# Patient Record
Sex: FEMALE | Race: WHITE | NOT HISPANIC OR LATINO | Employment: OTHER | URBAN - METROPOLITAN AREA
[De-identification: names, ages, dates, MRNs, and addresses within clinical notes are randomized per-mention and may not be internally consistent; named-entity substitution may affect disease eponyms.]

---

## 2017-03-12 ENCOUNTER — OFFICE VISIT (OUTPATIENT)
Dept: URGENT CARE | Facility: CLINIC | Age: 67
End: 2017-03-12
Payer: MEDICARE

## 2017-03-12 VITALS
WEIGHT: 163 LBS | BODY MASS INDEX: 27.16 KG/M2 | SYSTOLIC BLOOD PRESSURE: 120 MMHG | DIASTOLIC BLOOD PRESSURE: 80 MMHG | HEIGHT: 65 IN | OXYGEN SATURATION: 96 % | TEMPERATURE: 99.1 F | RESPIRATION RATE: 16 BRPM | HEART RATE: 78 BPM

## 2017-03-12 DIAGNOSIS — R05.8 NON-PRODUCTIVE COUGH: ICD-10-CM

## 2017-03-12 DIAGNOSIS — J03.90 TONSILLITIS: ICD-10-CM

## 2017-03-12 LAB
INT CON NEG: NEGATIVE
INT CON POS: POSITIVE
S PYO AG THROAT QL: NEGATIVE

## 2017-03-12 PROCEDURE — G8432 DEP SCR NOT DOC, RNG: HCPCS | Performed by: PHYSICIAN ASSISTANT

## 2017-03-12 PROCEDURE — G8484 FLU IMMUNIZE NO ADMIN: HCPCS | Performed by: PHYSICIAN ASSISTANT

## 2017-03-12 PROCEDURE — 99214 OFFICE O/P EST MOD 30 MIN: CPT | Performed by: PHYSICIAN ASSISTANT

## 2017-03-12 PROCEDURE — G8420 CALC BMI NORM PARAMETERS: HCPCS | Performed by: PHYSICIAN ASSISTANT

## 2017-03-12 PROCEDURE — 4040F PNEUMOC VAC/ADMIN/RCVD: CPT | Mod: 8P | Performed by: PHYSICIAN ASSISTANT

## 2017-03-12 PROCEDURE — 3014F SCREEN MAMMO DOC REV: CPT | Performed by: PHYSICIAN ASSISTANT

## 2017-03-12 PROCEDURE — 1101F PT FALLS ASSESS-DOCD LE1/YR: CPT | Mod: 8P | Performed by: PHYSICIAN ASSISTANT

## 2017-03-12 PROCEDURE — 87880 STREP A ASSAY W/OPTIC: CPT | Performed by: PHYSICIAN ASSISTANT

## 2017-03-12 PROCEDURE — 3017F COLORECTAL CA SCREEN DOC REV: CPT | Mod: 8P | Performed by: PHYSICIAN ASSISTANT

## 2017-03-12 PROCEDURE — 1036F TOBACCO NON-USER: CPT | Performed by: PHYSICIAN ASSISTANT

## 2017-03-12 RX ORDER — AZITHROMYCIN 250 MG/1
TABLET, FILM COATED ORAL
Qty: 6 TAB | Refills: 1 | Status: SHIPPED | OUTPATIENT
Start: 2017-03-12 | End: 2018-07-02

## 2017-03-12 ASSESSMENT — ENCOUNTER SYMPTOMS
TROUBLE SWALLOWING: 1
SORE THROAT: 1
SPUTUM PRODUCTION: 0
COUGH: 1
CONSTITUTIONAL NEGATIVE: 1
EYES NEGATIVE: 1
SWOLLEN GLANDS: 1

## 2017-03-12 NOTE — PROGRESS NOTES
Subjective:      Allison Cespedes is a 66 y.o. female who presents with Pharyngitis            Pharyngitis   This is a new problem. The current episode started in the past 7 days. The problem has been unchanged. Neither side of throat is experiencing more pain than the other. There has been no fever. The pain is moderate. Associated symptoms include coughing, swollen glands and trouble swallowing. Pertinent negatives include no congestion. She has had no exposure to strep or mono. She has tried nothing for the symptoms. The treatment provided no relief.       Review of Systems   Constitutional: Negative.    HENT: Positive for sore throat and trouble swallowing. Negative for congestion.    Eyes: Negative.    Respiratory: Positive for cough. Negative for sputum production.    Skin: Negative.           Objective:     There were no vitals taken for this visit.     Physical Exam   Constitutional: She is oriented to person, place, and time. She appears well-developed and well-nourished. No distress.   HENT:   Head: Normocephalic and atraumatic.   Mouth/Throat: No oropharyngeal exudate.   +phar./tons redn     Eyes: EOM are normal. Pupils are equal, round, and reactive to light.   Neck: Normal range of motion. Neck supple.   Cardiovascular: Normal rate.    Pulmonary/Chest: Effort normal and breath sounds normal. No respiratory distress. She has no wheezes. She has no rales.   Lymphadenopathy:     She has cervical adenopathy.   Neurological: She is alert and oriented to person, place, and time.   Skin: Skin is warm and dry.   Psychiatric: She has a normal mood and affect. Her behavior is normal. Judgment and thought content normal.   Nursing note and vitals reviewed.  There were no vitals filed for this visit.  Active Ambulatory Problems     Diagnosis Date Noted   • No Active Ambulatory Problems     Resolved Ambulatory Problems     Diagnosis Date Noted   • No Resolved Ambulatory Problems     Past Medical History    Diagnosis Date   • Allergy    • Arthritis      Current Outpatient Prescriptions on File Prior to Visit   Medication Sig Dispense Refill   • hydrocodone-acetaminophen (NORCO) 5-325 MG Tab per tablet Take 1-2 Tabs by mouth every 6 hours as needed. 20 Tab 0   • docusate sodium (COLACE) 100 MG Cap Take 1 Cap by mouth 2 times a day. 60 Cap 3   • Cetirizine-Pseudoephedrine (ZYRTEC-D PO) Take  by mouth.     • promethazine-codeine (PHENERGAN-CODEINE) 6.25-10 MG/5ML Syrup Take 5-10 mL by mouth 3 times a day as needed for Cough (or use qhs). 150 mL 0   • azithromycin (ZITHROMAX) 250 MG Tab z-cuca; U.D. 6 Tab 1   • estradiol (VIVELLE-DOT) 0.0375 MG/24HR patch Apply 1 Patch to skin as directed.     • progesterone (PROMETRIUM) 100 MG CAPS Take 100 mg by mouth every day.     • hydrOXYzine (ATARAX) 25 MG TABS Take 25 mg by mouth 3 times a day as needed for Itching.     • MELOXICAM PO Take 15 mg by mouth.     • omeprazole (PRILOSEC) 20 MG delayed-release capsule Take 20 mg by mouth every day.     • lovastatin (MEVACOR) 20 MG TABS Take 20 mg by mouth every evening.     • dicyclomine (BENTYL) 10 MG CAPS Take 10 mg by mouth 4 Times a Day,Before Meals and at Bedtime.     • Aspirin-Acetaminophen-Caffeine (EXCEDRIN PO) Take  by mouth.     • vitamin D, Ergocalciferol, (DRISDOL) 19714 UNITS CAPS capsule Take  by mouth every 7 days.     • Probiotic Product (PROBIOTIC DAILY PO) Take  by mouth.     • Multiple Vitamin (MULTI VITAMIN DAILY PO) Take  by mouth.     • Fexofenadine HCl (ALLEGRA PO) Take  by mouth.     • Fluticasone-Salmeterol (ADVAIR DISKUS INH) Inhale  by mouth.     • ALBUTEROL INH Inhale  by mouth.     • predniSONE (DELTASONE) 20 MG TABS Take 4 tabs PO today, then take 2 tabs PO days #2-#5 12 Tab 0   • fluticasone-salmeterol (ADVAIR) 250-50 MCG/DOSE AEPB Inhale 1 Puff by mouth every 12 hours.       • levothyroxine (SYNTHROID) 100 MCG TABS Take 100 mcg by mouth every day.     • liothyronine (CYTOMEL) 5 MCG TABS Take 5 mcg by  mouth every day.     • BuPROPion HCl (WELLBUTRIN PO) Take 150 mg by mouth.     • propranolol (INDERAL) 20 MG TABS Take 20 mg by mouth 2 times a day. Patient taking 1/2 tab BID     • Montelukast Sodium (SINGULAIR PO) Take  by mouth.     • Non Formulary Request Pt. Is also using two inhalers for allergies        No current facility-administered medications on file prior to visit.     Gargles, Cepacol lozenges, Aleve/Advil as needed for throat pain  Family History   Problem Relation Age of Onset   • Non-contributory Mother    • Non-contributory Father      Pcn         rst ng     Assessment/Plan:     ·  tonsillitis, cough      · otc prn; rx zpak prn sx persist/worsen

## 2017-03-12 NOTE — MR AVS SNAPSHOT
"        Allison Cespedes   3/12/2017 1:30 PM   Office Visit   MRN: [id number]    Department:  [facility]   Dept Phone:  [phone]    Description:  Female : 1950   Provider:  Agustin Trivedi PA-C           Reason for Visit     Pharyngitis cough,fever,sinus      Allergies as of 3/12/2017     Allergen Noted Reactions    Pcn [Penicillins] 2012         You were diagnosed with     Tonsillitis   [104382]       Non-productive cough   [278328]         Vital Signs     Blood Pressure Pulse Temperature Respirations Height Weight    120/80 mmHg 78 37.3 °C (99.1 °F) 16 1.651 m (5' 5\") 73.936 kg (163 lb)    Body Mass Index Oxygen Saturation Smoking Status             27.12 kg/m2 96% Former Smoker         Basic Information     Date Of Birth Sex Race Ethnicity Preferred Language    1950 Female White Non- English      Health Maintenance        Date Due Completion Dates    IMM DTaP/Tdap/Td Vaccine (1 - Tdap) 1969 ---    PAP SMEAR 1971 ---    COLONOSCOPY 2000 ---    IMM ZOSTER VACCINE 2010 ---    BONE DENSITY 2015 ---    IMM PNEUMOCOCCAL 65+ (ADULT) LOW/MEDIUM RISK SERIES (1 of 2 - PCV13) 2015 ---    IMM INFLUENZA (1) 2016 ---    MAMMOGRAM 2017, 2005, 2004            Current Immunizations     No immunizations on file.      Below and/or attached are the medications your provider expects you to take. Review all of your home medications and newly ordered medications with your provider and/or pharmacist. Follow medication instructions as directed by your provider and/or pharmacist. Please keep your medication list with you and share with your provider. Update the information when medications are discontinued, doses are changed, or new medications (including over-the-counter products) are added; and carry medication information at all times in the event of emergency situations     Allergies:  PCN - (reactions not documented)               "   Medications  Valid as of: March 12, 2017 -  1:56 PM    Generic Name Brand Name Tablet Size Instructions for use    Albuterol   Inhale  by mouth.        Aspirin-Acetaminophen-Caffeine   Take  by mouth.        Azithromycin (Tab) ZITHROMAX 250 MG z-cuca; U.D.        Azithromycin (Tab) ZITHROMAX 250 MG z-cuca; U.D.        BuPROPion HCl   Take 150 mg by mouth.        Cetirizine-Pseudoephedrine   Take  by mouth.        Dicyclomine HCl (Cap) BENTYL 10 MG Take 10 mg by mouth 4 Times a Day,Before Meals and at Bedtime.        Docusate Sodium (Cap) COLACE 100 MG Take 1 Cap by mouth 2 times a day.        Ergocalciferol (Cap) DRISDOL 94762 UNITS Take  by mouth every 7 days.        Estradiol (PATCH BIWEEKLY) VIVELLE 0.0375 MG/24HR Apply 1 Patch to skin as directed.        Fexofenadine HCl   Take  by mouth.        Fluticasone-Salmeterol (AEROSOL POWDER, BREATH ACTIVATED) ADVAIR 250-50 MCG/DOSE Inhale 1 Puff by mouth every 12 hours.          Fluticasone-Salmeterol   Inhale  by mouth.        Hydrocodone-Acetaminophen (Tab) NORCO 5-325 MG Take 1-2 Tabs by mouth every 6 hours as needed.        HydrOXYzine HCl (Tab) ATARAX 25 MG Take 25 mg by mouth 3 times a day as needed for Itching.        Levothyroxine Sodium (Tab) SYNTHROID 100 MCG Take 100 mcg by mouth every day.        Liothyronine Sodium (Tab) CYTOMEL 5 MCG Take 5 mcg by mouth every day.        Lovastatin (Tab) MEVACOR 20 MG Take 20 mg by mouth every evening.        Meloxicam   Take 15 mg by mouth.        Montelukast Sodium   Take  by mouth.        Multiple Vitamin   Take  by mouth.        Non Formulary Request Non Formulary Request  Pt. Is also using two inhalers for allergies         Omeprazole (CAPSULE DELAYED RELEASE) PRILOSEC 20 MG Take 20 mg by mouth every day.        PredniSONE (Tab) DELTASONE 20 MG Take 4 tabs PO today, then take 2 tabs PO days #2-#5        Probiotic Product   Take  by mouth.        Progesterone Micronized (Cap) PROMETRIUM 100 MG Take 100 mg by mouth  every day.        Promethazine-Codeine (Syrup) PHENERGAN-CODEINE 6.25-10 MG/5ML Take 5-10 mL by mouth 3 times a day as needed for Cough (or use qhs).        Propranolol HCl (Tab) INDERAL 20 MG Take 20 mg by mouth 2 times a day. Patient taking 1/2 tab BID        .                 Medicines prescribed today were sent to:     [facility] [address] [facility], [place] - [address] AT [address] [id number] [facility] [address]    Phone: [phone] Fax: [phone]    Open 24 Hours?: No      Medication refill instructions:       If your prescription bottle indicates you have medication refills left, it is not necessary to call your provider’s office. Please contact your pharmacy and they will refill your medication.    If your prescription bottle indicates you do not have any refills left, you may request refills at any time through one of the following ways: The online [employer] system (except Urgent Care), by calling your provider’s office, or by asking your pharmacy to contact your provider’s office with a refill request. Medication refills are processed only during regular business hours and may not be available until the next business day. Your provider may request additional information or to have a follow-up visit with you prior to refilling your medication.   *Please Note: Medication refills are assigned a new Rx number when refilled electronically. Your pharmacy may indicate that no refills were authorized even though a new prescription for the same medication is available at the pharmacy. Please request the medicine by name with the pharmacy before contacting your provider for a refill.           [employer] Access Code: Activation code not generated  Current [employer] Status: Active

## 2017-10-05 ENCOUNTER — HOSPITAL ENCOUNTER (OUTPATIENT)
Dept: RADIOLOGY | Facility: MEDICAL CENTER | Age: 67
End: 2017-10-05
Attending: NURSE PRACTITIONER
Payer: MEDICARE

## 2017-10-05 DIAGNOSIS — Z12.31 VISIT FOR SCREENING MAMMOGRAM: ICD-10-CM

## 2017-10-05 PROCEDURE — G0202 SCR MAMMO BI INCL CAD: HCPCS

## 2018-07-02 ENCOUNTER — APPOINTMENT (OUTPATIENT)
Dept: ADMISSIONS | Facility: MEDICAL CENTER | Age: 68
End: 2018-07-02
Attending: SURGERY
Payer: MEDICARE

## 2018-07-06 ENCOUNTER — HOSPITAL ENCOUNTER (OUTPATIENT)
Facility: MEDICAL CENTER | Age: 68
End: 2018-07-06
Attending: SURGERY | Admitting: SURGERY
Payer: MEDICARE

## 2018-07-06 VITALS
HEART RATE: 84 BPM | DIASTOLIC BLOOD PRESSURE: 77 MMHG | HEIGHT: 65 IN | TEMPERATURE: 97 F | SYSTOLIC BLOOD PRESSURE: 123 MMHG | RESPIRATION RATE: 17 BRPM | BODY MASS INDEX: 28.28 KG/M2 | OXYGEN SATURATION: 91 % | WEIGHT: 169.75 LBS

## 2018-07-06 PROCEDURE — 160035 HCHG PACU - 1ST 60 MINS PHASE I: Performed by: SURGERY

## 2018-07-06 PROCEDURE — A6402 STERILE GAUZE <= 16 SQ IN: HCPCS | Performed by: SURGERY

## 2018-07-06 PROCEDURE — 88304 TISSUE EXAM BY PATHOLOGIST: CPT

## 2018-07-06 PROCEDURE — 501838 HCHG SUTURE GENERAL: Performed by: SURGERY

## 2018-07-06 PROCEDURE — 160028 HCHG SURGERY MINUTES - 1ST 30 MINS LEVEL 3: Performed by: SURGERY

## 2018-07-06 PROCEDURE — 700111 HCHG RX REV CODE 636 W/ 250 OVERRIDE (IP)

## 2018-07-06 PROCEDURE — 700101 HCHG RX REV CODE 250

## 2018-07-06 PROCEDURE — 160009 HCHG ANES TIME/MIN: Performed by: SURGERY

## 2018-07-06 PROCEDURE — A9270 NON-COVERED ITEM OR SERVICE: HCPCS

## 2018-07-06 PROCEDURE — 160048 HCHG OR STATISTICAL LEVEL 1-5: Performed by: SURGERY

## 2018-07-06 PROCEDURE — 500122 HCHG BOVIE, BLADE: Performed by: SURGERY

## 2018-07-06 PROCEDURE — 160002 HCHG RECOVERY MINUTES (STAT): Performed by: SURGERY

## 2018-07-06 PROCEDURE — 700102 HCHG RX REV CODE 250 W/ 637 OVERRIDE(OP)

## 2018-07-06 PROCEDURE — 160036 HCHG PACU - EA ADDL 30 MINS PHASE I: Performed by: SURGERY

## 2018-07-06 RX ORDER — BUPIVACAINE HYDROCHLORIDE AND EPINEPHRINE 5; 5 MG/ML; UG/ML
INJECTION, SOLUTION EPIDURAL; INTRACAUDAL; PERINEURAL
Status: DISCONTINUED
Start: 2018-07-06 | End: 2018-07-06 | Stop reason: HOSPADM

## 2018-07-06 RX ORDER — BUPIVACAINE HYDROCHLORIDE AND EPINEPHRINE 2.5; 5 MG/ML; UG/ML
INJECTION, SOLUTION EPIDURAL; INFILTRATION; INTRACAUDAL; PERINEURAL
Status: DISCONTINUED | OUTPATIENT
Start: 2018-07-06 | End: 2018-07-06 | Stop reason: HOSPADM

## 2018-07-06 RX ORDER — SODIUM CHLORIDE, SODIUM LACTATE, POTASSIUM CHLORIDE, CALCIUM CHLORIDE 600; 310; 30; 20 MG/100ML; MG/100ML; MG/100ML; MG/100ML
INJECTION, SOLUTION INTRAVENOUS CONTINUOUS
Status: DISCONTINUED | OUTPATIENT
Start: 2018-07-06 | End: 2018-07-06 | Stop reason: HOSPADM

## 2018-07-06 RX ORDER — OXYCODONE HCL 5 MG/5 ML
SOLUTION, ORAL ORAL
Status: COMPLETED
Start: 2018-07-06 | End: 2018-07-06

## 2018-07-06 RX ADMIN — OXYCODONE HYDROCHLORIDE 5 MG: 5 SOLUTION ORAL at 13:58

## 2018-07-06 RX ADMIN — ALBUTEROL SULFATE 2.5 MG: 2.5 SOLUTION RESPIRATORY (INHALATION) at 15:45

## 2018-07-06 RX ADMIN — SODIUM CHLORIDE, SODIUM LACTATE, POTASSIUM CHLORIDE, CALCIUM CHLORIDE: 600; 310; 30; 20 INJECTION, SOLUTION INTRAVENOUS at 11:40

## 2018-07-06 ASSESSMENT — PAIN SCALES - GENERAL
PAINLEVEL_OUTOF10: 3
PAINLEVEL_OUTOF10: 3
PAINLEVEL_OUTOF10: 0
PAINLEVEL_OUTOF10: 3
PAINLEVEL_OUTOF10: 3
PAINLEVEL_OUTOF10: 2
PAINLEVEL_OUTOF10: 3
PAINLEVEL_OUTOF10: 4
PAINLEVEL_OUTOF10: 3
PAINLEVEL_OUTOF10: 2

## 2018-07-06 NOTE — DISCHARGE INSTRUCTIONS
ACTIVITY: Rest and take it easy for the first 24 hours.  A responsible adult is recommended to remain with you during that time.  It is normal to feel sleepy.  We encourage you to not do anything that requires balance, judgment or coordination.    MILD FLU-LIKE SYMPTOMS ARE NORMAL. YOU MAY EXPERIENCE GENERALIZED MUSCLE ACHES, THROAT IRRITATION, HEADACHE AND/OR SOME NAUSEA.    FOR 24 HOURS DO NOT:  Drive, operate machinery or run household appliances.  Drink beer or alcoholic beverages.   Make important decisions or sign legal documents.    SPECIAL INSTRUCTIONS:      Care of Your Incisions:  • Leave the bandages on for 48 hours. You can shower with the dressing on as it is waterproof.  Avoid getting lotions, powders or deodorant on the incision while it is healing.  • You may have thin paper strips across the incision. These are called Steri-Strips. When they start to curl at the edges, you can peel them off. It is okay to shower with these on.  • Don’t worry if the area under either incision feels firm or hard. This is normal and usually softens within a few months.     How to Manage Discomfort After Surgery:  • It is normal to have tenderness, discomfort or mild swelling at the site of the incisions.      You may also feel:  - Numbness at the incisions.     • You will be given a prescription for pain medication prior to being discharged from the hospital. If you are having pain, take the medication as directed by your physician and by the label directions. You should be comfortable and moving around. Most people use some prescription pain medication, though some need only over the counter pain medication, such as ibuprofen (Motrin) or acetaminophen (Tylenol). Some women have little pain and take nothing at all. Whatever amount of pain you have, it is important to listen to your body and use the medication if needed during your recovery.     • Prescription pain medication may cause constipation. If you are having  problems, use what you normally would or call your nurse for suggestions. It also helps to stay regular by including fiber in your diet (for example: bran or fruits and vegetables) and drink plenty of liquids (water, juice, etc.).     Activity:  • You may resume your normal routine, but avoid heavy lifting (over 10 pounds), pushing or pulling until your first post-operative visit, unless otherwise specified by your surgeon.  • Do not drive for at least 24-48 hours after surgery (unless otherwise directed by your surgeon), or while you are taking prescription pain medicine. Prescription pain medicine causes drowsiness (makes you sleepy) so you should avoid doing any tasks where you should be awake and alert (driving, operating machinery, etc).     When to Call Your Doctor/Nurse:  • If you have a fever greater than 100.5, increased pain, redness or warmth at the area around the incision, drainage from the incision or around the drain. Call Dr. Rivas's office at [phone] with any other questions or concerns.     You should have an appointment to see Dr. Rivas about a week after surgery, call [phone] if you do not already have an appointment.     Office address:  [address] [place] [address] [address]  [place] [address]        Swapna Rivas M.D.  [facility]  [phone]          DIET: To avoid nausea, slowly advance diet as tolerated, avoiding spicy or greasy foods for the first day.  Add more substantial food to your diet according to your physician's instructions.  Babies can be fed formula or breast milk as soon as they are hungry.  INCREASE FLUIDS AND FIBER TO AVOID CONSTIPATION.    SURGICAL DRESSING/BATHING: may shower tomorrow, remove dressing in 48 hours    FOLLOW-UP APPOINTMENT:  A follow-up appointment should be arranged with your doctor; call to schedule.    You should CALL YOUR PHYSICIAN if you develop:  Fever greater than 101 degrees F.  Pain not relieved by medication, or persistent nausea or  vomiting.  Excessive bleeding (blood soaking through dressing) or unexpected drainage from the wound.  Extreme redness or swelling around the incision site, drainage of pus or foul smelling drainage.  Inability to urinate or empty your bladder within 8 hours.  Problems with breathing or chest pain.    You should call [phone] if you develop problems with breathing or chest pain.  If you are unable to contact your doctor or surgical center, you should go to the nearest emergency room or urgent care center.  Physician's telephone #: [phone]    If any questions arise, call your doctor.  If your doctor is not available, please feel free to call the Surgical Center at [phone].  The Center is open Monday through Friday from 7AM to 7PM.  You can also call the [facility] HOTLINE open 24 hours/day, 7 days/week and speak to a nurse at [phone], or toll free at [phone].    A registered nurse may call you a few days after your surgery to see how you are doing after your procedure.    MEDICATIONS: Resume taking daily medication.  Take prescribed pain medication with food.  If no medication is prescribed, you may take non-aspirin pain medication if needed.  PAIN MEDICATION CAN BE VERY CONSTIPATING.  Take a stool softener or laxative such as senokot, pericolace, or milk of magnesia if needed.    Prescription given for home norco.  Last pain medication given at 1:58pm.    If your physician has prescribed pain medication that includes Acetaminophen (Tylenol), do not take additional Acetaminophen (Tylenol) while taking the prescribed medication.    Depression / Suicide Risk    As you are discharged from this [facility] facility, it is important to learn how to keep safe from harming yourself.    Recognize the warning signs:  · Abrupt changes in personality, positive or negative- including increase in energy   · Giving away possessions  · Change in eating patterns- significant weight changes-  positive or  negative  · Change in sleeping patterns- unable to sleep or sleeping all the time   · Unwillingness or inability to communicate  · Depression  · Unusual sadness, discouragement and loneliness  · Talk of wanting to die  · Neglect of personal appearance   · Rebelliousness- reckless behavior  · Withdrawal from people/activities they love  · Confusion- inability to concentrate     If you or a loved one observes any of these behaviors or has concerns about self-harm, here's what you can do:  · Talk about it- your feelings and reasons for harming yourself  · Remove any means that you might use to hurt yourself (examples: pills, rope, extension cords, firearm)  · Get professional help from the community (Mental Health, Substance Abuse, psychological counseling)  · Do not be alone:Call your Safe Contact- someone whom you trust who will be there for you.  · Call your local CRISIS HOTLINE 150-2098 or 743-173-5263  · Call your local Children's Mobile Crisis Response Team [place] (585) 330-3312 or www.Perk  · Call the toll free National Suicide Prevention Hotlines   · National Suicide Prevention Lifeline 066-329-TSTY (2944)  · National Hope Line Network 800-SUICIDE (782-4933)

## 2018-07-06 NOTE — OR NURSING
1450 Handoff report received from ZOYA Reyes. Patient sitting up in bed, using incentive spirometer as instructed.  at bedside.    1500  left for home for about an hour. Patient ate chocolate ice cream po without nausea. Continues to use incentive spirometer.    1525 Handoff report given to ZOYA Reyes.

## 2018-07-06 NOTE — PROGRESS NOTES
Discharge Instructions:    Care of Your Incisions:  • Leave the bandages on for 48 hours. You can shower with the dressing on as it is waterproof.  Avoid getting lotions, powders or deodorant on the incision while it is healing.  • You may have thin paper strips across the incision. These are called Steri-Strips. When they start to curl at the edges, you can peel them off. It is okay to shower with these on.  • Don’t worry if the area under either incision feels firm or hard. This is normal and usually softens within a few months.    How to Manage Discomfort After Surgery:  • It is normal to have tenderness, discomfort or mild swelling at the site of the incisions.     You may also feel:  - Numbness at the incisions.    • You will be given a prescription for pain medication prior to being discharged from the hospital. If you are having pain, take the medication as directed by your physician and by the label directions. You should be comfortable and moving around. Most people use some prescription pain medication, though some need only over the counter pain medication, such as ibuprofen (Motrin) or acetaminophen (Tylenol). Some women have little pain and take nothing at all. Whatever amount of pain you have, it is important to listen to your body and use the medication if needed during your recovery.    • Prescription pain medication may cause constipation. If you are having problems, use what you normally would or call your nurse for suggestions. It also helps to stay regular by including fiber in your diet (for example: bran or fruits and vegetables) and drink plenty of liquids (water, juice, etc.).    Activity:  • You may resume your normal routine, but avoid heavy lifting (over 10 pounds), pushing or pulling until your first post-operative visit, unless otherwise specified by your surgeon.  • Do not drive for at least 24-48 hours after surgery (unless otherwise directed by your surgeon), or while you are taking  prescription pain medicine. Prescription pain medicine causes drowsiness (makes you sleepy) so you should avoid doing any tasks where you should be awake and alert (driving, operating machinery, etc).    When to Call Your Doctor/Nurse:  • If you have a fever greater than 100.5, increased pain, redness or warmth at the area around the incision, drainage from the incision or around the drain. Call Dr. Rivas's office at [phone] with any other questions or concerns.    You should have an appointment to see Dr. Rivas about a week after surgery, call [phone] if you do not already have an appointment.    Office address:  [address] [place] [address] [address]  [place] [address]      Swapna Rivas M.D.  [facility]  [phone]

## 2018-07-06 NOTE — OR NURSING
1329 Patient arrived from OR on [facility]. Report received from RN and Anesthesia. Patient's VS WNL, patient arousable, stable, breathing even/non labored.     1358 Patient complained of 4/10 pain on back, appears calm, oxycodone given    1430 Per Dr. Rivas patient has home norco and does not need new prescriptions    1440 Patient on room air, desats to high 80's occasionally, educated on use of incentive spirometer. Incentive spirometer being used.     1630 Discharge instructions discussed with patient and family, copy given. Patient verbalized understanding to instructions. Belongings with patient.     1642 Discharge criteria met. PIV out, Discharged via wheelchair with  .

## 2018-07-06 NOTE — OP REPORT
Operative Report    Date: 7/6/2018    Surgeon: Swapna Rivas M.D.    Anesthesiologist: Daniel    Pre-operative Diagnosis:  Left shoulder subcutaneous mass, 8 cm     Post-operative Diagnosis: same     Procedure: excision subcutaneous mass, 8 cm x 8 cm      Procedure in detail: The patient was identified in the pre-operative holding area and brought to the operating room. Correct side and site were identified. Pre-operative antibiotics of ancef were administered prior to the procedure. Anesthesia was smoothly induced.The patient was then prepped and draped in the usual sterile fashion.    The skin was infiltrated with local anesthetic and a curvilinear incision was made over the mass.  The subcutaneous tissue was grasped and the mass excised using cautery. The specimen was then completely removed and was sent for permanent pathology. Meticulous hemostasis was achieved with electrocautery. The area was irrigated and suctioned. The skin was closed in two layers with vicryl and monocryl. Sterile dressings were applied.     The patient was awakened from anesthetic, and was taken to the recovery room in stable condition.    Sponge and needle counts were correct at the end of the case.     Specimen: left shoulder subcutaneous mass    EBL: minimal    Dispo: stable, extubated, to PACU    Swapna Rivas M.D.  [facility]  [phone]

## 2018-09-05 ENCOUNTER — HOSPITAL ENCOUNTER (OUTPATIENT)
Dept: RADIOLOGY | Facility: MEDICAL CENTER | Age: 68
End: 2018-09-05

## 2018-09-05 ENCOUNTER — OFFICE VISIT (OUTPATIENT)
Dept: CARDIOLOGY | Facility: MEDICAL CENTER | Age: 68
End: 2018-09-05
Payer: MEDICARE

## 2018-09-05 VITALS
WEIGHT: 170 LBS | HEART RATE: 92 BPM | BODY MASS INDEX: 28.32 KG/M2 | OXYGEN SATURATION: 95 % | DIASTOLIC BLOOD PRESSURE: 70 MMHG | HEIGHT: 65 IN | SYSTOLIC BLOOD PRESSURE: 112 MMHG

## 2018-09-05 DIAGNOSIS — R07.89 OTHER CHEST PAIN: ICD-10-CM

## 2018-09-05 DIAGNOSIS — R06.02 SOB (SHORTNESS OF BREATH): ICD-10-CM

## 2018-09-05 LAB — EKG IMPRESSION: NORMAL

## 2018-09-05 PROCEDURE — 99204 OFFICE O/P NEW MOD 45 MIN: CPT | Performed by: INTERNAL MEDICINE

## 2018-09-05 PROCEDURE — 93000 ELECTROCARDIOGRAM COMPLETE: CPT | Performed by: INTERNAL MEDICINE

## 2018-09-05 RX ORDER — DICLOFENAC SODIUM 75 MG/1
TABLET, DELAYED RELEASE ORAL
Refills: 2 | Status: ON HOLD | COMMUNITY
Start: 2018-08-13 | End: 2018-10-17

## 2018-09-05 RX ORDER — ESTRADIOL ACETATE 0.05 MG/D
RING VAGINAL
Refills: 0 | COMMUNITY
Start: 2018-06-28 | End: 2019-09-19

## 2018-09-05 RX ORDER — ESCITALOPRAM OXALATE 20 MG/1
TABLET ORAL
Refills: 3 | COMMUNITY
Start: 2018-08-18 | End: 2019-03-05

## 2018-09-05 RX ORDER — BUPROPION HYDROCHLORIDE 150 MG/1
TABLET ORAL
Refills: 3 | COMMUNITY
Start: 2018-07-09 | End: 2019-03-05

## 2018-09-05 ASSESSMENT — ENCOUNTER SYMPTOMS
CONSTITUTIONAL NEGATIVE: 1
NERVOUS/ANXIOUS: 1
EYES NEGATIVE: 1
BRUISES/BLEEDS EASILY: 0
HEARTBURN: 0
COUGH: 0
NAUSEA: 0
DEPRESSION: 1
HEMOPTYSIS: 0
MUSCULOSKELETAL NEGATIVE: 1
PND: 0
ABDOMINAL PAIN: 0
LOSS OF CONSCIOUSNESS: 0
DIZZINESS: 0
INSOMNIA: 1
MEMORY LOSS: 0
WHEEZING: 0

## 2018-09-05 NOTE — PROGRESS NOTES
Chief Complaint   Patient presents with   • Shortness of Breath   • Fatigue       Subjective:   Allison Cespedes is a 67 y.o. female who presents today as a new patient.  She is reestablishing care in regards to her breathlessness and ongoing chest discomfort  Her mother is 94 she helps take care of her long distance and this gets her upset sometimes.  She is originally from [place].    She takes medications as directed, tired a lot wonder if she has chronic fatigue.  Not sure if her SSRI is helping  Never put on medications for her palpitations these happen infrequently but do bother her greatly they feel heavy and catch her breath.  Thinks her sleep might be disturbed but thinks she had a sleep workup in the past that was negative    Worries that something is wrong and she might miss it  Testing done by PCP last February all normal and reassuring.  No changes in symptomatology since that point    Past Medical History:   Diagnosis Date   • Allergy    • Arthritis    • Breath shortness    • Bronchitis    • Cold    • Disorder of thyroid    • Heart burn    • Indigestion    • Pneumonia     in 1970's   • Psychiatric problem    • Snoring    • Tuberculosis      Past Surgical History:   Procedure Laterality Date   • MASS EXCISION GENERAL Left 7/6/2018    Procedure: MASS EXCISION GENERAL/ SUBCUTANEOUS MASS LEFT SHOULDER;  Surgeon: Swapna Rivas M.D.;  Location: SURGERY SAME DAY [facility];  Service: General     Family History   Problem Relation Age of Onset   • Non-contributory Mother    • Non-contributory Father      Social History     Social History   • Marital status:      Spouse name: N/A   • Number of children: N/A   • Years of education: N/A     Occupational History   • Not on file.     Social History Main Topics   • Smoking status: Former Smoker   • Smokeless tobacco: Never Used   • Alcohol use Yes      Comment: 2 per month   • Drug use: Unknown   • Sexual activity: Not on file     Other Topics  Concern   • Not on file     Social History Narrative   • No narrative on file     Allergies   Allergen Reactions   • Pcn [Penicillins]      Rxn as child     Outpatient Encounter Prescriptions as of 9/5/2018   Medication Sig Dispense Refill   • diclofenac EC (VOLTAREN) 75 MG Tablet Delayed Response TK 1 T PO BID  2   • escitalopram (LEXAPRO) 20 MG tablet TK 1 T PO QD  3   • FEMRING 0.05 MG/24HR RING INSERT 1 VAGINALLY Q 3 MONTHS  0   • buPROPion (WELLBUTRIN XL) 150 MG XL tablet TK 1 T PO QD  3   • DILTIAZem (CARDIZEM) 30 MG Tab Take 1 Tab by mouth 2 times a day as needed. 30 Tab 3   • hydrocodone-acetaminophen (NORCO) 5-325 MG Tab per tablet Take 1-2 Tabs by mouth every 6 hours as needed. 20 Tab 0   • progesterone (PROMETRIUM) 100 MG CAPS Take 100 mg by mouth every day.     • omeprazole (PRILOSEC) 20 MG delayed-release capsule Take 20 mg by mouth every day.     • lovastatin (MEVACOR) 20 MG TABS Take 20 mg by mouth every evening.     • dicyclomine (BENTYL) 10 MG CAPS Take 10 mg by mouth 4 Times a Day,Before Meals and at Bedtime.     • Aspirin-Acetaminophen-Caffeine (EXCEDRIN PO) Take  by mouth.     • vitamin D, Ergocalciferol, (DRISDOL) 08853 UNITS CAPS capsule Take  by mouth every 7 days.     • Probiotic Product (PROBIOTIC DAILY PO) Take  by mouth.     • Fluticasone-Salmeterol (ADVAIR DISKUS INH) Inhale  by mouth.     • levothyroxine (SYNTHROID) 100 MCG TABS Take 100 mcg by mouth every day.     • liothyronine (CYTOMEL) 5 MCG TABS Take 5 mcg by mouth every day.     • propranolol (INDERAL) 20 MG TABS Take 20 mg by mouth 2 times a day. Patient taking 1/2 tab BID     • [DISCONTINUED] BuPROPion HCl (WELLBUTRIN PO) Take 150 mg by mouth.       No facility-administered encounter medications on file as of 9/5/2018.      Review of Systems   Constitutional: Negative.    HENT: Negative for hearing loss and tinnitus.    Eyes: Negative.    Respiratory: Negative for cough, hemoptysis and wheezing.    Cardiovascular: Negative for  "leg swelling and PND.   Gastrointestinal: Negative for abdominal pain, heartburn and nausea.   Musculoskeletal: Negative.    Skin: Negative.    Neurological: Negative for dizziness and loss of consciousness.   Endo/Heme/Allergies: Does not bruise/bleed easily.   Psychiatric/Behavioral: Positive for depression. Negative for memory loss and suicidal ideas. The patient is nervous/anxious and has insomnia.    All other systems reviewed and are negative.       Objective:   /70   Pulse 92   Ht 1.651 m (5' 5\")   Wt 77.1 kg (170 lb)   SpO2 95%   BMI 28.29 kg/m²     Physical Exam   Constitutional: She is oriented to person, place, and time. She appears well-nourished.   Obese mildly anxious no acute distress   HENT:   Head: Normocephalic and atraumatic.   Eyes: Pupils are equal, round, and reactive to light. EOM are normal.   Neck: No JVD present. No thyromegaly present.   Cardiovascular: Normal rate, regular rhythm and intact distal pulses.    No murmur heard.  Pulmonary/Chest: Breath sounds normal. No respiratory distress. She exhibits no tenderness.   Abdominal: Bowel sounds are normal. She exhibits no distension.   Musculoskeletal: She exhibits no edema or tenderness.   Lymphadenopathy:     She has no cervical adenopathy.   Neurological: She is alert and oriented to person, place, and time. Coordination normal.   Skin: Skin is dry. No rash noted.   Psychiatric: She has a normal mood and affect. Her behavior is normal.       Assessment:     1. SOB (shortness of breath)  EKG    NM-CARDIAC PET   2. Other chest pain  NM-CARDIAC PET       Medical Decision Making:  Today's Assessment / Status / Plan:     Twelve-lead EKG done today and reviewed by me is normal sinus rhythm normal EKG    Chest discomfort chest recurrent angina in the setting of obesity  Is a PET scan.  We will look for underlying structural heart disease she is large breasted and obese, the test of choice in this setting with her symptoms escalating " in the form of coronary disease.  If it is abnormal discussed the eventuality of needing further testing including angiography.    We will see her back in a month.  I suggested she increase her SSRI to 30 mg a day   I gave her prescription for diltiazem 30 to take twice a day as needed for symptoms  She will let us know in the interim if she is not well    I would have a low threshold to check an x-ray and send for PFTs or sleep study if she is still symptomatic    Will follow-up as needed after this

## 2018-09-06 ENCOUNTER — TELEPHONE (OUTPATIENT)
Dept: CARDIOLOGY | Facility: MEDICAL CENTER | Age: 68
End: 2018-09-06

## 2018-09-06 NOTE — TELEPHONE ENCOUNTER
Allison Lovett M.D. 11 hours ago (10:37 PM)         I am currently taking 1/2 of a propranolol each night as a migraine prevention.  Should I continue on that or will the Diltizaren take care of the migraine prevention?  Thank you very much.         Kyra Lovett M.D.   You 18 minutes ago (10:03 AM)      Ok to do both! (Routing comment)       Pt notified through [employer]

## 2018-09-08 ASSESSMENT — ENCOUNTER SYMPTOMS
HEMOPTYSIS: 0
WHEEZING: 1
RESPIRATORY SYMPTOMS COMMENTS: YES
DYSPNEA AT REST: 0
CHEST TIGHTNESS: 1

## 2018-09-10 ENCOUNTER — OFFICE VISIT (OUTPATIENT)
Dept: PULMONOLOGY | Facility: HOSPICE | Age: 68
End: 2018-09-10
Payer: MEDICARE

## 2018-09-10 VITALS
DIASTOLIC BLOOD PRESSURE: 74 MMHG | WEIGHT: 174.6 LBS | HEART RATE: 62 BPM | BODY MASS INDEX: 29.09 KG/M2 | SYSTOLIC BLOOD PRESSURE: 122 MMHG | OXYGEN SATURATION: 95 % | RESPIRATION RATE: 16 BRPM | HEIGHT: 65 IN | TEMPERATURE: 97.5 F

## 2018-09-10 DIAGNOSIS — J45.30 MILD PERSISTENT ASTHMA WITHOUT COMPLICATION: ICD-10-CM

## 2018-09-10 DIAGNOSIS — Z87.891 FORMER SMOKER: ICD-10-CM

## 2018-09-10 PROCEDURE — 99204 OFFICE O/P NEW MOD 45 MIN: CPT | Performed by: INTERNAL MEDICINE

## 2018-09-10 RX ORDER — BUDESONIDE AND FORMOTEROL FUMARATE DIHYDRATE 160; 4.5 UG/1; UG/1
2 AEROSOL RESPIRATORY (INHALATION) 2 TIMES DAILY
Qty: 1 INHALER | Refills: 6 | Status: SHIPPED | OUTPATIENT
Start: 2018-09-10 | End: 2018-09-14

## 2018-09-10 NOTE — PROGRESS NOTES
Chief Complaint   Patient presents with   • Establish Care     Asthma. last seen on 08/2015       HPI: This patient is a 67 y.o. Female known to me from 2006, who resents to reestablish care for obstructive lung disease.  She has a 60-pack-year history of tobacco use, quit smoking in 1999.  She has had mild exertional dyspnea for >12 years, which she feels has worsened over the past year.  She has associated nonproductive cough, and periodic wheezing and chest tightness.  She had tried her mother's Advair discus, with subjective benefit.  Former pulmonary function testing in 2015 showed mild obstructive ventilatory defect, with baseline FEV1: 2 L or 78% and post albuterol FEV1 2.04 L or 80%.  She has a history of latent TB treated with 9 months of isoniazid.  She feels fatigued, and has chronic back pain requiring hydrocodone.  She is undergoing workup with Dr. Lovett to exclude cardiovascular etiology for dyspnea.      Past Medical History:   Diagnosis Date   • Allergy    • Arthritis    • Breath shortness    • Bronchitis    • Cold    • Disorder of thyroid    • Heart burn    • Indigestion    • Pneumonia     in 1970's   • Psychiatric problem    • Snoring    • Tuberculosis        Social History     Social History   • Marital status:      Spouse name: N/A   • Number of children: N/A   • Years of education: N/A     Occupational History   • Not on file.     Social History Main Topics   • Smoking status: Former Smoker     Packs/day: 2.00     Years: 30.00     Types: Cigarettes     Quit date: 2/3/1999   • Smokeless tobacco: Never Used   • Alcohol use No   • Drug use: No   • Sexual activity: Not on file     Other Topics Concern   • Not on file     Social History Narrative   • No narrative on file       Family History   Problem Relation Age of Onset   • Non-contributory Mother    • Hypertension Mother    • Non-contributory Father        Current Outpatient Prescriptions on File Prior to Visit   Medication Sig Dispense  Refill   • diclofenac EC (VOLTAREN) 75 MG Tablet Delayed Response TK 1 T PO BID  2   • escitalopram (LEXAPRO) 20 MG tablet TK 1 T PO QD  3   • FEMRING 0.05 MG/24HR RING INSERT 1 VAGINALLY Q 3 MONTHS  0   • buPROPion (WELLBUTRIN XL) 150 MG XL tablet TK 1 T PO QD  3   • DILTIAZem (CARDIZEM) 30 MG Tab Take 1 Tab by mouth 2 times a day as needed. 30 Tab 3   • hydrocodone-acetaminophen (NORCO) 5-325 MG Tab per tablet Take 1-2 Tabs by mouth every 6 hours as needed. 20 Tab 0   • progesterone (PROMETRIUM) 100 MG CAPS Take 100 mg by mouth every day.     • omeprazole (PRILOSEC) 20 MG delayed-release capsule Take 20 mg by mouth every day.     • lovastatin (MEVACOR) 20 MG TABS Take 20 mg by mouth every evening.     • dicyclomine (BENTYL) 10 MG CAPS Take 10 mg by mouth 4 Times a Day,Before Meals and at Bedtime.     • Aspirin-Acetaminophen-Caffeine (EXCEDRIN PO) Take  by mouth.     • vitamin D, Ergocalciferol, (DRISDOL) 87272 UNITS CAPS capsule Take  by mouth every 7 days.     • Probiotic Product (PROBIOTIC DAILY PO) Take  by mouth.     • Fluticasone-Salmeterol (ADVAIR DISKUS INH) Inhale  by mouth.     • levothyroxine (SYNTHROID) 100 MCG TABS Take 100 mcg by mouth every day.     • liothyronine (CYTOMEL) 5 MCG TABS Take 5 mcg by mouth every day.     • propranolol (INDERAL) 20 MG TABS Take 20 mg by mouth 2 times a day. Patient taking 1/2 tab BID       No current facility-administered medications on file prior to visit.        Allergies: Pcn [penicillins]    ROS:   Constitutional: Denies fevers, chills, night sweats,+ fatigue or weight loss  Eyes: Denies vision loss, pain, drainage, double vision  Ears, Nose, Throat: Denies earache, difficulty hearing, tinnitus, nasal congestion, hoarseness  Cardiovascular: Denies chest pain, tightness, palpitations, orthopnea or edema  Respiratory: As in HPI  Sleep: Denies daytime sleepiness, snoring, apneas, insomnia, morning headaches  GI: Denies heartburn, dysphagia, nausea, abdominal pain,  "diarrhea or constipation  : Denies frequent urination, hematuria, discharge or painful urination  Musculoskeletal: + back pain, painful joints, denies ore muscles  Neurological: Denies weakness or headaches  Skin: No rashes    Blood pressure 122/74, pulse 62, temperature 36.4 °C (97.5 °F), resp. rate 16, height 1.651 m (5' 5\"), weight 79.2 kg (174 lb 9.6 oz), SpO2 95 %.    Physical Exam:  Appearance: Well-nourished, well-developed, in no acute distress  HEENT: Normocephalic, atraumatic, white sclera, PERRLA, oropharynx clear  Neck: No adenopathy or masses  Respiratory: no intercostal retractions or accessory muscle use  Lungs auscultation: Clear to auscultation bilaterally  Cardiovascular: Regular rate rhythm. No murmurs, rubs or gallops.  No LE edema  Abdomen: soft, nondistended  Gait: Normal  Digits: No clubbing, cyanosis  Motor: No focal deficits  Orientation: Oriented to time, person and place    Diagnosis:  1. Mild persistent asthma without complication  AMB PULMONARY FUNCTION TEST/LAB    budesonide-formoterol (SYMBICORT) 160-4.5 MCG/ACT Aerosol   2. Former smoker         Plan:  The patient has chronic obstructive airways disease, and benefits from inhaled bronchodilators.  She quit smoking in 1999.  Recommend Symbicort 160 mcg at 2 puffs twice daily with the use of a spacer.  Inhaler instructions were provided.  Update full pulmonary function testing with DLCO.  Obtain overnight oximetry on room air.  Obtain chest x-ray from [facility].  Return in about 6 weeks (around 10/22/2018) for with PFT.      Answers for HPI/ROS submitted by the patient on 9/8/2018   What is the reason for your visit today?: Having problems with breathlessness and wanting to re-establish with Dr. Hewitt.  Do you cough first thing in the morning or at other times during the day?: Yes, but assume it is allergies.  Do you have a cough on most days? If so, how long have you had this cough?: Not really.  Bring up phlegm (mucus, sputum) in the " morning or other times during the day?: No  If so, how long have you produced phlegm (Months, Years), and what is the usual color of your phlegm?: n/a  Do you cough up blood from your chest?: No  If so, how many times, and approximately how much?: n/a  Do you experience wheezing?: Yes  How often?: intermittently  Do you experience any chest tightness?: Yes  How often?: intermittent  Experience shortness of breath at rest?: No  How far can you walk before becoming short of breath or need to rest? : Cannot walk from airport gate to baggage without resting several times  Please list what causes you to become short of breath:: Walking, stairs  Have you ever been hospitalized?: Yes  Reason, year, and hospital in which you were hospitalized:: appendix, 10 yrs old, [facility].  Gallbladder removal, [place], [place]   Have you ever needed to be intubated or placed on a ventilator? : No  If yes, when and for how long?: n/a  Have you ever had problems with anesthesia?: No  Have you experienced post-operative delirium?: No  Any complications with surgery?: No  What year did you receive your last Flu shot?: 2017  What year did you receive you last Pneumonia shot?: 2016  Have you had a TB skin test? If so, please list the year and result:: My Dad  from TB and I became ill from it in .  Have you had Allergy skin testing? If so, please list the year and result:: n/q  Please list all your occupations from your first job to your current job. Include Industry/Company, location, year, and your specific job.: [employer], Human Resource Mgt, began as  and ended as , 20 years.  , [employer], [place] [employer], 10 ywEA.  Stated , [employer], 6 years, currently employed there.  Please list the places you have lived, the year, and Country or State in the [place]  2613-0514, [place]  [id number].  [place]  to present  Birds?:  Yes  Dogs?: Yes  Cats?: Yes  Mice and/or Deer Mice?: No  Reptiles?: No  Blood Chemistries: Yes.  [facility], January 2018  Blood Count: Yes. [facility], January 2018  Chest X-ray: [facility], July 2018  Sleep Study: About 5 yrs ago, can't remember name but referred through Dr. Ramon CABRERA (I think).  Coffee: 1  Carbonated soft drinks: 1 to 2 small cans (90 calorie size)    Conflicting answers have been found for some questions. Please document the patient's answers manually.

## 2018-09-12 ENCOUNTER — TELEPHONE (OUTPATIENT)
Dept: PULMONOLOGY | Facility: HOSPICE | Age: 68
End: 2018-09-12

## 2018-09-12 DIAGNOSIS — J44.9 CHRONIC OBSTRUCTIVE PULMONARY DISEASE, UNSPECIFIED COPD TYPE (HCC): ICD-10-CM

## 2018-09-12 NOTE — TELEPHONE ENCOUNTER
MEDICATION PRIOR AUTHORIZATION NEEDED:    1. Name of Medication: Symbicort 160/4.5 mcg    2. Requested By (Name of Pharmacy): [facility]     3. Is insurance on file current? yes    4. What is the name & phone number of the 3rd party payor? [employer] [phone]

## 2018-09-14 ENCOUNTER — PATIENT MESSAGE (OUTPATIENT)
Dept: PULMONOLOGY | Facility: HOSPICE | Age: 68
End: 2018-09-14

## 2018-09-14 NOTE — TELEPHONE ENCOUNTER
DOCUMENTATION OF PRIOR AUTH STATUS    1. Medication name and dose: Symbicort 160/4.5 mcg    2. Name and Phone # of Prescription coverage company: [employer] [phone]    3. Date Prior Auth was submitted: 9/12/2018    4. What information was given to obtain insurance decision: Clinical notes    5. Prior Auth letter Approved or Denied: Denied    6. Pharmacy notified: No    7. Patient notified: No        Symbicort 160-4.5 mcg was denied.  The patient needs to try both Breo Ellipta and Pulmicort Flexhaler first.  Dx is Asthma.  Please advise, thank you.

## 2018-09-14 NOTE — TELEPHONE ENCOUNTER
From: Allison Cespedes  To: Joanna Hewitt M.D.  Sent: 9/14/2018 8:55 AM PDT  Subject: Prescription Question    The Insurance company will not fill my Symbicort. Are you prescribing another or has anyone contacted you?

## 2018-09-17 DIAGNOSIS — I10 ESSENTIAL HYPERTENSION: Primary | ICD-10-CM

## 2018-09-17 NOTE — TELEPHONE ENCOUNTER
Called and let the patient know that Symbicort was denied by her insurance and that she should start Breo.  I told her not to use Advair or Symbicort with the Breo.

## 2018-09-29 ENCOUNTER — OFFICE VISIT (OUTPATIENT)
Dept: URGENT CARE | Facility: CLINIC | Age: 68
End: 2018-09-29
Payer: MEDICARE

## 2018-09-29 VITALS
SYSTOLIC BLOOD PRESSURE: 120 MMHG | HEIGHT: 65 IN | DIASTOLIC BLOOD PRESSURE: 60 MMHG | BODY MASS INDEX: 28.32 KG/M2 | WEIGHT: 170 LBS | RESPIRATION RATE: 20 BRPM | OXYGEN SATURATION: 96 % | TEMPERATURE: 97.3 F | HEART RATE: 109 BPM

## 2018-09-29 DIAGNOSIS — J32.9 OTHER SINUSITIS, UNSPECIFIED CHRONICITY: ICD-10-CM

## 2018-09-29 DIAGNOSIS — J40 BRONCHITIS: ICD-10-CM

## 2018-09-29 PROCEDURE — 99214 OFFICE O/P EST MOD 30 MIN: CPT | Performed by: PHYSICIAN ASSISTANT

## 2018-09-29 RX ORDER — PROMETHAZINE HYDROCHLORIDE AND CODEINE PHOSPHATE 6.25; 1 MG/5ML; MG/5ML
5-10 SYRUP ORAL 3 TIMES DAILY PRN
Qty: 150 ML | Refills: 0 | Status: SHIPPED | OUTPATIENT
Start: 2018-09-29 | End: 2018-10-04

## 2018-09-29 RX ORDER — CLARITHROMYCIN 500 MG/1
500 TABLET, COATED ORAL 2 TIMES DAILY
Qty: 20 TAB | Refills: 0 | Status: SHIPPED | OUTPATIENT
Start: 2018-09-29 | End: 2018-10-09

## 2018-09-29 ASSESSMENT — ENCOUNTER SYMPTOMS
RHINORRHEA: 1
COUGH: 1
SHORTNESS OF BREATH: 0
FEVER: 0
MUSCULOSKELETAL NEGATIVE: 1
CONSTITUTIONAL NEGATIVE: 1
HEMOPTYSIS: 0
CARDIOVASCULAR NEGATIVE: 1
SORE THROAT: 0
SINUS PAIN: 1
WHEEZING: 0
EYES NEGATIVE: 1

## 2018-09-29 NOTE — PROGRESS NOTES
"Subjective:      Allison Cespedes is a 67 y.o. female who presents with Cough (x2 days) and Sinus Pain (x2 days)            Cough   This is a new problem. The current episode started in the past 7 days. The problem has been unchanged. The problem occurs every few minutes. The cough is productive of sputum. Associated symptoms include nasal congestion and rhinorrhea. Pertinent negatives include no fever, hemoptysis, sore throat, shortness of breath or wheezing. Nothing aggravates the symptoms. She has tried nothing for the symptoms. The treatment provided no relief. There is no history of asthma.   Sinus Pain   Associated symptoms include congestion and coughing. Pertinent negatives include no fever or sore throat.       Review of Systems   Constitutional: Negative.  Negative for fever.   HENT: Positive for congestion, rhinorrhea and sinus pain. Negative for sore throat.         +phar./tons redn  +maxill.sinus press.     Eyes: Negative.    Respiratory: Positive for cough. Negative for hemoptysis, shortness of breath and wheezing.    Cardiovascular: Negative.    Musculoskeletal: Negative.    Skin: Negative.           Objective:     /60   Pulse (!) 109   Temp 36.3 °C (97.3 °F)   Resp 20   Ht 1.651 m (5' 5\")   Wt 77.1 kg (170 lb)   SpO2 96%   BMI 28.29 kg/m²      Physical Exam   Constitutional: She is oriented to person, place, and time. She appears well-developed and well-nourished. No distress.   HENT:   Head: Normocephalic and atraumatic.   Mouth/Throat: No oropharyngeal exudate.   +phar./tons redn  +maxill.sinus press.     Eyes: Pupils are equal, round, and reactive to light. Conjunctivae and EOM are normal.   Neck: Normal range of motion. Neck supple.   Cardiovascular: Normal rate, regular rhythm and normal heart sounds.    Pulmonary/Chest: Effort normal and breath sounds normal. No respiratory distress. She has no wheezes. She has no rales.   Lymphadenopathy:     She has cervical adenopathy. "   Neurological: She is alert and oriented to person, place, and time.   Skin: Skin is warm and dry.   Psychiatric: She has a normal mood and affect. Her behavior is normal.   Nursing note and vitals reviewed.    Active Ambulatory Problems     Diagnosis Date Noted   • SOB (shortness of breath) 09/05/2018   • Other chest pain 09/05/2018     Resolved Ambulatory Problems     Diagnosis Date Noted   • No Resolved Ambulatory Problems     Past Medical History:   Diagnosis Date   • Allergy    • Arthritis    • Breath shortness    • Bronchitis    • Cold    • Disorder of thyroid    • Heart burn    • Indigestion    • Pneumonia    • Psychiatric problem    • Snoring    • Tuberculosis      Current Outpatient Prescriptions on File Prior to Visit   Medication Sig Dispense Refill   • diclofenac EC (VOLTAREN) 75 MG Tablet Delayed Response TK 1 T PO BID  2   • escitalopram (LEXAPRO) 20 MG tablet TK 1 T PO QD  3   • FEMRING 0.05 MG/24HR RING INSERT 1 VAGINALLY Q 3 MONTHS  0   • buPROPion (WELLBUTRIN XL) 150 MG XL tablet TK 1 T PO QD  3   • hydrocodone-acetaminophen (NORCO) 5-325 MG Tab per tablet Take 1-2 Tabs by mouth every 6 hours as needed. 20 Tab 0   • progesterone (PROMETRIUM) 100 MG CAPS Take 100 mg by mouth every day.     • omeprazole (PRILOSEC) 20 MG delayed-release capsule Take 20 mg by mouth every day.     • lovastatin (MEVACOR) 20 MG TABS Take 20 mg by mouth every evening.     • dicyclomine (BENTYL) 10 MG CAPS Take 10 mg by mouth 4 Times a Day,Before Meals and at Bedtime.     • Aspirin-Acetaminophen-Caffeine (EXCEDRIN PO) Take  by mouth.     • vitamin D, Ergocalciferol, (DRISDOL) 87485 UNITS CAPS capsule Take  by mouth every 7 days.     • Probiotic Product (PROBIOTIC DAILY PO) Take  by mouth.     • levothyroxine (SYNTHROID) 100 MCG TABS Take 100 mcg by mouth every day.     • liothyronine (CYTOMEL) 5 MCG TABS Take 5 mcg by mouth every day.     • propranolol (INDERAL) 20 MG TABS Take 20 mg by mouth 2 times a day. Patient taking  1/2 tab BID     • DILTIAZem (CARDIZEM) 30 MG Tab TAKE 1 TABLET BY MOUTH TWICE DAILY AS NEEDED 180 Tab 0   • Fluticasone Furoate-Vilanterol (BREO ELLIPTA) 200-25 MCG/INH AEROSOL POWDER, BREATH ACTIVATED Inhale 1 Puff by mouth every day. Rinse mouth after use. 1 Each 3   • Fluticasone Furoate-Vilanterol (BREO ELLIPTA) 200-25 MCG/INH AEROSOL POWDER, BREATH ACTIVATED Inhale 1 Puff by mouth every day. Rinse mouth after use. 1 Each 5     No current facility-administered medications on file prior to visit.      Social History     Social History   • Marital status:      Spouse name: N/A   • Number of children: N/A   • Years of education: N/A     Occupational History   • Not on file.     Social History Main Topics   • Smoking status: Former Smoker     Packs/day: 2.00     Years: 30.00     Types: Cigarettes     Quit date: 2/3/1999   • Smokeless tobacco: Never Used   • Alcohol use No   • Drug use: No   • Sexual activity: Not on file     Other Topics Concern   • Not on file     Social History Narrative   • No narrative on file     Family History   Problem Relation Age of Onset   • Non-contributory Mother    • Hypertension Mother    • Non-contributory Father      Pcn [penicillins]              Assessment/Plan:     ·  sinusitis, bronchitis      · Start w/MucinexD; nsaids; [hold rx for abx prn [conditional use] if sx persist/worsen]  ·

## 2018-10-01 NOTE — PROGRESS NOTES
Called back; left msg; ok to take abx; ;watch for any musc aches or any side effects, call back prn. rw

## 2018-10-05 ENCOUNTER — APPOINTMENT (OUTPATIENT)
Dept: PULMONOLOGY | Facility: HOSPICE | Age: 68
End: 2018-10-05
Attending: INTERNAL MEDICINE
Payer: MEDICARE

## 2018-10-05 DIAGNOSIS — J45.30 MILD PERSISTENT ASTHMA WITHOUT COMPLICATION: ICD-10-CM

## 2018-10-08 ENCOUNTER — OFFICE VISIT (OUTPATIENT)
Dept: PULMONOLOGY | Facility: HOSPICE | Age: 68
End: 2018-10-08
Payer: MEDICARE

## 2018-10-08 ENCOUNTER — NON-PROVIDER VISIT (OUTPATIENT)
Dept: PULMONOLOGY | Facility: HOSPICE | Age: 68
End: 2018-10-08
Payer: MEDICARE

## 2018-10-08 VITALS
DIASTOLIC BLOOD PRESSURE: 76 MMHG | BODY MASS INDEX: 28.66 KG/M2 | WEIGHT: 172 LBS | TEMPERATURE: 97 F | HEIGHT: 65 IN | OXYGEN SATURATION: 95 % | HEART RATE: 73 BPM | RESPIRATION RATE: 16 BRPM | SYSTOLIC BLOOD PRESSURE: 122 MMHG

## 2018-10-08 DIAGNOSIS — J01.80 OTHER ACUTE SINUSITIS, RECURRENCE NOT SPECIFIED: ICD-10-CM

## 2018-10-08 DIAGNOSIS — R05.9 COUGH: ICD-10-CM

## 2018-10-08 DIAGNOSIS — R06.02 SOB (SHORTNESS OF BREATH): ICD-10-CM

## 2018-10-08 DIAGNOSIS — Z23 NEED FOR PNEUMOCOCCAL VACCINATION: ICD-10-CM

## 2018-10-08 DIAGNOSIS — Z23 NEED FOR INFLUENZA VACCINATION: ICD-10-CM

## 2018-10-08 DIAGNOSIS — G47.34 NOCTURNAL HYPOXIA: ICD-10-CM

## 2018-10-08 PROCEDURE — 90670 PCV13 VACCINE IM: CPT | Performed by: INTERNAL MEDICINE

## 2018-10-08 PROCEDURE — G0009 ADMIN PNEUMOCOCCAL VACCINE: HCPCS | Performed by: INTERNAL MEDICINE

## 2018-10-08 PROCEDURE — 99214 OFFICE O/P EST MOD 30 MIN: CPT | Mod: 25 | Performed by: INTERNAL MEDICINE

## 2018-10-08 PROCEDURE — 90662 IIV NO PRSV INCREASED AG IM: CPT | Performed by: INTERNAL MEDICINE

## 2018-10-08 PROCEDURE — G0008 ADMIN INFLUENZA VIRUS VAC: HCPCS | Performed by: INTERNAL MEDICINE

## 2018-10-08 RX ORDER — PREDNISONE 10 MG/1
TABLET ORAL
Qty: 18 TAB | Refills: 0 | Status: SHIPPED | OUTPATIENT
Start: 2018-10-08 | End: 2018-11-20

## 2018-10-08 RX ORDER — CIPROFLOXACIN 500 MG/1
500 TABLET, FILM COATED ORAL 2 TIMES DAILY
Qty: 20 TAB | Refills: 0 | Status: ON HOLD | OUTPATIENT
Start: 2018-10-08 | End: 2018-10-17

## 2018-10-08 NOTE — PROGRESS NOTES
Chief Complaint   Patient presents with   • Results     CNOX results. Follow up       HPI: This patient is a 67 y.o. Female who returns for obstructive lung disease.  She has a 60-pack-year history of tobacco use, quit smoking in 1999.  She has had mild exertional dyspnea for >12 years, which has worsened over the past year.  She has associated nonproductive cough, wheezing and chest tightness.  Former pulmonary function testing in 2015 showed mild obstructive ventilatory defect, with baseline FEV1: 2 L or 78% and post albuterol FEV1 2.04 L or 80%.    She was scheduled for PFT today however had to cancel on account of acute URI symptoms.  She was treated with clarithromycin with initial benefit in symptoms. She was started on Breo inhaler last visit although admits she is not consistent with its use.  Overnight oximetry showed desaturations below 90% for 31% of the night, to a best of 86%.  Chest x-ray from May 2018 showed no acute process.  She has a history of latent TB treated with 9 months of isoniazid.  She is undergoing workup with Dr. Lovett to exclude cardiovascular etiology for dyspnea.    Past Medical History:   Diagnosis Date   • Allergy    • Arthritis    • Breath shortness    • Bronchitis    • Cold    • Disorder of thyroid    • Heart burn    • Indigestion    • Pneumonia     in 1970's   • Psychiatric problem    • Snoring    • Tuberculosis        Social History     Social History   • Marital status:      Spouse name: N/A   • Number of children: N/A   • Years of education: N/A     Occupational History   • Not on file.     Social History Main Topics   • Smoking status: Former Smoker     Packs/day: 2.00     Years: 30.00     Types: Cigarettes     Quit date: 2/3/1999   • Smokeless tobacco: Never Used   • Alcohol use No   • Drug use: No   • Sexual activity: Not on file     Other Topics Concern   • Not on file     Social History Narrative   • No narrative on file       Family History   Problem Relation  Age of Onset   • Non-contributory Mother    • Hypertension Mother    • Non-contributory Father        Current Outpatient Prescriptions on File Prior to Visit   Medication Sig Dispense Refill   • clarithromycin (BIAXIN) 500 MG Tab Take 1 Tab by mouth 2 times a day for 10 days. 20 Tab 0   • DILTIAZem (CARDIZEM) 30 MG Tab TAKE 1 TABLET BY MOUTH TWICE DAILY AS NEEDED 180 Tab 0   • Fluticasone Furoate-Vilanterol (BREO ELLIPTA) 200-25 MCG/INH AEROSOL POWDER, BREATH ACTIVATED Inhale 1 Puff by mouth every day. Rinse mouth after use. 1 Each 5   • Fluticasone Furoate-Vilanterol (BREO ELLIPTA) 200-25 MCG/INH AEROSOL POWDER, BREATH ACTIVATED Inhale 1 Puff by mouth every day. Rinse mouth after use. 1 Each 3   • diclofenac EC (VOLTAREN) 75 MG Tablet Delayed Response TK 1 T PO BID  2   • escitalopram (LEXAPRO) 20 MG tablet TK 1 T PO QD  3   • FEMRING 0.05 MG/24HR RING INSERT 1 VAGINALLY Q 3 MONTHS  0   • buPROPion (WELLBUTRIN XL) 150 MG XL tablet TK 1 T PO QD  3   • hydrocodone-acetaminophen (NORCO) 5-325 MG Tab per tablet Take 1-2 Tabs by mouth every 6 hours as needed. 20 Tab 0   • progesterone (PROMETRIUM) 100 MG CAPS Take 100 mg by mouth every day.     • omeprazole (PRILOSEC) 20 MG delayed-release capsule Take 20 mg by mouth every day.     • lovastatin (MEVACOR) 20 MG TABS Take 20 mg by mouth every evening.     • dicyclomine (BENTYL) 10 MG CAPS Take 10 mg by mouth 4 Times a Day,Before Meals and at Bedtime.     • Aspirin-Acetaminophen-Caffeine (EXCEDRIN PO) Take  by mouth.     • vitamin D, Ergocalciferol, (DRISDOL) 04409 UNITS CAPS capsule Take  by mouth every 7 days.     • Probiotic Product (PROBIOTIC DAILY PO) Take  by mouth.     • levothyroxine (SYNTHROID) 100 MCG TABS Take 100 mcg by mouth every day.     • liothyronine (CYTOMEL) 5 MCG TABS Take 5 mcg by mouth every day.     • propranolol (INDERAL) 20 MG TABS Take 20 mg by mouth 2 times a day. Patient taking 1/2 tab BID       No current facility-administered medications on  "file prior to visit.        Allergies: Pcn [penicillins]    ROS:   Constitutional: Denies fevers, chills, night sweats, fatigue or weight loss  Eyes: Denies vision loss, pain, drainage, double vision  Ears, Nose, Throat: Denies earache, difficulty hearing, tinnitus, +nasal congestion, denies hoarseness  Cardiovascular: Denies chest pain, tightness, palpitations, orthopnea or edema  Respiratory: As in HPI  Sleep: Denies daytime sleepiness, snoring, apneas, insomnia, morning headaches  GI: Denies heartburn, dysphagia, nausea, abdominal pain, diarrhea or constipation  : Denies frequent urination, hematuria, discharge or painful urination  Musculoskeletal: Denies back pain, painful joints, sore muscles  Neurological: Denies weakness or headaches  Skin: No rashes    Blood pressure 122/76, pulse 73, temperature 36.1 °C (97 °F), temperature source Temporal, resp. rate 16, height 1.651 m (5' 5\"), weight 78 kg (172 lb), SpO2 95 %, not currently breastfeeding.    Physical Exam:  Appearance: Well-nourished, well-developed, in no acute distress  HEENT: Normocephalic, atraumatic, white sclera, PERRLA, oropharynx clear  Neck: No adenopathy or masses  Respiratory: no intercostal retractions or accessory muscle use  Lungs auscultation: Clear to auscultation bilaterally  Cardiovascular: Regular rate rhythm. No murmurs, rubs or gallops.  No LE edema  Abdomen: soft, nondistended  Gait: Normal  Digits: No clubbing, cyanosis  Motor: No focal deficits  Orientation: Oriented to time, person and place    Diagnosis:  1. Cough  CT-CHEST, HIGH RESOLUTION LUNG   2. Nocturnal hypoxia  DME O2 New Set Up   3. SOB (shortness of breath)     4. Need for influenza vaccination  INFLUENZA VACCINE, HIGH DOSE (65+ ONLY)   5. Need for pneumococcal vaccination  Pneumococcal Conjugate Vaccine 13-Valent   6. Other acute sinusitis, recurrence not specified  CT-MAXILLOFACIAL W/O    ciprofloxacin (CIPRO) 500 MG Tab    predniSONE (DELTASONE) 10 MG Tab "       Plan:  The patient has recurrent respiratory symptoms, with known history of chronic obstructive airways disease.  Recommend Cipro 500 mg twice daily for 10 days with short prednisone taper for acute URI symptoms.  Obtain HRCT chest and sinus CAT scan to exclude infection/bronchiectasis etc.  Encouraged consistent use of Breo inhaler 1 puff QD.  Start supplemental oxygen nocturnally at 2 liters per minute.  Influenza and pneumococcal vaccines provided.  Reschedule pulmonary function testing.  Return for after PFT, after CT scan.

## 2018-10-16 ENCOUNTER — OFFICE VISIT (OUTPATIENT)
Dept: CARDIOLOGY | Facility: MEDICAL CENTER | Age: 68
End: 2018-10-16
Payer: MEDICARE

## 2018-10-16 ENCOUNTER — APPOINTMENT (OUTPATIENT)
Dept: RADIOLOGY | Facility: MEDICAL CENTER | Age: 68
End: 2018-10-16
Attending: EMERGENCY MEDICINE
Payer: MEDICARE

## 2018-10-16 ENCOUNTER — HOSPITAL ENCOUNTER (OUTPATIENT)
Facility: MEDICAL CENTER | Age: 68
End: 2018-10-17
Attending: EMERGENCY MEDICINE | Admitting: INTERNAL MEDICINE
Payer: MEDICARE

## 2018-10-16 VITALS
OXYGEN SATURATION: 96 % | DIASTOLIC BLOOD PRESSURE: 60 MMHG | SYSTOLIC BLOOD PRESSURE: 94 MMHG | HEIGHT: 65 IN | BODY MASS INDEX: 28.49 KG/M2 | WEIGHT: 171 LBS | HEART RATE: 74 BPM

## 2018-10-16 DIAGNOSIS — R07.89 OTHER CHEST PAIN: ICD-10-CM

## 2018-10-16 DIAGNOSIS — I48.0 PAROXYSMAL ATRIAL FIBRILLATION (HCC): ICD-10-CM

## 2018-10-16 DIAGNOSIS — I49.9 IRREGULAR HEARTBEAT: ICD-10-CM

## 2018-10-16 DIAGNOSIS — J40 BRONCHITIS: ICD-10-CM

## 2018-10-16 DIAGNOSIS — R07.9 CHEST PAIN, UNSPECIFIED TYPE: ICD-10-CM

## 2018-10-16 PROBLEM — D72.829 LEUKOCYTOSIS: Status: ACTIVE | Noted: 2018-10-16

## 2018-10-16 PROBLEM — E03.9 HYPOTHYROIDISM: Status: ACTIVE | Noted: 2018-10-16

## 2018-10-16 PROBLEM — I48.91 ATRIAL FIBRILLATION (HCC): Status: ACTIVE | Noted: 2018-10-16

## 2018-10-16 PROBLEM — G47.33 OBSTRUCTIVE SLEEP APNEA: Status: ACTIVE | Noted: 2018-10-16

## 2018-10-16 PROBLEM — J32.9 SINUSITIS: Status: ACTIVE | Noted: 2018-10-16

## 2018-10-16 LAB
ALBUMIN SERPL BCP-MCNC: 3.9 G/DL (ref 3.2–4.9)
ALBUMIN/GLOB SERPL: 1.4 G/DL
ALP SERPL-CCNC: 94 U/L (ref 30–99)
ALT SERPL-CCNC: 27 U/L (ref 2–50)
ANION GAP SERPL CALC-SCNC: 9 MMOL/L (ref 0–11.9)
APPEARANCE UR: ABNORMAL
APTT PPP: 25.2 SEC (ref 24.7–36)
AST SERPL-CCNC: 25 U/L (ref 12–45)
BACTERIA #/AREA URNS HPF: ABNORMAL /HPF
BASOPHILS # BLD AUTO: 0.5 % (ref 0–1.8)
BASOPHILS # BLD: 0.1 K/UL (ref 0–0.12)
BILIRUB SERPL-MCNC: 0.9 MG/DL (ref 0.1–1.5)
BILIRUB UR QL STRIP.AUTO: NEGATIVE
BNP SERPL-MCNC: 73 PG/ML (ref 0–100)
BUN SERPL-MCNC: 17 MG/DL (ref 8–22)
CALCIUM SERPL-MCNC: 9 MG/DL (ref 8.4–10.2)
CHLORIDE SERPL-SCNC: 106 MMOL/L (ref 96–112)
CO2 SERPL-SCNC: 22 MMOL/L (ref 20–33)
COLOR UR: YELLOW
CREAT SERPL-MCNC: 1.05 MG/DL (ref 0.5–1.4)
EKG IMPRESSION: NORMAL
EOSINOPHIL # BLD AUTO: 0.18 K/UL (ref 0–0.51)
EOSINOPHIL NFR BLD: 0.9 % (ref 0–6.9)
EPI CELLS #/AREA URNS HPF: ABNORMAL /HPF
ERYTHROCYTE [DISTWIDTH] IN BLOOD BY AUTOMATED COUNT: 45.2 FL (ref 35.9–50)
GLOBULIN SER CALC-MCNC: 2.8 G/DL (ref 1.9–3.5)
GLUCOSE SERPL-MCNC: 92 MG/DL (ref 65–99)
GLUCOSE UR STRIP.AUTO-MCNC: NEGATIVE MG/DL
HCT VFR BLD AUTO: 47.4 % (ref 37–47)
HGB BLD-MCNC: 16.3 G/DL (ref 12–16)
IMM GRANULOCYTES # BLD AUTO: 0.27 K/UL (ref 0–0.11)
IMM GRANULOCYTES NFR BLD AUTO: 1.4 % (ref 0–0.9)
INR PPP: 1.06 (ref 0.87–1.13)
KETONES UR STRIP.AUTO-MCNC: NEGATIVE MG/DL
LEUKOCYTE ESTERASE UR QL STRIP.AUTO: ABNORMAL
LIPASE SERPL-CCNC: 27 U/L (ref 7–58)
LYMPHOCYTES # BLD AUTO: 3.75 K/UL (ref 1–4.8)
LYMPHOCYTES NFR BLD: 19.5 % (ref 22–41)
MAGNESIUM SERPL-MCNC: 2.4 MG/DL (ref 1.5–2.5)
MCH RBC QN AUTO: 31.5 PG (ref 27–33)
MCHC RBC AUTO-ENTMCNC: 34.4 G/DL (ref 33.6–35)
MCV RBC AUTO: 91.5 FL (ref 81.4–97.8)
MICRO URNS: ABNORMAL
MONOCYTES # BLD AUTO: 1.97 K/UL (ref 0–0.85)
MONOCYTES NFR BLD AUTO: 10.2 % (ref 0–13.4)
MUCOUS THREADS #/AREA URNS HPF: ABNORMAL /HPF
NEUTROPHILS # BLD AUTO: 12.97 K/UL (ref 2–7.15)
NEUTROPHILS NFR BLD: 67.5 % (ref 44–72)
NITRITE UR QL STRIP.AUTO: NEGATIVE
NRBC # BLD AUTO: 0 K/UL
NRBC BLD-RTO: 0 /100 WBC
PH UR STRIP.AUTO: 6.5 [PH]
PLATELET # BLD AUTO: 450 K/UL (ref 164–446)
PMV BLD AUTO: 9.4 FL (ref 9–12.9)
POTASSIUM SERPL-SCNC: 3.7 MMOL/L (ref 3.6–5.5)
PROCALCITONIN SERPL-MCNC: <0.05 NG/ML
PROT SERPL-MCNC: 6.7 G/DL (ref 6–8.2)
PROT UR QL STRIP: NEGATIVE MG/DL
PROTHROMBIN TIME: 13.7 SEC (ref 12–14.6)
RBC # BLD AUTO: 5.18 M/UL (ref 4.2–5.4)
RBC # URNS HPF: ABNORMAL /HPF
RBC UR QL AUTO: NEGATIVE
SODIUM SERPL-SCNC: 137 MMOL/L (ref 135–145)
SP GR UR STRIP.AUTO: 1.02
TROPONIN I SERPL-MCNC: <0.01 NG/ML (ref 0–0.04)
TROPONIN I SERPL-MCNC: <0.02 NG/ML (ref 0–0.04)
TSH SERPL DL<=0.005 MIU/L-ACNC: 1.07 UIU/ML (ref 0.38–5.33)
UNIDENT CRYS URNS QL MICRO: ABNORMAL /HPF
WBC # BLD AUTO: 19.2 K/UL (ref 4.8–10.8)
WBC #/AREA URNS HPF: ABNORMAL /HPF

## 2018-10-16 PROCEDURE — 99285 EMERGENCY DEPT VISIT HI MDM: CPT

## 2018-10-16 PROCEDURE — 99214 OFFICE O/P EST MOD 30 MIN: CPT | Mod: 25 | Performed by: NURSE PRACTITIONER

## 2018-10-16 PROCEDURE — 700105 HCHG RX REV CODE 258: Performed by: EMERGENCY MEDICINE

## 2018-10-16 PROCEDURE — G0378 HOSPITAL OBSERVATION PER HR: HCPCS

## 2018-10-16 PROCEDURE — 99215 OFFICE O/P EST HI 40 MIN: CPT | Performed by: INTERNAL MEDICINE

## 2018-10-16 PROCEDURE — 93000 ELECTROCARDIOGRAM COMPLETE: CPT | Performed by: NURSE PRACTITIONER

## 2018-10-16 PROCEDURE — 84443 ASSAY THYROID STIM HORMONE: CPT

## 2018-10-16 PROCEDURE — 85025 COMPLETE CBC W/AUTO DIFF WBC: CPT

## 2018-10-16 PROCEDURE — 96376 TX/PRO/DX INJ SAME DRUG ADON: CPT

## 2018-10-16 PROCEDURE — 85730 THROMBOPLASTIN TIME PARTIAL: CPT

## 2018-10-16 PROCEDURE — 99220 PR INITIAL OBSERVATION CARE,LEVL III: CPT | Performed by: INTERNAL MEDICINE

## 2018-10-16 PROCEDURE — 36415 COLL VENOUS BLD VENIPUNCTURE: CPT

## 2018-10-16 PROCEDURE — 83880 ASSAY OF NATRIURETIC PEPTIDE: CPT

## 2018-10-16 PROCEDURE — 71045 X-RAY EXAM CHEST 1 VIEW: CPT

## 2018-10-16 PROCEDURE — 96367 TX/PROPH/DG ADDL SEQ IV INF: CPT

## 2018-10-16 PROCEDURE — A9270 NON-COVERED ITEM OR SERVICE: HCPCS | Performed by: INTERNAL MEDICINE

## 2018-10-16 PROCEDURE — 96365 THER/PROPH/DIAG IV INF INIT: CPT

## 2018-10-16 PROCEDURE — 700101 HCHG RX REV CODE 250: Performed by: INTERNAL MEDICINE

## 2018-10-16 PROCEDURE — 93005 ELECTROCARDIOGRAM TRACING: CPT | Performed by: EMERGENCY MEDICINE

## 2018-10-16 PROCEDURE — 700111 HCHG RX REV CODE 636 W/ 250 OVERRIDE (IP): Performed by: EMERGENCY MEDICINE

## 2018-10-16 PROCEDURE — 700111 HCHG RX REV CODE 636 W/ 250 OVERRIDE (IP): Performed by: INTERNAL MEDICINE

## 2018-10-16 PROCEDURE — 96375 TX/PRO/DX INJ NEW DRUG ADDON: CPT

## 2018-10-16 PROCEDURE — 700102 HCHG RX REV CODE 250 W/ 637 OVERRIDE(OP): Performed by: INTERNAL MEDICINE

## 2018-10-16 PROCEDURE — 700105 HCHG RX REV CODE 258: Performed by: INTERNAL MEDICINE

## 2018-10-16 PROCEDURE — 84145 PROCALCITONIN (PCT): CPT

## 2018-10-16 PROCEDURE — 83735 ASSAY OF MAGNESIUM: CPT

## 2018-10-16 PROCEDURE — 81001 URINALYSIS AUTO W/SCOPE: CPT

## 2018-10-16 PROCEDURE — 94760 N-INVAS EAR/PLS OXIMETRY 1: CPT

## 2018-10-16 PROCEDURE — 83690 ASSAY OF LIPASE: CPT

## 2018-10-16 PROCEDURE — 85610 PROTHROMBIN TIME: CPT

## 2018-10-16 PROCEDURE — 80053 COMPREHEN METABOLIC PANEL: CPT

## 2018-10-16 PROCEDURE — 84484 ASSAY OF TROPONIN QUANT: CPT

## 2018-10-16 RX ORDER — PROPRANOLOL HYDROCHLORIDE 20 MG/1
10 TABLET ORAL
Status: DISCONTINUED | OUTPATIENT
Start: 2018-10-16 | End: 2018-10-17 | Stop reason: HOSPADM

## 2018-10-16 RX ORDER — SODIUM CHLORIDE AND POTASSIUM CHLORIDE 150; 900 MG/100ML; MG/100ML
INJECTION, SOLUTION INTRAVENOUS CONTINUOUS
Status: DISCONTINUED | OUTPATIENT
Start: 2018-10-16 | End: 2018-10-17 | Stop reason: HOSPADM

## 2018-10-16 RX ORDER — DILTIAZEM HYDROCHLORIDE 5 MG/ML
10 INJECTION INTRAVENOUS ONCE
Status: DISCONTINUED | OUTPATIENT
Start: 2018-10-16 | End: 2018-10-17

## 2018-10-16 RX ORDER — ESCITALOPRAM OXALATE 10 MG/1
20 TABLET ORAL DAILY
Status: DISCONTINUED | OUTPATIENT
Start: 2018-10-17 | End: 2018-10-17 | Stop reason: HOSPADM

## 2018-10-16 RX ORDER — LABETALOL HYDROCHLORIDE 5 MG/ML
10 INJECTION, SOLUTION INTRAVENOUS EVERY 4 HOURS PRN
Status: DISCONTINUED | OUTPATIENT
Start: 2018-10-16 | End: 2018-10-17 | Stop reason: HOSPADM

## 2018-10-16 RX ORDER — BISACODYL 10 MG
10 SUPPOSITORY, RECTAL RECTAL
Status: DISCONTINUED | OUTPATIENT
Start: 2018-10-16 | End: 2018-10-17 | Stop reason: HOSPADM

## 2018-10-16 RX ORDER — CALCIUM GLUCONATE 94 MG/ML
1 INJECTION, SOLUTION INTRAVENOUS ONCE
Status: DISCONTINUED | OUTPATIENT
Start: 2018-10-16 | End: 2018-10-16

## 2018-10-16 RX ORDER — LIOTHYRONINE SODIUM 5 UG/1
5 TABLET ORAL 2 TIMES DAILY
Status: DISCONTINUED | OUTPATIENT
Start: 2018-10-16 | End: 2018-10-17 | Stop reason: HOSPADM

## 2018-10-16 RX ORDER — SODIUM CHLORIDE 9 MG/ML
500 INJECTION, SOLUTION INTRAVENOUS ONCE
Status: COMPLETED | OUTPATIENT
Start: 2018-10-16 | End: 2018-10-16

## 2018-10-16 RX ORDER — POLYETHYLENE GLYCOL 3350 17 G/17G
1 POWDER, FOR SOLUTION ORAL
Status: DISCONTINUED | OUTPATIENT
Start: 2018-10-16 | End: 2018-10-17 | Stop reason: HOSPADM

## 2018-10-16 RX ORDER — ONDANSETRON 4 MG/1
4 TABLET, ORALLY DISINTEGRATING ORAL EVERY 4 HOURS PRN
Status: DISCONTINUED | OUTPATIENT
Start: 2018-10-16 | End: 2018-10-17 | Stop reason: HOSPADM

## 2018-10-16 RX ORDER — TIZANIDINE 4 MG/1
TABLET ORAL
Refills: 2 | COMMUNITY
Start: 2018-09-20 | End: 2018-10-16

## 2018-10-16 RX ORDER — LOVASTATIN 20 MG/1
10 TABLET ORAL NIGHTLY
COMMUNITY
End: 2021-08-30

## 2018-10-16 RX ORDER — DEXTROSE MONOHYDRATE 50 MG/ML
INJECTION, SOLUTION INTRAVENOUS CONTINUOUS
Status: DISCONTINUED | OUTPATIENT
Start: 2018-10-16 | End: 2018-10-17

## 2018-10-16 RX ORDER — IPRATROPIUM BROMIDE AND ALBUTEROL SULFATE 2.5; .5 MG/3ML; MG/3ML
3 SOLUTION RESPIRATORY (INHALATION)
Status: DISCONTINUED | OUTPATIENT
Start: 2018-10-16 | End: 2018-10-17 | Stop reason: HOSPADM

## 2018-10-16 RX ORDER — ACETAMINOPHEN 325 MG/1
650 TABLET ORAL EVERY 6 HOURS PRN
Status: DISCONTINUED | OUTPATIENT
Start: 2018-10-16 | End: 2018-10-17 | Stop reason: HOSPADM

## 2018-10-16 RX ORDER — ONDANSETRON 2 MG/ML
4 INJECTION INTRAMUSCULAR; INTRAVENOUS EVERY 4 HOURS PRN
Status: DISCONTINUED | OUTPATIENT
Start: 2018-10-16 | End: 2018-10-17 | Stop reason: HOSPADM

## 2018-10-16 RX ORDER — TIZANIDINE 4 MG/1
4 TABLET ORAL NIGHTLY PRN
Status: DISCONTINUED | OUTPATIENT
Start: 2018-10-16 | End: 2018-10-17 | Stop reason: HOSPADM

## 2018-10-16 RX ORDER — AMOXICILLIN 250 MG
2 CAPSULE ORAL 2 TIMES DAILY
Status: DISCONTINUED | OUTPATIENT
Start: 2018-10-16 | End: 2018-10-17 | Stop reason: HOSPADM

## 2018-10-16 RX ORDER — BUPROPION HYDROCHLORIDE 150 MG/1
150 TABLET, EXTENDED RELEASE ORAL DAILY
Status: DISCONTINUED | OUTPATIENT
Start: 2018-10-17 | End: 2018-10-17 | Stop reason: HOSPADM

## 2018-10-16 RX ORDER — BUDESONIDE AND FORMOTEROL FUMARATE DIHYDRATE 160; 4.5 UG/1; UG/1
2 AEROSOL RESPIRATORY (INHALATION) 2 TIMES DAILY
Status: DISCONTINUED | OUTPATIENT
Start: 2018-10-17 | End: 2018-10-17 | Stop reason: HOSPADM

## 2018-10-16 RX ORDER — OMEPRAZOLE 20 MG/1
20 CAPSULE, DELAYED RELEASE ORAL DAILY
Status: DISCONTINUED | OUTPATIENT
Start: 2018-10-16 | End: 2018-10-17 | Stop reason: HOSPADM

## 2018-10-16 RX ORDER — SODIUM CHLORIDE 9 MG/ML
INJECTION, SOLUTION INTRAVENOUS CONTINUOUS
Status: DISCONTINUED | OUTPATIENT
Start: 2018-10-16 | End: 2018-10-16

## 2018-10-16 RX ORDER — BUDESONIDE AND FORMOTEROL FUMARATE DIHYDRATE 160; 4.5 UG/1; UG/1
2 AEROSOL RESPIRATORY (INHALATION)
Status: DISCONTINUED | OUTPATIENT
Start: 2018-10-16 | End: 2018-10-16

## 2018-10-16 RX ORDER — CLINDAMYCIN HYDROCHLORIDE 150 MG/1
450 CAPSULE ORAL EVERY 8 HOURS
Status: DISCONTINUED | OUTPATIENT
Start: 2018-10-16 | End: 2018-10-17 | Stop reason: HOSPADM

## 2018-10-16 RX ORDER — TIZANIDINE 4 MG/1
4 TABLET ORAL NIGHTLY PRN
COMMUNITY
End: 2019-02-26

## 2018-10-16 RX ORDER — LEVOTHYROXINE SODIUM 0.05 MG/1
100 TABLET ORAL
Status: DISCONTINUED | OUTPATIENT
Start: 2018-10-16 | End: 2018-10-17 | Stop reason: HOSPADM

## 2018-10-16 RX ADMIN — RIVAROXABAN 20 MG: 20 TABLET, FILM COATED ORAL at 18:50

## 2018-10-16 RX ADMIN — LIOTHYRONINE SODIUM 5 MCG: 5 TABLET ORAL at 18:47

## 2018-10-16 RX ADMIN — PROPRANOLOL HYDROCHLORIDE 10 MG: 20 TABLET ORAL at 20:28

## 2018-10-16 RX ADMIN — SODIUM CHLORIDE 500 ML: 9 INJECTION, SOLUTION INTRAVENOUS at 11:06

## 2018-10-16 RX ADMIN — AMIODARONE HYDROCHLORIDE 1 MG/MIN: 50 INJECTION, SOLUTION INTRAVENOUS at 15:00

## 2018-10-16 RX ADMIN — CALCIUM GLUCONATE 1000 MG: 98 INJECTION, SOLUTION INTRAVENOUS at 10:45

## 2018-10-16 RX ADMIN — POTASSIUM CHLORIDE AND SODIUM CHLORIDE: 900; 150 INJECTION, SOLUTION INTRAVENOUS at 18:55

## 2018-10-16 RX ADMIN — LEVOTHYROXINE SODIUM 100 MCG: 50 TABLET ORAL at 20:27

## 2018-10-16 RX ADMIN — OMEPRAZOLE 20 MG: 20 CAPSULE, DELAYED RELEASE ORAL at 18:50

## 2018-10-16 RX ADMIN — CLINDAMYCIN HYDROCHLORIDE 450 MG: 150 CAPSULE ORAL at 18:48

## 2018-10-16 RX ADMIN — AMIODARONE HYDROCHLORIDE 0.5 MG/MIN: 50 INJECTION, SOLUTION INTRAVENOUS at 23:32

## 2018-10-16 RX ADMIN — BUDESONIDE AND FORMOTEROL FUMARATE DIHYDRATE 2 PUFF: 160; 4.5 AEROSOL RESPIRATORY (INHALATION) at 17:29

## 2018-10-16 RX ADMIN — AMIODARONE HYDROCHLORIDE 150 MG: 50 INJECTION, SOLUTION INTRAVENOUS at 14:23

## 2018-10-16 RX ADMIN — PROGESTERONE 100 MG: 100 CAPSULE ORAL at 20:27

## 2018-10-16 RX ADMIN — DEXTROSE MONOHYDRATE: 50 INJECTION, SOLUTION INTRAVENOUS at 20:25

## 2018-10-16 ASSESSMENT — ENCOUNTER SYMPTOMS
MYALGIAS: 0
SINUS PAIN: 1
DEPRESSION: 0
NERVOUS/ANXIOUS: 1
WEAKNESS: 0
CHILLS: 0
CHILLS: 0
DIZZINESS: 0
BRUISES/BLEEDS EASILY: 0
ABDOMINAL PAIN: 0
ABDOMINAL PAIN: 0
TINGLING: 0
NAUSEA: 0
DIZZINESS: 0
STRIDOR: 0
DIARRHEA: 0
COUGH: 1
FEVER: 0
SHORTNESS OF BREATH: 1
SHORTNESS OF BREATH: 0
FALLS: 0
MYALGIAS: 0
COUGH: 1
LOSS OF CONSCIOUSNESS: 0
ORTHOPNEA: 0
VOMITING: 0
NAUSEA: 0
FEVER: 0
HEADACHES: 0
SPUTUM PRODUCTION: 0
HEARTBURN: 1
PALPITATIONS: 1
LOSS OF CONSCIOUSNESS: 0
PALPITATIONS: 1
HEADACHES: 0
CONSTIPATION: 0
PND: 0

## 2018-10-16 ASSESSMENT — COPD QUESTIONNAIRES
DURING THE PAST 4 WEEKS HOW MUCH DID YOU FEEL SHORT OF BREATH: NONE/LITTLE OF THE TIME
HAVE YOU SMOKED AT LEAST 100 CIGARETTES IN YOUR ENTIRE LIFE: YES
DURING THE PAST 4 WEEKS HOW MUCH DID YOU FEEL SHORT OF BREATH: MOST  OR ALL OF THE TIME
HAVE YOU SMOKED AT LEAST 100 CIGARETTES IN YOUR ENTIRE LIFE: YES
DO YOU EVER COUGH UP ANY MUCUS OR PHLEGM?: NO/ONLY WITH OCCASIONAL COLDS OR INFECTIONS
DO YOU EVER COUGH UP ANY MUCUS OR PHLEGM?: NO/ONLY WITH OCCASIONAL COLDS OR INFECTIONS
COPD SCREENING SCORE: 4
IN THE PAST 12 MONTHS DO YOU DO LESS THAN YOU USED TO BECAUSE OF YOUR BREATHING PROBLEMS: AGREE
COPD SCREENING SCORE: 7

## 2018-10-16 ASSESSMENT — COGNITIVE AND FUNCTIONAL STATUS - GENERAL
SUGGESTED CMS G CODE MODIFIER DAILY ACTIVITY: CH
DAILY ACTIVITIY SCORE: 24
SUGGESTED CMS G CODE MODIFIER MOBILITY: CH
MOBILITY SCORE: 24

## 2018-10-16 ASSESSMENT — PAIN SCALES - GENERAL
PAINLEVEL_OUTOF10: 0

## 2018-10-16 ASSESSMENT — PATIENT HEALTH QUESTIONNAIRE - PHQ9
1. LITTLE INTEREST OR PLEASURE IN DOING THINGS: NOT AT ALL
2. FEELING DOWN, DEPRESSED, IRRITABLE, OR HOPELESS: NOT AT ALL
SUM OF ALL RESPONSES TO PHQ9 QUESTIONS 1 AND 2: 0

## 2018-10-16 ASSESSMENT — LIFESTYLE VARIABLES
EVER_SMOKED: YES
ALCOHOL_USE: NO

## 2018-10-16 NOTE — ED NOTES
Med rec complete per patient  allergies reviewed    Patient is currently taking Cipro and Prednisone taper  She did state she takes Cytomel BID

## 2018-10-16 NOTE — ASSESSMENT & PLAN NOTE
-Possibly due to steroid she has been on, she also has been having an upper respiratory infection and sounds like she has had significant sinusitis  -Repeat CBC in the morning

## 2018-10-16 NOTE — ED PROVIDER NOTES
ED Provider Note    CHIEF COMPLAINT  Chief Complaint   Patient presents with   • Chest Pain     Pt c/o CP increased this am. Pt states recent URI with chest tightness. Pt seen by PCp and pt sent to ER for further evaluation.        HPI  Allison Cespedes is a 67 y.o. female who presents to the ER after being referred from the cardiology office.  Patient has a history of long-standing dyspnea.  She has had fatigue particularly with exertion at least over the last 1 year.  Seems to be gradually worsening.  She developed respiratory tract infection a couple weeks ago.  She was initially seen at the urgent care and started on clarithromycin.  She was seen by her pulmonologist and clarithromycin was changed to Cipro.  Prednisone was also added to the medication regimen.  She has been taking this for the last 8 days.  She reports the Cipro just makes her feel bad all over.  She states that she has had intermittent chest pain.  This is characterized as a pressure.  Most recently it happened yesterday while simply walking to her kitchen.  She felt dizzy and lightheaded at the time and had to sit down and rest.  She has not had any pleuritic pain, or hemoptysis.  She does have a mild cough.  She does not feel her heart beating rapidly.  She had an appointment today with her cardiologist.  She did notice lightheadedness upon walking into the clinic.  On her examination she was identified to have atrial fibrillation with rapid ventricular response.  She was subsequently referred here.  While at rest in the ER she states she feels fine.  She does tell me that her blood pressure tends to run on the low end.  She states in the 90s and 100s.  She takes propranolol for migraine prophylaxis and took 1/2 tablet last night.  She has not been taking any other antihypertensive medications.    REVIEW OF SYSTEMS  As per HPI, otherwise a 10 point review of systems is negative    PAST MEDICAL HISTORY  Past Medical History:   Diagnosis  Date   • Allergy    • Arthritis    • Atrial fibrillation (HCC) 10/2018    New onset in office   • Breath shortness    • Bronchitis    • Disorder of thyroid    • Heart burn    • Pneumonia     in 1970's   • Psychiatric problem    • Snoring    • Tuberculosis        SOCIAL HISTORY  Social History   Substance Use Topics   • Smoking status: Former Smoker     Packs/day: 2.00     Years: 30.00     Types: Cigarettes     Quit date: 2/3/1999   • Smokeless tobacco: Never Used   • Alcohol use No       SURGICAL HISTORY  Past Surgical History:   Procedure Laterality Date   • MASS EXCISION GENERAL Left 7/6/2018    Procedure: MASS EXCISION GENERAL/ SUBCUTANEOUS MASS LEFT SHOULDER;  Surgeon: Swapna Rivas M.D.;  Location: SURGERY SAME DAY [facility];  Service: General   • APPENDECTOMY     • ARTHROSCOPY, KNEE     • PB REMV 2ND CATARACT,CORN-SCLER SECTN         CURRENT MEDICATIONS  Home Medications     Reviewed by Rut Gerber (Pharmacy Tech) on 10/16/18 at 0953  Med List Status: Partial   Medication Last Dose Status   buPROPion (WELLBUTRIN XL) 150 MG XL tablet Taking Active   ciprofloxacin (CIPRO) 500 MG Tab Taking Active   diclofenac EC (VOLTAREN) 75 MG Tablet Delayed Response Taking Active   dicyclomine (BENTYL) 10 MG CAPS PRN Active   DILTIAZem (CARDIZEM) 30 MG Tab Taking Active   escitalopram (LEXAPRO) 20 MG tablet Taking Active   FEMRING 0.05 MG/24HR RING Taking Active   Fluticasone Furoate-Vilanterol (BREO ELLIPTA) 200-25 MCG/INH AEROSOL POWDER, BREATH ACTIVATED Taking Active   hydrocodone-acetaminophen (NORCO) 5-325 MG Tab per tablet PRN Active   levothyroxine (SYNTHROID) 100 MCG TABS Taking Active   liothyronine (CYTOMEL) 5 MCG TABS Taking Active   lovastatin (MEVACOR) 20 MG TABS Not Taking Active   omeprazole (PRILOSEC) 20 MG delayed-release capsule Not Taking Active   OXYGEN-HELIUM INH Taking Active   predniSONE (DELTASONE) 10 MG Tab Taking Active   progesterone (PROMETRIUM) 100 MG CAPS Taking Active  "  propranolol (INDERAL) 20 MG TABS Taking Active   tizanidine (ZANAFLEX) 4 MG Tab Taking Active   vitamin D, Ergocalciferol, (DRISDOL) 73571 UNITS CAPS capsule Taking Active                ALLERGIES  Allergies   Allergen Reactions   • Pcn [Penicillins]      Rxn as child       PHYSICAL EXAM  VITAL SIGNS: Pulse (!) 49   Temp 36.5 °C (97.7 °F)   Ht 1.651 m (5' 5\")   Wt 77.7 kg (171 lb 4.8 oz)   SpO2 92%   BMI 28.51 kg/m²    Constitutional: Awake and alert  HENT:  Atraumatic, Normocephalic.Oropharynx mildly dry mucus membranes, Nose normal inspection.   Eyes: Normal inspection  Neck: Supple  Cardiovascular: Tachycardic and irregular.  Symmetric peripheral pulses.   Thorax & Lungs: Decreased breath sounds throughout  Abdomen: Bowel sounds normal, soft, non-distended, nontender, no mass  Skin: Warm, Dry, No rash.   Back: No tenderness, No CVA tenderness.   Extremities: No remarkable edema.  No Homans or cords.  Neurologic: Grossly normal   Psychiatric: Normal affect    RADIOLOGY/PROCEDURES  DX-CHEST-PORTABLE (1 VIEW)   Final Result      No radiographic evidence of acute cardiopulmonary process.      EC-ECHOCARDIOGRAM COMPLETE W/O CONT    (Results Pending)        Imaging is interpreted by radiologist    Labs:  Results for orders placed or performed during the hospital encounter of 10/16/18   CBC with Differential   Result Value Ref Range    WBC 19.2 (H) 4.8 - 10.8 K/uL    RBC 5.18 4.20 - 5.40 M/uL    Hemoglobin 16.3 (H) 12.0 - 16.0 g/dL    Hematocrit 47.4 (H) 37.0 - 47.0 %    MCV 91.5 81.4 - 97.8 fL    MCH 31.5 27.0 - 33.0 pg    MCHC 34.4 33.6 - 35.0 g/dL    RDW 45.2 35.9 - 50.0 fL    Platelet Count 450 (H) 164 - 446 K/uL    MPV 9.4 9.0 - 12.9 fL    Neutrophils-Polys 67.50 44.00 - 72.00 %    Lymphocytes 19.50 (L) 22.00 - 41.00 %    Monocytes 10.20 0.00 - 13.40 %    Eosinophils 0.90 0.00 - 6.90 %    Basophils 0.50 0.00 - 1.80 %    Immature Granulocytes 1.40 (H) 0.00 - 0.90 %    Nucleated RBC 0.00 /100 WBC    " Neutrophils (Absolute) 12.97 (H) 2.00 - 7.15 K/uL    Lymphs (Absolute) 3.75 1.00 - 4.80 K/uL    Monos (Absolute) 1.97 (H) 0.00 - 0.85 K/uL    Eos (Absolute) 0.18 0.00 - 0.51 K/uL    Baso (Absolute) 0.10 0.00 - 0.12 K/uL    Immature Granulocytes (abs) 0.27 (H) 0.00 - 0.11 K/uL    NRBC (Absolute) 0.00 K/uL   Complete Metabolic Panel (CMP)   Result Value Ref Range    Sodium 137 135 - 145 mmol/L    Potassium 3.7 3.6 - 5.5 mmol/L    Chloride 106 96 - 112 mmol/L    Co2 22 20 - 33 mmol/L    Anion Gap 9.0 0.0 - 11.9    Glucose 92 65 - 99 mg/dL    Bun 17 8 - 22 mg/dL    Creatinine 1.05 0.50 - 1.40 mg/dL    Calcium 9.0 8.4 - 10.2 mg/dL    AST(SGOT) 25 12 - 45 U/L    ALT(SGPT) 27 2 - 50 U/L    Alkaline Phosphatase 94 30 - 99 U/L    Total Bilirubin 0.9 0.1 - 1.5 mg/dL    Albumin 3.9 3.2 - 4.9 g/dL    Total Protein 6.7 6.0 - 8.2 g/dL    Globulin 2.8 1.9 - 3.5 g/dL    A-G Ratio 1.4 g/dL   Btype Natriuretic Peptide (BNP)   Result Value Ref Range    B Natriuretic Peptide 73 0 - 100 pg/mL   Prothrombin Time (PT/INR)   Result Value Ref Range    PT 13.7 12.0 - 14.6 sec    INR 1.06 0.87 - 1.13   APTT   Result Value Ref Range    APTT 25.2 24.7 - 36.0 sec   Lipase   Result Value Ref Range    Lipase 27 7 - 58 U/L   MAGNESIUM   Result Value Ref Range    Magnesium 2.4 1.5 - 2.5 mg/dL   TSH   Result Value Ref Range    TSH 1.070 0.380 - 5.330 uIU/mL   ESTIMATED GFR   Result Value Ref Range    GFR If African American >60 >60 mL/min/1.73 m 2    GFR If Non African American 52 (A) >60 mL/min/1.73 m 2   PROCALCITONIN   Result Value Ref Range    Procalcitonin <0.05 <0.25 ng/mL   EKG   Result Value Ref Range    Report       [facility]    Test Date:  2018-10-16  Pt Name:    MELISSA SOARES                 Department: EDS  MRN:        [id number]                      [facility]  Gender:     Female                       Technician: JACEK  :        1950                   Requested By:BILLY KAY  CARLOS ALBERTO  Order #:    [id number]                    Reading MD: BILLY CARCAMO MD    Measurements  Intervals                                Axis  Rate:       132                          P:  AR:                                      QRS:        15  QRSD:       66                           T:          20  QT:         308  QTc:        457    Interpretive Statements  ATRIAL FIBRILLATION, V-RATE    VENTRICULAR PREMATURE COMPLEX  PROBABLE POSTERIOR INFARCT  Compared to ECG 10/16/2018 09:22:45  Ventricular premature complex(es) now present  ST (T wave) deviation no longer present  Myocardial infarct finding still present    Electronically Signed On 10- 10:04:18 PD T by BILLY CARCAMO MD     EKG   Result Value Ref Range    Report       [facility] Emergency Dept.    Test Date:  2018-10-16  Pt Name:    MELISSA SOARES                 Department: [facility]  MRN:        [id number]                      [facility]  Gender:     Female                       Technician: JACEK  :        1950                   Requested By:BILLY CARCAMO  Order #:    [id number]                    Reading MD: BILLY CARCAMO MD    Measurements  Intervals                                Axis  Rate:       75                           P:          49  AR:         150                          QRS:        -10  QRSD:       77                           T:          23  QT:         389  QTc:        435    Interpretive Statements  Sinus rhythm  Ventricular premature complex  Low voltage, precordial leads  Compared to ECG 10/16/2018 09:43:55  Low QRS voltage now present  Atrial fibrillation no longer present  Myocardial infarct finding no longer present    Electronically Signed On 10- 12:34:06 PDT by ELIZA DUBOIS         Medications   DILTIAZem (CARDIZEM) injection 10 mg (0 mg Intravenous Held 10/16/18 1030)   D5W infusion (not administered)   amiodarone (CORDARONE) 450 mg in D5W 250 mL Infusion (not  administered)   amiodarone (CORDARONE) 150 mg in D5W 100 mL IVPB (not administered)   NS infusion 500 mL (0 mL Intravenous Stopped 10/16/18 1215)   calcium GLUConate 1,000 mg in D5W 100 mL IVPB (0 mg Intravenous Stopped 10/16/18 1116)       COURSE & MEDICAL DECISION MAKING  Patient presents to the ER with atrial fibrillation with rapid ventricular response.  She also describes having exertional fatigue and dyspnea.  Her blood pressure was marginal on arrival, but did not appear overtly unstable.  My plan was to administer IV calcium channel blocker.  In preparation for this the patient was given 500 cc of normal saline and 1 g of calcium gluconate.  After calcium gluconate the patient converted to normal sinus rhythm.  Repeat EKG is noted.  Laboratory data returned as noted above.    Dr. Lovett evaluated the patient in the ER and has initiated amiodarone.  He ordered echocardiogram.  Requested admission of the hospital.  I consulted Dr. Gooden for admission.      FINAL IMPRESSION  1.  Atrial fibrillation with rapid ventricular response  2.  Exertional dyspnea      This dictation was created using voice recognition software. The accuracy of the dictation is limited to the abilities of the software.  The nursing notes were reviewed and certain aspects of this information were incorporated into this note.      Electronically signed by: Garrison Streeter, 10/16/2018 10:07 AM

## 2018-10-16 NOTE — ASSESSMENT & PLAN NOTE
-New onset  -Converted to sinus with IV calcium infusion  -Cardiology is following, patient is now on amiodarone and Xarelto  -TSH is normal  -Obtain echocardiogram  -Patient denies alcohol, states he gets her migraines  -Possibly due to dehydration, start IV fluids  -Await additional recommendations

## 2018-10-16 NOTE — LETTER
[facility]   [address]   [address]   [place] [address]  Phone: [phone]  Fax: [phone]              Allison Cespedes  1950    Encounter Date: 10/16/2018    CHARLOTTE Ibrahim          PROGRESS NOTE:  Chief Complaint   Patient presents with   • Follow-Up   • Shortness of Breath   • Chest Pain       Subjective:   Allison Cespedes is a 67 y.o. female who presents today for follow-up of shortness of breath and chest pressure.    Allison is a 67 year old female with history of COPD, normally followed by Dr. Hewitt. She saw Dr. SAMI Lovett 4-6 weeks ago for ongoing shortness of breath and chest pressure. PET scan was ordered, and scheduled for next week.  In the meantime, she saw Dr. Hewitt, who prescribed some Cipro and Prednisone for ongoing lower respiratory infection.    She is here today for follow-up, as she is feeling worse: more short of breath and just very tired and slightly dizzy. Some mild, vague chest pressure, but she hasn't really been using her Diltiazem PRN. No orthopnea or PND; no syncope; no edema. BP has been on the lower side. No recent fever or chills.    Past Medical History:   Diagnosis Date   • Allergy    • Arthritis    • Atrial fibrillation (HCC) 10/2018    New onset in office   • Breath shortness    • Bronchitis    • Disorder of thyroid    • Heart burn    • Pneumonia     in 1970's   • Psychiatric problem    • Snoring    • Tuberculosis      Past Surgical History:   Procedure Laterality Date   • MASS EXCISION GENERAL Left 7/6/2018    Procedure: MASS EXCISION GENERAL/ SUBCUTANEOUS MASS LEFT SHOULDER;  Surgeon: Swapna Rivas M.D.;  Location: SURGERY SAME DAY [facility];  Service: General   • APPENDECTOMY     • ARTHROSCOPY, KNEE     • PB REMV 2ND CATARACT,CORN-SCLER SECTN       Family History   Problem Relation Age of Onset   • Non-contributory Mother    • Hypertension Mother    • Non-contributory Father         Social History     Social History   • Marital status:      Spouse name: N/A   • Number of children: N/A   • Years of education: N/A     Occupational History   • Not on file.     Social History Main Topics   • Smoking status: Former Smoker     Packs/day: 2.00     Years: 30.00     Types: Cigarettes     Quit date: 2/3/1999   • Smokeless tobacco: Never Used   • Alcohol use No   • Drug use: No   • Sexual activity: Not on file     Other Topics Concern   • Not on file     Social History Narrative   • No narrative on file     Allergies   Allergen Reactions   • Pcn [Penicillins]      Rxn as child     Outpatient Encounter Prescriptions as of 10/16/2018   Medication Sig Dispense Refill   • tizanidine (ZANAFLEX) 4 MG Tab TK 1/2 TO 2 TS PO TID PRF SPASMS  2   • OXYGEN-HELIUM INH Inhale  by mouth.     • ciprofloxacin (CIPRO) 500 MG Tab Take 1 Tab by mouth 2 times a day. 20 Tab 0   • predniSONE (DELTASONE) 10 MG Tab Take 30mg x 3 days, then take 20mg x 3 days, then take 10mg x 3 days, with food, then discontinue. 18 Tab 0   • Fluticasone Furoate-Vilanterol (BREO ELLIPTA) 200-25 MCG/INH AEROSOL POWDER, BREATH ACTIVATED Inhale 1 Puff by mouth every day. Rinse mouth after use. 1 Each 5   • DILTIAZem (CARDIZEM) 30 MG Tab TAKE 1 TABLET BY MOUTH TWICE DAILY AS NEEDED 180 Tab 0   • [DISCONTINUED] Fluticasone Furoate-Vilanterol (BREO ELLIPTA) 200-25 MCG/INH AEROSOL POWDER, BREATH ACTIVATED Inhale 1 Puff by mouth every day. Rinse mouth after use. 1 Each 3   • diclofenac EC (VOLTAREN) 75 MG Tablet Delayed Response TK 1 T PO BID  2   • escitalopram (LEXAPRO) 20 MG tablet TK 1 T PO QD  3   • FEMRING 0.05 MG/24HR RING INSERT 1 VAGINALLY Q 3 MONTHS  0   • buPROPion (WELLBUTRIN XL) 150 MG XL tablet TK 1 T PO QD  3   • hydrocodone-acetaminophen (NORCO) 5-325 MG Tab per tablet Take 1-2 Tabs by mouth every 6 hours as needed. 20 Tab 0   • progesterone (PROMETRIUM) 100 MG CAPS Take 100 mg by mouth every day.     • omeprazole (PRILOSEC) 20  "MG delayed-release capsule Take 20 mg by mouth every day.     • lovastatin (MEVACOR) 20 MG TABS Take 20 mg by mouth every evening.     • dicyclomine (BENTYL) 10 MG CAPS Take 10 mg by mouth 4 Times a Day,Before Meals and at Bedtime.     • Aspirin-Acetaminophen-Caffeine (EXCEDRIN PO) Take  by mouth.     • vitamin D, Ergocalciferol, (DRISDOL) 02543 UNITS CAPS capsule Take  by mouth every 7 days.     • [DISCONTINUED] Probiotic Product (PROBIOTIC DAILY PO) Take  by mouth.     • levothyroxine (SYNTHROID) 100 MCG TABS Take 100 mcg by mouth every day.     • liothyronine (CYTOMEL) 5 MCG TABS Take 5 mcg by mouth every day.     • propranolol (INDERAL) 20 MG TABS Take 20 mg by mouth 2 times a day. Patient taking 1/2 tab BID       No facility-administered encounter medications on file as of 10/16/2018.      Review of Systems   Constitutional: Negative for chills and fever.   HENT: Negative for congestion.    Respiratory: Positive for cough and shortness of breath.    Cardiovascular: Positive for palpitations. Negative for chest pain, orthopnea, leg swelling and PND.   Gastrointestinal: Negative for abdominal pain and nausea.   Musculoskeletal: Negative for myalgias.   Skin: Negative for rash.   Neurological: Negative for dizziness, loss of consciousness and headaches.   Endo/Heme/Allergies: Does not bruise/bleed easily.   Psychiatric/Behavioral: The patient is nervous/anxious.         Objective:   BP (!) 94/60 (BP Location: Left arm, Patient Position: Sitting, BP Cuff Size: Adult)   Pulse 74   Ht 1.651 m (5' 5\")   Wt 77.6 kg (171 lb)   SpO2 96%   BMI 28.46 kg/m²      Physical Exam   Constitutional: She is oriented to person, place, and time. She appears well-developed and well-nourished.   HENT:   Head: Normocephalic.   Eyes: EOM are normal.   Neck: Normal range of motion. Neck supple. No JVD present.   Cardiovascular: Normal heart sounds.  An irregularly irregular rhythm present. Bradycardia present.    -130bpm "   Pulmonary/Chest: Effort normal. No respiratory distress. She has decreased breath sounds. She has no wheezes. She has rhonchi in the right lower field and the left lower field. She has no rales.   Abdominal: Soft. Bowel sounds are normal. She exhibits no distension. There is no tenderness.   Musculoskeletal: Normal range of motion. She exhibits no edema.   Neurological: She is alert and oriented to person, place, and time.   Skin: Skin is warm and dry. No rash noted.   Psychiatric: She has a normal mood and affect.     EKG ordered by me and interpreted by me shows atrial fibrillation at a rate of 85-145bpm.    Assessment:     1. Other chest pain     2. Irregular heartbeat  EKG   3. Bronchitis     4. Paroxysmal atrial fibrillation (HCC)         Medical Decision Making:  Today's Assessment / Status / Plan:     1. Chest pressure and shortness of breath, with new onset atrial fibrillation captured today in EKG. Per discussion with Dr. JOSE ROBERTO Lovett, will send her to the [facility] ER to be rate controlled, with possible MERVIN/cardioversion to be done today. She is agreeable, and is escorted to the ER by one of our MAs.    2. Bronchitis, currently treated with Cipro and Prednisone.    As above, she will go to the ER today to be treated. Further follow-up to be determined based on today's treatment.    Collaborating MD: JOSE ROBERTO Lovett      No Recipients

## 2018-10-16 NOTE — H&P
Hospital Medicine History & Physical Note    Date of Service  10/16/2018    Primary Care Physician  Bishnu Du M.D.    Consultants  Cardiology    Code Status  Full    Chief Complaint  Palpitations    History of Presenting Illness  67 y.o. female who presented 10/16/2018 with palpitations.  Patient states yesterday she noted palpitations, went back to bed, started oxygen and this resolved.  This morning she became dizzy, had difficulty walking into her cardiologist appointment.  Likely she already had an appointment with cardiology.  There they noted she was in atrial fibrillation with rapid ventricular rate and was sent to the emergency department.  Upon arrival to the emergency department patient was being given IV calcium prior to being treated for the A. fib and she converted.  Cardiology has seen her here as well and is started amiodarone.  Echocardiogram has also been ordered.  Patient does have a history of frequent upper respiratory infections that last quite some time.  She had one starting 3 weeks ago.  Since then she has been on 10 days of clarithromycin.  She then went and saw her pulmonologist and was started on ciprofloxacin and prednisone, has been on this for approximately 1 week.  Patient is still on them however is almost finishing the course.  Patient also has had a sleep study approximately 10 days ago and that is why she was started on oxygen.  Patient does have a history of TB, became active in 1981, took antibiotics for 9 months.  Patient also complains of chronic fatigue.  She states the ciprofloxacin causes her to have indigestion.  Patient initially went and saw cardiology a month ago due to chest pressure and palpitations, she was given diltiazem at that point in time.  However when she was placed on the clarithromycin pharmacy recommended stopping the diltiazem.    Review of Systems  Review of Systems   Constitutional: Negative for chills, fever and malaise/fatigue.   HENT:  Positive for sinus pain. Negative for congestion.    Respiratory: Positive for cough. Negative for sputum production, shortness of breath and stridor.    Cardiovascular: Positive for chest pain (tightness) and palpitations. Negative for leg swelling.   Gastrointestinal: Positive for heartburn. Negative for abdominal pain, constipation, diarrhea, nausea and vomiting.   Genitourinary: Negative for dysuria and urgency.   Musculoskeletal: Negative for falls and myalgias.   Neurological: Negative for dizziness, tingling, loss of consciousness, weakness and headaches.   Psychiatric/Behavioral: Negative for depression and suicidal ideas.   All other systems reviewed and are negative.      Past Medical History   has a past medical history of Allergy; Arthritis; Atrial fibrillation (HCC) (10/2018); Breath shortness; Bronchitis; Disorder of thyroid; Heart burn; Pneumonia; Psychiatric problem; Snoring; and Tuberculosis. She also has no past medical history of ASTHMA or Diabetes.    Surgical History   has a past surgical history that includes mass excision general (Left, 7/6/2018); appendectomy; arthroscopy, knee; and pr remv 2nd cataract,corn-scler sectn.     Family History  family history includes Hypertension in her mother; Non-contributory in her father and mother.     Social History   reports that she quit smoking about 19 years ago. Her smoking use included Cigarettes. She has a 60.00 pack-year smoking history. She has never used smokeless tobacco. She reports that she does not drink alcohol or use drugs.    Allergies  Allergies   Allergen Reactions   • Pcn [Penicillins]      Rxn as child       Medications  Prior to Admission Medications   Prescriptions Last Dose Informant Patient Reported? Taking?   FEMRING 0.05 MG/24HR RING 10/9/2018 Patient Yes No   Sig: INSERT 1 VAGINALLY Q 3 MONTHS   Fluticasone Furoate-Vilanterol (BREO ELLIPTA) 200-25 MCG/INH AEROSOL POWDER, BREATH ACTIVATED 10/16/2018 at 0730 Patient No No   Sig:  Inhale 1 Puff by mouth every day. Rinse mouth after use.   buPROPion (WELLBUTRIN XL) 150 MG XL tablet 10/16/2018 at 0730 Patient Yes No   Sig: TK 1 T PO QD   ciprofloxacin (CIPRO) 500 MG Tab 10/16/2018 at 0800 Patient No No   Sig: Take 1 Tab by mouth 2 times a day.   diclofenac EC (VOLTAREN) 75 MG Tablet Delayed Response 10/16/2018 at 0730 Patient Yes No   Sig: TK 1 T PO BID   escitalopram (LEXAPRO) 20 MG tablet 10/16/2018 at 0730 Patient Yes No   Sig: TK 1 T PO QD   levothyroxine (SYNTHROID) 100 MCG TABS 10/15/2018 at PM Patient Yes No   Sig: Take 100 mcg by mouth every day.   liothyronine (CYTOMEL) 5 MCG TABS 10/16/2018 at 0730 Patient Yes No   Sig: Take 5 mcg by mouth 2 Times a Day.   omeprazole (PRILOSEC) 20 MG delayed-release capsule 1 WEEK at D/C Patient Yes No   Sig: Take 20 mg by mouth every day.   predniSONE (DELTASONE) 10 MG Tab 10/16/2018 at 0800 Patient No No   Sig: Take 30mg x 3 days, then take 20mg x 3 days, then take 10mg x 3 days, with food, then discontinue.   progesterone (PROMETRIUM) 100 MG CAPS 10/15/2018 at 2200 Patient Yes No   Sig: Take 100 mg by mouth every day.   propranolol (INDERAL) 20 MG TABS 10/15/2018 at 2200 Patient Yes No   Sig: Take 10 mg by mouth every day.   tizanidine (ZANAFLEX) 4 MG Tab 10/15/2018 at 2200 Patient Yes Yes   Sig: Take 4 mg by mouth at bedtime as needed.   vitamin D, Ergocalciferol, (DRISDOL) 17702 UNITS CAPS capsule 10/10/2018 at PM Patient Yes No   Sig: Take 50,000 Units by mouth every 7 days.      Facility-Administered Medications: None       Physical Exam  Temp:  [36.5 °C (97.7 °F)] 36.5 °C (97.7 °F)  Pulse:  [49-71] 65    Physical Exam   Constitutional: She is oriented to person, place, and time. She appears well-developed. She is cooperative. No distress.   HENT:   Head: Normocephalic and atraumatic. Not macrocephalic and not microcephalic. Head is without raccoon's eyes and without Milton's sign.   Mouth/Throat: Oropharynx is clear and moist. No oropharyngeal  exudate.   Eyes: Right eye exhibits no discharge. Left eye exhibits no discharge.   Neck: Neck supple. No tracheal deviation present.   Cardiovascular: Normal rate, regular rhythm and intact distal pulses.  Exam reveals no gallop, no distant heart sounds and no friction rub.    No murmur heard.  Pulmonary/Chest: Effort normal and breath sounds normal. No accessory muscle usage or stridor. No tachypnea and no bradypnea. No respiratory distress. She has no wheezes. She has no rales. She exhibits no tenderness.   Abdominal: Soft. Bowel sounds are normal. She exhibits no distension. There is no splenomegaly or hepatomegaly. There is no tenderness. There is no rebound and no guarding.   Musculoskeletal: Normal range of motion. She exhibits no edema or tenderness.   Lymphadenopathy:     She has no cervical adenopathy.   Neurological: She is alert and oriented to person, place, and time. No cranial nerve deficit. She displays no seizure activity.   Skin: Skin is warm, dry and intact. No rash noted. She is not diaphoretic. No erythema. No pallor.   Psychiatric: She has a normal mood and affect. Her speech is normal and behavior is normal. Judgment and thought content normal. Cognition and memory are normal.   Nursing note and vitals reviewed.      Laboratory:  Recent Labs      10/16/18   1030   WBC  19.2*   RBC  5.18   HEMOGLOBIN  16.3*   HEMATOCRIT  47.4*   MCV  91.5   MCH  31.5   MCHC  34.4   RDW  45.2   PLATELETCT  450*   MPV  9.4     Recent Labs      10/16/18   1030   SODIUM  137   POTASSIUM  3.7   CHLORIDE  106   CO2  22   GLUCOSE  92   BUN  17   CREATININE  1.05   CALCIUM  9.0     Recent Labs      10/16/18   1030   ALTSGPT  27   ASTSGOT  25   ALKPHOSPHAT  94   TBILIRUBIN  0.9   LIPASE  27   GLUCOSE  92     Recent Labs      10/16/18   1030   APTT  25.2   INR  1.06     Recent Labs      10/16/18   1030   BNPBTYPENAT  73         Recent Labs      10/16/18   1030   TROPONINI  <0.01       Urinalysis:    No results found      Imaging:  DX-CHEST-PORTABLE (1 VIEW)   Final Result      No radiographic evidence of acute cardiopulmonary process.      EC-ECHOCARDIOGRAM COMPLETE W/O CONT    (Results Pending)         Assessment/Plan:  I anticipate this patient is appropriate for observation status at this time.    Atrial fibrillation with RVR (HCC)- (present on admission)   Assessment & Plan    -New onset  -Converted to sinus with IV calcium infusion  -Cardiology is following, patient is now on amiodarone and Xarelto  -TSH is normal  -Obtain echocardiogram  -Patient denies alcohol, states he gets her migraines  -Possibly due to dehydration, start IV fluids  -Await additional recommendations        Hypothyroidism   Assessment & Plan    -Continue Synthroid        Sinusitis   Assessment & Plan    -Acute worsening of a likely chronic condition  -Patient has been on ciprofloxacin yet still has sinus pressure  -Patient is allergic to penicillin from a childhood reaction  -Second line would be clindamycin, will start this but patient would benefit from outpatient ENT workup  -Apparently pulmonology has ordered a CT scan of her sinuses        Obstructive sleep apnea   Assessment & Plan    -Continue nocturnal oxygen        Leukocytosis   Assessment & Plan    -Possibly due to steroid she has been on, she also has been having an upper respiratory infection and sounds like she has had significant sinusitis  -Repeat CBC in the morning            VTE prophylaxis: Xarelto

## 2018-10-16 NOTE — ASSESSMENT & PLAN NOTE
-Acute worsening of a likely chronic condition  -Patient has been on ciprofloxacin yet still has sinus pressure  -Patient is allergic to penicillin from a childhood reaction  -Second line would be clindamycin, will start this but patient would benefit from outpatient ENT workup  -Apparently pulmonology has ordered a CT scan of her sinuses

## 2018-10-16 NOTE — ED TRIAGE NOTES
"Chief Complaint   Patient presents with   • Chest Pain     Pt c/o CP increased this am. Pt states recent URI with chest tightness. Pt seen by PCp and pt sent to ER for further evaluation.      Pulse (!) 49   Temp 36.5 °C (97.7 °F)   Ht 1.651 m (5' 5\")   Wt 77.7 kg (171 lb 4.8 oz)   SpO2 92%   BMI 28.51 kg/m²     "

## 2018-10-16 NOTE — CONSULTS
Cardiology Consult Note    Date of note:    10/16/2018      Patient ID:    Name:             Allison Cespedes   YOB: 1950  Age:                 67 y.o.  female   MRN:               [id number]                                                             Consulting Physician: Garrison Streeter MD    Consult question: Evaluation of newly diagnosed rapid atrial fibrillation    History of Present Illness:      Allison Cespedes is a 67 y.o. female with a history of COPD and former smoking who was referred originally to cardiology for evaluation of chest discomfort and dyspnea found today to be in rapid atrial fibrillation in our office referred to the emergency room for the same.  This comes in the context of treatment for an upper respiratory tract infection with prednisone.  By the time I saw her in the emergency room she had reverted to sinus rhythm, and had interval resolution of palpitations and improvement in her dyspnea.  She has no other cardiovascular complaints at this time.    Her  is a patient of our clinic and seen by electrophysiology.  He previously had an intestinal bleed on Eliquis related to which he was placed on warfarin.    Review of Systems:  All others reviewed and negative.    Past Medical History:   Past Medical History:   Diagnosis Date   • Allergy    • Arthritis    • Atrial fibrillation (HCC) 10/2018    New onset in office   • Breath shortness    • Bronchitis    • Disorder of thyroid    • Heart burn    • Pneumonia     in 1970's   • Psychiatric problem    • Snoring    • Tuberculosis      There are no active hospital problems to display for this patient.      Past Surgical History:  Past Surgical History:   Procedure Laterality Date   • MASS EXCISION GENERAL Left 7/6/2018    Procedure: MASS EXCISION GENERAL/ SUBCUTANEOUS MASS LEFT SHOULDER;  Surgeon: Swapna Rivas M.D.;  Location: SURGERY SAME DAY [facility];  Service: General   • APPENDECTOMY     • ARTHROSCOPY,  KNEE     • PB REMV 2ND CATARACT,[facility] Medications:    Current Facility-Administered Medications:   •  DILTIAZem (CARDIZEM) injection 10 mg, 10 mg, Intravenous, Once, Garrison Streeter M.D., Stopped at 10/16/18 1030  •  D5W infusion, , Intravenous, Continuous, Ryan Lovett M.D.  •  amiodarone (CORDARONE) 450 mg in D5W 250 mL Infusion, 0.5-1 mg/min, Intravenous, Continuous, Ryan Lovett M.D.  •  amiodarone (CORDARONE) 150 mg in D5W 100 mL IVPB, 150 mg, Intravenous, Once, Ryan Lovett M.D.    Current Outpatient Prescriptions:   •  tizanidine (ZANAFLEX) 4 MG Tab, Take 4 mg by mouth at bedtime as needed., Disp: , Rfl:   •  ciprofloxacin (CIPRO) 500 MG Tab, Take 1 Tab by mouth 2 times a day., Disp: 20 Tab, Rfl: 0  •  predniSONE (DELTASONE) 10 MG Tab, Take 30mg x 3 days, then take 20mg x 3 days, then take 10mg x 3 days, with food, then discontinue., Disp: 18 Tab, Rfl: 0  •  Fluticasone Furoate-Vilanterol (BREO ELLIPTA) 200-25 MCG/INH AEROSOL POWDER, BREATH ACTIVATED, Inhale 1 Puff by mouth every day. Rinse mouth after use., Disp: 1 Each, Rfl: 5  •  diclofenac EC (VOLTAREN) 75 MG Tablet Delayed Response, TK 1 T PO BID, Disp: , Rfl: 2  •  escitalopram (LEXAPRO) 20 MG tablet, TK 1 T PO QD, Disp: , Rfl: 3  •  FEMRING 0.05 MG/24HR RING, INSERT 1 VAGINALLY Q 3 MONTHS, Disp: , Rfl: 0  •  buPROPion (WELLBUTRIN XL) 150 MG XL tablet, TK 1 T PO QD, Disp: , Rfl: 3  •  progesterone (PROMETRIUM) 100 MG CAPS, Take 100 mg by mouth every day., Disp: , Rfl:   •  omeprazole (PRILOSEC) 20 MG delayed-release capsule, Take 20 mg by mouth every day., Disp: , Rfl:   •  vitamin D, Ergocalciferol, (DRISDOL) 74956 UNITS CAPS capsule, Take  by mouth every 7 days., Disp: , Rfl:   •  levothyroxine (SYNTHROID) 100 MCG TABS, Take 100 mcg by mouth every day., Disp: , Rfl:   •  liothyronine (CYTOMEL) 5 MCG TABS, Take 5 mcg by mouth 2 Times a Day., Disp: , Rfl:   •  propranolol (INDERAL) 20 MG TABS, Take 10 mg by mouth  "every day., Disp: , Rfl:     Current Outpatient Medications:    (Not in a hospital admission)    Medication Allergy:  Allergies   Allergen Reactions   • Pcn [Penicillins]      Rxn as child       Family History:  Family History   Problem Relation Age of Onset   • Non-contributory Mother    • Hypertension Mother    • Non-contributory Father        Social History:  Social History     Social History   • Marital status:      Spouse name: N/A   • Number of children: N/A   • Years of education: N/A     Occupational History   • Not on file.     Social History Main Topics   • Smoking status: Former Smoker     Packs/day: 2.00     Years: 30.00     Types: Cigarettes     Quit date: 2/3/1999   • Smokeless tobacco: Never Used   • Alcohol use No   • Drug use: No   • Sexual activity: Not on file     Other Topics Concern   • Not on file     Social History Narrative   • No narrative on file         Physical Exam:  Vitals/   Weight/BMI: Body mass index is 28.51 kg/m².  Pulse 71, temperature 36.5 °C (97.7 °F), height 1.651 m (5' 5\"), weight 77.7 kg (171 lb 4.8 oz), SpO2 96 %, not currently breastfeeding.  Vitals:    10/16/18 0940 10/16/18 0942 10/16/18 0945 10/16/18 1140   Pulse:   (!) 49 71   Temp:  36.5 °C (97.7 °F)     SpO2:   92% 96%   Weight: 77.7 kg (171 lb 4.8 oz)      Height: 1.651 m (5' 5\")        Oxygen Therapy:  Pulse Oximetry: 96 %, O2 Delivery: None (Room Air)    Physical Exam   Constitutional: She is oriented to person, place, and time. She appears well-developed and well-nourished. No distress.   Pleasant, elderly woman accompanied by her  in no distress   HENT:   Head: Normocephalic and atraumatic.   Eyes: Pupils are equal, round, and reactive to light. Conjunctivae and EOM are normal.   Neck: No JVD present.   Cardiovascular: Normal rate, regular rhythm and normal heart sounds.  Exam reveals no gallop and no friction rub.    No murmur heard.  Pulmonary/Chest: Effort normal and breath sounds normal. No " respiratory distress. She has no wheezes. She has no rales.   Abdominal: Soft. Bowel sounds are normal. She exhibits no distension.   Musculoskeletal: She exhibits no edema (no lower extremity edema bilaterally).   Neurological: She is alert and oriented to person, place, and time.   Skin: Skin is warm and dry. No rash noted. She is not diaphoretic. No erythema. No pallor.   Psychiatric: She has a normal mood and affect. Her behavior is normal. Judgment and thought content normal.   Nursing note and vitals reviewed.      MDM (Data Review):     Records reviewed and summarized in current documentation    Lab Data Review:  Recent Results (from the past 24 hour(s))   EKG    Collection Time: 10/16/18  9:22 AM   Result Value Ref Range    Report       [facility]    Test Date:  2018-10-16  Pt Name:    MELISSA SOARES                 Department: [facility]  MRN:        [id number]                      Room:  Gender:     Female                       Technician: CRISSY  :        1950                   Requested By:STEPHEN DUMONT  Order #:    [id number]                    Reading MD:    Measurements  Intervals                                Axis  Rate:       142                          P:  MI:                                      QRS:        37  QRSD:       66                           T:          52  QT:         316  QTc:        486    Interpretive Statements  ATRIAL FIBRILLATION, V-RATE    CONSIDER POSTERIOR INFARCT  BORDERLINE ST DEPRESSION, DIFFUSE LEADS  BORDERLINE PROLONGED QT INTERVAL  Compared to ECG 2018 11:01:16  Myocardial infarct finding now present  ST (T wave) deviation now present  Sinus rhythm no longer present  Ventricular premature complex(es) no longer present     EKG    Collection Time: 10/16/18  9:43 AM   Result Value Ref Range    Report       [facility]    Test Date:  2018-10-16  Pt Name:    MELISSA SOARES                 Department: [facility]  MRN:         [id number]                      [facility]  Gender:     Female                       Technician: JACEK  :        1950                   Requested By:BILLY CARCAMO  Order #:    [id number]                    Reading MD: BILYL CARCAMO MD    Measurements  Intervals                                Axis  Rate:       132                          P:  NE:                                      QRS:        15  QRSD:       66                           T:          20  QT:         308  QTc:        457    Interpretive Statements  ATRIAL FIBRILLATION, V-RATE    VENTRICULAR PREMATURE COMPLEX  PROBABLE POSTERIOR INFARCT  Compared to ECG 10/16/2018 09:22:45  Ventricular premature complex(es) now present  ST (T wave) deviation no longer present  Myocardial infarct finding still present    Electronically Signed On 10- 10:04:18 PD T by BILLY CARCAMO MD     CBC with Differential    Collection Time: 10/16/18 10:30 AM   Result Value Ref Range    WBC 19.2 (H) 4.8 - 10.8 K/uL    RBC 5.18 4.20 - 5.40 M/uL    Hemoglobin 16.3 (H) 12.0 - 16.0 g/dL    Hematocrit 47.4 (H) 37.0 - 47.0 %    MCV 91.5 81.4 - 97.8 fL    MCH 31.5 27.0 - 33.0 pg    MCHC 34.4 33.6 - 35.0 g/dL    RDW 45.2 35.9 - 50.0 fL    Platelet Count 450 (H) 164 - 446 K/uL    MPV 9.4 9.0 - 12.9 fL    Neutrophils-Polys 67.50 44.00 - 72.00 %    Lymphocytes 19.50 (L) 22.00 - 41.00 %    Monocytes 10.20 0.00 - 13.40 %    Eosinophils 0.90 0.00 - 6.90 %    Basophils 0.50 0.00 - 1.80 %    Immature Granulocytes 1.40 (H) 0.00 - 0.90 %    Nucleated RBC 0.00 /100 WBC    Neutrophils (Absolute) 12.97 (H) 2.00 - 7.15 K/uL    Lymphs (Absolute) 3.75 1.00 - 4.80 K/uL    Monos (Absolute) 1.97 (H) 0.00 - 0.85 K/uL    Eos (Absolute) 0.18 0.00 - 0.51 K/uL    Baso (Absolute) 0.10 0.00 - 0.12 K/uL    Immature Granulocytes (abs) 0.27 (H) 0.00 - 0.11 K/uL    NRBC (Absolute) 0.00 K/uL   Complete Metabolic Panel (CMP)    Collection Time: 10/16/18 10:30 AM   Result Value Ref  Range    Sodium 137 135 - 145 mmol/L    Potassium 3.7 3.6 - 5.5 mmol/L    Chloride 106 96 - 112 mmol/L    Co2 22 20 - 33 mmol/L    Anion Gap 9.0 0.0 - 11.9    Glucose 92 65 - 99 mg/dL    Bun 17 8 - 22 mg/dL    Creatinine 1.05 0.50 - 1.40 mg/dL    Calcium 9.0 8.4 - 10.2 mg/dL    AST(SGOT) 25 12 - 45 U/L    ALT(SGPT) 27 2 - 50 U/L    Alkaline Phosphatase 94 30 - 99 U/L    Total Bilirubin 0.9 0.1 - 1.5 mg/dL    Albumin 3.9 3.2 - 4.9 g/dL    Total Protein 6.7 6.0 - 8.2 g/dL    Globulin 2.8 1.9 - 3.5 g/dL    A-G Ratio 1.4 g/dL   Btype Natriuretic Peptide (BNP)    Collection Time: 10/16/18 10:30 AM   Result Value Ref Range    B Natriuretic Peptide 73 0 - 100 pg/mL   Prothrombin Time (PT/INR)    Collection Time: 10/16/18 10:30 AM   Result Value Ref Range    PT 13.7 12.0 - 14.6 sec    INR 1.06 0.87 - 1.13   APTT    Collection Time: 10/16/18 10:30 AM   Result Value Ref Range    APTT 25.2 24.7 - 36.0 sec   Lipase    Collection Time: 10/16/18 10:30 AM   Result Value Ref Range    Lipase 27 7 - 58 U/L   MAGNESIUM    Collection Time: 10/16/18 10:30 AM   Result Value Ref Range    Magnesium 2.4 1.5 - 2.5 mg/dL   TSH    Collection Time: 10/16/18 10:30 AM   Result Value Ref Range    TSH 1.070 0.380 - 5.330 uIU/mL   ESTIMATED GFR    Collection Time: 10/16/18 10:30 AM   Result Value Ref Range    GFR If African American >60 >60 mL/min/1.73 m 2    GFR If Non African American 52 (A) >60 mL/min/1.73 m 2   PROCALCITONIN    Collection Time: 10/16/18 10:30 AM   Result Value Ref Range    Procalcitonin <0.05 <0.25 ng/mL   TROPONINS - REHAB ONLY    Collection Time: 10/16/18 10:30 AM   Result Value Ref Range    Troponin I <0.01 0.00 - 0.04 ng/mL   EKG    Collection Time: 10/16/18 11:16 AM   Result Value Ref Range    Report       [facility] Emergency [facility]    Test Date:  2018-10-16  Pt Name:    MELISSA SOARES                 Department: EDS  MRN:        [id number]                      [facility]  Gender:     Female                        Technician: JACEK  :        1950                   Requested By:BILLY CARCAMO  Order #:    [id number]                    Reading MD: BILLY CARCAMO MD    Measurements  Intervals                                Axis  Rate:       75                           P:          49  UT:         150                          QRS:        -10  QRSD:       77                           T:          23  QT:         389  QTc:        435    Interpretive Statements  Sinus rhythm  Ventricular premature complex  Low voltage, precordial leads  Compared to ECG 10/16/2018 09:43:55  Low QRS voltage now present  Atrial fibrillation no longer present  Myocardial infarct finding no longer present    Electronically Signed On 10- 12:34:06 PDT by ELIZA DUBOIS         Imaging/Procedures Review (all reviewed and pertinent for):      Her transthoracic echocardiogram from 2008 showed normal left ventricular ejection fraction and borderline mitral valve prolapse    I reviewed the images and report of her serial EKGs that documented initially in our clinic rapid atrial fibrillation with rate related nonspecific ST and T wave changes, then resolution to sinus rhythm.  There was some question about QT prolongation when in rapid atrial fibrillation.  QT interval on repeat EKG in sinus rhythm is 435 ms, normal      Impression and Recommendations:       1.  New onset paroxysmal rapid atrial fibrillation: I have prescribed IV amiodarone to keep her in sinus rhythm.  I will plan to continue that until tomorrow morning then converting her to 200 mg p.o. daily of oral amiodarone for 2 months after discharge stopping at that time.  I discussed with her the need for an oral anticoagulant given her risk factors for stroke, and as we may need repeat procedures for rhythm control in the near future.  Given her 's experience with Eliquis, I did prescribe her Xarelto at 20 mg p.o. daily.  She should be placed on  metoprolol if her blood pressure tolerates it.  We will arrange for follow-up with electrophysiology after discharge.  I discussed that medical decision-making with the patient who is comfortable with it.  I also discussed that with the emergency room physician and the admitting hospitalist.    Ryan Lovett MD  Cardiologist, [facility]

## 2018-10-16 NOTE — PROGRESS NOTES
Chief Complaint   Patient presents with   • Follow-Up   • Shortness of Breath   • Chest Pain       Subjective:   Allison Cespedes is a 67 y.o. female who presents today for follow-up of shortness of breath and chest pressure.    Allison is a 67 year old female with history of COPD, normally followed by Dr. Hewitt. She saw Dr. SAMI Lovett 4-6 weeks ago for ongoing shortness of breath and chest pressure. PET scan was ordered, and scheduled for next week.  In the meantime, she saw Dr. Hewitt, who prescribed some Cipro and Prednisone for ongoing lower respiratory infection.    She is here today for follow-up, as she is feeling worse: more short of breath and just very tired and slightly dizzy. Some mild, vague chest pressure, but she hasn't really been using her Diltiazem PRN. No orthopnea or PND; no syncope; no edema. BP has been on the lower side. No recent fever or chills.    Past Medical History:   Diagnosis Date   • Allergy    • Arthritis    • Atrial fibrillation (HCC) 10/2018    New onset in office   • Breath shortness    • Bronchitis    • Disorder of thyroid    • Heart burn    • Pneumonia     in 1970's   • Psychiatric problem    • Snoring    • Tuberculosis      Past Surgical History:   Procedure Laterality Date   • MASS EXCISION GENERAL Left 7/6/2018    Procedure: MASS EXCISION GENERAL/ SUBCUTANEOUS MASS LEFT SHOULDER;  Surgeon: Swapna Rivas M.D.;  Location: SURGERY SAME DAY [facility];  Service: General   • APPENDECTOMY     • ARTHROSCOPY, KNEE     • PB REMV 2ND CATARACT,[facility]       Family History   Problem Relation Age of Onset   • Non-contributory Mother    • Hypertension Mother    • Non-contributory Father      Social History     Social History   • Marital status:      Spouse name: N/A   • Number of children: N/A   • Years of education: N/A     Occupational History   • Not on file.     Social History Main Topics   • Smoking status: Former Smoker     Packs/day: 2.00     Years: 30.00      Types: Cigarettes     Quit date: 2/3/1999   • Smokeless tobacco: Never Used   • Alcohol use No   • Drug use: No   • Sexual activity: Not on file     Other Topics Concern   • Not on file     Social History Narrative   • No narrative on file     Allergies   Allergen Reactions   • Pcn [Penicillins]      Rxn as child     Outpatient Encounter Prescriptions as of 10/16/2018   Medication Sig Dispense Refill   • tizanidine (ZANAFLEX) 4 MG Tab TK 1/2 TO 2 TS PO TID PRF SPASMS  2   • OXYGEN-HELIUM INH Inhale  by mouth.     • ciprofloxacin (CIPRO) 500 MG Tab Take 1 Tab by mouth 2 times a day. 20 Tab 0   • predniSONE (DELTASONE) 10 MG Tab Take 30mg x 3 days, then take 20mg x 3 days, then take 10mg x 3 days, with food, then discontinue. 18 Tab 0   • Fluticasone Furoate-Vilanterol (BREO ELLIPTA) 200-25 MCG/INH AEROSOL POWDER, BREATH ACTIVATED Inhale 1 Puff by mouth every day. Rinse mouth after use. 1 Each 5   • DILTIAZem (CARDIZEM) 30 MG Tab TAKE 1 TABLET BY MOUTH TWICE DAILY AS NEEDED 180 Tab 0   • [DISCONTINUED] Fluticasone Furoate-Vilanterol (BREO ELLIPTA) 200-25 MCG/INH AEROSOL POWDER, BREATH ACTIVATED Inhale 1 Puff by mouth every day. Rinse mouth after use. 1 Each 3   • diclofenac EC (VOLTAREN) 75 MG Tablet Delayed Response TK 1 T PO BID  2   • escitalopram (LEXAPRO) 20 MG tablet TK 1 T PO QD  3   • FEMRING 0.05 MG/24HR RING INSERT 1 VAGINALLY Q 3 MONTHS  0   • buPROPion (WELLBUTRIN XL) 150 MG XL tablet TK 1 T PO QD  3   • hydrocodone-acetaminophen (NORCO) 5-325 MG Tab per tablet Take 1-2 Tabs by mouth every 6 hours as needed. 20 Tab 0   • progesterone (PROMETRIUM) 100 MG CAPS Take 100 mg by mouth every day.     • omeprazole (PRILOSEC) 20 MG delayed-release capsule Take 20 mg by mouth every day.     • lovastatin (MEVACOR) 20 MG TABS Take 20 mg by mouth every evening.     • dicyclomine (BENTYL) 10 MG CAPS Take 10 mg by mouth 4 Times a Day,Before Meals and at Bedtime.     • Aspirin-Acetaminophen-Caffeine (EXCEDRIN PO) Take  by  "mouth.     • vitamin D, Ergocalciferol, (DRISDOL) 21406 UNITS CAPS capsule Take  by mouth every 7 days.     • [DISCONTINUED] Probiotic Product (PROBIOTIC DAILY PO) Take  by mouth.     • levothyroxine (SYNTHROID) 100 MCG TABS Take 100 mcg by mouth every day.     • liothyronine (CYTOMEL) 5 MCG TABS Take 5 mcg by mouth every day.     • propranolol (INDERAL) 20 MG TABS Take 20 mg by mouth 2 times a day. Patient taking 1/2 tab BID       No facility-administered encounter medications on file as of 10/16/2018.      Review of Systems   Constitutional: Negative for chills and fever.   HENT: Negative for congestion.    Respiratory: Positive for cough and shortness of breath.    Cardiovascular: Positive for palpitations. Negative for chest pain, orthopnea, leg swelling and PND.   Gastrointestinal: Negative for abdominal pain and nausea.   Musculoskeletal: Negative for myalgias.   Skin: Negative for rash.   Neurological: Negative for dizziness, loss of consciousness and headaches.   Endo/Heme/Allergies: Does not bruise/bleed easily.   Psychiatric/Behavioral: The patient is nervous/anxious.         Objective:   BP (!) 94/60 (BP Location: Left arm, Patient Position: Sitting, BP Cuff Size: Adult)   Pulse 74   Ht 1.651 m (5' 5\")   Wt 77.6 kg (171 lb)   SpO2 96%   BMI 28.46 kg/m²     Physical Exam   Constitutional: She is oriented to person, place, and time. She appears well-developed and well-nourished.   HENT:   Head: Normocephalic.   Eyes: EOM are normal.   Neck: Normal range of motion. Neck supple. No JVD present.   Cardiovascular: Normal heart sounds.  An irregularly irregular rhythm present. Bradycardia present.    -130bpm   Pulmonary/Chest: Effort normal. No respiratory distress. She has decreased breath sounds. She has no wheezes. She has rhonchi in the right lower field and the left lower field. She has no rales.   Abdominal: Soft. Bowel sounds are normal. She exhibits no distension. There is no tenderness. "   Musculoskeletal: Normal range of motion. She exhibits no edema.   Neurological: She is alert and oriented to person, place, and time.   Skin: Skin is warm and dry. No rash noted.   Psychiatric: She has a normal mood and affect.     EKG ordered by me and interpreted by me shows atrial fibrillation at a rate of 85-145bpm.    Assessment:     1. Other chest pain     2. Irregular heartbeat  EKG   3. Bronchitis     4. Paroxysmal atrial fibrillation (HCC)         Medical Decision Making:  Today's Assessment / Status / Plan:     1. Chest pressure and shortness of breath, with new onset atrial fibrillation captured today in EKG. Per discussion with Dr. JOSE ROBERTO Lovett, will send her to the [facility] ER to be rate controlled, with possible MERVIN/cardioversion to be done today. She is agreeable, and is escorted to the ER by one of our MAs.    2. Bronchitis, currently treated with Cipro and Prednisone.    As above, she will go to the ER today to be treated. Further follow-up to be determined based on today's treatment.    Collaborating MD: JOSE ROBERTO Lovett

## 2018-10-17 ENCOUNTER — TELEPHONE (OUTPATIENT)
Dept: CARDIOLOGY | Facility: MEDICAL CENTER | Age: 68
End: 2018-10-17

## 2018-10-17 ENCOUNTER — APPOINTMENT (OUTPATIENT)
Dept: RADIOLOGY | Facility: MEDICAL CENTER | Age: 68
End: 2018-10-17
Attending: INTERNAL MEDICINE
Payer: MEDICARE

## 2018-10-17 ENCOUNTER — HOSPITAL ENCOUNTER (OUTPATIENT)
Dept: CARDIOLOGY | Facility: MEDICAL CENTER | Age: 68
End: 2018-10-17
Attending: INTERNAL MEDICINE
Payer: MEDICARE

## 2018-10-17 VITALS
DIASTOLIC BLOOD PRESSURE: 51 MMHG | TEMPERATURE: 97.2 F | OXYGEN SATURATION: 95 % | SYSTOLIC BLOOD PRESSURE: 127 MMHG | HEIGHT: 65 IN | RESPIRATION RATE: 18 BRPM | WEIGHT: 171.3 LBS | HEART RATE: 58 BPM | BODY MASS INDEX: 28.54 KG/M2

## 2018-10-17 DIAGNOSIS — I48.91 ATRIAL FIBRILLATION, UNSPECIFIED TYPE (HCC): ICD-10-CM

## 2018-10-17 DIAGNOSIS — I48.0 PAROXYSMAL ATRIAL FIBRILLATION (HCC): ICD-10-CM

## 2018-10-17 LAB
ALBUMIN SERPL BCP-MCNC: 3.4 G/DL (ref 3.2–4.9)
ALBUMIN/GLOB SERPL: 1.6 G/DL
ALP SERPL-CCNC: 79 U/L (ref 30–99)
ALT SERPL-CCNC: 20 U/L (ref 2–50)
ANION GAP SERPL CALC-SCNC: 5 MMOL/L (ref 0–11.9)
AST SERPL-CCNC: 14 U/L (ref 12–45)
BILIRUB SERPL-MCNC: 0.5 MG/DL (ref 0.1–1.5)
BUN SERPL-MCNC: 18 MG/DL (ref 8–22)
CALCIUM SERPL-MCNC: 8.2 MG/DL (ref 8.4–10.2)
CHLORIDE SERPL-SCNC: 108 MMOL/L (ref 96–112)
CO2 SERPL-SCNC: 24 MMOL/L (ref 20–33)
CREAT SERPL-MCNC: 0.88 MG/DL (ref 0.5–1.4)
ERYTHROCYTE [DISTWIDTH] IN BLOOD BY AUTOMATED COUNT: 46.4 FL (ref 35.9–50)
GLOBULIN SER CALC-MCNC: 2.1 G/DL (ref 1.9–3.5)
GLUCOSE SERPL-MCNC: 99 MG/DL (ref 65–99)
HCT VFR BLD AUTO: 39.3 % (ref 37–47)
HGB BLD-MCNC: 13.2 G/DL (ref 12–16)
LV EJECT FRACT  99904: 65
LV EJECT FRACT MOD 2C 99903: 66.74
LV EJECT FRACT MOD 4C 99902: 69.75
LV EJECT FRACT MOD BP 99901: 68.71
MCH RBC QN AUTO: 30.8 PG (ref 27–33)
MCHC RBC AUTO-ENTMCNC: 33.6 G/DL (ref 33.6–35)
MCV RBC AUTO: 91.8 FL (ref 81.4–97.8)
PLATELET # BLD AUTO: 379 K/UL (ref 164–446)
PMV BLD AUTO: 9.9 FL (ref 9–12.9)
POTASSIUM SERPL-SCNC: 3.9 MMOL/L (ref 3.6–5.5)
PROT SERPL-MCNC: 5.5 G/DL (ref 6–8.2)
RBC # BLD AUTO: 4.28 M/UL (ref 4.2–5.4)
SODIUM SERPL-SCNC: 137 MMOL/L (ref 135–145)
TROPONIN I SERPL-MCNC: <0.02 NG/ML (ref 0–0.04)
WBC # BLD AUTO: 14 K/UL (ref 4.8–10.8)

## 2018-10-17 PROCEDURE — G0378 HOSPITAL OBSERVATION PER HR: HCPCS

## 2018-10-17 PROCEDURE — 99214 OFFICE O/P EST MOD 30 MIN: CPT | Performed by: INTERNAL MEDICINE

## 2018-10-17 PROCEDURE — A9270 NON-COVERED ITEM OR SERVICE: HCPCS | Performed by: INTERNAL MEDICINE

## 2018-10-17 PROCEDURE — 99217 PR OBSERVATION CARE DISCHARGE: CPT | Performed by: HOSPITALIST

## 2018-10-17 PROCEDURE — 700101 HCHG RX REV CODE 250: Performed by: INTERNAL MEDICINE

## 2018-10-17 PROCEDURE — 85027 COMPLETE CBC AUTOMATED: CPT

## 2018-10-17 PROCEDURE — 80053 COMPREHEN METABOLIC PANEL: CPT

## 2018-10-17 PROCEDURE — 93306 TTE W/DOPPLER COMPLETE: CPT

## 2018-10-17 PROCEDURE — 700102 HCHG RX REV CODE 250 W/ 637 OVERRIDE(OP): Performed by: INTERNAL MEDICINE

## 2018-10-17 PROCEDURE — 84484 ASSAY OF TROPONIN QUANT: CPT

## 2018-10-17 PROCEDURE — 93306 TTE W/DOPPLER COMPLETE: CPT | Mod: 26 | Performed by: INTERNAL MEDICINE

## 2018-10-17 RX ORDER — AMIODARONE HYDROCHLORIDE 200 MG/1
200 TABLET ORAL DAILY
Qty: 30 TAB | Refills: 2 | Status: SHIPPED | OUTPATIENT
Start: 2018-10-17 | End: 2018-10-17 | Stop reason: SDUPTHER

## 2018-10-17 RX ORDER — AMIODARONE HYDROCHLORIDE 400 MG/1
400 TABLET ORAL 2 TIMES DAILY
Qty: 60 TAB | Refills: 0 | Status: SHIPPED | OUTPATIENT
Start: 2018-10-17 | End: 2018-10-17

## 2018-10-17 RX ORDER — AMIODARONE HYDROCHLORIDE 200 MG/1
400 TABLET ORAL TWICE DAILY
Status: DISCONTINUED | OUTPATIENT
Start: 2018-10-17 | End: 2018-10-17 | Stop reason: HOSPADM

## 2018-10-17 RX ADMIN — LIOTHYRONINE SODIUM 5 MCG: 5 TABLET ORAL at 05:52

## 2018-10-17 RX ADMIN — OMEPRAZOLE 20 MG: 20 CAPSULE, DELAYED RELEASE ORAL at 05:52

## 2018-10-17 RX ADMIN — ESCITALOPRAM OXALATE 20 MG: 10 TABLET ORAL at 05:52

## 2018-10-17 RX ADMIN — CLINDAMYCIN HYDROCHLORIDE 450 MG: 150 CAPSULE ORAL at 05:52

## 2018-10-17 RX ADMIN — BUPROPION HYDROCHLORIDE 150 MG: 150 TABLET, FILM COATED, EXTENDED RELEASE ORAL at 05:52

## 2018-10-17 RX ADMIN — BUDESONIDE AND FORMOTEROL FUMARATE DIHYDRATE 2 PUFF: 160; 4.5 AEROSOL RESPIRATORY (INHALATION) at 05:52

## 2018-10-17 RX ADMIN — POTASSIUM CHLORIDE AND SODIUM CHLORIDE: 900; 150 INJECTION, SOLUTION INTRAVENOUS at 05:53

## 2018-10-17 RX ADMIN — AMIODARONE HYDROCHLORIDE 400 MG: 200 TABLET ORAL at 09:34

## 2018-10-17 ASSESSMENT — ENCOUNTER SYMPTOMS
VOMITING: 0
FEVER: 0
CHILLS: 0
NAUSEA: 0

## 2018-10-17 ASSESSMENT — PAIN SCALES - GENERAL: PAINLEVEL_OUTOF10: 0

## 2018-10-17 ASSESSMENT — PATIENT HEALTH QUESTIONNAIRE - PHQ9
SUM OF ALL RESPONSES TO PHQ9 QUESTIONS 1 AND 2: 0
2. FEELING DOWN, DEPRESSED, IRRITABLE, OR HOPELESS: NOT AT ALL
1. LITTLE INTEREST OR PLEASURE IN DOING THINGS: NOT AT ALL

## 2018-10-17 NOTE — RESPIRATORY CARE
"COPD EDUCATION by COPD CLINICAL EDUCATOR  10/17/2018  at  10:45 AM by Mayte Bhakta     Patient interviewed by COPD education team.  Patient unable to participate in full program.  Short intervention was completed with this patient covering: What is Asthma (how the lungs work), asthma medications, controlling asthma, asthma action plan, peak flow meter and understanding asthma triggers were covered in detail.  A comprehensive packet including information about asthma and treatments.    A personalized \"Asthma Action Plan\" was reviewed with and provided to the patient. Patient takes Breo daily but needs rescue inhaler has spacer.    "

## 2018-10-17 NOTE — TELEPHONE ENCOUNTER
Amy Yin, Med Ass't  Jammie Mckeon R.MOISES.             Patient would like to switch the rivaroxaban (XARELTO) 20 MG Tab tablet to coumadin because she is unable to pay for the co-pay     She is requesting a call back today please   Thank you      Amy Yin, Med Ass't  Amy Yin, Med Ass't   Cc: Jammie Mckeon R.N.             Waiting on INS information from the pharmacy so I can submit PA for amiodarone     FYI to Jammie just in case you talk to the patient       Called pt, pt reports she was just discharged from [facility] she was sent home with Rx for Amiodarone 400mg and Xarelto 20mg, pt reports the Amiodarone would cost her $300 and Xarelto $154/month first she is requesting if she could be switched to Coumadin as this is cheaper,she is also concerned about interaction between diclofenac and Xarelto, discussed with pt that if she chooses Coumadin, we will have to refer her to a Coumadin Clinic and she needs to be followed by them for INR monitoring, discussed that we could give her samples through our [facility] and also she could apply through [employer] patient assistance program. Pt reports she would like a 30 day sample of Xarelto for now, she will think about it and will give us a call back if she decides to go on Coumadin. In regards to Amiodarone, informed pt that we are waiting for paper works from his pharmacy to submit PA. Pt reports she does not have any meds for tonight, informed pt that I will talked to our Pharmacy tech to see what we could do for her but advised her also to call her pharmacy to find out how much a short supply would cost her, pt appreciative and verbalizes understanding

## 2018-10-17 NOTE — CARE PLAN
Problem: Communication  Goal: The ability to communicate needs accurately and effectively will improve  Outcome: PROGRESSING AS EXPECTED    Intervention: [place] patient and significant other/support system to call light to alert staff of needs  Patient oriented to call light system and all relevant hospital policies. Call light within reach and used appropriately when in need of assistance.        Problem: Knowledge Deficit  Goal: Knowledge of disease process/condition, treatment plan, diagnostic tests, and medications will improve  Outcome: PROGRESSING AS EXPECTED    Intervention: Assess knowledge level of disease process/condition, treatment plan, diagnostic tests, and medications  Patient's knowledge of plan of care, diagnostics (echocardiogram in morning), and medications (amiodarone drip) regularly assessed. RN answers patient questions appropriately, education provided. Patient verbalizes better understanding of their condition.

## 2018-10-17 NOTE — PROGRESS NOTES
Received report from day shift RN Korin. Plan of care discussed at bedside. Patient resting comfortably in bed at this time with no complaints. Safety precautions and hourly rounding in place.

## 2018-10-17 NOTE — DISCHARGE SUMMARY
Discharge Summary    CHIEF COMPLAINT ON ADMISSION  Chief Complaint   Patient presents with   • Chest Pain     Pt c/o CP increased this am. Pt states recent URI with chest tightness. Pt seen by PCp and pt sent to ER for further evaluation.        Reason for Admission  chest pain     Admission Date  10/16/2018    CODE STATUS  Full Code    HPI & HOSPITAL COURSE  This is a 67 y.o. female here with new onset atrial fibrillation.  She converted spontaneously to sinus rhythm was started on amiodarone per area and she had some chest pain during the atrial fibrillation but this resolved completely with converting to sinus rhythm.  Patient was seen by Dr. Esther Gibson for cardiology evaluation who reviewed her echocardiogram and deemed the patient could go home on an amiodarone taper and oral anti-coagulation.  She will follow-up in the cardiology clinic.       Therefore, she is discharged in good and stable condition to home with close outpatient follow-up.        Discharge Date  10/17/18    FOLLOW UP ITEMS POST DISCHARGE  cardiology    DISCHARGE DIAGNOSES  Active Problems:    Atrial fibrillation with RVR (HCC) POA: Yes    Leukocytosis POA: Unknown    Obstructive sleep apnea POA: Unknown    Sinusitis POA: Unknown    Hypothyroidism POA: Unknown  Resolved Problems:    * No resolved hospital problems. *      FOLLOW UP  Future Appointments  Date Time Provider Department Center   10/24/2018 10:00 AM [facility] [place] CARDIAC PET [facility]   10/24/2018 2:30 PM [facility]   10/24/2018 3:30 PM [facility]   11/12/2018 1:00 PM PFT-RM2 PULM None   11/12/2018 2:00 PM Nano Perez P.A.-C. PULM None     No follow-up provider specified.    MEDICATIONS ON DISCHARGE     Medication List      START taking these medications      Instructions   amiodarone 400 MG tablet  Commonly known as:  PACERONE   Take 1 Tab by mouth 2 Times a Day for 30 days.  Dose:  400 mg     rivaroxaban 20 MG Tabs tablet  Commonly known as:   XARELTO   Take 1 Tab by mouth with dinner.  Dose:  20 mg        CONTINUE taking these medications      Instructions   buPROPion 150 MG XL tablet  Commonly known as:  WELLBUTRIN XL   TK 1 T PO QD     CYTOMEL 5 MCG Tabs  Generic drug:  liothyronine   Take 5 mcg by mouth 2 Times a Day.  Dose:  5 mcg     diclofenac EC 75 MG Tbec  Commonly known as:  VOLTAREN   TK 1 T PO BID     escitalopram 20 MG tablet  Commonly known as:  LEXAPRO   TK 1 T PO QD     FEMRING 0.05 MG/24HR Ring  Generic drug:  Estradiol Acetate   INSERT 1 VAGINALLY Q 3 MONTHS     Fluticasone Furoate-Vilanterol 200-25 MCG/INH Aepb  Commonly known as:  BREO ELLIPTA   Inhale 1 Puff by mouth every day. Rinse mouth after use.  Dose:  1 Puff     lovastatin 20 MG Tabs  Commonly known as:  MEVACOR   Take 10 mg by mouth every evening.  Dose:  10 mg     omeprazole 20 MG delayed-release capsule  Commonly known as:  PRILOSEC   Take 20 mg by mouth every day.  Dose:  20 mg     predniSONE 10 MG Tabs  Commonly known as:  DELTASONE   Take 30mg x 3 days, then take 20mg x 3 days, then take 10mg x 3 days, with food, then discontinue.     PROMETRIUM 100 MG Caps  Generic drug:  progesterone   Take 100 mg by mouth every day.  Dose:  100 mg     propranolol 20 MG Tabs  Commonly known as:  INDERAL   Take 10 mg by mouth every day.  Dose:  10 mg     SYNTHROID 100 MCG Tabs  Generic drug:  levothyroxine   Take 100 mcg by mouth every day.  Dose:  100 mcg     tizanidine 4 MG Tabs  Commonly known as:  ZANAFLEX   Take 4 mg by mouth at bedtime as needed.  Dose:  4 mg     vitamin D (Ergocalciferol) 86307 units Caps capsule  Commonly known as:  DRISDOL   Take 50,000 Units by mouth every 7 days.  Dose:  89322 Units        STOP taking these medications    ciprofloxacin 500 MG Tabs  Commonly known as:  CIPRO            Allergies  Allergies   Allergen Reactions   • Pcn [Penicillins]      Rxn as child       DIET  Orders Placed This Encounter   Procedures   • Diet Order Regular     Standing  Status:   Standing     Number of Occurrences:   1     Order Specific Question:   Diet:     Answer:   Regular [1]       ACTIVITY  As tolerated.  Weight bearing as tolerated    CONSULTATIONS  Cardiology, Dr. Gibson     PROCEDURES  none    LABORATORY  Lab Results   Component Value Date    SODIUM 137 10/17/2018    POTASSIUM 3.9 10/17/2018    CHLORIDE 108 10/17/2018    CO2 24 10/17/2018    GLUCOSE 99 10/17/2018    BUN 18 10/17/2018    CREATININE 0.88 10/17/2018        Lab Results   Component Value Date    WBC 14.0 (H) 10/17/2018    HEMOGLOBIN 13.2 10/17/2018    HEMATOCRIT 39.3 10/17/2018    PLATELETCT 379 10/17/2018

## 2018-10-17 NOTE — DISCHARGE INSTRUCTIONS
Discharge Instructions    Discharged to home by car with relative. Discharged via wheelchair, hospital escort: Yes.  Special equipment needed: Not Applicable    Be sure to schedule a follow-up appointment with your primary care doctor or any specialists as instructed.     Discharge Plan:   Diet Plan: Discussed  Activity Level: Discussed  Confirmed Follow up Appointment: Appointment Scheduled  Confirmed Symptoms Management: Discussed  Medication Reconciliation Updated: Yes  Influenza Vaccine Indication: Not indicated: Previously immunized this influenza season and > 8 years of age    I understand that a diet low in cholesterol, fat, and sodium is recommended for good health. Unless I have been given specific instructions below for another diet, I accept this instruction as my diet prescription.   Other diet: regular    Special Instructions: None    · Is patient discharged on Warfarin / Coumadin?   No     Depression / Suicide Risk    As you are discharged from this [facility] facility, it is important to learn how to keep safe from harming yourself.    Recognize the warning signs:  · Abrupt changes in personality, positive or negative- including increase in energy   · Giving away possessions  · Change in eating patterns- significant weight changes-  positive or negative  · Change in sleeping patterns- unable to sleep or sleeping all the time   · Unwillingness or inability to communicate  · Depression  · Unusual sadness, discouragement and loneliness  · Talk of wanting to die  · Neglect of personal appearance   · Rebelliousness- reckless behavior  · Withdrawal from people/activities they love  · Confusion- inability to concentrate     If you or a loved one observes any of these behaviors or has concerns about self-harm, here's what you can do:  · Talk about it- your feelings and reasons for harming yourself  · Remove any means that you might use to hurt yourself (examples: pills, rope, extension cords,  firearm)  · Get professional help from the community (Mental Health, Substance Abuse, psychological counseling)  · Do not be alone:Call your Safe Contact- someone whom you trust who will be there for you.  · Call your local CRISIS HOTLINE 761-9467 or 090-706-7111  · Call your local Children's Mobile Crisis Response Team [place] (708) 547-5637 or www.Hark  · Call the toll free National Suicide Prevention Hotlines   · National Suicide Prevention Lifeline 648-928-RYLV (6473)  · National Hope Line Network 800-SUICIDE (288-1298)

## 2018-10-17 NOTE — PROGRESS NOTES
Tele Strip at 1906 shows SR with HR of 74  Measurements: 0.14/0.08/0.38    Tele Shift Summary:  Rhythm: SR/SB  Rate: 49-80s  Ectopy: frequent PVC/PAC, couplets, trigeminy    Telemetry monitoring strips placed in patient's chart.

## 2018-10-17 NOTE — FLOWSHEET NOTE
10/16/18 2238   Interdisciplinary Plan of Care-Goals (Indications)   Bronchodilator Indications History / Diagnosis   Interdisciplinary Plan of Care-Outcomes    Bronchodilator Outcome Patient at Stable Baseline   Therapy Not Performed   Type of Therapy Not Performed SVN   Reason Therapy Not Performed PRN, No Indication   Chest Exam   Respiration 18   Pulse 93   Oximetry   #Pulse Oximetry (Single Determination) Yes   Oxygen   Pulse Oximetry 98 %   O2 (LPM) 2   O2 Daily Delivery Respiratory  Nasal Cannula

## 2018-10-17 NOTE — PROGRESS NOTES
Due medications given and assessment completed.    Amiodarone rate change reduced per protocol at 2100 with Arron RN to 17mL/hr.

## 2018-10-17 NOTE — PROGRESS NOTES
Cardiology Follow Up Progress Note    Date of Service  10/17/2018    Attending Physician  Celsa Vale M.D.    Chief Complaint   Afib    JULIA Cespedes is a 67 y.o. female admitted 10/16/2018 with rapid afib, converted spontaneously, started on amio.    Interim Events  Remains in sinus  K 3.9, mg 2.4, plt 379, trop normal  Tele- sinus, frequent PVCs, PACs, rate 40-70  Awaiting echo  Breathing better  No chest pain, no palpitations      Review of Systems  Review of Systems   Constitutional: Negative for chills and fever.   Gastrointestinal: Negative for nausea and vomiting.   Endocrine: Negative for cold intolerance and heat intolerance.       Vital signs in last 24 hours  Temp:  [36.2 °C (97.2 °F)-37.1 °C (98.7 °F)] 36.2 °C (97.2 °F)  Pulse:  [] 58  Resp:  [16-18] 18  BP: (108-156)/(43-98) 127/51    Physical Exam  Physical Exam     General: No acute distress. Well nourished. Appears older than stated age  HEENT: EOM grossly intact, no scleral icterus, no pharyngeal erythema.   Neck:  No JVD, no bruits, trachea midline  CVS: RRR, some ectopy. Normal S1, S2. No M/R/G. No LE edema.  2+ radial pulses, 2+ PT pulses  Resp: CTAB. No wheezing or crackles/rhonchi. Normal respiratory effort.  Abdomen: Soft, NT, no magui hepatomegaly.  MSK/Ext: No clubbing or cyanosis.  Skin: Warm and dry, no rashes.  Neurological: CN III-XII grossly intact. No focal deficits.   Psych: A&O x 3, appropriate affect, good judgement      Lab Review  Lab Results   Component Value Date/Time    WBC 14.0 (H) 10/17/2018 12:49 AM    RBC 4.28 10/17/2018 12:49 AM    HEMOGLOBIN 13.2 10/17/2018 12:49 AM    HEMATOCRIT 39.3 10/17/2018 12:49 AM    MCV 91.8 10/17/2018 12:49 AM    MCH 30.8 10/17/2018 12:49 AM    MCHC 33.6 10/17/2018 12:49 AM    MPV 9.9 10/17/2018 12:49 AM      Lab Results   Component Value Date/Time    SODIUM 137 10/17/2018 12:49 AM    POTASSIUM 3.9 10/17/2018 12:49 AM    CHLORIDE 108 10/17/2018 12:49 AM    CO2 24 10/17/2018  12:49 AM    GLUCOSE 99 10/17/2018 12:49 AM    BUN 18 10/17/2018 12:49 AM    CREATININE 0.88 10/17/2018 12:49 AM      Lab Results   Component Value Date/Time    ASTSGOT 14 10/17/2018 12:49 AM    ALTSGPT 20 10/17/2018 12:49 AM     Lab Results   Component Value Date/Time    TROPONINI <0.02 10/17/2018 12:49 AM       Recent Labs      10/16/18   1030   BNPBTYPENAT  73       Cardiac Imaging and Procedures Review  EKG:  My personal interpretation of the EKG dated 10-16-18 is sinus, PVC, rate 75    Echocardiogram:  pending    Imaging  Chest X-Ray:  10-7-18 no acute process    Stress Test:  7-16-15 stress echo normal    Assessment/Plan  1. Parox afib  2. High risk medications   3. Anticoagulation  4. HTN, controlled today.  5. COPD, prednisone  6. IVETET  7. Sinusitis      -Switch to oral amio, 400 MG BID for 2 weeks, 200 mg BID 2 weeks, 200 mg daily.  -stop cardizem IV  -Cont Xarelto  -I will FU on echo  -Will arrange outpt FU, plan for amio is short term only  -DW Dr. Vale, recall with questions/concerns    Thank you for allowing me to participate in the care of this patient.  Cardiology will sign off on this patient    Please contact me with any questions.    Esther Gibson M.D.   Cardiologist, [facility]  [phone]    Future Appointments  Date Time Provider Department Center   10/24/2018 10:00 AM [facility] NM CARDIAC PET [facility]   10/24/2018 2:30 PM [facility] CT 2 [facility] S[facility] Ismael   10/24/2018 3:30 PM [facility]   11/12/2018 1:00 PM PFT-RM2 PULM None   11/12/2018 2:00 PM Nano Perez P.A.-C. PULM None

## 2018-10-17 NOTE — PROGRESS NOTES
Reviewed d/c paperwork with patient and ; both vocalized understanding. IVs removed, pt d/c with . Pt belongings sent with pt

## 2018-10-17 NOTE — PROGRESS NOTES
AOx4,  Afebrile. Denies pain. Pt is cooperative. Admission profile and assessment per doc flowsheet.    PIV intact & patent. 2nd PIV started for additional fluids. 1 attempt, patent and flushing, IVF infusing.     States understanding of POC. Patient has history of TB, received treatment for nine months in 1981.     Patient has history of falls, states she loses her balance every once in a while.    No complaints at this time.  Pt educated to call for assist. Non-skid socks. Bed locked & in low position. Bed alarm on.

## 2018-10-17 NOTE — TELEPHONE ENCOUNTER
PAR sent to patient's plan:  Allison Cespedes Peralta: [id number] - PA Case ID: [id number]   Status   Sent to [facility]   DrugAmiodarone HCl 400MG OR TABS   FormOptumRx Medicare Part D Electronic Prior Authorization Form        The following alternative(s) is/are the preferred alternative(s): Amiodarone 200mg tablet or Pacerone 200mg tablet

## 2018-10-17 NOTE — CARE PLAN
Problem: Cardiac:  Goal: Cardiovascular alteration will improve  Outcome: PROGRESSING AS EXPECTED  Switching pt from IV amiodarone to PO amiodarone. Continues to be in sinus yinka/normal sinus rhythm

## 2018-10-17 NOTE — TELEPHONE ENCOUNTER
Per Amy Yin:    working on the PA and according to the plan:  The following alternative(s) is/are the preferred alternative(s): Amiodarone 200mg tablet or Pacerone 200mg tablet.     Discussed above information w/ Dr Lovett, per Dr Lovett:    Please prescribed Amio 200mg PO daily and interactions between diclofenac and Xarelto should be ok.     New Rx for Amiodarone 200mg PO daily sent to [facility].     Called pt, discussed above information, pt appreciative and verbalizes understanding

## 2018-10-18 ENCOUNTER — PATIENT OUTREACH (OUTPATIENT)
Dept: HEALTH INFORMATION MANAGEMENT | Facility: OTHER | Age: 68
End: 2018-10-18

## 2018-10-18 NOTE — PROGRESS NOTES
10/18/2018 0859 - Discharge Outreach attempt - LM  10/18/2018 1418 - Patient returned call and call completed.

## 2018-10-19 ENCOUNTER — TELEPHONE (OUTPATIENT)
Dept: PULMONOLOGY | Facility: HOSPICE | Age: 68
End: 2018-10-19

## 2018-10-19 ENCOUNTER — TELEPHONE (OUTPATIENT)
Dept: CARDIOLOGY | Facility: MEDICAL CENTER | Age: 68
End: 2018-10-19

## 2018-10-19 NOTE — TELEPHONE ENCOUNTER
The patient called and stated that she had to go into the hospital this Tuesday for A-FIB.  They took her off of the Cipro and Prednisone that you had prescribed when she was in on 10/8/18.  She said that her white count is still high and she is getting her sinus and chest CT's done next Wednesday.  Also, they put her on Xarelto and Amioderone while she was in the hospital.  She just wanted you to know what is going on and wanted to know if you needed her to do anything different.  Please advise, thank you.

## 2018-10-19 NOTE — TELEPHONE ENCOUNTER
PREM Cline/Jammie     Patient called to thank you for helping her get the Xarelto and she has picked up the medication. She also wants to discuss the results of her recent EKG which showed MI Infarct and wants to find out which doctor she should follow up with. She can be reached at [phone].      Called pt, she is wondering if she should set up a FV w/ Dr Lovett, recommended pt to set up HFU in 1-2 weeks, if Dr Lovett is not available she could see one of our APRN, pt verbalizes understanding, she did asked also about her EKG that showed MI during her recent hospital stay, reassured pt that if there is anything concerning that the MD should have already addressed it during that time and also she is scheduled for Cardiac PET on 10/24/18 and this would give us a better picture of what's going on, pt appreciative and verbalizes understanding, she requested to be transferred to schedulers, transferred pt [phone].

## 2018-10-22 ENCOUNTER — TELEPHONE (OUTPATIENT)
Dept: CARDIOLOGY | Facility: MEDICAL CENTER | Age: 68
End: 2018-10-22

## 2018-10-22 DIAGNOSIS — I48.0 PAROXYSMAL ATRIAL FIBRILLATION (HCC): ICD-10-CM

## 2018-10-22 PROBLEM — J40 BRONCHITIS: Status: RESOLVED | Noted: 2018-10-16 | Resolved: 2018-10-22

## 2018-10-22 PROBLEM — R07.89 OTHER CHEST PAIN: Status: RESOLVED | Noted: 2018-09-05 | Resolved: 2018-10-22

## 2018-10-22 PROBLEM — D72.829 LEUKOCYTOSIS: Status: RESOLVED | Noted: 2018-10-16 | Resolved: 2018-10-22

## 2018-10-22 PROBLEM — R06.02 SOB (SHORTNESS OF BREATH): Status: RESOLVED | Noted: 2018-09-05 | Resolved: 2018-10-22

## 2018-10-22 RX ORDER — AMIODARONE HYDROCHLORIDE 200 MG/1
200 TABLET ORAL DAILY
Qty: 270 TAB | Refills: 2 | Status: SHIPPED | OUTPATIENT
Start: 2018-10-22 | End: 2018-12-28

## 2018-10-22 NOTE — TELEPHONE ENCOUNTER
Jeannette Mckeon R.N.             IA/Jammie       Patient spoke to on-call APRN over the weekend to discuss her A-fib. She was told to call the office this morning to discuss her medications. Pt. #[phone].      Called pt, pt reports she went back to Afib HR 160s last Sat at 3pm, she called our office and s/w Sayeh and she was instructed to take Amio 200mg 2 tabs BID as well as Inderal 20mg one time and was instructed to call our office today, pt reports she has been taking Amio 200mg 2 tabs BID over the weekend and took the inderal one time, she got out of Afib yesterday (Sunday) 3pm, she scheduled a HFU but she is not scheduled until 11/20/18 w/ AB. Informed pt that will discussed concerns w/ Dr Kyra Lovett and will let her know of her recommendations.     To Dr Lovett

## 2018-10-22 NOTE — TELEPHONE ENCOUNTER
Thank you!  Great news is we have found what is bothering her & should be able to keep it under control.    Stay on meds - carlos xarelto  We are not planning to have her on amio longterm - she can take an extra 2 (400mg) amio if she has breakthrough af, for now    Please - can she get next available with EP too?  Would be great to discuss watchman or RFA    thx!

## 2018-10-22 NOTE — TELEPHONE ENCOUNTER
Called pt, discussed Dr Lovett recommendations, pt agreed and verbalizes understanding, pt reports she was already scheduled to see EP on 1/29/19.     MAR updated.

## 2018-10-23 NOTE — TELEPHONE ENCOUNTER
LM on patient's voicemail to call back to schedule an appointment with Dr. Lyles - as he is the only MD that does the Watchman procedure.

## 2018-10-24 ENCOUNTER — HOSPITAL ENCOUNTER (OUTPATIENT)
Dept: RADIOLOGY | Facility: MEDICAL CENTER | Age: 68
End: 2018-10-24
Attending: INTERNAL MEDICINE
Payer: MEDICARE

## 2018-10-24 DIAGNOSIS — R05.9 COUGH: ICD-10-CM

## 2018-10-24 DIAGNOSIS — J01.80 OTHER ACUTE SINUSITIS, RECURRENCE NOT SPECIFIED: ICD-10-CM

## 2018-10-24 DIAGNOSIS — R06.02 SOB (SHORTNESS OF BREATH): ICD-10-CM

## 2018-10-24 DIAGNOSIS — R07.89 OTHER CHEST PAIN: ICD-10-CM

## 2018-10-24 PROCEDURE — A9555 RB82 RUBIDIUM: HCPCS

## 2018-10-24 PROCEDURE — 93018 CV STRESS TEST I&R ONLY: CPT | Performed by: INTERNAL MEDICINE

## 2018-10-24 PROCEDURE — 78492 MYOCRD IMG PET MLT RST&STRS: CPT | Mod: 26 | Performed by: INTERNAL MEDICINE

## 2018-10-24 PROCEDURE — 70486 CT MAXILLOFACIAL W/O DYE: CPT

## 2018-10-24 PROCEDURE — 71250 CT THORAX DX C-: CPT

## 2018-10-29 ENCOUNTER — PATIENT MESSAGE (OUTPATIENT)
Dept: PULMONOLOGY | Facility: HOSPICE | Age: 68
End: 2018-10-29

## 2018-10-29 NOTE — TELEPHONE ENCOUNTER
From: Allison Cespedes  To: Joanna Hewitt M.D.  Sent: 10/29/2018 9:11 AM PDT  Subject: Test Result Question    Wondering what my next step is. Nov. 12th seems a long time away. Thank you.

## 2018-10-29 NOTE — PATIENT COMMUNICATION
I called the patient and she would like to keep her November 12th appointment coming up with our office.

## 2018-11-12 ENCOUNTER — APPOINTMENT (OUTPATIENT)
Dept: PULMONOLOGY | Facility: HOSPICE | Age: 68
End: 2018-11-12
Payer: MEDICARE

## 2018-11-12 DIAGNOSIS — I48.0 PAROXYSMAL ATRIAL FIBRILLATION (HCC): ICD-10-CM

## 2018-11-13 ENCOUNTER — NON-PROVIDER VISIT (OUTPATIENT)
Dept: PULMONOLOGY | Facility: HOSPICE | Age: 68
End: 2018-11-13
Payer: MEDICARE

## 2018-11-13 ENCOUNTER — OFFICE VISIT (OUTPATIENT)
Dept: PULMONOLOGY | Facility: HOSPICE | Age: 68
End: 2018-11-13
Payer: MEDICARE

## 2018-11-13 VITALS — WEIGHT: 170 LBS | HEIGHT: 65 IN | BODY MASS INDEX: 28.32 KG/M2

## 2018-11-13 VITALS
HEIGHT: 65 IN | TEMPERATURE: 98.2 F | SYSTOLIC BLOOD PRESSURE: 122 MMHG | OXYGEN SATURATION: 96 % | WEIGHT: 170 LBS | DIASTOLIC BLOOD PRESSURE: 60 MMHG | BODY MASS INDEX: 28.32 KG/M2 | RESPIRATION RATE: 16 BRPM | HEART RATE: 77 BPM

## 2018-11-13 DIAGNOSIS — J45.30 MILD PERSISTENT ASTHMA WITHOUT COMPLICATION: ICD-10-CM

## 2018-11-13 DIAGNOSIS — I48.91 ATRIAL FIBRILLATION WITH RVR (HCC): ICD-10-CM

## 2018-11-13 DIAGNOSIS — J44.9 CHRONIC OBSTRUCTIVE PULMONARY DISEASE, UNSPECIFIED COPD TYPE (HCC): ICD-10-CM

## 2018-11-13 DIAGNOSIS — G47.33 OSA (OBSTRUCTIVE SLEEP APNEA): ICD-10-CM

## 2018-11-13 DIAGNOSIS — G47.33 OBSTRUCTIVE SLEEP APNEA: ICD-10-CM

## 2018-11-13 PROCEDURE — 99213 OFFICE O/P EST LOW 20 MIN: CPT | Performed by: PHYSICIAN ASSISTANT

## 2018-11-13 PROCEDURE — 94726 PLETHYSMOGRAPHY LUNG VOLUMES: CPT | Performed by: INTERNAL MEDICINE

## 2018-11-13 PROCEDURE — 94729 DIFFUSING CAPACITY: CPT | Performed by: INTERNAL MEDICINE

## 2018-11-13 PROCEDURE — 94060 EVALUATION OF WHEEZING: CPT | Performed by: INTERNAL MEDICINE

## 2018-11-13 RX ORDER — LEVALBUTEROL TARTRATE 45 UG/1
2 AEROSOL, METERED ORAL EVERY 4 HOURS PRN
Qty: 1 INHALER | Refills: 1 | Status: SHIPPED | OUTPATIENT
Start: 2018-11-13 | End: 2018-11-13 | Stop reason: SDUPTHER

## 2018-11-13 RX ORDER — LEVALBUTEROL TARTRATE 45 UG/1
2 AEROSOL, METERED ORAL EVERY 4 HOURS PRN
Qty: 1 INHALER | Refills: 5 | Status: SHIPPED | OUTPATIENT
Start: 2018-11-13 | End: 2018-11-15 | Stop reason: SDUPTHER

## 2018-11-13 ASSESSMENT — PULMONARY FUNCTION TESTS
FEV1_LLN: 2.03
FEV1_PERCENT_PREDICTED: 74
FVC_PERCENT_PREDICTED: 82
FEV1/FVC_PERCENT_PREDICTED: 96
FEV1_PERCENT_CHANGE: 5
FEV1_PERCENT_PREDICTED: 81
FEV1_PERCENT_CHANGE: 9
FEV1/FVC_PERCENT_PREDICTED: 95
FEV1: 1.81
FEV1/FVC_PERCENT_PREDICTED: 76
FEV1/FVC_PERCENT_LLN: 63
FEV1/FVC: 73
FVC_LLN: 2.66
FEV1/FVC: 76
FEV1_PREDICTED: 2.43
FVC: 2.62
FVC_PREDICTED: 3.19
FVC: 2.49
FEV1/FVC: 73
FEV1/FVC: 75.57
FEV1/FVC_PREDICTED: 76
FEV1/FVC_PERCENT_PREDICTED: 99
FVC_PERCENT_PREDICTED: 77
FEV1/FVC_PERCENT_CHANGE: 180
FEV1/FVC_PERCENT_PREDICTED: 99
FEV1/FVC_PERCENT_CHANGE: 3
FEV1: 1.98

## 2018-11-13 NOTE — PROGRESS NOTES
CC    HPI:  Allison Cespedes is a 67 y.o. year old female here today for follow-up on COPD.  Patient had lung CT and pulmonary function tests completed per pulmonary function test was compared to prior testing in 2014.  Her FVC was 2.49 L or 79% which was down 2.78 L or 83%, FEV1 1.81 L or 74% which was down from 2.0 L or 78% previous, FEV1/FVC ratio 95% predicted, she did show reduction in mid flows with an FEF 25-75% of 59% of predicted, with 48% increase mid flows post bronchodilator. Residual volume 2.11L or 95%, TLC 4.63L or 89%, DLCO 103% predicted.  Patient states she is tolerating Breo well and requests prescription sent to healthcare pharmacy for sample.  She reports she has not seen much change in her exertional dyspnea with complaints of dyspnea with extended talking.  Patient is on amiodarone for atrial fib with rapid ventricular response. Patient is on Xarelto.  She is pending a watchman procedure. She is on hormone replacement therapy.  Patient has had decreased activity due to pain from 2 crushed disks and arthritis in her feet over the last year. CT results reviewed: Per radiology report no evidence for interstitial lung disease, mild bilateral apical pleural scarring, incidental 14 mm right lower lobe pulmonary cyst.  Reviewed in office vitals including heart rate 77 blood pressure 122/60 and BMI of 28.29.  Patient has history of IVETTE with nocturnal hypoxia and is on 2-1/2 L oxygen at night.  Will provide rescue inhaler with Xopenex due to cardiac history.    ROS:   Constitutional: Denies fever chills or unintentional weight loss, does report increase in fatigue in general, and night sweats she attributes to hormone replacement therapy  Skin: Denies rashes, hair or nail changes, lumps, sores  Eyes: Does wear glasses, denies sudden onset blurring or other vision changes  ENT: History of ear infections and sinus infections, history of allergies, history of ear infections, has occasional  hoarseness, nasal congestion and/or runny nose  GI: Has heartburn/reflux on omeprazole, no trouble swallowing  Respiratory: Occasional dry cough at night, shortness of breath with activity including talking for very long, history of pneumonia x3, bronchitis, latent TB  CV: History of A. fib, palpitations, heart disease, mild edema, denies murmurs, tightness    Past Medical History:   Diagnosis Date   • Allergy    • Arthritis    • Asthma    • Atrial fibrillation (HCC) 10/2018    New onset in office   • Breath shortness    • Bronchitis    • Disorder of thyroid    • Heart burn    • Indigestion    • Pneumonia     in 1970's   • Psychiatric problem    • Snoring    • Tuberculosis    • Tuberculosis 1981    Treated for nine months       Past Surgical History:   Procedure Laterality Date   • MASS EXCISION GENERAL Left 7/6/2018    Procedure: MASS EXCISION GENERAL/ SUBCUTANEOUS MASS LEFT SHOULDER;  Surgeon: Swapna Rivas M.D.;  Location: SURGERY SAME DAY [facility];  Service: General   • APPENDECTOMY     • ARTHROSCOPY, KNEE     • PB REMV 2ND CATARACT,CORN-SCLER SECTN         Family History   Problem Relation Age of Onset   • Non-contributory Mother    • Hypertension Mother    • Non-contributory Father        Social History     Social History   • Marital status:      Spouse name: N/A   • Number of children: N/A   • Years of education: N/A     Occupational History   • Not on file.     Social History Main Topics   • Smoking status: Former Smoker     Packs/day: 2.00     Years: 30.00     Types: Cigarettes     Quit date: 2/3/1996   • Smokeless tobacco: Never Used      Comment: continued abstinance   • Alcohol use No   • Drug use: No   • Sexual activity: Not on file     Other Topics Concern   • Not on file     Social History Narrative   • No narrative on file       Allergies as of 11/13/2018 - Reviewed 11/13/2018   Allergen Reaction Noted   • Pcn [penicillins]  08/03/2012        @Vital signs for this encounter:  Vitals:     "11/13/18 0946 11/13/18 0947   Height: 1.651 m (5' 5\")    Weight: 77.1 kg (170 lb)    Weight % change since last entry.: 0 %    BP: 122/60    Pulse: 77    BMI (Calculated): 28.29    Resp: 16    Temp: 36.8 °C (98.2 °F)    TempSrc: Oral    O2 sat % room air:  96 %       Current medications as of today   Current Outpatient Prescriptions   Medication Sig Dispense Refill   • rivaroxaban (XARELTO) 20 MG Tab tablet Take 1 Tab by mouth with dinner. 30 Tab 0   • Fluticasone Furoate-Vilanterol (BREO ELLIPTA) 200-25 MCG/INH AEROSOL POWDER, BREATH ACTIVATED Inhale 1 Puff by mouth every day. Rinse mouth after use. 1 Each 5   • levalbuterol (XOPENEX HFA) 45 MCG/ACT inhaler Inhale 2 Puffs by mouth every four hours as needed for Shortness of Breath. 1 Inhaler 1   • amiodarone (CORDARONE) 200 MG Tab Take 1 Tab by mouth every day. May take extra up to 2 tabs daily PRN palps 270 Tab 2   • tizanidine (ZANAFLEX) 4 MG Tab Take 4 mg by mouth at bedtime as needed.     • lovastatin (MEVACOR) 20 MG Tab Take 10 mg by mouth every evening.     • predniSONE (DELTASONE) 10 MG Tab Take 30mg x 3 days, then take 20mg x 3 days, then take 10mg x 3 days, with food, then discontinue. 18 Tab 0   • escitalopram (LEXAPRO) 20 MG tablet TK 1 T PO QD  3   • FEMRING 0.05 MG/24HR RING INSERT 1 VAGINALLY Q 3 MONTHS  0   • buPROPion (WELLBUTRIN XL) 150 MG XL tablet TK 1 T PO QD  3   • progesterone (PROMETRIUM) 100 MG CAPS Take 100 mg by mouth every day.     • omeprazole (PRILOSEC) 20 MG delayed-release capsule Take 20 mg by mouth every day.     • vitamin D, Ergocalciferol, (DRISDOL) 04979 UNITS CAPS capsule Take 50,000 Units by mouth every 7 days.     • levothyroxine (SYNTHROID) 100 MCG TABS Take 100 mcg by mouth every day.     • liothyronine (CYTOMEL) 5 MCG TABS Take 5 mcg by mouth 2 Times a Day.     • propranolol (INDERAL) 20 MG TABS Take 10 mg by mouth every day.       No current facility-administered medications for this visit.          Physical Exam:   Gen:   "         Alert and oriented, No apparent distress. Mood and affect appropriate, normal interaction   Eyes:          sclere white, conjunctive moist.  Hearing:     Grossly intact.  Dentition:    Good dentition.  Oropharynx:   Tongue normal, posterior pharynx without erythema or exudate.  Mallampati Classification:III  Neck:        Supple, trachea midline, no masses.  Respiratory Effort: No intercostal retractions or use of accessory muscles.   Lung Auscultation:      Clear to auscultation bilaterally; no rales, rhonchi or wheezing.  CV:            Regular rate and rhythm. No murmurs, rubs or gallops appreciated.  No edema.  digits, Nails, Ext: No clubbing or cyanosis   Skin:        No rashes, lesions or ulcers noted on back or exposed skin surfaces.                     Assessment:  1. Chronic obstructive pulmonary disease, unspecified COPD type (HCC)  Fluticasone Furoate-Vilanterol (BREO ELLIPTA) 200-25 MCG/INH AEROSOL POWDER, BREATH ACTIVATED   2. Obstructive sleep apnea     3. Atrial fibrillation with RVR ([facility])         Immunizations:    Flu: 10/8/2018  Pneumovax 23: 10/16/2012  Prevnar 13: 10/8/2018    Plan:  1-reviewed CT, per Joanna Butler MD follow up CT in one year  2-6 month follow up appointment with me  3-add Xopenex for rescue inhaler    This dictation was created using voice recognition software. The accuracy of the dictation is limited to the abilities of the software. I expect there may be some errors of grammar and possibly content.

## 2018-11-13 NOTE — PROCEDURES
Technician Shayna James, [facility] Comments:Good patient effort & cooperation.  The results of this test meet the ATS/ERS standards for acceptability & reproducibility.  Test was performed on the [employer] Plethysmograph-Elite DX system.  Predicted values were [facility]-3 for spirometry, [facility] for DLCO, ITS for Lung Volumes.  The DLCO was uncorrected for Hgb.  A bronchodilator of Xopenex HFA -2puffs via spacer administered.  DLCO performed during dilation period.      The FVC is 2.62 L or 82%, FEV1 is 1.98 L or 81%, FEV1/FVC 76%.  TLC 89%.  DLCO 103%.  No significant bronchodilator response.    Interpretation:  Normal pulmonary function and gas transfer.  Lack of bronchodilator response does not preclude using inhalers when clinically indicated.

## 2018-11-13 NOTE — PATIENT INSTRUCTIONS
1-reviewed CTs, will follow up with MD  2-6 month follow up  3-add Xopenex for rescue inhaler

## 2018-11-14 NOTE — TELEPHONE ENCOUNTER
Kelechi Mcgill in the [facility] is requesting that you resend rx Xopenex HFA to the [facility] pharmacy on file for there is no sample form for the rx.    IVETTE

## 2018-11-15 ENCOUNTER — TELEPHONE (OUTPATIENT)
Dept: PULMONOLOGY | Facility: HOSPICE | Age: 68
End: 2018-11-15

## 2018-11-15 DIAGNOSIS — G47.33 OSA (OBSTRUCTIVE SLEEP APNEA): ICD-10-CM

## 2018-11-15 NOTE — TELEPHONE ENCOUNTER
MEDICATION PRIOR AUTHORIZATION NEEDED:    1. Name of Medication: Levalbuterol HFA 45 mcg    2. Requested By (Name of Pharmacy): [facility]     3. Is insurance on file current? yes    4. What is the name & phone number of the 3rd party payor? [employer] [phone]

## 2018-11-15 NOTE — TELEPHONE ENCOUNTER
DOCUMENTATION OF PRIOR AUTH STATUS    1. Medication name and dose: Levalbuterol HFA 45 mcg    2. Name and Phone # of Prescription coverage company: [employer] [phone]    3. Date Prior Auth was submitted: 11/15/2018    4. What information was given to obtain insurance decision: Clinical notes    5. Prior Auth letter Approved or Denied: Approved through 12/31/2019    6. Pharmacy notified: Yes    7. Patient notified: Yes

## 2018-11-16 RX ORDER — LEVALBUTEROL TARTRATE 45 UG/1
AEROSOL, METERED ORAL
Qty: 3 INHALER | Refills: 3 | Status: SHIPPED | OUTPATIENT
Start: 2018-11-16 | End: 2023-06-23

## 2018-11-16 NOTE — TELEPHONE ENCOUNTER
Have we ever prescribed this med? Yes.  If yes, what date? 11/13/2018    Last OV: 11/13/2018 - Nnao Perez     Next OV: 05/13/2019 - Nano Perez     DX: COPD    Medications: Xopenex     Please re-do prescription/ patient request 90 day supply

## 2018-11-19 ENCOUNTER — HOSPITAL ENCOUNTER (OUTPATIENT)
Dept: RADIOLOGY | Facility: MEDICAL CENTER | Age: 68
End: 2018-11-19
Attending: OBSTETRICS & GYNECOLOGY
Payer: MEDICARE

## 2018-11-19 DIAGNOSIS — Z12.31 BREAST CANCER SCREENING BY MAMMOGRAM: ICD-10-CM

## 2018-11-19 PROCEDURE — 77067 SCR MAMMO BI INCL CAD: CPT

## 2018-11-20 ENCOUNTER — OFFICE VISIT (OUTPATIENT)
Dept: CARDIOLOGY | Facility: MEDICAL CENTER | Age: 68
End: 2018-11-20
Payer: MEDICARE

## 2018-11-20 VITALS
OXYGEN SATURATION: 95 % | SYSTOLIC BLOOD PRESSURE: 102 MMHG | WEIGHT: 170 LBS | HEART RATE: 64 BPM | HEIGHT: 65 IN | BODY MASS INDEX: 28.32 KG/M2 | DIASTOLIC BLOOD PRESSURE: 72 MMHG

## 2018-11-20 DIAGNOSIS — M19.90 ARTHRITIS: ICD-10-CM

## 2018-11-20 DIAGNOSIS — G47.33 OBSTRUCTIVE SLEEP APNEA: ICD-10-CM

## 2018-11-20 DIAGNOSIS — E03.9 HYPOTHYROIDISM, UNSPECIFIED TYPE: ICD-10-CM

## 2018-11-20 DIAGNOSIS — Z79.01 CHRONIC ANTICOAGULATION: ICD-10-CM

## 2018-11-20 DIAGNOSIS — I48.91 ATRIAL FIBRILLATION WITH RVR (HCC): ICD-10-CM

## 2018-11-20 PROBLEM — J32.9 SINUSITIS: Status: RESOLVED | Noted: 2018-10-16 | Resolved: 2018-11-20

## 2018-11-20 PROCEDURE — 99214 OFFICE O/P EST MOD 30 MIN: CPT | Performed by: NURSE PRACTITIONER

## 2018-11-20 RX ORDER — DICLOFENAC SODIUM 75 MG/1
75 TABLET, DELAYED RELEASE ORAL 2 TIMES DAILY
Qty: 60 TAB | Refills: 6
Start: 2018-11-20 | End: 2019-01-08

## 2018-11-20 ASSESSMENT — ENCOUNTER SYMPTOMS
CHILLS: 0
FEVER: 0
HEADACHES: 0
DIZZINESS: 0
INSOMNIA: 0
NAUSEA: 0
PALPITATIONS: 0
BRUISES/BLEEDS EASILY: 0
COUGH: 0
MYALGIAS: 0
SHORTNESS OF BREATH: 0
LOSS OF CONSCIOUSNESS: 0
ABDOMINAL PAIN: 0
ORTHOPNEA: 0
PND: 0

## 2018-11-20 NOTE — LETTER
[facility]   [address]   [address]   [place] [address]  Phone: [phone]  Fax: [phone]              Allison Cespedes  1950    Encounter Date: 11/20/2018    CHARLOTTE Ibrahim          PROGRESS NOTE:  Chief Complaint   Patient presents with   • Hospital Follow-up   • Atrial Fibrillation       Subjective:   Allison Cespedes is a 67 y.o. female who presents today for hospital follow-up of new onset atrial fibrillation.    Allison is a 67 year old female with history of COPD, normally followed by Dr. Hewitt. She saw Dr. SAMI Lovett September 2018 for ongoing shortness of breath and chest pressure. PET scan was normal.   I saw her a few weeks later for increasing shortness of breath and palpitations, EKG confirmed new onset atrial fibrillation. She was escorted to [facility] ER, and admitted and treated with Amiodarone. She did convert to sinus rhythm. She was also started on Xarelto.     She is here today for follow-up, and she is feeling much better. She does have a [employer] fabricio, to check her HR and rhythm. No further chest pressure or discomfort; no palpitations; no shortness of breath, orthopnea or PND. No dizziness or syncope. No LE edema. She does use oral Diclofenac twice daily for arthritis per her pain management MD; he does know she is on Xarelto, and she understands the risks.    Past Medical History:   Diagnosis Date   • Arthritis    • Asthma    • Atrial fibrillation (HCC) 10/2018    New onset in office. Echocardiogram with normal LV size, LVEF 65%. Trace MR, trace TR. RVSP 34mmHg.   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     Past Surgical History:   Procedure Laterality Date   • MASS EXCISION GENERAL  Left 7/6/2018    Procedure: MASS EXCISION GENERAL/ SUBCUTANEOUS MASS LEFT SHOULDER;  Surgeon: Swapna Rivas M.D.;  Location: SURGERY SAME DAY [facility];  Service: General   • APPENDECTOMY     • ARTHROSCOPY, KNEE     • PB REMV 2ND CATARACT,CORN-SCLER SECTN       Family History   Problem Relation Age of Onset   • Non-contributory Mother    • Hypertension Mother    • Non-contributory Father      Social History     Social History   • Marital status:      Spouse name: N/A   • Number of children: N/A   • Years of education: N/A     Occupational History   • Not on file.     Social History Main Topics   • Smoking status: Former Smoker     Packs/day: 2.00     Years: 30.00     Types: Cigarettes     Quit date: 2/3/1996   • Smokeless tobacco: Never Used      Comment: continued abstinance   • Alcohol use No   • Drug use: No   • Sexual activity: Not on file     Other Topics Concern   • Not on file     Social History Narrative   • No narrative on file     Allergies   Allergen Reactions   • Pcn [Penicillins]      Rxn as child     Outpatient Encounter Prescriptions as of 11/20/2018   Medication Sig Dispense Refill   • diclofenac EC (VOLTAREN) 75 MG Tablet Delayed Response Take 1 Tab by mouth 2 times a day. 60 Tab 6   • levalbuterol (XOPENEX HFA) 45 MCG/ACT inhaler INHALE 2 PUFFS BY MOUTH EVERY 4 HOURS AS NEEDED FOR SHORTNESS OF BREATH 3 Inhaler 3   • rivaroxaban (XARELTO) 20 MG Tab tablet Take 1 Tab by mouth with dinner. 30 Tab 0   • Fluticasone Furoate-Vilanterol (BREO ELLIPTA) 200-25 MCG/INH AEROSOL POWDER, BREATH ACTIVATED Inhale 1 Puff by mouth every day. Rinse mouth after use. 1 Each 0   • amiodarone (CORDARONE) 200 MG Tab Take 1 Tab by mouth every day. May take extra up to 2 tabs daily PRN palps 270 Tab 2   • tizanidine (ZANAFLEX) 4 MG Tab Take 4 mg by mouth at bedtime as needed.     • lovastatin (MEVACOR) 20 MG Tab Take 10 mg by mouth every evening.     • escitalopram (LEXAPRO) 20 MG tablet TK 1 T PO QD  3   •  "FEMRING 0.05 MG/24HR RING INSERT 1 VAGINALLY Q 3 MONTHS  0   • buPROPion (WELLBUTRIN XL) 150 MG XL tablet TK 1 T PO QD  3   • progesterone (PROMETRIUM) 100 MG CAPS Take 100 mg by mouth every day.     • omeprazole (PRILOSEC) 20 MG delayed-release capsule Take 20 mg by mouth every day.     • vitamin D, Ergocalciferol, (DRISDOL) 29096 UNITS CAPS capsule Take 50,000 Units by mouth every 7 days.     • levothyroxine (SYNTHROID) 100 MCG TABS Take 100 mcg by mouth every day.     • liothyronine (CYTOMEL) 5 MCG TABS Take 5 mcg by mouth 2 Times a Day.     • propranolol (INDERAL) 20 MG TABS Take 10 mg by mouth every day.     • [DISCONTINUED] predniSONE (DELTASONE) 10 MG Tab Take 30mg x 3 days, then take 20mg x 3 days, then take 10mg x 3 days, with food, then discontinue. (Patient not taking: Reported on 11/20/2018) 18 Tab 0     No facility-administered encounter medications on file as of 11/20/2018.      Review of Systems   Constitutional: Negative for chills and fever.   HENT: Negative for congestion.    Respiratory: Negative for cough and shortness of breath.    Cardiovascular: Negative for chest pain, palpitations, orthopnea, leg swelling and PND.   Gastrointestinal: Negative for abdominal pain and nausea.   Musculoskeletal: Negative for myalgias.   Skin: Negative for rash.   Neurological: Negative for dizziness, loss of consciousness and headaches.   Endo/Heme/Allergies: Does not bruise/bleed easily.   Psychiatric/Behavioral: The patient does not have insomnia.         Objective:   /72 (BP Location: Right arm, Patient Position: Sitting, BP Cuff Size: Adult)   Pulse 64   Ht 1.651 m (5' 5\")   Wt 77.1 kg (170 lb)   SpO2 95%   BMI 28.29 kg/m²      Physical Exam   Constitutional: She is oriented to person, place, and time. She appears well-developed and well-nourished.   HENT:   Head: Normocephalic.   Eyes: EOM are normal.   Neck: Normal range of motion. Neck supple. No JVD present.   Cardiovascular: Normal rate, " regular rhythm and normal heart sounds.    Pulmonary/Chest: Effort normal and breath sounds normal. No respiratory distress. She has no wheezes. She has no rales.   Abdominal: Soft. Bowel sounds are normal. She exhibits no distension. There is no tenderness.   Musculoskeletal: Normal range of motion. She exhibits no edema.   Neurological: She is alert and oriented to person, place, and time.   Skin: Skin is warm and dry. No rash noted.   Psychiatric: She has a normal mood and affect.     TSH 1.070  0.380 - 5.330 uIU/mL Final     Lab Results   Component Value Date/Time    SODIUM 137 10/17/2018 12:49 AM    POTASSIUM 3.9 10/17/2018 12:49 AM    CHLORIDE 108 10/17/2018 12:49 AM    CO2 24 10/17/2018 12:49 AM    GLUCOSE 99 10/17/2018 12:49 AM    BUN 18 10/17/2018 12:49 AM    CREATININE 0.88 10/17/2018 12:49 AM     Lab Results   Component Value Date/Time    WBC 14.0 (H) 10/17/2018 12:49 AM    RBC 4.28 10/17/2018 12:49 AM    HEMOGLOBIN 13.2 10/17/2018 12:49 AM    HEMATOCRIT 39.3 10/17/2018 12:49 AM    MCV 91.8 10/17/2018 12:49 AM    MCH 30.8 10/17/2018 12:49 AM    MCHC 33.6 10/17/2018 12:49 AM    MPV 9.9 10/17/2018 12:49 AM        CONCLUSIONS OF ECHOCARDIOGRAM OF 10/17/2018:  Normal left ventricular systolic function.  Left ventricular ejection fraction is visually estimated to be 65%.  Normal diastolic function.  Normal inferior vena cava size and inspiratory collapse.  Estimated right ventricular systolic pressure is 34 mmHg.    RESULTS OF PET SCAN OF 10/24/2018:  ELECTROCARDIOGRAPHIC FINDINGS:   Normal sinus rhythm.  Normal ECG response to dipyridamole infusion.  No ischemic changes noted   SCINTOGRAPHIC FINDINGS:    Normal left ventricular perfusion with stress and rest images.  There is no evidence of ischemia or scar.  GATED WALL MOTION FINDINGS:    The left ventricle wall motion is normal with stress and rest  imagings.  Measured resting ejection fraction is 72 %.       Assessment:     1. Atrial fibrillation with RVR  (HCC)     2. Chronic anticoagulation     3. Arthritis  diclofenac EC (VOLTAREN) 75 MG Tablet Delayed Response   4. Obstructive sleep apnea     5. Hypothyroidism, unspecified type         Medical Decision Making:  Today's Assessment / Status / Plan:     1. New onset atrial fibrillation, now in sinus rhythm on Amiodarone. She has already been referred to EP to discuss treatment options including Watchman device. To see Dr. Lyles 12/24/2018.    2. Chronic anticoagulation with Xarelto. No bleeding problems.    3. Arthritis, currently on Voltaren/Diclofenac twice daily. Discussed GI bleed risks with this and Xarelto; she states understanding. Urged to use more sparingly.    4. Sleep apnea, treated.    5. Hypothyroidism, treated. Recent TSH was normal.    Same medications for now. She does see Dr. Lyles next month, and Dr. SAMI Lovett next year; to keep this appointment.    Collaborating MD: Niya Zarco

## 2018-11-20 NOTE — PROGRESS NOTES
Chief Complaint   Patient presents with   • Hospital Follow-up   • Atrial Fibrillation       Subjective:   Allison Cespedes is a 67 y.o. female who presents today for hospital follow-up of new onset atrial fibrillation.    Allison is a 67 year old female with history of COPD, normally followed by Dr. Hewitt. She saw Dr. SAMI Lovett September 2018 for ongoing shortness of breath and chest pressure. PET scan was normal.  I saw her a few weeks later for increasing shortness of breath and palpitations, EKG confirmed new onset atrial fibrillation. She was escorted to [facility] ER, and admitted and treated with Amiodarone. She did convert to sinus rhythm. She was also started on Xarelto.     She is here today for follow-up, and she is feeling much better. She does have a [employer] fabricio, to check her HR and rhythm. No further chest pressure or discomfort; no palpitations; no shortness of breath, orthopnea or PND. No dizziness or syncope. No LE edema. She does use oral Diclofenac twice daily for arthritis per her pain management MD; he does know she is on Xarelto, and she understands the risks.    Past Medical History:   Diagnosis Date   • Arthritis    • Asthma    • Atrial fibrillation (HCC) 10/2018    New onset in office. Echocardiogram with normal LV size, LVEF 65%. Trace MR, trace TR. RVSP 34mmHg.   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     Past Surgical History:   Procedure Laterality Date   • MASS EXCISION GENERAL Left 7/6/2018    Procedure: MASS EXCISION GENERAL/ SUBCUTANEOUS MASS LEFT SHOULDER;  Surgeon: Swapna Rivas M.D.;  Location: SURGERY SAME DAY [facility];  Service: General   • APPENDECTOMY     • ARTHROSCOPY, KNEE     • PB REMV 2ND CATARACT,CORN-SCLER SECTN       Family History   Problem  Relation Age of Onset   • Non-contributory Mother    • Hypertension Mother    • Non-contributory Father      Social History     Social History   • Marital status:      Spouse name: N/A   • Number of children: N/A   • Years of education: N/A     Occupational History   • Not on file.     Social History Main Topics   • Smoking status: Former Smoker     Packs/day: 2.00     Years: 30.00     Types: Cigarettes     Quit date: 2/3/1996   • Smokeless tobacco: Never Used      Comment: continued abstinance   • Alcohol use No   • Drug use: No   • Sexual activity: Not on file     Other Topics Concern   • Not on file     Social History Narrative   • No narrative on file     Allergies   Allergen Reactions   • Pcn [Penicillins]      Rxn as child     Outpatient Encounter Prescriptions as of 11/20/2018   Medication Sig Dispense Refill   • diclofenac EC (VOLTAREN) 75 MG Tablet Delayed Response Take 1 Tab by mouth 2 times a day. 60 Tab 6   • levalbuterol (XOPENEX HFA) 45 MCG/ACT inhaler INHALE 2 PUFFS BY MOUTH EVERY 4 HOURS AS NEEDED FOR SHORTNESS OF BREATH 3 Inhaler 3   • rivaroxaban (XARELTO) 20 MG Tab tablet Take 1 Tab by mouth with dinner. 30 Tab 0   • Fluticasone Furoate-Vilanterol (BREO ELLIPTA) 200-25 MCG/INH AEROSOL POWDER, BREATH ACTIVATED Inhale 1 Puff by mouth every day. Rinse mouth after use. 1 Each 0   • amiodarone (CORDARONE) 200 MG Tab Take 1 Tab by mouth every day. May take extra up to 2 tabs daily PRN palps 270 Tab 2   • tizanidine (ZANAFLEX) 4 MG Tab Take 4 mg by mouth at bedtime as needed.     • lovastatin (MEVACOR) 20 MG Tab Take 10 mg by mouth every evening.     • escitalopram (LEXAPRO) 20 MG tablet TK 1 T PO QD  3   • FEMRING 0.05 MG/24HR RING INSERT 1 VAGINALLY Q 3 MONTHS  0   • buPROPion (WELLBUTRIN XL) 150 MG XL tablet TK 1 T PO QD  3   • progesterone (PROMETRIUM) 100 MG CAPS Take 100 mg by mouth every day.     • omeprazole (PRILOSEC) 20 MG delayed-release capsule Take 20 mg by mouth every day.     •  "vitamin D, Ergocalciferol, (DRISDOL) 05731 UNITS CAPS capsule Take 50,000 Units by mouth every 7 days.     • levothyroxine (SYNTHROID) 100 MCG TABS Take 100 mcg by mouth every day.     • liothyronine (CYTOMEL) 5 MCG TABS Take 5 mcg by mouth 2 Times a Day.     • propranolol (INDERAL) 20 MG TABS Take 10 mg by mouth every day.     • [DISCONTINUED] predniSONE (DELTASONE) 10 MG Tab Take 30mg x 3 days, then take 20mg x 3 days, then take 10mg x 3 days, with food, then discontinue. (Patient not taking: Reported on 11/20/2018) 18 Tab 0     No facility-administered encounter medications on file as of 11/20/2018.      Review of Systems   Constitutional: Negative for chills and fever.   HENT: Negative for congestion.    Respiratory: Negative for cough and shortness of breath.    Cardiovascular: Negative for chest pain, palpitations, orthopnea, leg swelling and PND.   Gastrointestinal: Negative for abdominal pain and nausea.   Musculoskeletal: Negative for myalgias.   Skin: Negative for rash.   Neurological: Negative for dizziness, loss of consciousness and headaches.   Endo/Heme/Allergies: Does not bruise/bleed easily.   Psychiatric/Behavioral: The patient does not have insomnia.         Objective:   /72 (BP Location: Right arm, Patient Position: Sitting, BP Cuff Size: Adult)   Pulse 64   Ht 1.651 m (5' 5\")   Wt 77.1 kg (170 lb)   SpO2 95%   BMI 28.29 kg/m²     Physical Exam   Constitutional: She is oriented to person, place, and time. She appears well-developed and well-nourished.   HENT:   Head: Normocephalic.   Eyes: EOM are normal.   Neck: Normal range of motion. Neck supple. No JVD present.   Cardiovascular: Normal rate, regular rhythm and normal heart sounds.    Pulmonary/Chest: Effort normal and breath sounds normal. No respiratory distress. She has no wheezes. She has no rales.   Abdominal: Soft. Bowel sounds are normal. She exhibits no distension. There is no tenderness.   Musculoskeletal: Normal range of " motion. She exhibits no edema.   Neurological: She is alert and oriented to person, place, and time.   Skin: Skin is warm and dry. No rash noted.   Psychiatric: She has a normal mood and affect.     TSH 1.070  0.380 - 5.330 uIU/mL Final     Lab Results   Component Value Date/Time    SODIUM 137 10/17/2018 12:49 AM    POTASSIUM 3.9 10/17/2018 12:49 AM    CHLORIDE 108 10/17/2018 12:49 AM    CO2 24 10/17/2018 12:49 AM    GLUCOSE 99 10/17/2018 12:49 AM    BUN 18 10/17/2018 12:49 AM    CREATININE 0.88 10/17/2018 12:49 AM     Lab Results   Component Value Date/Time    WBC 14.0 (H) 10/17/2018 12:49 AM    RBC 4.28 10/17/2018 12:49 AM    HEMOGLOBIN 13.2 10/17/2018 12:49 AM    HEMATOCRIT 39.3 10/17/2018 12:49 AM    MCV 91.8 10/17/2018 12:49 AM    MCH 30.8 10/17/2018 12:49 AM    MCHC 33.6 10/17/2018 12:49 AM    MPV 9.9 10/17/2018 12:49 AM        CONCLUSIONS OF ECHOCARDIOGRAM OF 10/17/2018:  Normal left ventricular systolic function.  Left ventricular ejection fraction is visually estimated to be 65%.  Normal diastolic function.  Normal inferior vena cava size and inspiratory collapse.  Estimated right ventricular systolic pressure is 34 mmHg.    RESULTS OF PET SCAN OF 10/24/2018:  ELECTROCARDIOGRAPHIC FINDINGS:   Normal sinus rhythm.  Normal ECG response to dipyridamole infusion.  No ischemic changes noted   SCINTOGRAPHIC FINDINGS:    Normal left ventricular perfusion with stress and rest images.  There is no evidence of ischemia or scar.  GATED WALL MOTION FINDINGS:    The left ventricle wall motion is normal with stress and rest  imagings.  Measured resting ejection fraction is 72 %.       Assessment:     1. Atrial fibrillation with RVR (HCC)     2. Chronic anticoagulation     3. Arthritis  diclofenac EC (VOLTAREN) 75 MG Tablet Delayed Response   4. Obstructive sleep apnea     5. Hypothyroidism, unspecified type         Medical Decision Making:  Today's Assessment / Status / Plan:     1. New onset atrial fibrillation, now in  sinus rhythm on Amiodarone. She has already been referred to EP to discuss treatment options including Watchman device. To see Dr. Lyles 12/24/2018.    2. Chronic anticoagulation with Xarelto. No bleeding problems.    3. Arthritis, currently on Voltaren/Diclofenac twice daily. Discussed GI bleed risks with this and Xarelto; she states understanding. Urged to use more sparingly.    4. Sleep apnea, treated.    5. Hypothyroidism, treated. Recent TSH was normal.    Same medications for now. She does see Dr. Lyles next month, and Dr. SAMI Lovett next year; to keep this appointment.    Collaborating MD: Niya

## 2018-12-17 DIAGNOSIS — I48.91 ATRIAL FIBRILLATION WITH RVR (HCC): ICD-10-CM

## 2018-12-17 RX ORDER — METOPROLOL SUCCINATE 50 MG/1
50 TABLET, EXTENDED RELEASE ORAL
Qty: 30 TAB | Refills: 1 | Status: SHIPPED | OUTPATIENT
Start: 2018-12-17 | End: 2019-05-20

## 2018-12-18 ENCOUNTER — TELEPHONE (OUTPATIENT)
Dept: CARDIOLOGY | Facility: MEDICAL CENTER | Age: 68
End: 2018-12-18

## 2018-12-18 DIAGNOSIS — I48.0 PAROXYSMAL ATRIAL FIBRILLATION (HCC): ICD-10-CM

## 2018-12-18 NOTE — TELEPHONE ENCOUNTER
Received fax from [facility] (P: [phone] F: [phone])  States that patient is both on Amiodarone and Metoprolol.  Called [facility] to verify fax.  They are requesting for this office to call patient to inform her that HR may be lowered when taking the medications.    Called patient, unable to reach.  Left voicemail to inform patient of what the two medications may do and to return this call if she has any questions or concerns.    Fax sent to scanning for reference.

## 2018-12-24 ENCOUNTER — OFFICE VISIT (OUTPATIENT)
Dept: CARDIOLOGY | Facility: MEDICAL CENTER | Age: 68
End: 2018-12-24
Payer: MEDICARE

## 2018-12-24 ENCOUNTER — TELEPHONE (OUTPATIENT)
Dept: CARDIOLOGY | Facility: MEDICAL CENTER | Age: 68
End: 2018-12-24

## 2018-12-24 VITALS
DIASTOLIC BLOOD PRESSURE: 80 MMHG | HEIGHT: 65 IN | SYSTOLIC BLOOD PRESSURE: 110 MMHG | HEART RATE: 64 BPM | WEIGHT: 170 LBS | OXYGEN SATURATION: 94 % | BODY MASS INDEX: 28.32 KG/M2

## 2018-12-24 DIAGNOSIS — G89.29 CHRONIC LOW BACK PAIN, UNSPECIFIED BACK PAIN LATERALITY, WITH SCIATICA PRESENCE UNSPECIFIED: ICD-10-CM

## 2018-12-24 DIAGNOSIS — M54.5 CHRONIC LOW BACK PAIN, UNSPECIFIED BACK PAIN LATERALITY, WITH SCIATICA PRESENCE UNSPECIFIED: ICD-10-CM

## 2018-12-24 DIAGNOSIS — Z79.899 ON AMIODARONE THERAPY: ICD-10-CM

## 2018-12-24 DIAGNOSIS — I48.91 ATRIAL FIBRILLATION, UNSPECIFIED TYPE (HCC): ICD-10-CM

## 2018-12-24 DIAGNOSIS — G47.33 OBSTRUCTIVE SLEEP APNEA: ICD-10-CM

## 2018-12-24 DIAGNOSIS — Z79.01 CHRONIC ANTICOAGULATION: ICD-10-CM

## 2018-12-24 LAB — EKG IMPRESSION: NORMAL

## 2018-12-24 PROCEDURE — 99204 OFFICE O/P NEW MOD 45 MIN: CPT | Performed by: INTERNAL MEDICINE

## 2018-12-24 PROCEDURE — 93000 ELECTROCARDIOGRAM COMPLETE: CPT | Performed by: INTERNAL MEDICINE

## 2018-12-24 NOTE — TELEPHONE ENCOUNTER
Patient scheduled for pre watchman MERVIN on 1-10-19 at [facility] with Dr. SAMI Lovett at 10:00. FYI to Dr. Lovett.

## 2018-12-24 NOTE — PROGRESS NOTES
Arrhythmia Clinic Note (New patient)     DOS: 12/24/2018    Referring physician: Kyra Lovett    Chief complaint/Reason for consult: PAF    HPI:  Patient is a 67 yo F. She has a history of COPD, asthma, chronic low back pain due to a slipped disc. She was being treated for bronchitis and shortness of breath, at that time had increasing dyspnea with exertion and was found to be in rapid AF. She was hospitalized at that point and converted chemically with amiodarone. She was also started on xarelto which she is tolerating. She has had one or two short bouts of palpitations and tachycardia since then she says but thinks the amiodarone is doing its job for the most part. She has been tolerating the xarelto but says she is now unable to take her non-steroidal for her chronic pain which has really negatively affected her quality of life. She wonders if appendage closure may be an option for her to get off the chronic anticoagulation.    ROS (+ highlighted in red):  Constitutional: Fevers/chills/fatigue/weightloss  HEENT: Blurry vision/eye pain/sore throat/hearing loss  Respiratory: Shortness of breath/cough  Cardiovascular: Chest pain/palpitations/edema/orthopnea/syncope  GI: Nausea/vomitting/diarrhea  MSK: Arthralgias/myagias/muscle weakness  Skin: Rash/sores  Neurological: Numbness/tremors/vertigo  Endocrine: Excessive thirst/polyuria/cold intolerance/heat intolerance  Psych: Depression/anxiety    Past Medical History:   Diagnosis Date   • Arthritis    • Asthma    • Atrial fibrillation (HCC) 10/2018    New onset in office. Echocardiogram with normal LV size, LVEF 65%. Trace MR, trace TR. RVSP 34mmHg.   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       Past Surgical History:   Procedure Laterality Date   • MASS  EXCISION GENERAL Left 7/6/2018    Procedure: MASS EXCISION GENERAL/ SUBCUTANEOUS MASS LEFT SHOULDER;  Surgeon: Swapna Rivas M.D.;  Location: SURGERY SAME DAY [facility];  Service: General   • APPENDECTOMY     • ARTHROSCOPY, KNEE     • PB REMV 2ND CATARACT,CORN-SCLER SECTN         Social History     Social History   • Marital status:      Spouse name: N/A   • Number of children: N/A   • Years of education: N/A     Occupational History   • Not on file.     Social History Main Topics   • Smoking status: Former Smoker     Packs/day: 2.00     Years: 30.00     Types: Cigarettes     Quit date: 2/3/1996   • Smokeless tobacco: Never Used      Comment: continued abstinance   • Alcohol use No   • Drug use: No   • Sexual activity: Not on file     Other Topics Concern   • Not on file     Social History Narrative   • No narrative on file       Family History   Problem Relation Age of Onset   • Non-contributory Mother    • Hypertension Mother    • Non-contributory Father        Allergies   Allergen Reactions   • Pcn [Penicillins]      Rxn as child       Current Outpatient Prescriptions   Medication Sig Dispense Refill   • rivaroxaban (XARELTO) 20 MG Tab tablet Take 1 Tab by mouth with dinner. 30 Tab 0   • metoprolol SR (TOPROL XL) 50 MG TABLET SR 24 HR Take 1 Tab by mouth 1 time daily as needed (HR>120bpm). 30 Tab 1   • levalbuterol (XOPENEX HFA) 45 MCG/ACT inhaler INHALE 2 PUFFS BY MOUTH EVERY 4 HOURS AS NEEDED FOR SHORTNESS OF BREATH 3 Inhaler 3   • Fluticasone Furoate-Vilanterol (BREO ELLIPTA) 200-25 MCG/INH AEROSOL POWDER, BREATH ACTIVATED Inhale 1 Puff by mouth every day. Rinse mouth after use. 1 Each 0   • amiodarone (CORDARONE) 200 MG Tab Take 1 Tab by mouth every day. May take extra up to 2 tabs daily PRN palps 270 Tab 2   • tizanidine (ZANAFLEX) 4 MG Tab Take 4 mg by mouth at bedtime as needed.     • lovastatin (MEVACOR) 20 MG Tab Take 10 mg by mouth every evening.     • escitalopram (LEXAPRO) 20 MG tablet TK  "1 T PO QD  3   • FEMRING 0.05 MG/24HR RING INSERT 1 VAGINALLY Q 3 MONTHS  0   • buPROPion (WELLBUTRIN XL) 150 MG XL tablet TK 1 T PO QD  3   • progesterone (PROMETRIUM) 100 MG CAPS Take 100 mg by mouth every day.     • omeprazole (PRILOSEC) 20 MG delayed-release capsule Take 20 mg by mouth every day.     • vitamin D, Ergocalciferol, (DRISDOL) 97154 UNITS CAPS capsule Take 50,000 Units by mouth every 7 days.     • liothyronine (CYTOMEL) 5 MCG TABS Take 5 mcg by mouth 2 Times a Day.     • propranolol (INDERAL) 20 MG TABS Take 10 mg by mouth every day.     • diclofenac EC (VOLTAREN) 75 MG Tablet Delayed Response Take 1 Tab by mouth 2 times a day. (Patient not taking: Reported on 12/24/2018) 60 Tab 6   • levothyroxine (SYNTHROID) 100 MCG TABS Take 100 mcg by mouth every day.       No current facility-administered medications for this visit.        Physical Exam:  Vitals:    12/24/18 1109   BP: 110/80   BP Location: Left arm   Patient Position: Sitting   BP Cuff Size: Adult   Pulse: 64   SpO2: 94%   Weight: 77.1 kg (170 lb)   Height: 1.651 m (5' 5\")     General appearance: NAD, conversant   Eyes: anicteric sclerae, moist conjunctivae; no lid-lag; PERRLA  HENT: Atraumatic; oropharynx clear with moist mucous membranes and no mucosal ulcerations; normal hard and soft palate  Neck: Trachea midline; FROM, supple, no thyromegaly or lymphadenopathy  Lungs: CTA, with normal respiratory effort and no intercostal retractions  CV: RRR, no MRGs, no JVD   Abdomen: Soft, non-tender; no masses or HSM  Extremities: No peripheral edema or extremity lymphadenopathy  Skin: Normal temperature, turgor and texture; no rash, ulcers or subcutaneous nodules  Psych: Appropriate affect, alert and oriented to person, place and time    Data:  Labs reviewed    Prior echo/stress results reviewed:  LVEF 65%    EKG interpreted by me:  Sinus yinka    Impression/Plan:  1. Atrial fibrillation, unspecified type (HCC)  EKG   2. Chronic anticoagulation     3. " Chronic low back pain, unspecified back pain laterality, with sciatica presence unspecified     4. Obstructive sleep apnea     5. On amiodarone therapy       -I agree that the amiodarone is probably not a good long term medication for her  -I will switch her over to Multaq (if this is not affordable then 1C agent is reasonable, but I would like a longer amio washout if we do that)  -I think appendage closure is also reasonable as taking chronic OAC with scheduled non-steroidals is not particularly appealing from bleeding risk standpoint  -Risks/benefits of WATCHMAN discussed and she has agreed to proceed  -All her questions were answered    Sincere Lyles MD

## 2018-12-28 DIAGNOSIS — I48.20 CHRONIC ATRIAL FIBRILLATION (HCC): ICD-10-CM

## 2019-01-08 ENCOUNTER — TELEPHONE (OUTPATIENT)
Dept: CARDIOLOGY | Facility: MEDICAL CENTER | Age: 69
End: 2019-01-08

## 2019-01-08 ENCOUNTER — APPOINTMENT (OUTPATIENT)
Dept: ADMISSIONS | Facility: MEDICAL CENTER | Age: 69
End: 2019-01-08
Attending: INTERNAL MEDICINE
Payer: MEDICARE

## 2019-01-08 RX ORDER — LANSOPRAZOLE 30 MG/1
30 CAPSULE, DELAYED RELEASE ORAL PRN
COMMUNITY
End: 2020-03-06

## 2019-01-08 NOTE — TELEPHONE ENCOUNTER
"Antonina Lee R.N.   You 58 minutes ago (1:48 PM)      To ZOYA Patel to discuss with Dr. Lyles (Routing comment)         CHARLOTTE Ibrahim R.N. 1 hour ago (1:27 PM)      I would ask Dr. Lyles, as his note mentions a \"longer washout of Amiodarone\" for 1C agent.  I'm not sure how long he wants? 2-3 months?   Thanks, AB (Routing comment)         PREM Christy A.P.N. Yesterday (11:29 AM)      Heavenly, please see Dr. Lyles's 12/24 OV note. He mentions that a 1C agent would be reasonable if Multaq not affordable. How do you want me to proceed? Send to Dr. Lyles for new med?   She's scheduled for Watchman MERVIN on 1/10.   Viviana (Routing comment)         Anibal Jamison Ass't routed conversation to Antonina Lee R.N. Yesterday (10:16 AM)      CHARLOTTE Brown 3 days ago         Yobany Burdick. This may be something for Dr Lovett, but I'm not sure.     Sheila Burdick,  I saw Dr Lyles last week and Dr Lovett will be  doing a procedure this coming week.  While there, he told me he was switching me to Multaq.  He said  If it was too expensive to let him know and he would switch me to another drug but I would have to be off my anti arrhythmia for several weeks to several months (I may have that part confused but I know there was a wait with no medicine protection).  I asked him if he could call it in to [facility] (like y'all do for my Xarelto)  And he said no because they would have to do that every month. My  picked up my prescription today and it was $217 and that was after insurance. I just cannot afford a drug that expensive every month because I'm taking several other drugs that are for chronic conditions and my cost today with some of my refills was $500...that is more than we can handle.  I was wondering if your office could [facility] in to the [facility] or if you could prescribe something that is not so   expensive and requires me to be on no " antiarrhythmia?       I look forward to hearing from you.  Thank you very much.

## 2019-01-09 NOTE — TELEPHONE ENCOUNTER
Sincere Lyles M.D.   You; Amanda Ocampo R.N. 1 hour ago (11:55 AM)      Let's stop whatever she is on amiodarone or multaq for 2 weeks. Start flecainide 50 BID then have her come into office for EKG after 2 days after starting. Thanks.     Spoke to pt. She did have Multaq filled for 30 days so would like to finish that since she did already pay for it. Pt will call back in 2-3 weeks and we can order flecainide and EKG as per above instructions.

## 2019-01-10 ENCOUNTER — APPOINTMENT (OUTPATIENT)
Dept: CARDIOLOGY | Facility: MEDICAL CENTER | Age: 69
End: 2019-01-10
Attending: INTERNAL MEDICINE
Payer: MEDICARE

## 2019-01-10 ENCOUNTER — HOSPITAL ENCOUNTER (OUTPATIENT)
Facility: MEDICAL CENTER | Age: 69
End: 2019-01-10
Attending: INTERNAL MEDICINE | Admitting: INTERNAL MEDICINE
Payer: MEDICARE

## 2019-01-10 VITALS
HEART RATE: 59 BPM | RESPIRATION RATE: 12 BRPM | WEIGHT: 174.82 LBS | OXYGEN SATURATION: 92 % | SYSTOLIC BLOOD PRESSURE: 112 MMHG | DIASTOLIC BLOOD PRESSURE: 40 MMHG | TEMPERATURE: 97.2 F | HEIGHT: 65 IN | BODY MASS INDEX: 29.13 KG/M2

## 2019-01-10 DIAGNOSIS — I48.19 PERSISTENT ATRIAL FIBRILLATION (HCC): ICD-10-CM

## 2019-01-10 LAB — LV EJECT FRACT  99904: 50

## 2019-01-10 PROCEDURE — 93325 DOPPLER ECHO COLOR FLOW MAPG: CPT

## 2019-01-10 PROCEDURE — 93321 DOPPLER ECHO F-UP/LMTD STD: CPT | Mod: 26 | Performed by: INTERNAL MEDICINE

## 2019-01-10 PROCEDURE — 93312 ECHO TRANSESOPHAGEAL: CPT | Mod: 26,52 | Performed by: INTERNAL MEDICINE

## 2019-01-10 PROCEDURE — 700111 HCHG RX REV CODE 636 W/ 250 OVERRIDE (IP)

## 2019-01-10 PROCEDURE — 93325 DOPPLER ECHO COLOR FLOW MAPG: CPT | Mod: 26,52 | Performed by: INTERNAL MEDICINE

## 2019-01-10 PROCEDURE — 160002 HCHG RECOVERY MINUTES (STAT)

## 2019-01-10 ASSESSMENT — PAIN SCALES - GENERAL
PAINLEVEL_OUTOF10: 0

## 2019-01-10 NOTE — DISCHARGE INSTRUCTIONS
ACTIVITY: Rest and take it easy for the first 24 hours.  A responsible adult is recommended to remain with you during that time.  It is normal to feel sleepy.  We encourage you to not do anything that requires balance, judgment or coordination.    MILD FLU-LIKE SYMPTOMS ARE NORMAL. YOU MAY EXPERIENCE GENERALIZED MUSCLE ACHES, THROAT IRRITATION, HEADACHE AND/OR SOME NAUSEA.    FOR 24 HOURS DO NOT:  Drive, operate machinery or run household appliances.  Drink beer or alcoholic beverages.   Make important decisions or sign legal documents.    SPECIAL INSTRUCTIONS: **KEEP INCISION DRY FOR 24 HRS*    DIET: To avoid nausea, slowly advance diet as tolerated, avoiding spicy or greasy foods for the first day.  Add more substantial food to your diet according to your physician's instructions.  Babies can be fed formula or breast milk as soon as they are hungry.  INCREASE FLUIDS AND FIBER TO AVOID CONSTIPATION.    SURGICAL DRESSING/BATHING: **NO RESTRICTION*    FOLLOW-UP APPOINTMENT:  A follow-up appointment should be arranged with your doctor in *DR JACKSON    [phone]**; call to schedule.    You should CALL YOUR PHYSICIAN if you develop:  Fever greater than 101 degrees F.  Pain not relieved by medication, or persistent nausea or vomiting.  Excessive bleeding (blood soaking through dressing) or unexpected drainage from the wound.  Extreme redness or swelling around the incision site, drainage of pus or foul smelling drainage.  Inability to urinate or empty your bladder within 8 hours.  Problems with breathing or chest pain.    You should call 911 if you develop problems with breathing or chest pain.  If you are unable to contact your doctor or surgical center, you should go to the nearest emergency room or urgent care center.  Physician's telephone #: *[phone]**    If any questions arise, call your doctor.  If your doctor is not available, please feel free to call the Surgical Center at [phone].  The Center is open  Monday through Friday from 7AM to 7PM.  You can also call the HEALTH HOTLINE open 24 hours/day, 7 days/week and speak to a nurse at [phone], or toll free at [phone].    A registered nurse may call you a few days after your surgery to see how you are doing after your procedure.    MEDICATIONS: Resume taking daily medication.  Take prescribed pain medication with food.  If no medication is prescribed, you may take non-aspirin pain medication if needed.  PAIN MEDICATION CAN BE VERY CONSTIPATING.  Take a stool softener or laxative such as senokot, pericolace, or milk of magnesia if needed.      If your physician has prescribed pain medication that includes Acetaminophen (Tylenol), do not take additional Acetaminophen (Tylenol) while taking the prescribed medication.    Depression / Suicide Risk    As you are discharged from this [facility] facility, it is important to learn how to keep safe from harming yourself.    Recognize the warning signs:  · Abrupt changes in personality, positive or negative- including increase in energy   · Giving away possessions  · Change in eating patterns- significant weight changes-  positive or negative  · Change in sleeping patterns- unable to sleep or sleeping all the time   · Unwillingness or inability to communicate  · Depression  · Unusual sadness, discouragement and loneliness  · Talk of wanting to die  · Neglect of personal appearance   · Rebelliousness- reckless behavior  · Withdrawal from people/activities they love  · Confusion- inability to concentrate     If you or a loved one observes any of these behaviors or has concerns about self-harm, here's what you can do:  · Talk about it- your feelings and reasons for harming yourself  · Remove any means that you might use to hurt yourself (examples: pills, rope, extension cords, firearm)  · Get professional help from the community (Mental Health, Substance Abuse, psychological counseling)  · Do not be alone:Call your  Safe Contact- someone whom you trust who will be there for you.  · Call your local CRISIS HOTLINE 237-7926 or 110-325-6030  · Call your local Children's Mobile Crisis Response Team [place] (663) 393-8844 or www.SkyGiraffe  · Call the toll free National Suicide Prevention Hotlines   · National Suicide Prevention Lifeline 857-739-EQKU (0642)  · National [employer] Bus Line Network 800-SUICIDE (689-4625)

## 2019-01-10 NOTE — OR NURSING
1020   PATIENT RECEIVED FROM CATH LAB,  S/P MERVIN UNDER ANESTHESIA.  PATIENT IS A/A/OX4.  DENIES ANY PAIN.  REPORT RECEIVED FROM DR JAIMES.    1100  PATIENT IS TOTALLY AWAKE,  TAKING PO FLUID AND SNACK OK.   IS @ BEDSIDE.    1130   DC INSTRUCTIONS GIVEN TO PATIENT AND .  VERBALIZED UNDERSTANDING OF ALL.  HL DC.  PATIENT DC TO HOME,  VIA WHEELCHAIR,  ESCORTED OUT BY VOLUNTEER.

## 2019-01-17 ENCOUNTER — PATIENT MESSAGE (OUTPATIENT)
Dept: CARDIOLOGY | Facility: MEDICAL CENTER | Age: 69
End: 2019-01-17

## 2019-01-17 DIAGNOSIS — I48.0 PAROXYSMAL ATRIAL FIBRILLATION (HCC): ICD-10-CM

## 2019-01-18 ENCOUNTER — TELEPHONE (OUTPATIENT)
Dept: CARDIOLOGY | Facility: MEDICAL CENTER | Age: 69
End: 2019-01-18

## 2019-02-05 ENCOUNTER — TELEPHONE (OUTPATIENT)
Dept: CARDIOLOGY | Facility: MEDICAL CENTER | Age: 69
End: 2019-02-05

## 2019-02-05 DIAGNOSIS — I48.91 ATRIAL FIBRILLATION WITH RVR (HCC): ICD-10-CM

## 2019-02-05 RX ORDER — FLECAINIDE ACETATE 50 MG/1
50 TABLET ORAL 2 TIMES DAILY
Qty: 180 TAB | Refills: 1 | Status: SHIPPED | OUTPATIENT
Start: 2019-02-05 | End: 2019-08-01 | Stop reason: SDUPTHER

## 2019-02-05 NOTE — TELEPHONE ENCOUNTER
Allison Lyles M.D. 12 hours ago (11:02 PM)         Yobany Contreras, I will finish up my Multaq tomorrow.  I believe you told me I have to stay off of anything for a bit before I start taking Flecinide.  Please confirm.  Also, please call in the Flecinide to [facility] on [facility] [address].  Thank you so much!      Responded to pt via Audience. Rx sent to [facility] per pt request. Advised will need EKG 2 days after flecainide start and to call for scheduling.

## 2019-02-20 ENCOUNTER — NON-PROVIDER VISIT (OUTPATIENT)
Dept: CARDIOLOGY | Facility: MEDICAL CENTER | Age: 69
End: 2019-02-20
Payer: MEDICARE

## 2019-02-20 DIAGNOSIS — I48.91 ATRIAL FIBRILLATION, UNSPECIFIED TYPE (HCC): ICD-10-CM

## 2019-02-20 PROCEDURE — 93000 ELECTROCARDIOGRAM COMPLETE: CPT | Performed by: INTERNAL MEDICINE

## 2019-02-21 LAB — EKG IMPRESSION: NORMAL

## 2019-02-26 ENCOUNTER — OFFICE VISIT (OUTPATIENT)
Dept: CARDIOLOGY | Facility: MEDICAL CENTER | Age: 69
End: 2019-02-26
Payer: MEDICARE

## 2019-02-26 VITALS
HEIGHT: 65 IN | BODY MASS INDEX: 28.32 KG/M2 | DIASTOLIC BLOOD PRESSURE: 55 MMHG | SYSTOLIC BLOOD PRESSURE: 108 MMHG | WEIGHT: 170 LBS | HEART RATE: 72 BPM | OXYGEN SATURATION: 99 %

## 2019-02-26 DIAGNOSIS — I48.91 ATRIAL FIBRILLATION WITH RVR (HCC): ICD-10-CM

## 2019-02-26 DIAGNOSIS — J44.9 CHRONIC OBSTRUCTIVE PULMONARY DISEASE, UNSPECIFIED COPD TYPE (HCC): ICD-10-CM

## 2019-02-26 DIAGNOSIS — Z79.01 CHRONIC ANTICOAGULATION: ICD-10-CM

## 2019-02-26 PROCEDURE — 99214 OFFICE O/P EST MOD 30 MIN: CPT | Performed by: INTERNAL MEDICINE

## 2019-02-26 ASSESSMENT — ENCOUNTER SYMPTOMS
PALPITATIONS: 0
SPEECH CHANGE: 0
DEPRESSION: 0
COUGH: 0
ABDOMINAL PAIN: 0
WHEEZING: 0
HEMOPTYSIS: 0
NERVOUS/ANXIOUS: 1
BLURRED VISION: 0
BRUISES/BLEEDS EASILY: 1
MEMORY LOSS: 0
FALLS: 0
INSOMNIA: 0
LOSS OF CONSCIOUSNESS: 0
CHILLS: 0
NAUSEA: 0
MYALGIAS: 0
EYE PAIN: 0
POLYDIPSIA: 0
VOMITING: 0
EYE DISCHARGE: 0
FEVER: 0

## 2019-02-26 NOTE — PROGRESS NOTES
Chief Complaint   Patient presents with   • Atrial Fibrillation     follow up       Subjective:   Allison Cespedes is a 68 y.o. female who presents today in follow-up in regards to her recent diagnosis of persistent atrial fibrillation.  She is having terrible bruising and bleeding but being very good about taking her anticoagulation    Back pain is always bothered her.  She is planning on a left atrial closure device with my partner next month.  This has been warranted because she cannot take the high doses of anti-inflammatories she needs to control her severe back pain.  She is very positive and optimistic.  She really appreciates the help of electrophysiology next    Flecainide is affordable  In with her  Evaristo    Past Medical History:   Diagnosis Date   • Arthritis     fingers, hands, toes, feet   • Asthma     inhalers daily   • Atrial fibrillation (HCC) 10/2018    New onset in office. Echocardiogram with normal LV size, LVEF 65%. Trace MR, trace TR. RVSP 34mmHg.   • Bronchitis 09/2018    gets often   • Chronic anticoagulation    • COPD (chronic obstructive pulmonary disease) (HCC)    • Heart burn    • Hypothyroidism    • Pneumonia 1990    in 1970's   • Psychiatric problem     depression, anxiety   • Snoring    • Tuberculosis 1981    Treated for nine months     Past Surgical History:   Procedure Laterality Date   • MASS EXCISION GENERAL Left 7/6/2018    Procedure: MASS EXCISION GENERAL/ SUBCUTANEOUS MASS LEFT SHOULDER;  Surgeon: Sawpna Rivas M.D.;  Location: SURGERY SAME DAY [facility];  Service: General   • CHOLECYSTECTOMY  1994   • APPENDECTOMY  1960    had ruptured   • ARTHROSCOPY, KNEE     • PB REMV 2ND CATARACT,CORN-SCLER SECTN       Family History   Problem Relation Age of Onset   • Non-contributory Mother    • Hypertension Mother    • Non-contributory Father      Social History     Social History   • Marital status:      Spouse name: N/A   • Number of children: N/A   • Years of  education: N/A     Occupational History   • Not on file.     Social History Main Topics   • Smoking status: Former Smoker     Packs/day: 2.00     Years: 30.00     Types: Cigarettes     Quit date: 2/3/1996   • Smokeless tobacco: Never Used      Comment: continued abstinance   • Alcohol use No   • Drug use: No   • Sexual activity: Not on file     Other Topics Concern   • Not on file     Social History Narrative   • No narrative on file     Allergies   Allergen Reactions   • Pcn [Penicillins]      Rxn as child     Outpatient Encounter Prescriptions as of 2/26/2019   Medication Sig Dispense Refill   • Fluticasone Furoate-Vilanterol (BREO ELLIPTA) 200-25 MCG/INH AEROSOL POWDER, BREATH ACTIVATED Inhale 1 Puff by mouth every day. Rinse mouth after use. 1 Each 0   • flecainide (TAMBOCOR) 50 MG tablet Take 1 Tab by mouth 2 times a day. 180 Tab 1   • rivaroxaban (XARELTO) 20 MG Tab tablet Take 1 Tab by mouth with dinner. 30 Tab 0   • lansoprazole (PREVACID) 30 MG CAPSULE DELAYED RELEASE Take 30 mg by mouth every day.     • lovastatin (MEVACOR) 20 MG Tab Take 10 mg by mouth every evening.     • escitalopram (LEXAPRO) 20 MG tablet TK 1 T PO QD  3   • FEMRING 0.05 MG/24HR RING INSERT 1 VAGINALLY Q 3 MONTHS  0   • buPROPion (WELLBUTRIN XL) 150 MG XL tablet TK 1 T PO QD  3   • progesterone (PROMETRIUM) 100 MG CAPS Take 100 mg by mouth every day.     • vitamin D, Ergocalciferol, (DRISDOL) 78846 UNITS CAPS capsule Take 50,000 Units by mouth every 7 days.     • levothyroxine (SYNTHROID) 100 MCG TABS Take 100 mcg by mouth every day.     • liothyronine (CYTOMEL) 5 MCG TABS Take 5 mcg by mouth 2 Times a Day.     • propranolol (INDERAL) 20 MG TABS Take 10 mg by mouth every day.     • [DISCONTINUED] Fluticasone Furoate-Vilanterol (BREO ELLIPTA) 200-25 MCG/INH AEROSOL POWDER, BREATH ACTIVATED Inhale 1 Puff by mouth every day. Rinse mouth after use. 1 Each 0   • metoprolol SR (TOPROL XL) 50 MG TABLET SR 24 HR Take 1 Tab by mouth 1 time  "daily as needed (HR>120bpm). 30 Tab 1   • levalbuterol (XOPENEX HFA) 45 MCG/ACT inhaler INHALE 2 PUFFS BY MOUTH EVERY 4 HOURS AS NEEDED FOR SHORTNESS OF BREATH 3 Inhaler 3   • [DISCONTINUED] tizanidine (ZANAFLEX) 4 MG Tab Take 4 mg by mouth at bedtime as needed.       No facility-administered encounter medications on file as of 2/26/2019.      Review of Systems   Constitutional: Negative for chills and fever.   HENT: Negative for congestion.    Eyes: Negative for blurred vision, pain and discharge.   Respiratory: Negative for cough, hemoptysis and wheezing.    Cardiovascular: Negative for chest pain and palpitations.   Gastrointestinal: Negative for abdominal pain, nausea and vomiting.   Musculoskeletal: Negative for falls, joint pain and myalgias.   Skin: Negative for itching and rash.   Neurological: Negative for speech change and loss of consciousness.   Endo/Heme/Allergies: Negative for polydipsia. Bruises/bleeds easily.   Psychiatric/Behavioral: Negative for depression and memory loss. The patient is nervous/anxious. The patient does not have insomnia.    All other systems reviewed and are negative.       Objective:   /55 (BP Location: Left arm, Patient Position: Sitting)   Pulse 72   Ht 1.651 m (5' 5\")   Wt 77.1 kg (170 lb)   SpO2 99%   BMI 28.29 kg/m²     Physical Exam   Constitutional: She is oriented to person, place, and time. She appears well-nourished.   Patient seen and examined again today changes noted   HENT:   Head: Normocephalic and atraumatic.   Eyes: Pupils are equal, round, and reactive to light. EOM are normal. No scleral icterus.   Neck: No JVD present. No thyromegaly present.   Cardiovascular: Normal rate, regular rhythm and intact distal pulses.    No murmur heard.  Pulmonary/Chest: Effort normal and breath sounds normal.   Abdominal: Bowel sounds are normal. She exhibits no distension.   Musculoskeletal: She exhibits no edema or tenderness.   Neurological: She is alert and " oriented to person, place, and time. She exhibits normal muscle tone. Coordination normal.   Skin: Skin is dry. No rash noted.   Psychiatric: She has a normal mood and affect. Her behavior is normal.   Anxious but appropriate       Assessment:     1. Chronic anticoagulation     2. Atrial fibrillation with RVR ([facility])     3. Chronic obstructive pulmonary disease, unspecified COPD type ([facility])  Fluticasone Furoate-Vilanterol (BREO ELLIPTA) 200-25 MCG/INH AEROSOL POWDER, BREATH ACTIVATED       Medical Decision Making:  Today's Assessment / Status / Plan:     Atrial fibrillation  Spent 35 minutes going over physiology  Talked at length again about closure devices she is quite well versed  Talked about symptoms of atrial fibrillation.  She is minimally symptomatic at this point and doing well on her flecainide.  She follows her EKGs with Dr. Lyles, she also is on an AV sophia blocker in the form of metoprolol    Cardiac health  Told me her sister who is 6 years older just had a heart attack.  Stent   she is on a low-dose statin.  No aspirin because of anticoagulation  Talked about cardiac screening including calcium score.  She would like to hold off.  She is highly functional and active    Questions answered talked about guidelines of cholesterol RTC 6 months sooner with concerns

## 2019-02-26 NOTE — LETTER
[facility]   [address]  [place] [address]  Phone: [phone]  Fax: [phone]              Allison Cespedes  1950    Encounter Date: 2/26/2019    Kyra Lovett M.D.          PROGRESS NOTE:  Chief Complaint   Patient presents with   • Atrial Fibrillation     follow up       Subjective:   Allison Cespedes is a 68 y.o. female who presents today in follow-up in regards to her recent diagnosis of persistent atrial fibrillation.  She is having terrible bruising and bleeding but being very good about taking her anticoagulation    Back pain is always bothered her.  She is planning on a left atrial closure device with my partner next month.  This has been warranted because she cannot take the high doses of anti-inflammatories she needs to control her severe back pain.  She is very positive and optimistic.  She really appreciates the help of electrophysiology next    Flecainide is affordable  In with her  Evaristo    Past Medical History:   Diagnosis Date   • Arthritis     fingers, hands, toes, feet   • Asthma     inhalers daily   • Atrial fibrillation (HCC) 10/2018    New onset in office. Echocardiogram with normal LV size, LVEF 65%. Trace MR, trace TR. RVSP 34mmHg.   • Bronchitis 09/2018    gets often   • Chronic anticoagulation    • COPD (chronic obstructive pulmonary disease) (HCC)    • Heart burn    • Hypothyroidism    • Pneumonia 1990    in 1970's   • Psychiatric problem     depression, anxiety   • Snoring    • Tuberculosis 1981    Treated for nine months     Past Surgical History:   Procedure Laterality Date   • MASS EXCISION GENERAL Left 7/6/2018    Procedure: MASS EXCISION GENERAL/ SUBCUTANEOUS MASS LEFT SHOULDER;  Surgeon: Swapna Rivas M.D.;  Location: SURGERY SAME DAY [facility];  Service: General   • CHOLECYSTECTOMY  1994   • APPENDECTOMY  1960    had ruptured   • ARTHROSCOPY, KNEE     • PB REMV 2ND CATARACT,CORN-SCLER  SECTN       Family History   Problem Relation Age of Onset   • Non-contributory Mother    • Hypertension Mother    • Non-contributory Father      Social History     Social History   • Marital status:      Spouse name: N/A   • Number of children: N/A   • Years of education: N/A     Occupational History   • Not on file.     Social History Main Topics   • Smoking status: Former Smoker     Packs/day: 2.00     Years: 30.00     Types: Cigarettes     Quit date: 2/3/1996   • Smokeless tobacco: Never Used      Comment: continued abstinance   • Alcohol use No   • Drug use: No   • Sexual activity: Not on file     Other Topics Concern   • Not on file     Social History Narrative   • No narrative on file     Allergies   Allergen Reactions   • Pcn [Penicillins]      Rxn as child     Outpatient Encounter Prescriptions as of 2/26/2019   Medication Sig Dispense Refill   • Fluticasone Furoate-Vilanterol (BREO ELLIPTA) 200-25 MCG/INH AEROSOL POWDER, BREATH ACTIVATED Inhale 1 Puff by mouth every day. Rinse mouth after use. 1 Each 0   • flecainide (TAMBOCOR) 50 MG tablet Take 1 Tab by mouth 2 times a day. 180 Tab 1   • rivaroxaban (XARELTO) 20 MG Tab tablet Take 1 Tab by mouth with dinner. 30 Tab 0   • lansoprazole (PREVACID) 30 MG CAPSULE DELAYED RELEASE Take 30 mg by mouth every day.     • lovastatin (MEVACOR) 20 MG Tab Take 10 mg by mouth every evening.     • escitalopram (LEXAPRO) 20 MG tablet TK 1 T PO QD  3   • FEMRING 0.05 MG/24HR RING INSERT 1 VAGINALLY Q 3 MONTHS  0   • buPROPion (WELLBUTRIN XL) 150 MG XL tablet TK 1 T PO QD  3   • progesterone (PROMETRIUM) 100 MG CAPS Take 100 mg by mouth every day.     • vitamin D, Ergocalciferol, (DRISDOL) 40657 UNITS CAPS capsule Take 50,000 Units by mouth every 7 days.     • levothyroxine (SYNTHROID) 100 MCG TABS Take 100 mcg by mouth every day.     • liothyronine (CYTOMEL) 5 MCG TABS Take 5 mcg by mouth 2 Times a Day.     • propranolol (INDERAL) 20 MG TABS Take 10 mg by mouth every  "day.     • [DISCONTINUED] Fluticasone Furoate-Vilanterol (BREO ELLIPTA) 200-25 MCG/INH AEROSOL POWDER, BREATH ACTIVATED Inhale 1 Puff by mouth every day. Rinse mouth after use. 1 Each 0   • metoprolol SR (TOPROL XL) 50 MG TABLET SR 24 HR Take 1 Tab by mouth 1 time daily as needed (HR>120bpm). 30 Tab 1   • levalbuterol (XOPENEX HFA) 45 MCG/ACT inhaler INHALE 2 PUFFS BY MOUTH EVERY 4 HOURS AS NEEDED FOR SHORTNESS OF BREATH 3 Inhaler 3   • [DISCONTINUED] tizanidine (ZANAFLEX) 4 MG Tab Take 4 mg by mouth at bedtime as needed.       No facility-administered encounter medications on file as of 2/26/2019.      Review of Systems   Constitutional: Negative for chills and fever.   HENT: Negative for congestion.    Eyes: Negative for blurred vision, pain and discharge.   Respiratory: Negative for cough, hemoptysis and wheezing.    Cardiovascular: Negative for chest pain and palpitations.   Gastrointestinal: Negative for abdominal pain, nausea and vomiting.   Musculoskeletal: Negative for falls, joint pain and myalgias.   Skin: Negative for itching and rash.   Neurological: Negative for speech change and loss of consciousness.   Endo/Heme/Allergies: Negative for polydipsia. Bruises/bleeds easily.   Psychiatric/Behavioral: Negative for depression and memory loss. The patient is nervous/anxious. The patient does not have insomnia.    All other systems reviewed and are negative.       Objective:   /55 (BP Location: Left arm, Patient Position: Sitting)   Pulse 72   Ht 1.651 m (5' 5\")   Wt 77.1 kg (170 lb)   SpO2 99%   BMI 28.29 kg/m²      Physical Exam   Constitutional: She is oriented to person, place, and time. She appears well-nourished.   Patient seen and examined again today changes noted   HENT:   Head: Normocephalic and atraumatic.   Eyes: Pupils are equal, round, and reactive to light. EOM are normal. No scleral icterus.   Neck: No JVD present. No thyromegaly present.   Cardiovascular: Normal rate, regular rhythm " and intact distal pulses.    No murmur heard.  Pulmonary/Chest: Effort normal and breath sounds normal.   Abdominal: Bowel sounds are normal. She exhibits no distension.   Musculoskeletal: She exhibits no edema or tenderness.   Neurological: She is alert and oriented to person, place, and time. She exhibits normal muscle tone. Coordination normal.   Skin: Skin is dry. No rash noted.   Psychiatric: She has a normal mood and affect. Her behavior is normal.   Anxious but appropriate       Assessment:     1. Chronic anticoagulation     2. Atrial fibrillation with RVR ([facility])     3. Chronic obstructive pulmonary disease, unspecified COPD type ([facility])  Fluticasone Furoate-Vilanterol (BREO ELLIPTA) 200-25 MCG/INH AEROSOL POWDER, BREATH ACTIVATED       Medical Decision Making:  Today's Assessment / Status / Plan:     Atrial fibrillation  Spent 35 minutes going over physiology  Talked at length again about closure devices she is quite well versed  Talked about symptoms of atrial fibrillation.  She is minimally symptomatic at this point and doing well on her flecainide.  She follows her EKGs with Dr. Lyles, she also is on an AV sophia blocker in the form of metoprolol    Cardiac [facility]  Told me her sister who is 6 years older just had a heart attack.  Stent   she is on a low-dose statin.  No aspirin because of anticoagulation  Talked about cardiac screening including calcium score.  She would like to hold off.  She is highly functional and active    Questions answered talked about guidelines of cholesterol RTC 6 months sooner with concerns      Bishnu Du M.D.  [address] [address]  VIA Facsimile: [phone]

## 2019-03-04 ENCOUNTER — TELEPHONE (OUTPATIENT)
Dept: CARDIOLOGY | Facility: MEDICAL CENTER | Age: 69
End: 2019-03-04

## 2019-03-04 NOTE — TELEPHONE ENCOUNTER
Sincere Lyles M.D.   You 19 minutes ago (1:56 PM)      Yes please hold the xarelto. Will need her to see gyn ASAP.      Spoke to pt. She has a GYN appt tomorrow. Pt will get pre-op labs done tomorrow as well, so Hgb/Hct will be checked. Advised to hold Xarelto tonight and call after GYN appt tomorrow w/update.

## 2019-03-04 NOTE — TELEPHONE ENCOUNTER
Pt sent email stating she has been having bright, red vaginal bleeding since Friday and she is post-menopausal. Additional email from the weekend states she thinks bleeding is getting worse. Per email pt states she has had a headache, but denies additional symptoms. She has not taken her BP. Pt is currently on Xarelto and scheduled for a Watchman on Wednesday, 3/6/19. Advised pt to contact OB/GYN ASAP.     To Dr. Lyles--please advise re: recommendations due to Xarelto, vaginal bleeding, and upcoming Watchman procedure.

## 2019-03-05 ENCOUNTER — APPOINTMENT (OUTPATIENT)
Dept: ADMISSIONS | Facility: MEDICAL CENTER | Age: 69
DRG: 274 | End: 2019-03-05
Payer: MEDICARE

## 2019-03-05 DIAGNOSIS — Z95.818 PRESENCE OF WATCHMAN LEFT ATRIAL APPENDAGE CLOSURE DEVICE: ICD-10-CM

## 2019-03-05 DIAGNOSIS — Z01.812 PRE-OPERATIVE LABORATORY EXAMINATION: ICD-10-CM

## 2019-03-05 LAB
ABO GROUP BLD: NORMAL
ANION GAP SERPL CALC-SCNC: 6 MMOL/L (ref 0–11.9)
BASOPHILS # BLD AUTO: 0.4 % (ref 0–1.8)
BASOPHILS # BLD: 0.04 K/UL (ref 0–0.12)
BLD GP AB SCN SERPL QL: NORMAL
BUN SERPL-MCNC: 18 MG/DL (ref 8–22)
CALCIUM SERPL-MCNC: 10.4 MG/DL (ref 8.5–10.5)
CHLORIDE SERPL-SCNC: 105 MMOL/L (ref 96–112)
CO2 SERPL-SCNC: 26 MMOL/L (ref 20–33)
CREAT SERPL-MCNC: 0.81 MG/DL (ref 0.5–1.4)
EOSINOPHIL # BLD AUTO: 0.09 K/UL (ref 0–0.51)
EOSINOPHIL NFR BLD: 1 % (ref 0–6.9)
ERYTHROCYTE [DISTWIDTH] IN BLOOD BY AUTOMATED COUNT: 46.1 FL (ref 35.9–50)
GLUCOSE SERPL-MCNC: 95 MG/DL (ref 65–99)
HCT VFR BLD AUTO: 46 % (ref 37–47)
HGB BLD-MCNC: 14.8 G/DL (ref 12–16)
IMM GRANULOCYTES # BLD AUTO: 0.05 K/UL (ref 0–0.11)
IMM GRANULOCYTES NFR BLD AUTO: 0.5 % (ref 0–0.9)
INR PPP: 1.05 (ref 0.87–1.13)
LYMPHOCYTES # BLD AUTO: 2.17 K/UL (ref 1–4.8)
LYMPHOCYTES NFR BLD: 22.9 % (ref 22–41)
MCH RBC QN AUTO: 31.4 PG (ref 27–33)
MCHC RBC AUTO-ENTMCNC: 32.2 G/DL (ref 33.6–35)
MCV RBC AUTO: 97.7 FL (ref 81.4–97.8)
MONOCYTES # BLD AUTO: 0.9 K/UL (ref 0–0.85)
MONOCYTES NFR BLD AUTO: 9.5 % (ref 0–13.4)
NEUTROPHILS # BLD AUTO: 6.21 K/UL (ref 2–7.15)
NEUTROPHILS NFR BLD: 65.7 % (ref 44–72)
NRBC # BLD AUTO: 0 K/UL
NRBC BLD-RTO: 0 /100 WBC
PLATELET # BLD AUTO: 327 K/UL (ref 164–446)
PMV BLD AUTO: 10.4 FL (ref 9–12.9)
POTASSIUM SERPL-SCNC: 3.8 MMOL/L (ref 3.6–5.5)
PROTHROMBIN TIME: 13.8 SEC (ref 12–14.6)
RBC # BLD AUTO: 4.71 M/UL (ref 4.2–5.4)
RH BLD: NORMAL
SODIUM SERPL-SCNC: 137 MMOL/L (ref 135–145)
WBC # BLD AUTO: 9.5 K/UL (ref 4.8–10.8)

## 2019-03-05 PROCEDURE — 80048 BASIC METABOLIC PNL TOTAL CA: CPT

## 2019-03-05 PROCEDURE — 86850 RBC ANTIBODY SCREEN: CPT

## 2019-03-05 PROCEDURE — 86900 BLOOD TYPING SEROLOGIC ABO: CPT

## 2019-03-05 PROCEDURE — 85610 PROTHROMBIN TIME: CPT

## 2019-03-05 PROCEDURE — 85025 COMPLETE CBC W/AUTO DIFF WBC: CPT

## 2019-03-05 PROCEDURE — 86901 BLOOD TYPING SEROLOGIC RH(D): CPT

## 2019-03-05 PROCEDURE — 36415 COLL VENOUS BLD VENIPUNCTURE: CPT

## 2019-03-05 RX ORDER — ESCITALOPRAM OXALATE 20 MG/1
20 TABLET ORAL EVERY MORNING
COMMUNITY
End: 2022-08-25

## 2019-03-05 RX ORDER — DICYCLOMINE HYDROCHLORIDE 10 MG/1
10 CAPSULE ORAL PRN
COMMUNITY
End: 2021-08-30

## 2019-03-05 RX ORDER — HYDROCODONE BITARTRATE AND ACETAMINOPHEN 5; 325 MG/1; MG/1
1 TABLET ORAL 2 TIMES DAILY PRN
COMMUNITY

## 2019-03-05 RX ORDER — BUPROPION HYDROCHLORIDE 150 MG/1
150 TABLET ORAL EVERY MORNING
COMMUNITY
End: 2021-12-07 | Stop reason: SDUPTHER

## 2019-03-05 NOTE — TELEPHONE ENCOUNTER
"Spoke w/pt today for update. She went to see GYN today who removed an \"estrogen insert\" she had in place. Pt notes she is still having some bleeding. Pre-Admit labs completed and Hgb=14.8. Dr. Lyles updated and procedure can proceed as planned. Pt notified.   "

## 2019-03-06 ENCOUNTER — APPOINTMENT (OUTPATIENT)
Dept: CARDIOLOGY | Facility: MEDICAL CENTER | Age: 69
DRG: 274 | End: 2019-03-06
Attending: INTERNAL MEDICINE
Payer: MEDICARE

## 2019-03-06 ENCOUNTER — HOSPITAL ENCOUNTER (INPATIENT)
Facility: MEDICAL CENTER | Age: 69
LOS: 1 days | DRG: 274 | End: 2019-03-07
Attending: INTERNAL MEDICINE | Admitting: INTERNAL MEDICINE
Payer: MEDICARE

## 2019-03-06 DIAGNOSIS — I48.19 PERSISTENT ATRIAL FIBRILLATION (HCC): ICD-10-CM

## 2019-03-06 DIAGNOSIS — I48.91 ATRIAL FIBRILLATION WITH RVR (HCC): ICD-10-CM

## 2019-03-06 LAB
ACT BLD: 263 SEC (ref 74–137)
INR PPP: 1.03 (ref 0.87–1.13)
PROTHROMBIN TIME: 13.6 SEC (ref 12–14.6)

## 2019-03-06 PROCEDURE — 700111 HCHG RX REV CODE 636 W/ 250 OVERRIDE (IP)

## 2019-03-06 PROCEDURE — 160002 HCHG RECOVERY MINUTES (STAT)

## 2019-03-06 PROCEDURE — 301140 HCHG WATCHMAN ACCESS SYSTEM CURVE

## 2019-03-06 PROCEDURE — 304952 HCHG R 2 PADS

## 2019-03-06 PROCEDURE — 00560 ANES PX HEART W/O PUMP OXTJ: CPT

## 2019-03-06 PROCEDURE — C1894 INTRO/SHEATH, NON-LASER: HCPCS

## 2019-03-06 PROCEDURE — 700105 HCHG RX REV CODE 258: Performed by: INTERNAL MEDICINE

## 2019-03-06 PROCEDURE — 93355 ECHO TRANSESOPHAGEAL (TEE): CPT

## 2019-03-06 PROCEDURE — 360995 HCHG BAYLIS TRANSSEPTAL NEEDLE

## 2019-03-06 PROCEDURE — A9270 NON-COVERED ITEM OR SERVICE: HCPCS | Performed by: INTERNAL MEDICINE

## 2019-03-06 PROCEDURE — B24BZZ4 ULTRASONOGRAPHY OF HEART WITH AORTA, TRANSESOPHAGEAL: ICD-10-PCS | Performed by: ANESTHESIOLOGY

## 2019-03-06 PROCEDURE — 85610 PROTHROMBIN TIME: CPT

## 2019-03-06 PROCEDURE — 85347 COAGULATION TIME ACTIVATED: CPT

## 2019-03-06 PROCEDURE — 301139 HCHG WATCHMAN CLOSURE DEVICE

## 2019-03-06 PROCEDURE — 305387 HCHG SUTURES

## 2019-03-06 PROCEDURE — 700101 HCHG RX REV CODE 250

## 2019-03-06 PROCEDURE — A9270 NON-COVERED ITEM OR SERVICE: HCPCS

## 2019-03-06 PROCEDURE — 360979 HCHG DIAGNOSTIC CATH

## 2019-03-06 PROCEDURE — 700102 HCHG RX REV CODE 250 W/ 637 OVERRIDE(OP)

## 2019-03-06 PROCEDURE — 33340 PERQ CLSR TCAT L ATR APNDGE: CPT

## 2019-03-06 PROCEDURE — 700102 HCHG RX REV CODE 250 W/ 637 OVERRIDE(OP): Performed by: INTERNAL MEDICINE

## 2019-03-06 PROCEDURE — C1893 INTRO/SHEATH, FIXED,NON-PEEL: HCPCS

## 2019-03-06 PROCEDURE — C1769 GUIDE WIRE: HCPCS

## 2019-03-06 PROCEDURE — 770020 HCHG ROOM/CARE - TELE (206)

## 2019-03-06 PROCEDURE — 700117 HCHG RX CONTRAST REV CODE 255: Performed by: INTERNAL MEDICINE

## 2019-03-06 PROCEDURE — 36415 COLL VENOUS BLD VENIPUNCTURE: CPT

## 2019-03-06 PROCEDURE — 02L73DK OCCLUSION OF LEFT ATRIAL APPENDAGE WITH INTRALUMINAL DEVICE, PERCUTANEOUS APPROACH: ICD-10-PCS | Performed by: INTERNAL MEDICINE

## 2019-03-06 PROCEDURE — 33340 PERQ CLSR TCAT L ATR APNDGE: CPT | Mod: Q0 | Performed by: INTERNAL MEDICINE

## 2019-03-06 RX ORDER — HYDROMORPHONE HYDROCHLORIDE 1 MG/ML
0.2 INJECTION, SOLUTION INTRAMUSCULAR; INTRAVENOUS; SUBCUTANEOUS
Status: DISCONTINUED | OUTPATIENT
Start: 2019-03-06 | End: 2019-03-06 | Stop reason: HOSPADM

## 2019-03-06 RX ORDER — PROTAMINE SULFATE 10 MG/ML
INJECTION, SOLUTION INTRAVENOUS
Status: DISPENSED
Start: 2019-03-06 | End: 2019-03-06

## 2019-03-06 RX ORDER — BUPROPION HYDROCHLORIDE 150 MG/1
150 TABLET ORAL EVERY MORNING
Status: DISCONTINUED | OUTPATIENT
Start: 2019-03-06 | End: 2019-03-06

## 2019-03-06 RX ORDER — FLECAINIDE ACETATE 50 MG/1
50 TABLET ORAL 2 TIMES DAILY
Status: DISCONTINUED | OUTPATIENT
Start: 2019-03-06 | End: 2019-03-07 | Stop reason: HOSPADM

## 2019-03-06 RX ORDER — OXYCODONE HCL 5 MG/5 ML
10 SOLUTION, ORAL ORAL
Status: COMPLETED | OUTPATIENT
Start: 2019-03-06 | End: 2019-03-06

## 2019-03-06 RX ORDER — LIOTHYRONINE SODIUM 5 UG/1
5 TABLET ORAL 2 TIMES DAILY
Status: DISCONTINUED | OUTPATIENT
Start: 2019-03-06 | End: 2019-03-07 | Stop reason: HOSPADM

## 2019-03-06 RX ORDER — HYDRALAZINE HYDROCHLORIDE 20 MG/ML
5 INJECTION INTRAMUSCULAR; INTRAVENOUS
Status: DISCONTINUED | OUTPATIENT
Start: 2019-03-06 | End: 2019-03-06 | Stop reason: HOSPADM

## 2019-03-06 RX ORDER — LOVASTATIN 20 MG/1
10 TABLET ORAL NIGHTLY
Status: DISCONTINUED | OUTPATIENT
Start: 2019-03-06 | End: 2019-03-07 | Stop reason: HOSPADM

## 2019-03-06 RX ORDER — WARFARIN SODIUM 5 MG/1
5 TABLET ORAL
Status: DISCONTINUED | OUTPATIENT
Start: 2019-03-06 | End: 2019-03-07 | Stop reason: HOSPADM

## 2019-03-06 RX ORDER — METOPROLOL SUCCINATE 50 MG/1
50 TABLET, EXTENDED RELEASE ORAL
Status: DISCONTINUED | OUTPATIENT
Start: 2019-03-06 | End: 2019-03-07 | Stop reason: HOSPADM

## 2019-03-06 RX ORDER — HYDROMORPHONE HYDROCHLORIDE 1 MG/ML
0.1 INJECTION, SOLUTION INTRAMUSCULAR; INTRAVENOUS; SUBCUTANEOUS
Status: DISCONTINUED | OUTPATIENT
Start: 2019-03-06 | End: 2019-03-06 | Stop reason: HOSPADM

## 2019-03-06 RX ORDER — PROPRANOLOL HYDROCHLORIDE 10 MG/1
10 TABLET ORAL
Status: DISCONTINUED | OUTPATIENT
Start: 2019-03-06 | End: 2019-03-07 | Stop reason: HOSPADM

## 2019-03-06 RX ORDER — OXYCODONE HCL 5 MG/5 ML
5 SOLUTION, ORAL ORAL
Status: COMPLETED | OUTPATIENT
Start: 2019-03-06 | End: 2019-03-06

## 2019-03-06 RX ORDER — IPRATROPIUM BROMIDE AND ALBUTEROL SULFATE 2.5; .5 MG/3ML; MG/3ML
3 SOLUTION RESPIRATORY (INHALATION)
Status: DISCONTINUED | OUTPATIENT
Start: 2019-03-06 | End: 2019-03-06 | Stop reason: HOSPADM

## 2019-03-06 RX ORDER — HEPARIN SODIUM,PORCINE 1000/ML
VIAL (ML) INJECTION
Status: COMPLETED
Start: 2019-03-06 | End: 2019-03-06

## 2019-03-06 RX ORDER — ACETAMINOPHEN 325 MG/1
650 TABLET ORAL EVERY 4 HOURS PRN
Status: DISCONTINUED | OUTPATIENT
Start: 2019-03-06 | End: 2019-03-07 | Stop reason: HOSPADM

## 2019-03-06 RX ORDER — MEPERIDINE HYDROCHLORIDE 25 MG/ML
12.5 INJECTION INTRAMUSCULAR; INTRAVENOUS; SUBCUTANEOUS
Status: DISCONTINUED | OUTPATIENT
Start: 2019-03-06 | End: 2019-03-06 | Stop reason: HOSPADM

## 2019-03-06 RX ORDER — ESCITALOPRAM OXALATE 10 MG/1
20 TABLET ORAL EVERY MORNING
Status: DISCONTINUED | OUTPATIENT
Start: 2019-03-07 | End: 2019-03-07 | Stop reason: HOSPADM

## 2019-03-06 RX ORDER — OXYCODONE HCL 5 MG/5 ML
SOLUTION, ORAL ORAL
Status: COMPLETED
Start: 2019-03-06 | End: 2019-03-06

## 2019-03-06 RX ORDER — BUPROPION HYDROCHLORIDE 150 MG/1
150 TABLET, EXTENDED RELEASE ORAL DAILY
Status: DISCONTINUED | OUTPATIENT
Start: 2019-03-06 | End: 2019-03-07 | Stop reason: HOSPADM

## 2019-03-06 RX ORDER — LIDOCAINE HYDROCHLORIDE 20 MG/ML
INJECTION, SOLUTION INFILTRATION; PERINEURAL
Status: COMPLETED
Start: 2019-03-06 | End: 2019-03-06

## 2019-03-06 RX ORDER — LEVOTHYROXINE SODIUM 0.1 MG/1
100 TABLET ORAL
Status: DISCONTINUED | OUTPATIENT
Start: 2019-03-06 | End: 2019-03-07 | Stop reason: HOSPADM

## 2019-03-06 RX ORDER — HALOPERIDOL 5 MG/ML
1 INJECTION INTRAMUSCULAR
Status: DISCONTINUED | OUTPATIENT
Start: 2019-03-06 | End: 2019-03-06 | Stop reason: HOSPADM

## 2019-03-06 RX ORDER — MIDAZOLAM HYDROCHLORIDE 1 MG/ML
INJECTION INTRAMUSCULAR; INTRAVENOUS
Status: DISPENSED
Start: 2019-03-06 | End: 2019-03-06

## 2019-03-06 RX ORDER — ONDANSETRON 2 MG/ML
4 INJECTION INTRAMUSCULAR; INTRAVENOUS
Status: DISCONTINUED | OUTPATIENT
Start: 2019-03-06 | End: 2019-03-06 | Stop reason: HOSPADM

## 2019-03-06 RX ORDER — SODIUM CHLORIDE 9 MG/ML
INJECTION, SOLUTION INTRAVENOUS CONTINUOUS
Status: DISCONTINUED | OUTPATIENT
Start: 2019-03-06 | End: 2019-03-07 | Stop reason: HOSPADM

## 2019-03-06 RX ORDER — SODIUM CHLORIDE, SODIUM LACTATE, POTASSIUM CHLORIDE, CALCIUM CHLORIDE 600; 310; 30; 20 MG/100ML; MG/100ML; MG/100ML; MG/100ML
INJECTION, SOLUTION INTRAVENOUS CONTINUOUS
Status: DISCONTINUED | OUTPATIENT
Start: 2019-03-06 | End: 2019-03-06

## 2019-03-06 RX ORDER — HYDROMORPHONE HYDROCHLORIDE 1 MG/ML
0.4 INJECTION, SOLUTION INTRAMUSCULAR; INTRAVENOUS; SUBCUTANEOUS
Status: DISCONTINUED | OUTPATIENT
Start: 2019-03-06 | End: 2019-03-06 | Stop reason: HOSPADM

## 2019-03-06 RX ADMIN — LIOTHYRONINE SODIUM 5 MCG: 5 TABLET ORAL at 19:54

## 2019-03-06 RX ADMIN — FLECAINIDE ACETATE 50 MG: 50 TABLET ORAL at 19:54

## 2019-03-06 RX ADMIN — Medication 5 MG: at 10:57

## 2019-03-06 RX ADMIN — LIDOCAINE HYDROCHLORIDE: 20 INJECTION, SOLUTION INFILTRATION; PERINEURAL at 09:36

## 2019-03-06 RX ADMIN — HEPARIN SODIUM: 1000 INJECTION, SOLUTION INTRAVENOUS; SUBCUTANEOUS at 09:36

## 2019-03-06 RX ADMIN — LOVASTATIN 10 MG: 20 TABLET ORAL at 22:15

## 2019-03-06 RX ADMIN — SODIUM CHLORIDE: 9 INJECTION, SOLUTION INTRAVENOUS at 18:27

## 2019-03-06 RX ADMIN — WARFARIN SODIUM 5 MG: 5 TABLET ORAL at 18:29

## 2019-03-06 RX ADMIN — ACETAMINOPHEN 650 MG: 325 TABLET, FILM COATED ORAL at 22:23

## 2019-03-06 RX ADMIN — SODIUM CHLORIDE, SODIUM LACTATE, POTASSIUM CHLORIDE, CALCIUM CHLORIDE: 600; 310; 30; 20 INJECTION, SOLUTION INTRAVENOUS at 10:30

## 2019-03-06 RX ADMIN — PROPRANOLOL HYDROCHLORIDE 10 MG: 10 TABLET ORAL at 22:16

## 2019-03-06 RX ADMIN — OXYCODONE HYDROCHLORIDE 5 MG: 5 SOLUTION ORAL at 10:57

## 2019-03-06 RX ADMIN — LEVOTHYROXINE SODIUM 100 MCG: 100 TABLET ORAL at 22:16

## 2019-03-06 RX ADMIN — HEPARIN SODIUM 2000 UNITS: 200 INJECTION, SOLUTION INTRAVENOUS at 09:42

## 2019-03-06 RX ADMIN — IOHEXOL 140 ML: 350 INJECTION, SOLUTION INTRAVENOUS at 10:30

## 2019-03-06 RX ADMIN — ASPIRIN 81 MG: 81 TABLET, COATED ORAL at 18:29

## 2019-03-06 RX ADMIN — PROGESTERONE 100 MG: 100 CAPSULE ORAL at 22:17

## 2019-03-06 ASSESSMENT — PATIENT HEALTH QUESTIONNAIRE - PHQ9
2. FEELING DOWN, DEPRESSED, IRRITABLE, OR HOPELESS: NOT AT ALL
SUM OF ALL RESPONSES TO PHQ9 QUESTIONS 1 AND 2: 0
1. LITTLE INTEREST OR PLEASURE IN DOING THINGS: NOT AT ALL

## 2019-03-06 ASSESSMENT — COGNITIVE AND FUNCTIONAL STATUS - GENERAL
DAILY ACTIVITIY SCORE: 24
SUGGESTED CMS G CODE MODIFIER MOBILITY: CH
MOBILITY SCORE: 24
SUGGESTED CMS G CODE MODIFIER DAILY ACTIVITY: CH

## 2019-03-06 ASSESSMENT — LIFESTYLE VARIABLES
ALCOHOL_USE: NO
EVER_SMOKED: NEVER

## 2019-03-06 ASSESSMENT — CHA2DS2 SCORE
AGE 75 OR GREATER: NO
CHA2DS2 VASC SCORE: 3
PRIOR STROKE OR TIA OR THROMBOEMBOLISM: NO
AGE 65 TO 74: YES
SEX: FEMALE
CHF OR LEFT VENTRICULAR DYSFUNCTION: NO
DIABETES: NO
VASCULAR DISEASE: YES
HYPERTENSION: NO

## 2019-03-06 NOTE — H&P
EWA Pre-procedure H and P    DOS: 3/6/2019    Chief complaint/Reason for consult: AF    Interval History:  Patient is a 67 yo F. She has a history of atrial fibrillation. She also has aortic calcifications seen on prior CT. She has been on chronic oral anticoagulation in the form of Xarelto for stroke prevention. She has chronic pain due to osteoarthritis and would like to take NSAIDs on a regular basis and has preferred to get off xarelto and opt for CASA closure. Recent vaginal bleeding in last couple of days, now subsided after a estrogen insert was removed yesterday.    ROS (+ highlighted in red):  Constitutional: Fevers/chills/fatigue/weightloss  Respiratory: Shortness of breath/cough  Cardiovascular: Chest pain/palpitations/edema/orthopnea/syncope    Past Medical History:   Diagnosis Date   • Anemia    • Arthritis     fingers, hands, toes, feet (osteoarthritis)   • Asthma     inhalers daily   • Atrial fibrillation (HCC) 10/2018    New onset in office. Echocardiogram with normal LV size, LVEF 65%. Trace MR, trace TR. RVSP 34mmHg.   • Breath shortness     uses 2L oxygen at night ([employer])    • Bronchitis 11/2018    gets often   • Chronic anticoagulation    • COPD (chronic obstructive pulmonary disease) (HCC)    • Gynecological disorder     postmenopausal bleeding right now    • Heart burn    • Hypothyroidism    • Pleurisy    • Pneumonia 1989   • Psychiatric problem     depression, anxiety   • Snoring    • Tuberculosis 1981    Treated for nine months       Allergies   Allergen Reactions   • Pcn [Penicillins]      Rxn as child       Current Facility-Administered Medications   Medication Dose Route Frequency Provider Last Rate Last Dose   • HEPARIN 1000 UNITS/ML OR USE ONLY            • LIDOCAINE HCL 2 % INJ SOLN            • HEPARIN (PORCINE) IN NACL 2-0.9 UNIT/ML-% INJ SOLN            • HEPARIN 1000 UNITS/ML OR USE ONLY                Physical Exam:  Vitals:    03/05/19 1249 03/06/19 0654   BP:  117/47   Pulse:  62  "  Resp:  16   Temp:  37.2 °C (98.9 °F)   TempSrc:  Temporal   SpO2:  95%   Weight: 79 kg (174 lb 2.6 oz) 78.1 kg (172 lb 2.9 oz)   Height: 1.651 m (5' 5\") 1.651 m (5' 5\")     General appearance: NAD, conversant   Eyes: anicteric sclerae, moist conjunctivae; no lid-lag; PERRLA  HENT: Atraumatic; oropharynx clear with moist mucous membranes and no mucosal ulcerations; normal hard and soft palate  Neck: Trachea midline; FROM, supple, no thyromegaly or lymphadenopathy  Lungs: CTA, with normal respiratory effort and no intercostal retractions  CV: RRR, no MRGs, no JVD  Abdomen: Soft, non-tender; no masses or HSM  Extremities: No peripheral edema or extremity lymphadenopathy  Skin: Normal temperature, turgor and texture; no rash, ulcers or subcutaneous nodules  Psych: Appropriate affect, alert and oriented to person, place and time    Data:  Labs reviewed    Prior echo/stress reviewed:  LVEF 65%, windsock type appendage    EKG interpreted by me:  NSR    Impression/Plan:  1)Paroxysmal AF  2)Aortic calcification  3)Osteoarthritis    -Risks/benefits/alternatives discussed, questions answered  -Will proceed with MERVIN/WATCHMAN implant    Sincere Lyles MD    "

## 2019-03-06 NOTE — OP REPORT
"[facility] Electrophysiology/Structural Heart Procedure Note     Procedure(s) Performed:   1) Watchman CASA closure    Indication(s):  Paroxysmal atrial fibrillation    Physician(s): Sincere Lyles M.D.     Resident/Assistant(s): None     Anesthesia: General endotracheal anesthetic with Dr. Fernando Rowland     Specimen(s) Removed: None     Estimated Blood Loss:  30cc     Complications:  None     Description of Procedure:   After informed written consent, the patient was brought to the cath lab in the fasting, non-sedated state. The patient was prepped and draped in the usual sterile fashion. Femoral venous access was obtained using the modified Seldinger technique. This was done under direct US guidance to visualize the needle insertion. Images were saved to the PACS system. In the right femoral vein, an 8 Fr sheath were inserted over 0.035” guidewire and exchanged for 16 Fr long sheath, through which an 8.5 Fr [place] trans-septal sheath was used for crossing. MERVIN was used to identify the atrial septum, and left atrial appendage.  A [place] trans-septal needle was inserted to into the trans-septal sheath, and under ultrasound guidance a inferior/posterior trans-septal location was used to cross into the left atrium. Intravenous heparin was given to target an -300. A stiff exchange length 0.035\" wire was inserted into the left atrium/left pulmonary veins, over which we exchanged to the WATCHMAN delivery sheath. The WATCHMAN device was prepped and flushed. A pigtail catheter was advanced into the delivery sheath into the left atrium and then after counter clockwise torque maneuvered into the body of the left atrial appendage. Cine was taken to verify the location of the pigtail in the appendage and to assess for depth. The sheath was then advanced over the pigtail until sufficient depth was reached.  The pigtail was removed, and under wet to wet connection the WATCHMAN device was advanced through the delivery " sheath until markers were aligned. The sheath was retracted slowly to deploy the device. Initial deployment was too distal leaving portions of the appendage uncovered, a partial recapture was performed and device was re-deployed, but too proximally outside the appendage. A full recapture was performed and we inserted the pigtail over again. A smaller device was used the second time.  The WATCHMAN device was prepped and flushed. A pigtail catheter was advanced into the delivery sheath into the left atrium and then after counter clockwise torque maneuvered into the body of the left atrial appendage. Cine was taken to verify the location of the pigtail in the appendage and to assess for depth. The sheath was then advanced over the pigtail until sufficient depth was reached.  The pigtail was removed, and under wet to wet connection the WATCHMAN device was advanced through the delivery sheath until markers were aligned. The sheath was retracted slowly to deploy the device. After the smaller device was deployed we assessed again for leak with contrast injected through the sheath as well as a tug test. We verified good position, anchoring, size, and seal with no/minimal leak with MERVIN. After all criteria were met we detached the device from the delivery system. At the end of the procedure, heparin was reversed with protamine, the catheter and sheaths were removed, and hemostasis was achieved by manual compression along with a figure of 8 silk suture. Following recovery from anesthesia, the patient was transferred to the PPU in good condition.        Fluoroscopy time:  7.4 min    Contrast used: 140 cc     Device implanted:  Size  21  Serial #[id number]  Max appendage pre-measurement 18 mm  Max device measurement 16 mm (23%) compression     Impressions:    1. Successful CASA closure      Recommendations:  1. Warfarin and ASA 81 (target INR 2-2.5)   2. Admit to monitored bedrest.  3. Echo and removal of figure of 8 suture in the  AM

## 2019-03-06 NOTE — OR NURSING
1014 Patient arrived to unit, awake and alert.  Right groin bleeding, pressure held by cath lab RN x 5 minutes.  Pressure dressing and sandbag applied.  Patient denies pain at this time.  Updated on plan of care, verbalized understanding.  1057 Patient medicated for pain.  1230 Patient states pain is 0/10, tolerating oral intake.  Right groin clean, dry and soft.  1330 Right groin clean, dry and soft.  1457 Patient voided in bedpan.  Right groin clean, dry   and soft.  1630 Patient ambulated to bathroom, back to [facility].  Groin clean, dry and soft.  1640 Report to Dorothy KENNY.  1700 Patient transferred to [facility] via [facility] with RN, handoff report to Dorothy KENNY.

## 2019-03-07 ENCOUNTER — APPOINTMENT (OUTPATIENT)
Dept: CARDIOLOGY | Facility: MEDICAL CENTER | Age: 69
DRG: 274 | End: 2019-03-07
Attending: INTERNAL MEDICINE
Payer: MEDICARE

## 2019-03-07 ENCOUNTER — PATIENT OUTREACH (OUTPATIENT)
Dept: HEALTH INFORMATION MANAGEMENT | Facility: OTHER | Age: 69
End: 2019-03-07

## 2019-03-07 VITALS
RESPIRATION RATE: 16 BRPM | HEART RATE: 62 BPM | WEIGHT: 173.06 LBS | SYSTOLIC BLOOD PRESSURE: 119 MMHG | TEMPERATURE: 98 F | HEIGHT: 65 IN | OXYGEN SATURATION: 97 % | BODY MASS INDEX: 28.83 KG/M2 | DIASTOLIC BLOOD PRESSURE: 62 MMHG

## 2019-03-07 LAB
ABO GROUP BLD: NORMAL
ANION GAP SERPL CALC-SCNC: 6 MMOL/L (ref 0–11.9)
BUN SERPL-MCNC: 15 MG/DL (ref 8–22)
CALCIUM SERPL-MCNC: 9 MG/DL (ref 8.5–10.5)
CHLORIDE SERPL-SCNC: 107 MMOL/L (ref 96–112)
CO2 SERPL-SCNC: 26 MMOL/L (ref 20–33)
CREAT SERPL-MCNC: 0.91 MG/DL (ref 0.5–1.4)
EKG IMPRESSION: NORMAL
ERYTHROCYTE [DISTWIDTH] IN BLOOD BY AUTOMATED COUNT: 46.8 FL (ref 35.9–50)
GLUCOSE SERPL-MCNC: 113 MG/DL (ref 65–99)
HCT VFR BLD AUTO: 40.1 % (ref 37–47)
HGB BLD-MCNC: 12.8 G/DL (ref 12–16)
INR PPP: 1.11 (ref 0.87–1.13)
LV EJECT FRACT  99904: 60
MCH RBC QN AUTO: 31.7 PG (ref 27–33)
MCHC RBC AUTO-ENTMCNC: 31.9 G/DL (ref 33.6–35)
MCV RBC AUTO: 99.3 FL (ref 81.4–97.8)
PLATELET # BLD AUTO: 259 K/UL (ref 164–446)
PMV BLD AUTO: 9.9 FL (ref 9–12.9)
POTASSIUM SERPL-SCNC: 3.7 MMOL/L (ref 3.6–5.5)
PROTHROMBIN TIME: 14.4 SEC (ref 12–14.6)
RBC # BLD AUTO: 4.04 M/UL (ref 4.2–5.4)
RH BLD: NORMAL
SODIUM SERPL-SCNC: 139 MMOL/L (ref 135–145)
WBC # BLD AUTO: 12.8 K/UL (ref 4.8–10.8)

## 2019-03-07 PROCEDURE — 93010 ELECTROCARDIOGRAM REPORT: CPT | Performed by: INTERNAL MEDICINE

## 2019-03-07 PROCEDURE — 36415 COLL VENOUS BLD VENIPUNCTURE: CPT

## 2019-03-07 PROCEDURE — 93308 TTE F-UP OR LMTD: CPT

## 2019-03-07 PROCEDURE — 85027 COMPLETE CBC AUTOMATED: CPT

## 2019-03-07 PROCEDURE — A9270 NON-COVERED ITEM OR SERVICE: HCPCS | Performed by: INTERNAL MEDICINE

## 2019-03-07 PROCEDURE — 85610 PROTHROMBIN TIME: CPT

## 2019-03-07 PROCEDURE — 93308 TTE F-UP OR LMTD: CPT | Mod: 26 | Performed by: INTERNAL MEDICINE

## 2019-03-07 PROCEDURE — 700102 HCHG RX REV CODE 250 W/ 637 OVERRIDE(OP): Performed by: INTERNAL MEDICINE

## 2019-03-07 PROCEDURE — 80048 BASIC METABOLIC PNL TOTAL CA: CPT

## 2019-03-07 PROCEDURE — 93005 ELECTROCARDIOGRAM TRACING: CPT | Performed by: INTERNAL MEDICINE

## 2019-03-07 RX ORDER — WARFARIN SODIUM 5 MG/1
5 TABLET ORAL
Qty: 30 TAB | Refills: 0 | Status: SHIPPED | OUTPATIENT
Start: 2019-03-07 | End: 2019-04-08 | Stop reason: SDUPTHER

## 2019-03-07 RX ORDER — ASPIRIN 81 MG/1
81 TABLET ORAL DAILY
Qty: 30 TAB | Refills: 6 | Status: SHIPPED | OUTPATIENT
Start: 2019-03-08

## 2019-03-07 RX ADMIN — ESCITALOPRAM OXALATE 20 MG: 10 TABLET ORAL at 05:15

## 2019-03-07 RX ADMIN — BUPROPION HYDROCHLORIDE 150 MG: 150 TABLET, EXTENDED RELEASE ORAL at 05:15

## 2019-03-07 RX ADMIN — ASPIRIN 81 MG: 81 TABLET, COATED ORAL at 05:15

## 2019-03-07 RX ADMIN — FLECAINIDE ACETATE 50 MG: 50 TABLET ORAL at 05:15

## 2019-03-07 RX ADMIN — LIOTHYRONINE SODIUM 5 MCG: 5 TABLET ORAL at 05:15

## 2019-03-07 RX ADMIN — ACETAMINOPHEN 650 MG: 325 TABLET, FILM COATED ORAL at 10:17

## 2019-03-07 NOTE — DISCHARGE SUMMARY
CHIEF COMPLAINT ON ADMISSION  Elective admission for for Watchman procedure.     CODE STATUS  Full Code    HPI & HOSPITAL COURSE  This is a 68 y.o. year old female here for elective admission for Watchman procedure.  She has a history of paroxysmal atrial fibrillation and aortic calcifications seen on previous CT scan.  She was previously on Xarelto for anticoagulation and also had chronic osteoarthritis for which she would like to take NSAIDS for, also history of post menopausal vaginal bleeding.  She was referred for consideration for Watchman and after appropriate workup was completed she was scheduled for procedure.    Procedural Conclusions per Dr. Lyles's Op Note:  Device implanted:  Size  21  Serial #[id number]  Max appendage pre-measurement 18 mm  Max device measurement 16 mm (23%) compression     Impressions:    1. Successful CASA closure      Recommendations:  1. Warfarin and ASA 81 (target INR 2-2.5)   2. Admit to monitored bedrest.  3. Echo and removal of figure of 8 suture in the AM      The patient has been seen and examined in EP rounds this AM.  Her monitored rhythm is sinus presently.  Telemetry history has been reviewed and is without acute abnormalities.  Her EKG, echo, lab data, and vital signs have been reviewed and are stable.  The patient denies any chest pain, dyspnea, dizziness, paraesthesias, or other complaints.  Her physical exam is without acute findings; specifically her right femoral access site is clean and dry.  Her figure eight suture was removed by myself.  There is no evidence of bleeding or hematoma.  She has been out of bed and ambulated without difficulty.     Repeat Echo this AM showed no evidence of pericardial effusion.     Therefore, she is discharged in good and stable condition with close outpatient follow-up.    PROCEDURES  Watchman Procedure, Dr. Lyles, 3/6/19, see full procedure note for detail.    CONSULTATIONS  None    DISCHARGE PROBLEM LIST  Intolerance to  anticoagulation.  S/P Watchman procedure  Paroxysmal atrial Fibrillation.     MEDICATIONS ON DISCHARGE   Allison Cespedes   Home Medication Instructions BRYAN:[id number]    Printed on:03/07/19 1017   Medication Information                      aspirin EC 81 MG EC tablet  Take 1 Tab by mouth every day.             buPROPion (WELLBUTRIN XL) 150 MG XL tablet  Take 150 mg by mouth every morning.             dicyclomine (BENTYL) 10 MG Cap  Take 10 mg by mouth as needed.             escitalopram (LEXAPRO) 20 MG tablet  Take 20 mg by mouth every morning.             FEMRING 0.05 MG/24HR RING  INSERT 1 VAGINALLY Q 3 MONTHS             flecainide (TAMBOCOR) 50 MG tablet  Take 1 Tab by mouth 2 times a day.             Fluticasone Furoate-Vilanterol (BREO ELLIPTA) 200-25 MCG/INH AEROSOL POWDER, BREATH ACTIVATED  Inhale 1 Puff by mouth every day. Rinse mouth after use.             HYDROcodone-acetaminophen (NORCO) 5-325 MG Tab per tablet  Take 1 Tab by mouth 2 times a day as needed.             lansoprazole (PREVACID) 30 MG CAPSULE DELAYED RELEASE  Take 30 mg by mouth as needed.             levalbuterol (XOPENEX HFA) 45 MCG/ACT inhaler  INHALE 2 PUFFS BY MOUTH EVERY 4 HOURS AS NEEDED FOR SHORTNESS OF BREATH             levothyroxine (SYNTHROID) 100 MCG TABS  Take 100 mcg by mouth every bedtime.             liothyronine (CYTOMEL) 5 MCG TABS  Take 5 mcg by mouth 2 Times a Day.             lovastatin (MEVACOR) 20 MG Tab  Take 10 mg by mouth every evening.             metoprolol SR (TOPROL XL) 50 MG TABLET SR 24 HR  Take 1 Tab by mouth 1 time daily as needed (HR>120bpm).             Probiotic Product (PROBIOTIC DAILY PO)  Take 1 Cap by mouth 2 Times a Day.             progesterone (PROMETRIUM) 100 MG CAPS  Take 100 mg by mouth every bedtime.             propranolol (INDERAL) 20 MG TABS  Take 10 mg by mouth every bedtime. If hr >120 pt takes 20 mg             vitamin D, Ergocalciferol, (DRISDOL) 08519 UNITS CAPS capsule  Take  50,000 Units by mouth every 7 days.             warfarin (COUMADIN) 5 MG Tab  Take 1 Tab by mouth COUMADIN-DAILY.             Medication were reviewed in detail with the patient prior to discharge.      DIET  Cardiac     ACTIVITY/POST WATCHMAN INSTRUCTIONS:  Post Ablation Patient Instructions:  No lifting > 10 lbs x 1 week.  No baths or hot tubs x 1 week.  May shower on Friday (3/8/19) and take off groin dressings.  Continue to monitor site daily for warmth, redness, discolored drainage    Please follow up with Coumadin Clinic as scheduled for close follow up.      Please walk and take deep breaths after discharge.  After discharge, if  experiences chest pain, shortness of breath, hemoptysis, neurological changes, high fever, pre syncope/syncope, trouble with catheter sites needs to be seen in the emergency dept.       LABORATORY  Lab Results   Component Value Date/Time    SODIUM 139 03/07/2019 04:53 AM    POTASSIUM 3.7 03/07/2019 04:53 AM    CHLORIDE 107 03/07/2019 04:53 AM    CO2 26 03/07/2019 04:53 AM    GLUCOSE 113 (H) 03/07/2019 04:53 AM    BUN 15 03/07/2019 04:53 AM    CREATININE 0.91 03/07/2019 04:53 AM        Lab Results   Component Value Date/Time    WBC 12.8 (H) 03/07/2019 04:53 AM    HEMOGLOBIN 12.8 03/07/2019 04:53 AM    HEMATOCRIT 40.1 03/07/2019 04:53 AM    PLATELETCT 259 03/07/2019 04:53 AM        FOLLOW UP  Future Appointments  Date Time Provider Department Center   3/8/2019 10:30 AM [facility] EXAM 5 [facility] None   4/4/2019 2:20 PM EDGARD Jimenez [facility] None   5/13/2019 9:30 AM HOLA Suarez None   8/20/2019 11:15 AM Kyra Lovett M.D. [facility] None   8/29/2019 10:30 AM Joanna Hewitt M.D. [facility] None       SPECIFIC OUTPATIENT FOLLOW-UP  Patient will be seen in 3 weeks for hospital follow up in EP clinic and Friday 3/8/19 for Coumadin Clinic.    The above discharge plan was reviewed in detail with the patient and she verbalizes understanding and is in agreement.        Emilia Cole    3/7/2019 10:17 AM

## 2019-03-07 NOTE — PROGRESS NOTES
2 RN skin check complete with RN Leno.  Pt skin warm, dry, and intact.   Ears bilateral red , blanching.

## 2019-03-07 NOTE — PROGRESS NOTES
Discharge instructions provided for pt and . All questions answered. Tele box and IV removed. Pt left floor walking to be taken home by car with .

## 2019-03-07 NOTE — DISCHARGE INSTRUCTIONS
Discharge Instructions    Discharged to home by car with relative. Discharged via wheelchair, hospital escort: Yes.  Special equipment needed: Not Applicable    Be sure to schedule a follow-up appointment with your primary care doctor or any specialists as instructed.     Discharge Plan:   Diet Plan: Discussed  Activity Level: Discussed  Confirmed Follow up Appointment: Appointment Scheduled  Confirmed Symptoms Management: Discussed  Medication Reconciliation Updated: Yes  Influenza Vaccine Indication: Not indicated: Previously immunized this influenza season and > 8 years of age    I understand that a diet low in cholesterol, fat, and sodium is recommended for good health. Unless I have been given specific instructions below for another diet, I accept this instruction as my diet prescription.   Other diet: none    Special Instructions:    · Is patient discharged on Warfarin / Coumadin?   Yes    You are receiving the drug warfarin. Please understand the importance of monitoring warfarin with scheduled PT/INR blood draws.  Follow-up with a call to your personal Doctor's office in 3 days to schedule a PT/INR. APPT already SCHEDULED.    IMPORTANT: HOW TO USE THIS INFORMATION:  This is a summary and does NOT have all possible information about this product. This information does not assure that this product is safe, effective, or appropriate for you. This information is not individual medical advice and does not substitute for the advice of your health care professional. Always ask your health care professional for complete information about this product and your specific health needs.      WARFARIN - ORAL (WARF-uh-rin)      COMMON BRAND NAME(S): Coumadin      WARNING:  Warfarin can cause very serious (possibly fatal) bleeding. This is more likely to occur when you first start taking this medication or if you take too much warfarin. To decrease your risk for bleeding, your doctor or other health care provider will  "monitor you closely and check your lab results (INR test) to make sure you are not taking too much warfarin. Keep all medical and laboratory appointments. Tell your doctor right away if you notice any signs of serious bleeding. See also Side Effects section.      USES:  This medication is used to treat blood clots (such as in deep vein thrombosis-DVT or pulmonary embolus-PE) and/or to prevent new clots from forming in your body. Preventing harmful blood clots helps to reduce the risk of a stroke or heart attack. Conditions that increase your risk of developing blood clots include a certain type of irregular heart rhythm (atrial fibrillation), heart valve replacement, recent heart attack, and certain surgeries (such as hip/knee replacement). Warfarin is commonly called a \"blood thinner,\" but the more correct term is \"anticoagulant.\" It helps to keep blood flowing smoothly in your body by decreasing the amount of certain substances (clotting proteins) in your blood.      HOW TO USE:  Read the Medication Guide provided by your pharmacist before you start taking warfarin and each time you get a refill. If you have any questions, ask your doctor or pharmacist. Take this medication by mouth with or without food as directed by your doctor or other health care professional, usually once a day. It is very important to take it exactly as directed. Do not increase the dose, take it more frequently, or stop using it unless directed by your doctor. Dosage is based on your medical condition, laboratory tests (such as INR), and response to treatment. Your doctor or other health care provider will monitor you closely while you are taking this medication to determine the right dose for you. Use this medication regularly to get the most benefit from it. To help you remember, take it at the same time each day. It is important to eat a balanced, consistent diet while taking warfarin. Some foods can affect how warfarin works in your " body and may affect your treatment and dose. Avoid sudden large increases or decreases in your intake of foods high in vitamin K (such as broccoli, cauliflower, cabbage, brussels sprouts, kale, spinach, and other green leafy vegetables, liver, green tea, certain vitamin supplements). If you are trying to lose weight, check with your doctor before you try to go on a diet. Cranberry products may also affect how your warfarin works. Limit the amount of cranberry juice (16 ounces/480 milliliters a day) or other cranberry products you may drink or eat.      SIDE EFFECTS:  Nausea, loss of appetite, or stomach/abdominal pain may occur. If any of these effects persist or worsen, tell your doctor or pharmacist promptly. Remember that your doctor has prescribed this medication because he or she has judged that the benefit to you is greater than the risk of side effects. Many people using this medication do not have serious side effects. This medication can cause serious bleeding if it affects your blood clotting proteins too much (shown by unusually high INR lab results). Even if your doctor stops your medication, this risk of bleeding can continue for up to a week. Tell your doctor right away if you have any signs of serious bleeding, including: unusual pain/swelling/discomfort, unusual/easy bruising, prolonged bleeding from cuts or gums, persistent/frequent nosebleeds, unusually heavy/prolonged menstrual flow, pink/dark urine, coughing up blood, vomit that is bloody or looks like coffee grounds, severe headache, dizziness/fainting, unusual or persistent tiredness/weakness, bloody/black/tarry stools, chest pain, shortness of breath, difficulty swallowing. Tell your doctor right away if any of these unlikely but serious side effects occur: persistent nausea/vomiting, severe stomach/abdominal pain, yellowing eyes/skin. This drug rarely has caused very serious (possibly fatal) problems if its effects lead to small blood clots  (usually at the beginning of treatment). This can lead to severe skin/tissue damage that may require surgery or amputation if left untreated. Patients with certain blood conditions (protein C or S deficiency) may be at greater risk. Get medical help right away if any of these rare but serious side effects occur: painful/red/purplish patches on the skin (such as on the toe, breast, abdomen), change in the amount of urine, vision changes, confusion, slurred speech, weakness on one side of the body. A very serious allergic reaction to this drug is rare. However, get medical help right away if you notice any symptoms of a serious allergic reaction, including: rash, itching/swelling (especially of the face/tongue/throat), severe dizziness, trouble breathing. This is not a complete list of possible side effects. If you notice other effects not listed above, contact your doctor or pharmacist. In the [place] - Call your doctor for medical advice about side effects. You may report side effects to FDA at 9-149-YBQ-3868. In [place] - Call your doctor for medical advice about side effects. You may report side effects to [facility] [place] at 1-790.132.6998.      PRECAUTIONS:  Before taking warfarin, tell your doctor or pharmacist if you are allergic to it; or if you have any other allergies. This product may contain inactive ingredients, which can cause allergic reactions or other problems. Talk to your pharmacist for more details. Before using this medication, tell your doctor or pharmacist your medical history, especially of: blood disorders (such as anemia, hemophilia), bleeding problems (such as bleeding of the stomach/intestines, bleeding in the brain), blood vessel disorders (such as aneurysms), recent major injury/surgery, liver disease, alcohol use, mental/mood disorders (including memory problems), frequent falls/injuries. It is important that all your doctors and dentists know that you take warfarin. Before having surgery or any  medical/dental procedures, tell your doctor or dentist that you are taking this medication and about all the products you use (including prescription drugs, nonprescription drugs, and herbal products). Avoid getting injections into the muscles. If you must have an injection into a muscle (for example, a flu shot), it should be given in the arm. This way, it will be easier to check for bleeding and/or apply pressure bandages. This medication may cause stomach bleeding. Daily use of alcohol while using this medicine will increase your risk for stomach bleeding and may also affect how this medication works. Limit or avoid alcoholic beverages. If you have not been eating well, if you have an illness or infection that causes fever, vomiting, or diarrhea for more than 2 days, or if you start using any antibiotic medications, contact your doctor or pharmacist immediately because these conditions can affect how warfarin works. This medication can cause heavy bleeding. To lower the chance of getting cut, bruised, or injured, use great caution with sharp objects like safety razors and nail cutters. Use an electric razor when shaving and a soft toothbrush when brushing your teeth. Avoid activities such as contact sports. If you fall or injure yourself, especially if you hit your head, call your doctor immediately. Your doctor may need to check you. The Food & Drug Administration has stated that generic warfarin products are interchangeable. However, consult your doctor or pharmacist before switching warfarin products. Be careful not to take more than one medication that contains warfarin unless specifically directed by the doctor or health care provider who is monitoring your warfarin treatment. Older adults may be at greater risk for bleeding while using this drug. This medication is not recommended for use during pregnancy because of serious (possibly fatal) harm to an unborn baby. Discuss the use of reliable forms of birth  "control with your doctor. If you become pregnant or think you may be pregnant, tell your doctor immediately. If you are planning pregnancy, discuss a plan for managing your condition with your doctor before you become pregnant. Your doctor may switch the type of medication you use during pregnancy. Very small amounts of this medication may pass into breast milk but is unlikely to harm a nursing infant. Consult your doctor before breast-feeding.      DRUG INTERACTIONS:  Drug interactions may change how your medications work or increase your risk for serious side effects. This document does not contain all possible drug interactions. Keep a list of all the products you use (including prescription/nonprescription drugs and herbal products) and share it with your doctor and pharmacist. Do not start, stop, or change the dosage of any medicines without your doctor's approval. Warfarin interacts with many prescription, nonprescription, vitamin, and herbal products. This includes medications that are applied to the skin or inside the vagina or rectum. The interactions with warfarin usually result in an increase or decrease in the \"blood-thinning\" (anticoagulant) effect. Your doctor or other health care professional should closely monitor you to prevent serious bleeding or clotting problems. While taking warfarin, it is very important to tell your doctor or pharmacist of any changes in medications, vitamins, or herbal products that you are taking. Some products that may interact with this drug include: capecitabine, imatinib, mifepristone. Aspirin, aspirin-like drugs (salicylates), and nonsteroidal anti-inflammatory drugs (NSAIDs such as ibuprofen, naproxen, celecoxib) may have effects similar to warfarin. These drugs may increase the risk of bleeding problems if taken during treatment with warfarin. Carefully check all prescription/nonprescription product labels (including drugs applied to the skin such as pain-relieving " creams) since the products may contain NSAIDs or salicylates. Talk to your doctor about using a different medication (such as acetaminophen) to treat pain/fever. Low-dose aspirin and related drugs (such as clopidogrel, ticlopidine) should be continued if prescribed by your doctor for specific medical reasons such as heart attack or stroke prevention. Consult your doctor or pharmacist for more details. Many herbal products interact with warfarin. Tell your doctor before taking any herbal products, especially bromelains, coenzyme Q10, cranberry, danshen, dong quai, fenugreek, garlic, ginkgo biloba, ginseng, and [place]'s wort, among others. This medication may interfere with a certain laboratory test to measure theophylline levels, possibly causing false test results. Make sure laboratory personnel and all your doctors know you use this drug.      OVERDOSE:  If overdose is suspected, contact a poison control center or emergency room immediately.  residents can call the [facility] Poison Hotline at 1-828.406.5661. [place] residents can call a provincial poison control center. Symptoms of overdose may include: bloody/black/tarry stools, pink/dark urine, unusual/prolonged bleeding.      NOTES:  Do not share this medication with others. Laboratory and/or medical tests (such as INR, complete blood count) must be performed periodically to monitor your progress or check for side effects. Consult your doctor for more details.      MISSED DOSE:  For the best possible benefit, do not miss any doses. If you do miss a dose and remember on the same day, take it as soon as you remember. If you remember on the next day, skip the missed dose and resume your usual dosing schedule. Do not double the dose to catch up because this could increase your risk for bleeding. Keep a record of missed doses to give to your doctor or pharmacist. Contact your doctor or pharmacist if you miss 2 or more doses in a row.      STORAGE:  Store at room  temperature away from light and moisture. Do not store in the bathroom. Keep all medications away from children and pets. Do not flush medications down the toilet or pour them into a drain unless instructed to do so. Properly discard this product when it is  or no longer needed. Consult your pharmacist or local waste disposal company for more details about how to safely discard your product.      MEDICAL ALERT:  Your condition and medication can cause complications in a medical emergency. For information about enrolling in MedicAlert, call 1-725.812.1584 ([place]) or 1-954.406.2616 ([place]).      Information last revised 2010 Copyright(c) 2010 [employer]             Depression / Suicide Risk    As you are discharged from this [facility] facility, it is important to learn how to keep safe from harming yourself.    Recognize the warning signs:  · Abrupt changes in personality, positive or negative- including increase in energy   · Giving away possessions  · Change in eating patterns- significant weight changes-  positive or negative  · Change in sleeping patterns- unable to sleep or sleeping all the time   · Unwillingness or inability to communicate  · Depression  · Unusual sadness, discouragement and loneliness  · Talk of wanting to die  · Neglect of personal appearance   · Rebelliousness- reckless behavior  · Withdrawal from people/activities they love  · Confusion- inability to concentrate     If you or a loved one observes any of these behaviors or has concerns about self-harm, here's what you can do:  · Talk about it- your feelings and reasons for harming yourself  · Remove any means that you might use to hurt yourself (examples: pills, rope, extension cords, firearm)  · Get professional help from the community (Mental Health, Substance Abuse, psychological counseling)  · Do not be alone:Call your Safe Contact- someone whom you trust who will be there for you.  · Call your local CRISIS  HOTLINE 485-3290 or 496-036-1343  · Call your local Children's Mobile Crisis Response Team [place] (991) 585-9794 or www.[employer]  · Call the toll free National Suicide Prevention Hotlines   · National Suicide Prevention Lifeline 194-380-EETX (0866)  · [facility] Network 800-SUICIDE (029-8139)    Atrial Fibrillation  Introduction  Atrial fibrillation is a type of heartbeat that is irregular or fast (rapid). If you have this condition, your heart keeps quivering in a weird (chaotic) way. This condition can make it so your heart cannot pump blood normally. Having this condition gives a person more risk for stroke, heart failure, and other heart problems. There are different types of atrial fibrillation. Talk with your doctor to learn about the type that you have.  Follow these instructions at home:  · Take over-the-counter and prescription medicines only as told by your doctor.  · If your doctor prescribed a blood-thinning medicine, take it exactly as told. Taking too much of it can cause bleeding. If you do not take enough of it, you will not have the protection that you need against stroke and other problems.  · Do not use any tobacco products. These include cigarettes, chewing tobacco, and e-cigarettes. If you need help quitting, ask your doctor.  · If you have apnea (obstructive sleep apnea), manage it as told by your doctor.  · Do not drink alcohol.  · Do not drink beverages that have caffeine. These include coffee, soda, and tea.  · Maintain a healthy weight. Do not use diet pills unless your doctor says they are safe for you. Diet pills may make heart problems worse.  · Follow diet instructions as told by your doctor.  · Exercise regularly as told by your doctor.  · Keep all follow-up visits as told by your doctor. This is important.  Contact a doctor if:  · You notice a change in the speed, rhythm, or strength of your heartbeat.  · You are taking a blood-thinning medicine and you notice  more bruising.  · You get tired more easily when you move or exercise.  Get help right away if:  · You have pain in your chest or your belly (abdomen).  · You have sweating or weakness.  · You feel sick to your stomach (nauseous).  · You notice blood in your throw up (vomit), poop (stool), or pee (urine).  · You are short of breath.  · You suddenly have swollen feet and ankles.  · You feel dizzy.  · Your suddenly get weak or numb in your face, arms, or legs, especially if it happens on one side of your body.  · You have trouble talking, trouble understanding, or both.  · Your face or your eyelid droops on one side.  These symptoms may be an emergency. Do not wait to see if the symptoms will go away. Get medical help right away. Call your local emergency services (911 in the [place] Do not drive yourself to the hospital.   This information is not intended to replace advice given to you by your health care provider. Make sure you discuss any questions you have with your health care provider.  Document Released: 09/26/2009 Document Revised: 05/25/2017 Document Reviewed: 04/13/2016  © 2017 [employer]      Femoral Site Care  Introduction  Refer to this sheet in the next few weeks. These instructions provide you with information about caring for yourself after your procedure. Your health care provider may also give you more specific instructions. Your treatment has been planned according to current medical practices, but problems sometimes occur. Call your health care provider if you have any problems or questions after your procedure.  What can I expect after the procedure?  After your procedure, it is typical to have the following:  · Bruising at the site that usually fades within 1-2 weeks.  · Blood collecting in the tissue (hematoma) that may be painful to the touch. It should usually decrease in size and tenderness within 1-2 weeks.  Follow these instructions at home:  · Take medicines only as directed by your health  care provider.  · You may shower 24-48 hours after the procedure or as directed by your health care provider. Remove the bandage (dressing) and gently wash the site with plain soap and water. Pat the area dry with a clean towel. Do not rub the site, because this may cause bleeding.  · Do not take baths, swim, or use a hot tub until your health care provider approves.  · Check your insertion site every day for redness, swelling, or drainage.  · Do not apply powder or lotion to the site.  · Limit use of stairs to twice a day for the first 2-3 days or as directed by your health care provider.  · Do not squat for the first 2-3 days or as directed by your health care provider.  · Do not lift over 10 lb (4.5 kg) for 5 days after your procedure or as directed by your health care provider.  · Ask your health care provider when it is okay to:  ¨ Return to work or school.  ¨ Resume usual physical activities or sports.  ¨ Resume sexual activity.  · Do not drive home if you are discharged the same day as the procedure. Have someone else drive you.  · You may drive 24 hours after the procedure unless otherwise instructed by your health care provider.  · Do not operate machinery or power tools for 24 hours after the procedure or as directed by your health care provider.  · If your procedure was done as an outpatient procedure, which means that you went home the same day as your procedure, a responsible adult should be with you for the first 24 hours after you arrive home.  · Keep all follow-up visits as directed by your health care provider. This is important.  Contact a health care provider if:  · You have a fever.  · You have chills.  · You have increased bleeding from the site. Hold pressure on the site.  Get help right away if:  · You have unusual pain at the site.  · You have redness, warmth, or swelling at the site.  · You have drainage (other than a small amount of blood on the dressing) from the site.  · The site is  bleeding, and the bleeding does not stop after 30 minutes of holding steady pressure on the site.  · Your leg or foot becomes pale, cool, tingly, or numb.  This information is not intended to replace advice given to you by your health care provider. Make sure you discuss any questions you have with your health care provider.  Document Released: 08/21/2015 Document Revised: 05/25/2017 Document Reviewed: 07/07/2015  © 2017 [employer]

## 2019-03-07 NOTE — PROGRESS NOTES
Patient transported to floor with PACU RN. VSS. Right groin site and wrist assessed, clean, dry, intact, no hematoma. Patient updated on plan of care and oriented to floor. No further needs at this time. Bed locked and in lowest position, fall precautions and hourly rounding in place. Tele box on, call light within reach, will continue to monitor.

## 2019-03-07 NOTE — RESPIRATORY CARE
COPD EDUCATION by COPD CLINICAL EDUCATOR  3/7/2019 at 6:33 AM by Lynette Woodard     Patient reviewed by COPD education team. Patient does not qualify for COPD program.

## 2019-03-07 NOTE — PROGRESS NOTES
Inpatient Anticoagulation Service Note    Date: 3/6/2019  Reason for Anticoagulation: Atrial Fibrillation   [id number] VASc Score: 3    Target INR: 2.0 to 3.0    INR from last 7 days     None        Dose from last 7 days     Date/Time Dose (mg)    03/06/19 1700  5        Average Dose (mg):  (new start)  Significant Interactions: Aspirin, Statin, Thyroid Medications  Bridge Therapy: No     Comments: Pt is s/p Watchman procedure. Warfarin to start for AFib. Pt had previouosly been on Xarleto. INR yesterday was 1.03. Will start with warfarin 5 mg daily    Education Material Provided?: No  Pharmacist suggested discharge dosing: TBD. Starting with warfarin 5 mg daily. Recommend a follow up INR within 2-3 days of discharge.     Korey Johnson, PharmD

## 2019-03-07 NOTE — PROGRESS NOTES
Received report from night shift nurse. Tele box on. Updated patient about plan of care. All questions answered. Pt complained of 4 out of 10 pain due to headache. Tylenol given for pain. Bed locked and in lowest position. Call light in reach. Will continue to monitor.

## 2019-03-07 NOTE — PROGRESS NOTES
Inpatient Anticoagulation Service Note    Date: 3/7/2019  Reason for Anticoagulation: Atrial Fibrillation   [id number] VASc Score: 3    Hemoglobin Value: 12.8  Hematocrit Value: 40.1  Lab Platelet Value: 259  Target INR: 2.0 to 3.0    INR from last 7 days     Date/Time INR Value    03/07/19 0453  1.11    03/06/19 1722  1.03        Dose from last 7 days     Date/Time Dose (mg)    03/07/19 1400  5    03/06/19 1700  5        Average Dose (mg):  (new start)  Significant Interactions: Aspirin, Statin, Thyroid Medications  Bridge Therapy: No     Comments: Warfarin 5 mg daily started yesterdat for AFib. Will continue same dose and trend the INR.     Education Material Provided?: No  Pharmacist suggested discharge dosing: warfarin 5 mg daily for now. Recommend a follow up INR within 2-3 days of discharge.     Korey Johnson, PharmD

## 2019-03-08 ENCOUNTER — ANTICOAGULATION VISIT (OUTPATIENT)
Dept: VASCULAR LAB | Facility: MEDICAL CENTER | Age: 69
End: 2019-03-08
Attending: INTERNAL MEDICINE
Payer: MEDICARE

## 2019-03-08 VITALS — HEART RATE: 69 BPM | SYSTOLIC BLOOD PRESSURE: 99 MMHG | DIASTOLIC BLOOD PRESSURE: 61 MMHG

## 2019-03-08 DIAGNOSIS — Z79.01 CHRONIC ANTICOAGULATION: ICD-10-CM

## 2019-03-08 PROBLEM — I48.91 ATRIAL FIBRILLATION (HCC): Status: ACTIVE | Noted: 2019-03-08

## 2019-03-08 LAB
INR BLD: 1.3 (ref 0.9–1.2)
INR PPP: 1.3 (ref 2–3.5)

## 2019-03-08 PROCEDURE — 85610 PROTHROMBIN TIME: CPT

## 2019-03-08 PROCEDURE — 99213 OFFICE O/P EST LOW 20 MIN: CPT

## 2019-03-08 NOTE — PROGRESS NOTES
Anticoagulation Summary  As of 3/8/2019    INR goal:   2.0-2.5   TTR:   --   INR used for dosin.3! (3/8/2019)   Warfarin maintenance plan:   5 mg (5 mg x 1) every day   Weekly warfarin total:   35 mg   Plan last modified:   Bernabe Espana, PharmD (3/8/2019)   Next INR check:   3/15/2019   Target end date:   2019    Indications    Atrial fibrillation (HCC) [I48.91] [I48.91]             Anticoagulation Episode Summary     INR check location:       Preferred lab:       Send INR reminders to:       Comments:   S/P watchman, no bridging      Anticoagulation Care Providers     Provider Role Specialty Phone number    Sincere Lyles M.D. Referring Cardiology [phone]    [facility] Responsible  [phone]        Anticoagulation Patient Findings      Pt is new to warfarin and new to [facility].  Discussed indication for warfarin therapy and INR goal range. Explained our services, hours of operation, warfarin therapy, potential SE, potential DI. Discussed diet at length, with an emphasis on foods rich in vitamin K.  Discussed monitoring parameters, such as blood in urine, blood in stool, discussed what to do if a dose is missed, or suspected as missed.  Emphasized importance of compliance including follow up. Discussed lifestyle choices of ETOH & smoking and its impact on therapy.    Pt is on ASA 81 mg per cardiology.   She is s/p watchman procedure only two day ago.    Confirmed inpatient dosing of warfarin and discharge strength.  Continue with 5 mg warfarin daily.     INR goal of 2-2.5 per Emilia Cole from 3/7/19 note.  Pt has constant vaginal bleeding, the above goal seems most appropriate.   Target end date of 19 per referral.     INR is sub-therapeutic today. (as expected for 3rd day of warfarin)  Pt is to continue with current warfarin dosing regimen as she is projecting well for a therapeutic INR in 48 hours.     Followup INR in 72 hours.  SO provided so pt can obtain INR while in [place] next week.  She is aware to contact the clinic if we do not call her within 24 hours of obtaining INR.     Bernabe Espana, PharmD   CC Dr Michael Bloch, Dr Sincere Lyles

## 2019-03-10 LAB — LV EJECT FRACT  99904: 55

## 2019-03-11 ENCOUNTER — TELEPHONE (OUTPATIENT)
Dept: VASCULAR LAB | Facility: MEDICAL CENTER | Age: 69
End: 2019-03-11

## 2019-03-11 NOTE — TELEPHONE ENCOUNTER
Initial anticoagulation clinic note reviewed.   Patient is status post watchman procedure  We will defer dose and duration of anticoagulation and concomitant antiplatelet therapy to cardiology.  We will defer all other cardiovascular care, aside from day-to-day management of anticoagulation, to cardiology    Michael J. Bloch, MD  Anticoagulation Center    CC: Paul Du

## 2019-03-15 ENCOUNTER — ANTICOAGULATION VISIT (OUTPATIENT)
Dept: VASCULAR LAB | Facility: MEDICAL CENTER | Age: 69
End: 2019-03-15
Attending: INTERNAL MEDICINE
Payer: MEDICARE

## 2019-03-15 DIAGNOSIS — Z79.01 CHRONIC ANTICOAGULATION: ICD-10-CM

## 2019-03-15 LAB
INR BLD: 5.5 (ref 0.9–1.2)
INR PPP: 5.5 (ref 2–3.5)

## 2019-03-15 PROCEDURE — 85610 PROTHROMBIN TIME: CPT

## 2019-03-15 PROCEDURE — 99212 OFFICE O/P EST SF 10 MIN: CPT

## 2019-03-15 NOTE — PROGRESS NOTES
Anticoagulation Summary  As of 3/15/2019    INR goal:   2.0-2.5   TTR:   --   INR used for dosin.5! (3/15/2019)   Warfarin maintenance plan:   2.5 mg (5 mg x 0.5) every Mon, Wed, Fri; 5 mg (5 mg x 1) all other days   Weekly warfarin total:   27.5 mg   Plan last modified:   Etta Roche PharmD (3/15/2019)   Next INR check:   3/19/2019   Target end date:   2019    Indications    Atrial fibrillation (HCC) [I48.91] [I48.91]             Anticoagulation Episode Summary     INR check location:       Preferred lab:       Send INR reminders to:       Comments:   S/P watchman, no bridging      Anticoagulation Care Providers     Provider Role Specialty Phone number    Sincere Lyles M.D. Referring Cardiology [phone]    [facility] Responsible  [phone]        Anticoagulation Patient Findings      HPI:  Allison Cespedes seen in clinic today for follow up on anticoagulation therapy in the presence of Afib status post watchman. Denies any changes to current medical/health status since last appointment. Denies any medication or diet changes. No current symptoms of bleeding or thrombosis reported.    A/P:   INR is supra-therapeutic at 5.5, pt did have some cran-apple juice in the past 3 days. Pt to HOLD warfarin X 2 days then begin decreased weekly regimen     Follow up appointment in 4 days    Next Appointment: Tuesday, 2019     Maico Rivas.D

## 2019-03-19 ENCOUNTER — ANTICOAGULATION VISIT (OUTPATIENT)
Dept: VASCULAR LAB | Facility: MEDICAL CENTER | Age: 69
End: 2019-03-19
Attending: INTERNAL MEDICINE
Payer: MEDICARE

## 2019-03-19 DIAGNOSIS — I48.91 ATRIAL FIBRILLATION, UNSPECIFIED TYPE (HCC): ICD-10-CM

## 2019-03-19 LAB — INR PPP: 2.8 (ref 2–3.5)

## 2019-03-19 PROCEDURE — 99212 OFFICE O/P EST SF 10 MIN: CPT

## 2019-03-19 PROCEDURE — 85610 PROTHROMBIN TIME: CPT

## 2019-03-19 NOTE — PROGRESS NOTES
Anticoagulation Summary  As of 3/19/2019    INR goal:   2.0-2.5   TTR:   0.0 % (1 d)   INR used for dosin.8! (3/19/2019)   Warfarin maintenance plan:   2.5 mg (5 mg x 0.5) every Mon, Wed, Fri; 5 mg (5 mg x 1) all other days   Weekly warfarin total:   27.5 mg   Plan last modified:   Maico DaleyD (3/15/2019)   Next INR check:   3/22/2019   Target end date:   2019    Indications    Atrial fibrillation (HCC) [I48.91] [I48.91]             Anticoagulation Episode Summary     INR check location:       Preferred lab:       Send INR reminders to:       Comments:   S/P watchman, no bridging      Anticoagulation Care Providers     Provider Role Specialty Phone number    Sincere Lyles M.D. Referring Cardiology [phone]    [facility] Responsible  [phone]        Anticoagulation Patient Findings  Patient Findings     Negatives:   Signs/symptoms of thrombosis, Signs/symptoms of bleeding, Laboratory test error suspected, Change in health, Change in alcohol use, Change in activity, Upcoming invasive procedure, Emergency department visit, Upcoming dental procedure, Missed doses, Extra doses, Change in medications, Change in diet/appetite, Hospital admission, Bruising, Other complaints        History of Present Illness: follow up appointment for chronic anticoagulation with the high risk medication, warfarin for atrial fibrillation    Last INR was out of range, dosage adjusted: pt remains supra therapeutic today, however trending downward.  Will reduce tonight's dose to 2.5mg then resume current dosing regimen.   Follow up in 3 days, to reduce the risk of adverse events related to this high risk medication, warfarin.    Sola Owens, Clinical Pharmacist

## 2019-03-22 ENCOUNTER — ANTICOAGULATION VISIT (OUTPATIENT)
Dept: VASCULAR LAB | Facility: MEDICAL CENTER | Age: 69
End: 2019-03-22
Attending: INTERNAL MEDICINE
Payer: MEDICARE

## 2019-03-22 VITALS — SYSTOLIC BLOOD PRESSURE: 116 MMHG | HEART RATE: 85 BPM | DIASTOLIC BLOOD PRESSURE: 63 MMHG

## 2019-03-22 DIAGNOSIS — I48.91 ATRIAL FIBRILLATION, UNSPECIFIED TYPE (HCC): ICD-10-CM

## 2019-03-22 LAB — INR PPP: 2 (ref 2–3.5)

## 2019-03-22 PROCEDURE — 85610 PROTHROMBIN TIME: CPT

## 2019-03-22 PROCEDURE — 99212 OFFICE O/P EST SF 10 MIN: CPT

## 2019-03-22 NOTE — PROGRESS NOTES
Anticoagulation Summary  As of 3/22/2019    INR goal:   2.0-2.5   TTR:   46.9 % (4 d)   INR used for dosin.0 (3/22/2019)   Warfarin maintenance plan:   5 mg (5 mg x 1) every Mon, Wed, Fri; 2.5 mg (5 mg x 0.5) all other days   Weekly warfarin total:   25 mg   Plan last modified:   Sallie Crowder PharmD (3/22/2019)   Next INR check:   3/28/2019   Target end date:   2019    Indications    Atrial fibrillation (HCC) [I48.91] [I48.91]             Anticoagulation Episode Summary     INR check location:       Preferred lab:       Send INR reminders to:       Comments:   S/P watchman, no bridging      Anticoagulation Care Providers     Provider Role Specialty Phone number    Sincere Lyles M.D. Referring Cardiology [phone]    [facility] Responsible  [phone]        Anticoagulation Patient Findings  Patient Findings     Negatives:   Signs/symptoms of thrombosis, Signs/symptoms of bleeding, Laboratory test error suspected, Change in health, Change in alcohol use, Change in activity, Upcoming invasive procedure, Emergency department visit, Upcoming dental procedure, Missed doses, Extra doses, Change in medications, Change in diet/appetite, Hospital admission, Bruising, Other complaints          HPI:  Allison Cespedes seen in clinic today, on anticoagulation therapy with warfarin for AFib  Changes to current medical/health status since last appt: none  Denies signs/symptoms of bleeding and/or thrombosis since the last appt.    Denies any interval changes to diet  Denies any interval changes to medications since last appt.   Denies any complications or cost restrictions with current therapy.   BP recorded in vitals.      A/P   INR  therapeutic.   Adjusted dosing regimen to 5 mg MWF, 2.5 mg AOD.    Follow up appointment in ~1 week per pt preference (pt will be out of town Mon, Tues, Wed).    Sallie Crowder, MaicoD

## 2019-03-27 LAB — INR BLD: 2 (ref 0.9–1.2)

## 2019-03-28 ENCOUNTER — ANTICOAGULATION VISIT (OUTPATIENT)
Dept: VASCULAR LAB | Facility: MEDICAL CENTER | Age: 69
End: 2019-03-28
Attending: INTERNAL MEDICINE
Payer: MEDICARE

## 2019-03-28 VITALS — DIASTOLIC BLOOD PRESSURE: 68 MMHG | HEART RATE: 72 BPM | SYSTOLIC BLOOD PRESSURE: 107 MMHG

## 2019-03-28 DIAGNOSIS — Z79.01 CHRONIC ANTICOAGULATION: ICD-10-CM

## 2019-03-28 LAB
INR BLD: 3.6 (ref 0.9–1.2)
INR PPP: 3.6 (ref 2–3.5)

## 2019-03-28 PROCEDURE — 99212 OFFICE O/P EST SF 10 MIN: CPT

## 2019-03-28 PROCEDURE — 85610 PROTHROMBIN TIME: CPT

## 2019-03-28 NOTE — PROGRESS NOTES
Anticoagulation Summary  As of 3/28/2019    INR goal:   2.0-2.5   TTR:   37.5 % (1.4 wk)   INR used for dosing:   3.6! (3/28/2019)   Warfarin maintenance plan:   5 mg (5 mg x 1) every Mon, Fri; 2.5 mg (5 mg x 0.5) all other days   Weekly warfarin total:   22.5 mg   Plan last modified:   Bernabe Espana PharmD (3/28/2019)   Next INR check:   4/2/2019   Target end date:   4/19/2019    Indications    Atrial fibrillation (HCC) [I48.91] [I48.91]             Anticoagulation Episode Summary     INR check location:       Preferred lab:       Send INR reminders to:       Comments:   S/P watchman, alen bridging      Anticoagulation Care Providers     Provider Role Specialty Phone number    Sincere Lyles M.D. Referring Cardiology [phone]    [facility] Responsible  [phone]        Anticoagulation Patient Findings      HPI:  Allison Cespedes seen in clinic today, on anticoagulation therapy with warfarin for Afib.   Changes to current medical/health status since last appt: none  Denies signs/symptoms of bleeding and/or thrombosis since the last appt.    Denies any interval changes to diet  Denies any interval changes to medications since last appt.   Denies any complications or cost restrictions with current therapy.   BP recorded in vitals.  Confirmed dosing regimen.     A/P   INR  SUPRA-therapeutic.   Hold today then begin reduced regimen.     Follow up appointment in 5 days.     Bernabe Espana, MaicoD

## 2019-04-04 ENCOUNTER — OFFICE VISIT (OUTPATIENT)
Dept: CARDIOLOGY | Facility: MEDICAL CENTER | Age: 69
End: 2019-04-04
Payer: MEDICARE

## 2019-04-04 VITALS
WEIGHT: 170 LBS | OXYGEN SATURATION: 96 % | DIASTOLIC BLOOD PRESSURE: 70 MMHG | SYSTOLIC BLOOD PRESSURE: 120 MMHG | HEART RATE: 78 BPM | BODY MASS INDEX: 28.29 KG/M2

## 2019-04-04 DIAGNOSIS — Z79.01 CHRONIC ANTICOAGULATION: ICD-10-CM

## 2019-04-04 DIAGNOSIS — I48.91 ATRIAL FIBRILLATION, UNSPECIFIED TYPE (HCC): ICD-10-CM

## 2019-04-04 DIAGNOSIS — Z95.818 PRESENCE OF WATCHMAN LEFT ATRIAL APPENDAGE CLOSURE DEVICE: ICD-10-CM

## 2019-04-04 LAB — EKG IMPRESSION: NORMAL

## 2019-04-04 PROCEDURE — 93000 ELECTROCARDIOGRAM COMPLETE: CPT | Performed by: INTERNAL MEDICINE

## 2019-04-04 PROCEDURE — 99214 OFFICE O/P EST MOD 30 MIN: CPT | Performed by: NURSE PRACTITIONER

## 2019-04-04 ASSESSMENT — ENCOUNTER SYMPTOMS
FOCAL WEAKNESS: 0
SHORTNESS OF BREATH: 0
BRUISES/BLEEDS EASILY: 1
DIZZINESS: 0
ORTHOPNEA: 0
PALPITATIONS: 0
TINGLING: 0
HEADACHES: 0
STRIDOR: 0
DOUBLE VISION: 0
SORE THROAT: 0
CHILLS: 0
VOMITING: 0
WEIGHT LOSS: 0
DIARRHEA: 0
TREMORS: 0
SPEECH CHANGE: 0
HEMOPTYSIS: 0
WHEEZING: 0
WEAKNESS: 0
NAUSEA: 0
LOSS OF CONSCIOUSNESS: 0
BLOOD IN STOOL: 0
HEARTBURN: 0
FEVER: 0
PND: 0
BLURRED VISION: 0
SENSORY CHANGE: 0
ABDOMINAL PAIN: 0
COUGH: 0
SPUTUM PRODUCTION: 0

## 2019-04-04 NOTE — LETTER
[facility]   [address]  [place] [address]  Phone: [phone]  Fax: [phone]              Allison Cespedes  1950    Encounter Date: 4/4/2019    EDGARD Jimenez          PROGRESS NOTE:  Cardiology/Electrophysiology Follow-up Note      Subjective:   Chief Complaint:   Chief Complaint   Patient presents with   • Atrial Fibrillation     follow up       Alliosn Cespedes is a 68 y.o. female who presents today for hospital follow up after Watchman Procedure by Dr. Lyles 3/6/19 secondary to her inability to take anticoagulation secondary to severe arthritis and need for NSAIDS.     She is followed by Dr. Kyra Lovett .  Past medical history also significant for paroxysmal atrial fibrillation, high risk medication use in the form of Flecainide, hypothyroidism, IVETTE, and arthritis.     Today in follow up she tells me that she has been doing well and feeling well post ablation.  She is not aware of any problems and has no concerns.  She has been doing well with Flecainide and denies any lighheadedness, pre syncope pr syncope.  She is not aware of any significant arrhythmia.  She has not noticed any bleeding on Coumadin.   She denies chest pain, dizziness, palpitations, pre syncope or syncope, dyspnea, PND, orthopnea, or lower extremity edema.      Patient endorses medication compliance        Past Medical History:   Diagnosis Date   • Anemia    • Arthritis     fingers, hands, toes, feet (osteoarthritis)   • Asthma     inhalers daily   • Atrial fibrillation (HCC) 10/2018    New onset in office. Echocardiogram with normal LV size, LVEF 65%. Trace MR, trace TR. RVSP 34mmHg.   • Breath shortness     uses 2L oxygen at night ([facility])    • Bronchitis 11/2018    gets often   • Chronic anticoagulation    • COPD (chronic obstructive pulmonary disease) ([facility])    • Gynecological disorder     postmenopausal bleeding right now    • Heart burn    •  Hypothyroidism    • Pleurisy    • Pneumonia 1989   • Psychiatric problem     depression, anxiety   • Snoring    • Tuberculosis 1981    Treated for nine months     Past Surgical History:   Procedure Laterality Date   • MASS EXCISION GENERAL Left 7/6/2018    Procedure: MASS EXCISION GENERAL/ SUBCUTANEOUS MASS LEFT SHOULDER;  Surgeon: Swapna Rivas M.D.;  Location: SURGERY SAME DAY [facility];  Service: General   • CHOLECYSTECTOMY  1994   • APPENDECTOMY  1960    had ruptured   • ARTHROSCOPY, KNEE     • MAMMOPLASTY AUGMENTATION     • PB REMV 2ND CATARACT,CORN-SCLER SECTN       Family History   Problem Relation Age of Onset   • Non-contributory Mother    • Hypertension Mother    • Non-contributory Father      Social History     Social History   • Marital status:      Spouse name: N/A   • Number of children: N/A   • Years of education: N/A     Occupational History   • Not on file.     Social History Main Topics   • Smoking status: Former Smoker     Packs/day: 2.00     Years: 30.00     Types: Cigarettes     Quit date: 2/3/1996   • Smokeless tobacco: Never Used      Comment: continued abstinance   • Alcohol use No   • Drug use: No   • Sexual activity: Not on file     Other Topics Concern   • Not on file     Social History Narrative   • No narrative on file     Allergies   Allergen Reactions   • Pcn [Penicillins]      Rxn as child       Current Outpatient Prescriptions   Medication Sig Dispense Refill   • Fluticasone Furoate-Vilanterol (BREO ELLIPTA) 100-25 MCG/INH AEROSOL POWDER, BREATH ACTIVATED Inhale 1 Puff by mouth every day. Rinse mouth after each use. 1 Each 0   • aspirin EC 81 MG EC tablet Take 1 Tab by mouth every day. 30 Tab 6   • warfarin (COUMADIN) 5 MG Tab Take 1 Tab by mouth COUMADIN-DAILY. 30 Tab 0   • Probiotic Product (PROBIOTIC DAILY PO) Take 1 Cap by mouth 2 Times a Day.     • HYDROcodone-acetaminophen (NORCO) 5-325 MG Tab per tablet Take 1 Tab by mouth 2 times a day as needed.     •  Fluticasone Furoate-Vilanterol (BREO ELLIPTA) 200-25 MCG/INH AEROSOL POWDER, BREATH ACTIVATED Inhale 1 Puff by mouth every day. Rinse mouth after use. 1 Each 0   • flecainide (TAMBOCOR) 50 MG tablet Take 1 Tab by mouth 2 times a day. 180 Tab 1   • lansoprazole (PREVACID) 30 MG CAPSULE DELAYED RELEASE Take 30 mg by mouth as needed.     • metoprolol SR (TOPROL XL) 50 MG TABLET SR 24 HR Take 1 Tab by mouth 1 time daily as needed (HR>120bpm). 30 Tab 1   • levalbuterol (XOPENEX HFA) 45 MCG/ACT inhaler INHALE 2 PUFFS BY MOUTH EVERY 4 HOURS AS NEEDED FOR SHORTNESS OF BREATH 3 Inhaler 3   • lovastatin (MEVACOR) 20 MG Tab Take 10 mg by mouth every evening.     • progesterone (PROMETRIUM) 100 MG CAPS Take 100 mg by mouth every bedtime.     • vitamin D, Ergocalciferol, (DRISDOL) 89766 UNITS CAPS capsule Take 50,000 Units by mouth every 7 days.     • levothyroxine (SYNTHROID) 100 MCG TABS Take 100 mcg by mouth every bedtime.     • liothyronine (CYTOMEL) 5 MCG TABS Take 5 mcg by mouth 2 Times a Day.     • propranolol (INDERAL) 20 MG TABS Take 10 mg by mouth every bedtime. If hr >120 pt takes 20 mg     • buPROPion (WELLBUTRIN XL) 150 MG XL tablet Take 150 mg by mouth every morning.     • escitalopram (LEXAPRO) 20 MG tablet Take 20 mg by mouth every morning.     • dicyclomine (BENTYL) 10 MG Cap Take 10 mg by mouth as needed.     • FEMRING 0.05 MG/24HR RING INSERT 1 VAGINALLY Q 3 MONTHS  0     No current facility-administered medications for this visit.        Review of Systems   Constitutional: Negative for chills, fever, malaise/fatigue and weight loss.   HENT: Negative for congestion, nosebleeds, sore throat and tinnitus.    Eyes: Negative for blurred vision and double vision.   Respiratory: Negative for cough, hemoptysis, sputum production, shortness of breath, wheezing and stridor.    Cardiovascular: Negative for chest pain, palpitations, orthopnea, leg swelling and PND.   Gastrointestinal: Negative for abdominal pain, blood  in stool, diarrhea, heartburn, melena, nausea and vomiting.   Genitourinary: Negative for hematuria.   Skin: Negative for rash.   Neurological: Negative for dizziness, tingling, tremors, sensory change, speech change, focal weakness, loss of consciousness, weakness and headaches.   Endo/Heme/Allergies: Bruises/bleeds easily.     All others systems reviewed and negative.     Objective:     /70 (BP Location: Left arm, Patient Position: Sitting, BP Cuff Size: Adult)   Pulse 78   Wt 77.1 kg (170 lb)   SpO2 96%  Body mass index is 28.29 kg/m².    Physical Exam   Constitutional: She is oriented to person, place, and time and well-developed, well-nourished, and in no distress.   HENT:   Head: Normocephalic and atraumatic.   Eyes: Pupils are equal, round, and reactive to light. Conjunctivae and EOM are normal.   Neck: Normal range of motion. Neck supple. No JVD present.   Cardiovascular: Normal rate, regular rhythm, normal heart sounds and intact distal pulses.  Exam reveals no gallop and no friction rub.    No murmur heard.  Pulmonary/Chest: Effort normal and breath sounds normal. No respiratory distress. She has no wheezes. She has no rales. She exhibits no tenderness.   Abdominal: Soft. Bowel sounds are normal. There is no tenderness.   Musculoskeletal: Normal range of motion. She exhibits no edema.   Neurological: She is alert and oriented to person, place, and time.   Skin: Skin is warm and dry. No rash noted. No erythema.   Psychiatric: Mood, memory, affect and judgment normal.         Cardiac Imaging and Procedures Review:    EKG dated 4/4/19:   Sinus rhythm     Echo dated 3/6/19:   CONCLUSIONS  Significant Findings:    Pre-Intervention:    LV:  Normal left ventricular systolic function.   RV:  Moderately dilated right ventricle. Normal right ventricular   systolic function.   IA septum:  Normal interatrial septum. No evidence for interatrial   septum aneurysm. No evidence of shunt, PFO nor ASD.     Pericardium:  Normal pericardium without effusion.    Post-Intervention:  CASA:  Appropriate placement of Watchman device in the CASA with no   evidence on para-device blood flow.  IA septum:  Small left to right shunt across the IA septum.    Pericardium:  Normal pericardium without effusion.    Nuclear Perfusion Imaging (PET) (10/24/18):   ELECTROCARDIOGRAPHIC FINDINGS:   Normal sinus rhythm.  Normal ECG response to dipyridamole infusion.  No ischemic changes noted     SCINTOGRAPHIC FINDINGS:    Normal left ventricular perfusion with stress and rest images.  There is no evidence of ischemia or scar.    GATED WALL MOTION FINDINGS:    The left ventricle wall motion is normal with stress and rest  imagings.  Measured resting ejection fraction is 72 %.       Labs (personally reviewed and notable for):   Lab Results   Component Value Date/Time    SODIUM 139 03/07/2019 04:53 AM    POTASSIUM 3.7 03/07/2019 04:53 AM    CHLORIDE 107 03/07/2019 04:53 AM    CO2 26 03/07/2019 04:53 AM    GLUCOSE 113 (H) 03/07/2019 04:53 AM    BUN 15 03/07/2019 04:53 AM    CREATININE 0.91 03/07/2019 04:53 AM      Lab Results   Component Value Date/Time    WBC 12.8 (H) 03/07/2019 04:53 AM    RBC 4.04 (L) 03/07/2019 04:53 AM    HEMOGLOBIN 12.8 03/07/2019 04:53 AM    HEMATOCRIT 40.1 03/07/2019 04:53 AM    MCV 99.3 (H) 03/07/2019 04:53 AM    MCH 31.7 03/07/2019 04:53 AM    MCHC 31.9 (L) 03/07/2019 04:53 AM    MPV 9.9 03/07/2019 04:53 AM    NEUTSPOLYS 65.70 03/05/2019 01:26 PM    LYMPHOCYTES 22.90 03/05/2019 01:26 PM    MONOCYTES 9.50 03/05/2019 01:26 PM    EOSINOPHILS 1.00 03/05/2019 01:26 PM    BASOPHILS 0.40 03/05/2019 01:26 PM      PT/INR:   Lab Results   Component Value Date/Time    PROTHROMBTM 14.4 03/07/2019 04:53 AM    INR 3.6 03/28/2019   ]      Assessment:     1. Presence of Watchman left atrial appendage closure device     2. Atrial fibrillation, unspecified type (HCC)  EKG   3. Chronic anticoagulation         Medical Decision Making:   Today's Assessment / Status / Plan:   1. Watchman:  - S/P Watchman y Dr. Lyles 3/6/19.  Clinically she is doing well post procedure.   - Will scheduled for 45 day post procedure MERVIN.  She will continue COumadin and ASA 81 mg for now.  I have discussed with her anticipated medication changes if MERVIN shows minimal leakage around watchman.     2. High Risk Medication (Flecanide):  - Tolerating well.  12 lead EKG is stable from previous tracing.   - She will start low dose Toprol (start 12.5 mg/day x 1 week, then increased to 25 mg/day) to avoid potential flecainide mediated flutter      3. Paroxysmal Atrial Fibrillation:  - well controled with Flecainide.  She has not noticed any palpitations or sustained arrhythmias.  - Maintaining sinus on 12 lead EKG today.     4. Anticoagulation:  - Tolerating well without reported evidence of bleeding. Will plan to stop coumadin if 45 day post procedure  MERVIN shows <5mm leakage around Watchman device.     Plan reviewed in detail with the patient and she verbalizes understanding and is in agreement.   RTC as scheduled with Clover Lovett and in 4 months with Dr. Lyles , sooner if clinical condition changes  Collaborating MD/ADD: EDGARD Somers M.D.  [address] [address]  VIA Facsimile: [phone]

## 2019-04-04 NOTE — PROGRESS NOTES
Cardiology/Electrophysiology Follow-up Note      Subjective:   Chief Complaint:   Chief Complaint   Patient presents with   • Atrial Fibrillation     follow up       Allison Cespedes is a 68 y.o. female who presents today for hospital follow up after Watchman Procedure by Dr. Lyles 3/6/19 secondary to her inability to take anticoagulation secondary to severe arthritis and need for NSAIDS.     She is followed by Dr. Kyra Lovett .  Past medical history also significant for paroxysmal atrial fibrillation, high risk medication use in the form of Flecainide, hypothyroidism, IVETTE, and arthritis.     Today in follow up she tells me that she has been doing well and feeling well post ablation.  She is not aware of any problems and has no concerns.  She has been doing well with Flecainide and denies any lighheadedness, pre syncope pr syncope.  She is not aware of any significant arrhythmia.  She has not noticed any bleeding on Coumadin.   She denies chest pain, dizziness, palpitations, pre syncope or syncope, dyspnea, PND, orthopnea, or lower extremity edema.      Patient endorses medication compliance        Past Medical History:   Diagnosis Date   • Anemia    • Arthritis     fingers, hands, toes, feet (osteoarthritis)   • Asthma     inhalers daily   • Atrial fibrillation (HCC) 10/2018    New onset in office. Echocardiogram with normal LV size, LVEF 65%. Trace MR, trace TR. RVSP 34mmHg.   • Breath shortness     uses 2L oxygen at night ([facility])    • Bronchitis 11/2018    gets often   • Chronic anticoagulation    • COPD (chronic obstructive pulmonary disease) (HCC)    • Gynecological disorder     postmenopausal bleeding right now    • Heart burn    • Hypothyroidism    • Pleurisy    • Pneumonia 1989   • Psychiatric problem     depression, anxiety   • Snoring    • Tuberculosis 1981    Treated for nine months     Past Surgical History:   Procedure Laterality Date   • MASS EXCISION GENERAL Left 7/6/2018    Procedure: MASS  EXCISION GENERAL/ SUBCUTANEOUS MASS LEFT SHOULDER;  Surgeon: Swapna Rivas M.D.;  Location: SURGERY SAME DAY [facility];  Service: General   • CHOLECYSTECTOMY  1994   • APPENDECTOMY  1960    had ruptured   • ARTHROSCOPY, KNEE     • MAMMOPLASTY AUGMENTATION     • PB REMV 2ND CATARACT,CORN-SCLER SECTN       Family History   Problem Relation Age of Onset   • Non-contributory Mother    • Hypertension Mother    • Non-contributory Father      Social History     Social History   • Marital status:      Spouse name: N/A   • Number of children: N/A   • Years of education: N/A     Occupational History   • Not on file.     Social History Main Topics   • Smoking status: Former Smoker     Packs/day: 2.00     Years: 30.00     Types: Cigarettes     Quit date: 2/3/1996   • Smokeless tobacco: Never Used      Comment: continued abstinance   • Alcohol use No   • Drug use: No   • Sexual activity: Not on file     Other Topics Concern   • Not on file     Social History Narrative   • No narrative on file     Allergies   Allergen Reactions   • Pcn [Penicillins]      Rxn as child       Current Outpatient Prescriptions   Medication Sig Dispense Refill   • Fluticasone Furoate-Vilanterol (BREO ELLIPTA) 100-25 MCG/INH AEROSOL POWDER, BREATH ACTIVATED Inhale 1 Puff by mouth every day. Rinse mouth after each use. 1 Each 0   • aspirin EC 81 MG EC tablet Take 1 Tab by mouth every day. 30 Tab 6   • warfarin (COUMADIN) 5 MG Tab Take 1 Tab by mouth COUMADIN-DAILY. 30 Tab 0   • Probiotic Product (PROBIOTIC DAILY PO) Take 1 Cap by mouth 2 Times a Day.     • HYDROcodone-acetaminophen (NORCO) 5-325 MG Tab per tablet Take 1 Tab by mouth 2 times a day as needed.     • Fluticasone Furoate-Vilanterol (BREO ELLIPTA) 200-25 MCG/INH AEROSOL POWDER, BREATH ACTIVATED Inhale 1 Puff by mouth every day. Rinse mouth after use. 1 Each 0   • flecainide (TAMBOCOR) 50 MG tablet Take 1 Tab by mouth 2 times a day. 180 Tab 1   • lansoprazole (PREVACID) 30 MG  CAPSULE DELAYED RELEASE Take 30 mg by mouth as needed.     • metoprolol SR (TOPROL XL) 50 MG TABLET SR 24 HR Take 1 Tab by mouth 1 time daily as needed (HR>120bpm). 30 Tab 1   • levalbuterol (XOPENEX HFA) 45 MCG/ACT inhaler INHALE 2 PUFFS BY MOUTH EVERY 4 HOURS AS NEEDED FOR SHORTNESS OF BREATH 3 Inhaler 3   • lovastatin (MEVACOR) 20 MG Tab Take 10 mg by mouth every evening.     • progesterone (PROMETRIUM) 100 MG CAPS Take 100 mg by mouth every bedtime.     • vitamin D, Ergocalciferol, (DRISDOL) 59974 UNITS CAPS capsule Take 50,000 Units by mouth every 7 days.     • levothyroxine (SYNTHROID) 100 MCG TABS Take 100 mcg by mouth every bedtime.     • liothyronine (CYTOMEL) 5 MCG TABS Take 5 mcg by mouth 2 Times a Day.     • propranolol (INDERAL) 20 MG TABS Take 10 mg by mouth every bedtime. If hr >120 pt takes 20 mg     • buPROPion (WELLBUTRIN XL) 150 MG XL tablet Take 150 mg by mouth every morning.     • escitalopram (LEXAPRO) 20 MG tablet Take 20 mg by mouth every morning.     • dicyclomine (BENTYL) 10 MG Cap Take 10 mg by mouth as needed.     • FEMRING 0.05 MG/24HR RING INSERT 1 VAGINALLY Q 3 MONTHS  0     No current facility-administered medications for this visit.        Review of Systems   Constitutional: Negative for chills, fever, malaise/fatigue and weight loss.   HENT: Negative for congestion, nosebleeds, sore throat and tinnitus.    Eyes: Negative for blurred vision and double vision.   Respiratory: Negative for cough, hemoptysis, sputum production, shortness of breath, wheezing and stridor.    Cardiovascular: Negative for chest pain, palpitations, orthopnea, leg swelling and PND.   Gastrointestinal: Negative for abdominal pain, blood in stool, diarrhea, heartburn, melena, nausea and vomiting.   Genitourinary: Negative for hematuria.   Skin: Negative for rash.   Neurological: Negative for dizziness, tingling, tremors, sensory change, speech change, focal weakness, loss of consciousness, weakness and  headaches.   Endo/Heme/Allergies: Bruises/bleeds easily.     All others systems reviewed and negative.     Objective:     /70 (BP Location: Left arm, Patient Position: Sitting, BP Cuff Size: Adult)   Pulse 78   Wt 77.1 kg (170 lb)   SpO2 96%  Body mass index is 28.29 kg/m².    Physical Exam   Constitutional: She is oriented to person, place, and time and well-developed, well-nourished, and in no distress.   HENT:   Head: Normocephalic and atraumatic.   Eyes: Pupils are equal, round, and reactive to light. Conjunctivae and EOM are normal.   Neck: Normal range of motion. Neck supple. No JVD present.   Cardiovascular: Normal rate, regular rhythm, normal heart sounds and intact distal pulses.  Exam reveals no gallop and no friction rub.    No murmur heard.  Pulmonary/Chest: Effort normal and breath sounds normal. No respiratory distress. She has no wheezes. She has no rales. She exhibits no tenderness.   Abdominal: Soft. Bowel sounds are normal. There is no tenderness.   Musculoskeletal: Normal range of motion. She exhibits no edema.   Neurological: She is alert and oriented to person, place, and time.   Skin: Skin is warm and dry. No rash noted. No erythema.   Psychiatric: Mood, memory, affect and judgment normal.         Cardiac Imaging and Procedures Review:    EKG dated 4/4/19:   Sinus rhythm     Echo dated 3/6/19:   CONCLUSIONS  Significant Findings:    Pre-Intervention:    LV:  Normal left ventricular systolic function.   RV:  Moderately dilated right ventricle. Normal right ventricular   systolic function.   IA septum:  Normal interatrial septum. No evidence for interatrial   septum aneurysm. No evidence of shunt, PFO nor ASD.    Pericardium:  Normal pericardium without effusion.    Post-Intervention:  CASA:  Appropriate placement of Watchman device in the CASA with no   evidence on para-device blood flow.  IA septum:  Small left to right shunt across the IA septum.    Pericardium:  Normal pericardium  without effusion.    Nuclear Perfusion Imaging (PET) (10/24/18):   ELECTROCARDIOGRAPHIC FINDINGS:   Normal sinus rhythm.  Normal ECG response to dipyridamole infusion.  No ischemic changes noted     SCINTOGRAPHIC FINDINGS:    Normal left ventricular perfusion with stress and rest images.  There is no evidence of ischemia or scar.    GATED WALL MOTION FINDINGS:    The left ventricle wall motion is normal with stress and rest  imagings.  Measured resting ejection fraction is 72 %.       Labs (personally reviewed and notable for):   Lab Results   Component Value Date/Time    SODIUM 139 03/07/2019 04:53 AM    POTASSIUM 3.7 03/07/2019 04:53 AM    CHLORIDE 107 03/07/2019 04:53 AM    CO2 26 03/07/2019 04:53 AM    GLUCOSE 113 (H) 03/07/2019 04:53 AM    BUN 15 03/07/2019 04:53 AM    CREATININE 0.91 03/07/2019 04:53 AM      Lab Results   Component Value Date/Time    WBC 12.8 (H) 03/07/2019 04:53 AM    RBC 4.04 (L) 03/07/2019 04:53 AM    HEMOGLOBIN 12.8 03/07/2019 04:53 AM    HEMATOCRIT 40.1 03/07/2019 04:53 AM    MCV 99.3 (H) 03/07/2019 04:53 AM    MCH 31.7 03/07/2019 04:53 AM    MCHC 31.9 (L) 03/07/2019 04:53 AM    MPV 9.9 03/07/2019 04:53 AM    NEUTSPOLYS 65.70 03/05/2019 01:26 PM    LYMPHOCYTES 22.90 03/05/2019 01:26 PM    MONOCYTES 9.50 03/05/2019 01:26 PM    EOSINOPHILS 1.00 03/05/2019 01:26 PM    BASOPHILS 0.40 03/05/2019 01:26 PM      PT/INR:   Lab Results   Component Value Date/Time    PROTHROMBTM 14.4 03/07/2019 04:53 AM    INR 3.6 03/28/2019   ]      Assessment:     1. Presence of Watchman left atrial appendage closure device     2. Atrial fibrillation, unspecified type (HCC)  EKG   3. Chronic anticoagulation         Medical Decision Making:  Today's Assessment / Status / Plan:   1. Watchman:  - S/P Jasmyne Lyles 3/6/19.  Clinically she is doing well post procedure.   - Will scheduled for 45 day post procedure MERVIN.  She will continue COumadin and ASA 81 mg for now.  I have discussed with her anticipated  medication changes if MERVIN shows minimal leakage around watchman.     2. High Risk Medication (Flecanide):  - Tolerating well.  12 lead EKG is stable from previous tracing.   - She will start low dose Toprol (start 12.5 mg/day x 1 week, then increased to 25 mg/day) to avoid potential flecainide mediated flutter      3. Paroxysmal Atrial Fibrillation:  - well controled with Flecainide.  She has not noticed any palpitations or sustained arrhythmias.  - Maintaining sinus on 12 lead EKG today.     4. Anticoagulation:  - Tolerating well without reported evidence of bleeding. Will plan to stop coumadin if 45 day post procedure  MERVIN shows <5mm leakage around Watchman device.     Plan reviewed in detail with the patient and she verbalizes understanding and is in agreement.   RTC as scheduled with Clover Lovett and in 4 months with Dr. Lyles , sooner if clinical condition changes  Collaborating MD/ADD: EDGARD Somers

## 2019-04-05 ENCOUNTER — TELEPHONE (OUTPATIENT)
Dept: CARDIOLOGY | Facility: MEDICAL CENTER | Age: 69
End: 2019-04-05

## 2019-04-08 RX ORDER — WARFARIN SODIUM 5 MG/1
TABLET ORAL
Qty: 30 TAB | Refills: 0 | Status: SHIPPED | OUTPATIENT
Start: 2019-04-08 | End: 2019-05-01

## 2019-04-09 ENCOUNTER — ANTICOAGULATION VISIT (OUTPATIENT)
Dept: VASCULAR LAB | Facility: MEDICAL CENTER | Age: 69
End: 2019-04-09
Attending: INTERNAL MEDICINE
Payer: MEDICARE

## 2019-04-09 VITALS — SYSTOLIC BLOOD PRESSURE: 114 MMHG | HEART RATE: 65 BPM | DIASTOLIC BLOOD PRESSURE: 63 MMHG

## 2019-04-09 DIAGNOSIS — Z79.01 CHRONIC ANTICOAGULATION: ICD-10-CM

## 2019-04-09 LAB — INR PPP: 1.6 (ref 2–3.5)

## 2019-04-09 PROCEDURE — 99212 OFFICE O/P EST SF 10 MIN: CPT

## 2019-04-09 PROCEDURE — 85610 PROTHROMBIN TIME: CPT

## 2019-04-09 NOTE — PROGRESS NOTES
Anticoagulation Summary  As of 2019    INR goal:   2.0-2.5   TTR:   30.7 % (3.1 wk)   INR used for dosin.6! (2019)   Warfarin maintenance plan:   5 mg (5 mg x 1) every Mon, Fri; 2.5 mg (5 mg x 0.5) all other days   Weekly warfarin total:   22.5 mg   Plan last modified:   Bernabe Espana PharmD (3/28/2019)   Next INR check:   2019   Target end date:   2019    Indications    Atrial fibrillation (HCC) [I48.91] [I48.91]             Anticoagulation Episode Summary     INR check location:       Preferred lab:       Send INR reminders to:       Comments:   S/P watchman, no bridging      Anticoagulation Care Providers     Provider Role Specialty Phone number    Sincere Lyles M.D. Referring Cardiology [phone]    [facility] Anticoagulation Services Responsible  [phone]        Anticoagulation Patient Findings      HPI:  Allison Cespedes seen in clinic today, on anticoagulation therapy with warfarin for Afib and Watchman   Changes to current medical/health status since last appt: none  Denies signs/symptoms of bleeding and/or thrombosis since the last appt.    Pt reports eating a lot more greens than usual.   Denies any interval changes to medications since last appt.   Denies any complications or cost restrictions with current therapy.   BP recorded in vitals.  Confirmed dosing regimen.     A/P   INR  SUB-therapeutic.   PT would like to reduce VitK consumption rather than increase warfarin maintenance plan.   Bolus today then Pt is to continue with current warfarin dosing regimen.     Follow up appointment in 3 days per pt.    Bernabe Espana, MaicoD

## 2019-04-10 LAB — INR BLD: 1.6 (ref 0.9–1.2)

## 2019-04-12 ENCOUNTER — ANTICOAGULATION VISIT (OUTPATIENT)
Dept: VASCULAR LAB | Facility: MEDICAL CENTER | Age: 69
End: 2019-04-12
Attending: INTERNAL MEDICINE
Payer: MEDICARE

## 2019-04-12 VITALS — HEART RATE: 70 BPM | SYSTOLIC BLOOD PRESSURE: 98 MMHG | DIASTOLIC BLOOD PRESSURE: 56 MMHG

## 2019-04-12 DIAGNOSIS — Z79.01 CHRONIC ANTICOAGULATION: ICD-10-CM

## 2019-04-12 LAB — INR PPP: 1.3 (ref 2–3.5)

## 2019-04-12 PROCEDURE — 85610 PROTHROMBIN TIME: CPT

## 2019-04-12 PROCEDURE — 99212 OFFICE O/P EST SF 10 MIN: CPT

## 2019-04-12 NOTE — PROGRESS NOTES
Anticoagulation Summary  As of 2019    INR goal:   2.0-2.5   TTR:   27.0 % (3.6 wk)   INR used for dosin.3! (2019)   Warfarin maintenance plan:   5 mg (5 mg x 1) every Mon, Wed, Fri; 2.5 mg (5 mg x 0.5) all other days   Weekly warfarin total:   25 mg   Plan last modified:   Etta Roche PharmD (2019)   Next INR check:   2019   Target end date:   2019    Indications    Atrial fibrillation (HCC) [I48.91] [I48.91]             Anticoagulation Episode Summary     INR check location:       Preferred lab:       Send INR reminders to:       Comments:   S/P watchman, no bridging      Anticoagulation Care Providers     Provider Role Specialty Phone number    Sincere Lyles M.D. Referring Cardiology [phone]    [facility] Anticoagulation Services Responsible  [phone]        Anticoagulation Patient Findings      HPI:  Allison Cespedes seen in clinic today for follow up on anticoagulation therapy in the presence of Afib. Denies any changes to current medical/health status since last appointment. Denies any medication or diet changes. No current symptoms of bleeding or thrombosis reported.    A/P:   INR is sub-therapeutic at 1.3. Pt to bous tonight with 10 mg then increase weekly regimen by 11%   BP recorded in vitals.    Follow up appointment in 4 day(s).    Next Appointment: Tuesday, 2019     Maico Rivas.D

## 2019-04-16 ENCOUNTER — ANTICOAGULATION VISIT (OUTPATIENT)
Dept: VASCULAR LAB | Facility: MEDICAL CENTER | Age: 69
End: 2019-04-16
Attending: INTERNAL MEDICINE
Payer: MEDICARE

## 2019-04-16 VITALS — HEART RATE: 56 BPM | SYSTOLIC BLOOD PRESSURE: 100 MMHG | DIASTOLIC BLOOD PRESSURE: 47 MMHG

## 2019-04-16 DIAGNOSIS — I48.91 ATRIAL FIBRILLATION, UNSPECIFIED TYPE (HCC): ICD-10-CM

## 2019-04-16 LAB — INR PPP: 2.5 (ref 2–3.5)

## 2019-04-16 PROCEDURE — 99211 OFF/OP EST MAY X REQ PHY/QHP: CPT

## 2019-04-16 PROCEDURE — 85610 PROTHROMBIN TIME: CPT

## 2019-04-16 NOTE — PROGRESS NOTES
Anticoagulation Summary  As of 2019    INR goal:   2.0-2.5   TTR:   29.0 % (4.1 wk)   INR used for dosin.5 (2019)   Warfarin maintenance plan:   5 mg (5 mg x 1) every Mon, Wed, Fri; 2.5 mg (5 mg x 0.5) all other days   Weekly warfarin total:   25 mg   Plan last modified:   Maico DaleyD (2019)   Next INR check:   2019   Target end date:   2019    Indications    Atrial fibrillation (HCC) [I48.91] [I48.91]             Anticoagulation Episode Summary     INR check location:       Preferred lab:       Send INR reminders to:       Comments:   S/P watchman, no bridging      Anticoagulation Care Providers     Provider Role Specialty Phone number    Sincere Lyles M.D. Referring Cardiology [phone]    [facility] Anticoagulation Services Responsible  [phone]        Anticoagulation Patient Findings  Patient Findings     Negatives:   Signs/symptoms of thrombosis, Signs/symptoms of bleeding, Laboratory test error suspected, Change in health, Change in alcohol use, Change in activity, Upcoming invasive procedure, Emergency department visit, Upcoming dental procedure, Missed doses, Extra doses, Change in medications, Change in diet/appetite, Hospital admission, Bruising, Other complaints          HPI:  Allison Cespedes seen in clinic today, on anticoagulation therapy with warfarin for AF.  Changes to current medical/health status since last appt: none  Denies signs/symptoms of bleeding and/or thrombosis since the last appt.    Denies any interval changes to diet  Denies any interval changes to medications since last appt.   Denies any complications or cost restrictions with current therapy.   BP recorded in vitals.  Confirmed current dosing regimen.     Patient's previous INR was subtherapeutic at 1.3 on 19, at which time patient was instructed to increase current warfarin regimen. She returns to clinic today to recheck INR to ensure it is therapeutic and thus preventing  possible clotting and/or bleeding/bruising complications.    A/P   INR is therapeutic today at 2.5.  Patient instructed to continue with the current warfarin dosing regimen, and asked to follow up again in 1 week.    Next appt: Monday, April 22, 2019  2:45pm    Hilary Garcia PharmD Intern  Shawn NinaD

## 2019-04-18 LAB — INR BLD: 2.5 (ref 0.9–1.2)

## 2019-04-19 LAB — INR BLD: 1.3 (ref 0.9–1.2)

## 2019-04-22 ENCOUNTER — ANTICOAGULATION VISIT (OUTPATIENT)
Dept: VASCULAR LAB | Facility: MEDICAL CENTER | Age: 69
End: 2019-04-22
Attending: INTERNAL MEDICINE
Payer: MEDICARE

## 2019-04-22 DIAGNOSIS — I48.91 ATRIAL FIBRILLATION, UNSPECIFIED TYPE (HCC): ICD-10-CM

## 2019-04-22 LAB
INR BLD: 2.3 (ref 0.9–1.2)
INR PPP: 2.3 (ref 2–3.5)

## 2019-04-22 PROCEDURE — 85610 PROTHROMBIN TIME: CPT

## 2019-04-22 PROCEDURE — 99211 OFF/OP EST MAY X REQ PHY/QHP: CPT | Performed by: NURSE PRACTITIONER

## 2019-04-22 NOTE — PROGRESS NOTES
Anticoagulation Summary  As of 2019    INR goal:   2.0-2.5   TTR:   41.2 % (1.2 mo)   INR used for dosin.3 (2019)   Warfarin maintenance plan:   5 mg (5 mg x 1) every Mon, Wed, Fri; 2.5 mg (5 mg x 0.5) all other days   Weekly warfarin total:   25 mg   Plan last modified:   Etta Roche PharmD (2019)   Next INR check:      Target end date:   2019    Indications    Atrial fibrillation (HCC) [I48.91] [I48.91]             Anticoagulation Episode Summary     INR check location:       Preferred lab:       Send INR reminders to:       Comments:   S/P watchman, no bridging      Anticoagulation Care Providers     Provider Role Specialty Phone number    Sincere Lyles M.D. Referring Cardiology [phone]    [facility] Anticoagulation Services Responsible  [phone]        Anticoagulation Patient Findings      HPI:  Allison Cespedes seen in clinic today for follow up on anticoagulation therapy in the presence of AF, s/p Watchman. Denies any changes to current medical/health status since last appointment. Denies any medication or diet changes. No current symptoms of bleeding or thrombosis reported. Pt has completed 6 weeks of therapy. She is scheduled for a MERVIN tomorrow.    A/P:   INR therapeutic. Continue current regimen. Stop warfarin if okay with cardiology.     Follow up appointment with cardiology. F/u with anticoag clinic if staying on warfarin. I have added pt to my phone list.    Next Appointment: pt to call if she stays on warfarin.    Antonina DIAZ

## 2019-04-23 ENCOUNTER — HOSPITAL ENCOUNTER (OUTPATIENT)
Facility: MEDICAL CENTER | Age: 69
End: 2019-04-23
Attending: INTERNAL MEDICINE | Admitting: INTERNAL MEDICINE
Payer: MEDICARE

## 2019-04-23 ENCOUNTER — APPOINTMENT (OUTPATIENT)
Dept: CARDIOLOGY | Facility: MEDICAL CENTER | Age: 69
End: 2019-04-23
Attending: NURSE PRACTITIONER
Payer: MEDICARE

## 2019-04-23 VITALS
WEIGHT: 168.21 LBS | BODY MASS INDEX: 28.03 KG/M2 | DIASTOLIC BLOOD PRESSURE: 52 MMHG | HEIGHT: 65 IN | SYSTOLIC BLOOD PRESSURE: 121 MMHG | OXYGEN SATURATION: 95 % | TEMPERATURE: 98.6 F | HEART RATE: 67 BPM | RESPIRATION RATE: 18 BRPM

## 2019-04-23 DIAGNOSIS — Z95.818 PRESENCE OF WATCHMAN LEFT ATRIAL APPENDAGE CLOSURE DEVICE: ICD-10-CM

## 2019-04-23 PROCEDURE — 700111 HCHG RX REV CODE 636 W/ 250 OVERRIDE (IP)

## 2019-04-23 PROCEDURE — 93312 ECHO TRANSESOPHAGEAL: CPT | Mod: 26,52 | Performed by: INTERNAL MEDICINE

## 2019-04-23 PROCEDURE — 160002 HCHG RECOVERY MINUTES (STAT)

## 2019-04-23 PROCEDURE — 93325 DOPPLER ECHO COLOR FLOW MAPG: CPT

## 2019-04-23 NOTE — OR NURSING
1328 Pt over from cath lab, post MERVIN. Pt awake and alert, denies pain and nausea, VSS.  1335 Pt's spouse to bedside  1350 Tolerating orals  1428 Criteria met  1435 Discharge instructions provided to pt and spouse. Discussed diet, activity, follow up, prescriptions and symptom management. Pt and spouse state understanding. Pt and spouse state all questions have been answered.   1440 Pt wheeled off unit with all belongings without incident.

## 2019-04-23 NOTE — DISCHARGE INSTRUCTIONS
ACTIVITY: Rest and take it easy for the first 24 hours.  A responsible adult is recommended to remain with you during that time.  It is normal to feel sleepy.  We encourage you to not do anything that requires balance, judgment or coordination.    MILD FLU-LIKE SYMPTOMS ARE NORMAL. YOU MAY EXPERIENCE GENERALIZED MUSCLE ACHES, THROAT IRRITATION, HEADACHE AND/OR SOME NAUSEA.    FOR 24 HOURS DO NOT:  Drive, operate machinery or run household appliances.  Drink beer or alcoholic beverages.   Make important decisions or sign legal documents.    DIET: To avoid nausea, slowly advance diet as tolerated, avoiding spicy or greasy foods for the first day.  Add more substantial food to your diet according to your physician's instructions.  Babies can be fed formula or breast milk as soon as they are hungry.  INCREASE FLUIDS AND FIBER TO AVOID CONSTIPATION.    FOLLOW-UP APPOINTMENT:  A follow-up appointment should be arranged with your doctor; call to schedule.    You should CALL YOUR PHYSICIAN if you develop:  Fever greater than 101 degrees F.  Pain not relieved by medication, or persistent nausea or vomiting.  Excessive bleeding (blood soaking through dressing) or unexpected drainage from the wound.  Extreme redness or swelling around the incision site, drainage of pus or foul smelling drainage.  Inability to urinate or empty your bladder within 8 hours.  Problems with breathing or chest pain.    You should call 911 if you develop problems with breathing or chest pain.  If you are unable to contact your doctor or surgical center, you should go to the nearest emergency room or urgent care center.  Physician's telephone #: [phone]    If any questions arise, call your doctor.  If your doctor is not available, please feel free to call the Surgical Center at [phone].  The Center is open Monday through Friday from 7AM to 7PM.  You can also call the [facility] HOTLINE open 24 hours/day, 7 days/week and speak to a nurse at [phone], or toll free at [phone].    A registered nurse may call you a few days after your surgery to see how you are doing after your procedure.    MEDICATIONS: Resume taking daily medication.  Take prescribed pain medication with food.  If no medication is prescribed, you may take non-aspirin pain medication if needed.  PAIN MEDICATION CAN BE VERY CONSTIPATING.  Take a stool softener or laxative such as senokot, pericolace, or milk of magnesia if needed..    If your physician has prescribed pain medication that includes Acetaminophen (Tylenol), do not take additional Acetaminophen (Tylenol) while taking the prescribed medication.        Transesophageal Echocardiogram  Transesophageal echocardiography (MERVIN) is a special type of test that produces images of the heart by using sound waves (echocardiogram). This type of echocardiography can obtain better images of the heart than standard echocardiography. MERVIN is done by passing a flexible tube down the esophagus. The heart is located in front of the esophagus. Because the heart and esophagus are close to one another, your health care provider can take very clear, detailed pictures of the heart via ultrasound waves.  MERVIN may be done:  · If your health care provider needs more information based on standard echocardiography findings.  · If you had a stroke. This might have happened because a clot formed in your heart. MERVIN can visualize different areas of the heart and check for clots.  · To check valve anatomy and function.  · To check for infection on the inside of your heart (endocarditis).  · To evaluate the dividing wall (septum) of the heart and presence of a hole that did not close after birth (patent foramen ovale or atrial septal defect).  · To help diagnose a tear in the wall of the aorta (aortic dissection).  · During cardiac valve surgery. This allows the surgeon to assess the valve repair before closing the chest.  · During a variety of other cardiac  procedures to guide positioning of catheters.  · Sometimes before a cardioversion, which is a shock to convert heart rhythm back to normal.  LET YOUR HEALTH CARE PROVIDER KNOW ABOUT:   · Any allergies you have.  · All medicines you are taking, including vitamins, herbs, eye drops, creams, and over-the-counter medicines.  · Previous problems you or members of your family have had with the use of anesthetics.  · Any blood disorders you have.  · Previous surgeries you have had.  · Medical conditions you have.  · Swallowing difficulties.  · An esophageal obstruction.  RISKS AND COMPLICATIONS   Generally, MERVIN is a safe procedure. However, as with any procedure, complications can occur. Possible complications include an esophageal tear (rupture).  BEFORE THE PROCEDURE   · Do not eat or drink for 6 hours before the procedure or as directed by your health care provider.  · Arrange for someone to drive you home after the procedure. Do not drive yourself home. During the procedure, you will be given medicines that can continue to make you feel drowsy and can impair your reflexes.  · An IV access tube will be started in the arm.  PROCEDURE   · A medicine to help you relax (sedative) will be given through the IV access tube.  · A medicine may be sprayed or gargled to numb the back of the throat.  · Your blood pressure, heart rate, and breathing (vital signs) will be monitored during the procedure.  · The MERVIN probe is a long, flexible tube. The tip of the probe is placed into the back of the mouth, and you will be asked to swallow. This helps to pass the tip of the probe into the esophagus. Once the tip of the probe is in the correct area, your health care provider can take pictures of the heart.  · MERVIN is usually not a painful procedure. You may feel the probe press against the back of the throat. The probe does not enter the trachea and does not affect your breathing.  AFTER THE PROCEDURE   · You will be in bed, resting, until  you have fully returned to consciousness.  · When you first awaken, your throat may feel slightly sore and will probably still feel numb. This will improve slowly over time.  · You will not be allowed to eat or drink until it is clear that the numbness has improved.  · Once you have been able to drink, urinate, and sit on the edge of the bed without feeling sick to your stomach (nausea) or dizzy, you may be cleared to go home.  · You should have a friend or family member with you for the next 24 hours after your procedure.  This information is not intended to replace advice given to you by your health care provider. Make sure you discuss any questions you have with your health care provider.  Document Released: 03/09/2004 Document Revised: 12/23/2014 Document Reviewed: 06/19/2014  [employer] Interactive Patient Education © 2017 [employer]    Depression / Suicide Risk    As you are discharged from this [facility] facility, it is important to learn how to keep safe from harming yourself.    Recognize the warning signs:  · Abrupt changes in personality, positive or negative- including increase in energy   · Giving away possessions  · Change in eating patterns- significant weight changes-  positive or negative  · Change in sleeping patterns- unable to sleep or sleeping all the time   · Unwillingness or inability to communicate  · Depression  · Unusual sadness, discouragement and loneliness  · Talk of wanting to die  · Neglect of personal appearance   · Rebelliousness- reckless behavior  · Withdrawal from people/activities they love  · Confusion- inability to concentrate     If you or a loved one observes any of these behaviors or has concerns about self-harm, here's what you can do:  · Talk about it- your feelings and reasons for harming yourself  · Remove any means that you might use to hurt yourself (examples: pills, rope, extension cords, firearm)  · Get professional help from the community (Mental Health,  Substance Abuse, psychological counseling)  · Do not be alone:Call your Safe Contact- someone whom you trust who will be there for you.  · Call your local CRISIS HOTLINE 413-3271 or 479-819-4691  · Call your local Children's Mobile Crisis Response Team [place] (553) 236-4962 or www.[employer]  · Call the toll free National Suicide Prevention Hotlines   · National Suicide Prevention Lifeline 753-295-PCAW (4681)  · National Hope Line Network 800-SUICIDE (278-8914)

## 2019-04-26 ENCOUNTER — TELEPHONE (OUTPATIENT)
Dept: CARDIOLOGY | Facility: MEDICAL CENTER | Age: 69
End: 2019-04-26

## 2019-04-26 NOTE — TELEPHONE ENCOUNTER
---- Message -----     From: Allison Cespedes     Sent: 4/25/2019  6:52 PM PDT       To: EDGARD Jimenez  Subject: Prescription Question    Yobany Nieto, all went well with my Watchman check-up.  I was wondering when I can get off of my Warfin?  Please let me know when you are able.  Thank you so much !    --------------------------------------------------------------------------------------    To EA--- pt had 45 day post watchman MERVIN yesterday (4/25)

## 2019-04-30 ENCOUNTER — TELEPHONE (OUTPATIENT)
Dept: CARDIOLOGY | Facility: MEDICAL CENTER | Age: 69
End: 2019-04-30

## 2019-04-30 NOTE — TELEPHONE ENCOUNTER
Emilia is out of the office today. I will get back to you when I have an answer from her.     TO EA    ===View-only below this line===      ----- Message -----     From: Allison Cespedes     Sent: 4/29/2019  7:42 PM PDT       To: EDGARD Jimenez  Subject: Prescription Question    While you were out last week I had my MERVIN.  All went well and results were good.    Should I get off my Warfin?  Will you call in a script for Plavix?    Thank you!

## 2019-05-01 ENCOUNTER — TELEPHONE (OUTPATIENT)
Dept: CARDIOLOGY | Facility: MEDICAL CENTER | Age: 69
End: 2019-05-01

## 2019-05-01 RX ORDER — CLOPIDOGREL BISULFATE 75 MG/1
75 TABLET ORAL DAILY
Qty: 90 TAB | Refills: 3 | Status: SHIPPED | OUTPATIENT
Start: 2019-05-01 | End: 2019-09-19

## 2019-05-01 NOTE — TELEPHONE ENCOUNTER
---- Message -----     From: Allison Cespedes     Sent: 4/25/2019  6:52 PM PDT       To: EDGARD Jimenez  Subject: Prescription Question     Hi Emilia, all went well with my Watchman check-up.  I was wondering when I can get off of my Warfin?  Please let me know when you are able.  Thank you so much !     --------------------------------------------------------------------------------------     To EA--- pt had 45 day post watchman MERVIN yesterday (4/25)     ----------------------------------------------------------------------    Rx sent. Coumadin d/c'd from med list. Pt updated via [employer].

## 2019-05-01 NOTE — TELEPHONE ENCOUNTER
Echo reviewed shows no significant leakage around watchman.  OK to stop Coumadin and Start Plavix 75 mg PO daily.  Will be on Plavix through 6 months post procedure.  Diane, if you will please call in to pharmacy?  Please continue ASA 81 mg PO daily.   Emilia

## 2019-05-13 ENCOUNTER — OFFICE VISIT (OUTPATIENT)
Dept: PULMONOLOGY | Facility: HOSPICE | Age: 69
End: 2019-05-13
Payer: MEDICARE

## 2019-05-13 VITALS
RESPIRATION RATE: 16 BRPM | HEIGHT: 65 IN | SYSTOLIC BLOOD PRESSURE: 122 MMHG | BODY MASS INDEX: 28.16 KG/M2 | DIASTOLIC BLOOD PRESSURE: 54 MMHG | WEIGHT: 169 LBS | OXYGEN SATURATION: 94 % | HEART RATE: 71 BPM

## 2019-05-13 DIAGNOSIS — R06.02 SHORTNESS OF BREATH: ICD-10-CM

## 2019-05-13 DIAGNOSIS — G47.33 OBSTRUCTIVE SLEEP APNEA: ICD-10-CM

## 2019-05-13 DIAGNOSIS — J44.9 CHRONIC OBSTRUCTIVE PULMONARY DISEASE, UNSPECIFIED COPD TYPE (HCC): ICD-10-CM

## 2019-05-13 PROCEDURE — 99213 OFFICE O/P EST LOW 20 MIN: CPT | Performed by: PHYSICIAN ASSISTANT

## 2019-05-13 ASSESSMENT — ENCOUNTER SYMPTOMS
SPUTUM PRODUCTION: 1
SORE THROAT: 1
PALPITATIONS: 0
COUGH: 1
SINUS PAIN: 0
HEMOPTYSIS: 0
CHILLS: 0
ORTHOPNEA: 0
HEARTBURN: 1
WHEEZING: 0
SHORTNESS OF BREATH: 1
FEVER: 0
DIAPHORESIS: 0

## 2019-05-13 NOTE — PROGRESS NOTES
CC: Ran out of Breo    HPI:  Allison Cespedes is a 68 y.o. year old female here today for follow-up on COPD.  Patient reports she had done well until running out of Breo due to lack of samples available.  She did resume taking Advair.  Patient was last seen in clinic on 11/13/2018.  She has a former smoker with a reported 60-pack-year history who quit in 1996.  Continued abstinence advised.    Reviewed in clinic vitals including blood pressure 122/54, heart rate is 70, O2 sat of 94% and BMI of 28.12 kg/m².  We did discuss exercise and diet.  Patient has increased her activity level due to recent improvement in back pain.    Reviewed home medication regimen, patient was taking Breo and ran out due to lack of sample availability.  Was provided paperwork to apply for discounted price.  Patient did notice a difference in her pulmonary status off maintenance inhaler. She is currently using Advair which she did use previously.  She will get back to us with which maintenance inhaler she will be using.  She does have Xopenex for rescue inhaler use.  She experiences symptoms of increased wheezing approximately once per week.  Will try using for those episodes.    Reviewed most recent imaging including high resolution CT of the chest obtained 10/24/2018 which demonstrated no evidence for interstitial lung disease, mild bilateral apical pleural scarring, incidental 14 mm right lower lobe pulmonary cyst, no adenopathy or mass within the axilla mediastinum or lisa and aortic atherosclerotic plaque.  We will do follow-up chest CT at 1 year.    Reviewed recent procedures including PFT obtained 11/13/2018 which demonstrated an FVC of 2.62 L or 82% predicted, FEV1 of 1.98 L or 81% predicted, FEV1/FVC ratio at 76% predicted, TLC 89% predicted, DLCO 103% predicted.  No significant bronchodilator response.  Per pulmonology interpretation normal pulmonary function and gas transfer.    Patient is currently using O2 at 2-1/2 L at  night for nocturnal hypoxia.    Review of Systems   Constitutional: Positive for malaise/fatigue. Negative for chills, diaphoresis and fever.   HENT: Positive for congestion, sore throat and tinnitus. Negative for ear pain, hearing loss and sinus pain.         Seasonal allergies, history of sinus infections, current runny nose, intermittent sore throat   Eyes:        Wears glasses, denies new or sudden vision changes   Respiratory: Positive for cough, sputum production and shortness of breath. Negative for hemoptysis and wheezing.         Dark tan secretions, shortness of breath with activity   Cardiovascular: Positive for leg swelling. Negative for chest pain, palpitations and orthopnea.        History of atrial fib, occasional edema   Gastrointestinal: Positive for heartburn.   Skin:        Skin fragile with frequent bruising so was recently changed to Plavix       Past Medical History:   Diagnosis Date   • Anemia    • Arthritis     fingers, hands, toes, feet (osteoarthritis)   • Asthma     inhalers daily   • Atrial fibrillation (HCC) 10/2018    New onset in office. Echocardiogram with normal LV size, LVEF 65%. Trace MR, trace TR. RVSP 34mmHg.   • Breath shortness     uses 2L oxygen at night ([facility])    • Bronchitis 11/2018    gets often   • Chronic anticoagulation    • COPD (chronic obstructive pulmonary disease) (HCC)    • Gynecological disorder     postmenopausal bleeding right now    • Heart burn    • Hypothyroidism    • Pleurisy    • Pneumonia 1989   • Psychiatric problem     depression, anxiety   • Snoring    • Tuberculosis 1981    Treated for nine months       Past Surgical History:   Procedure Laterality Date   • MASS EXCISION GENERAL Left 7/6/2018    Procedure: MASS EXCISION GENERAL/ SUBCUTANEOUS MASS LEFT SHOULDER;  Surgeon: Swapna Rivas M.D.;  Location: SURGERY SAME DAY [facility];  Service: General   • CHOLECYSTECTOMY  1994   • APPENDECTOMY  1960    had ruptured   • ARTHROSCOPY, KNEE     •  "MAMMOPLASTY AUGMENTATION     • PB REMV 2ND CATARACT,CORN-SCLER SECTN         Family History   Problem Relation Age of Onset   • Non-contributory Mother    • Hypertension Mother    • Non-contributory Father        Social History     Social History   • Marital status:      Spouse name: N/A   • Number of children: N/A   • Years of education: N/A     Occupational History   • Not on file.     Social History Main Topics   • Smoking status: Former Smoker     Packs/day: 2.00     Years: 30.00     Types: Cigarettes     Quit date: 2/3/1996   • Smokeless tobacco: Never Used      Comment: continued abstinance   • Alcohol use No   • Drug use: No   • Sexual activity: Not on file     Other Topics Concern   • Not on file     Social History Narrative   • No narrative on file       Allergies as of 05/13/2019 - Reviewed 05/13/2019   Allergen Reaction Noted   • Pcn [penicillins]  08/03/2012        @Vital signs for this encounter:  Vitals:    05/13/19 0930   Height: 1.651 m (5' 5\")   Weight: 76.7 kg (169 lb)   Weight % change since last entry.: 0 %   BP: 122/54   Pulse: 71   BMI (Calculated): 28.12   Resp: 16   O2 sat % room air: 94 %       Current medications as of today   Current Outpatient Prescriptions   Medication Sig Dispense Refill   • clopidogrel (PLAVIX) 75 MG Tab Take 1 Tab by mouth every day. 90 Tab 3   • aspirin EC 81 MG EC tablet Take 1 Tab by mouth every day. 30 Tab 6   • buPROPion (WELLBUTRIN XL) 150 MG XL tablet Take 150 mg by mouth every morning.     • escitalopram (LEXAPRO) 20 MG tablet Take 20 mg by mouth every morning.     • HYDROcodone-acetaminophen (NORCO) 5-325 MG Tab per tablet Take 1 Tab by mouth 2 times a day as needed.     • dicyclomine (BENTYL) 10 MG Cap Take 10 mg by mouth as needed.     • Fluticasone Furoate-Vilanterol (BREO ELLIPTA) 200-25 MCG/INH AEROSOL POWDER, BREATH ACTIVATED Inhale 1 Puff by mouth every day. Rinse mouth after use. 1 Each 0   • flecainide (TAMBOCOR) 50 MG tablet Take 1 Tab by " mouth 2 times a day. 180 Tab 1   • metoprolol SR (TOPROL XL) 50 MG TABLET SR 24 HR Take 1 Tab by mouth 1 time daily as needed (HR>120bpm). 30 Tab 1   • levalbuterol (XOPENEX HFA) 45 MCG/ACT inhaler INHALE 2 PUFFS BY MOUTH EVERY 4 HOURS AS NEEDED FOR SHORTNESS OF BREATH 3 Inhaler 3   • lovastatin (MEVACOR) 20 MG Tab Take 10 mg by mouth every evening.     • progesterone (PROMETRIUM) 100 MG CAPS Take 100 mg by mouth every bedtime.     • vitamin D, Ergocalciferol, (DRISDOL) 70423 UNITS CAPS capsule Take 50,000 Units by mouth every 7 days.     • levothyroxine (SYNTHROID) 100 MCG TABS Take 100 mcg by mouth every bedtime.     • liothyronine (CYTOMEL) 5 MCG TABS Take 5 mcg by mouth 2 Times a Day.     • propranolol (INDERAL) 20 MG TABS Take 10 mg by mouth every bedtime. If hr >120 pt takes 20 mg     • Fluticasone Furoate-Vilanterol (BREO ELLIPTA) 100-25 MCG/INH AEROSOL POWDER, BREATH ACTIVATED Inhale 1 Puff by mouth every day. Rinse mouth after each use. 1 Each 0   • Probiotic Product (PROBIOTIC DAILY PO) Take 1 Cap by mouth 2 Times a Day.     • lansoprazole (PREVACID) 30 MG CAPSULE DELAYED RELEASE Take 30 mg by mouth as needed.     • FEMRING 0.05 MG/24HR RING INSERT 1 VAGINALLY Q 3 MONTHS  0     No current facility-administered medications for this visit.          Physical Exam:   Gen:           Alert and oriented, No apparent distress. Mood and affect     appropriate, normal interaction with provider.  Eyes:          sclere white, conjunctive moist.  Hearing:     Grossly intact.  Dentition:    Good dentition.  Oropharynx:   Tongue normal, posterior pharynx without erythema or exudate.  Neck:        Supple, trachea midline, no masses.  Respiratory Effort: No intercostal retractions or use of accessory muscles.   Lung Auscultation:      Clear to auscultation bilaterally; no rales, rhonchi or wheezing.  CV:            Regular rate and rhythm.  Trace pretibial edema. Murmur present, no rubs or gallops.  Digits, Nails, Ext: No  clubbing, cyanosis, petechiae, or nodes.   Skin:        No rashes, lesions or ulcers noted on back or exposed skin    surfaces.                     Assessment:  1. Shortness of breath  CT-CHEST (THORAX) W/O   2. Chronic obstructive pulmonary disease, unspecified COPD type (HCC)   continue Advair, consider resuming Breo if able, Xopenex for intermittent symptoms   3. Obstructive sleep apnea   on O2 at 2 and half liters at night, continue       Immunizations:    Flu: 10/8/2018  Pneumovax 23: 10/16/2012  Prevnar 13: 10/8/2018    Plan:  1-try to get discounted Breo  2-currently on Advair  3-not using Xopenex, was encouraged to use for reported approximately once a week symptoms  4-trial Xanax, previously on lansoprazole and does not want to take on a daily basis but having significant GERD  5-Due for follow up CT in November 6-Follow up visit after CT    This dictation was created using voice recognition software. The accuracy of the dictation is limited to the abilities of the software. I expect there may be some errors of grammar and possibly content.

## 2019-05-13 NOTE — PATIENT INSTRUCTIONS
1-try to get discounted Breo  2-currently on Advair  3-not using Xopenex, was encouraged to use for approximately once a week symptoms  4-trial Xanax   5-Due for follow up CT in November 6-Follow up visit after CT

## 2019-05-15 ENCOUNTER — ANTICOAGULATION MONITORING (OUTPATIENT)
Dept: VASCULAR LAB | Facility: MEDICAL CENTER | Age: 69
End: 2019-05-15

## 2019-05-15 ENCOUNTER — TELEPHONE (OUTPATIENT)
Dept: CARDIOLOGY | Facility: MEDICAL CENTER | Age: 69
End: 2019-05-15

## 2019-05-15 DIAGNOSIS — I48.91 ATRIAL FIBRILLATION, UNSPECIFIED TYPE (HCC): ICD-10-CM

## 2019-05-15 NOTE — TELEPHONE ENCOUNTER
EDGARD Brizuela 13 hours ago (8:22 PM)         I'm hoping one of you can respond.  Since I got off the Warfarin and got on Plavix, I'm having a terrible time with bruising.  Way worse than Warfarin.  Also, I bleed very very easy.       Hard to get good pictures but this will give you an idea.  Thank you.      Pictures uploaded via my chart in media for your review. To BRIDGETTE

## 2019-05-15 NOTE — PROGRESS NOTES
Anticoagulation Summary  As of 5/15/2019    INR goal:   2.0-2.5   TTR:   41.2 % (1.2 mo)   INR used for dosing:      Warfarin maintenance plan:   5 mg (5 mg x 1) every Mon, Wed, Fri; 2.5 mg (5 mg x 0.5) all other days   Weekly warfarin total:   25 mg   Plan last modified:   Maico DaleyD (4/12/2019)   Next INR check:      Target end date:   4/19/2019    Indications    Atrial fibrillation (HCC) [I48.91] [I48.91]             Anticoagulation Episode Summary     INR check location:       Preferred lab:       Resolved date:   5/1/2019    Resolved reason:   Alternate Therapy Initiated    Send INR reminders to:       Comments:   S/P watchman, no bridging      Anticoagulation Care Providers     Provider Role Specialty Phone number    Sincere Lyles M.D. Referring Cardiology [phone]    [facility] Responsible  [phone]        Anticoagulation Patient Findings    Anticoagulation discontinued. Patient switched to Plavix. Will discharge from Anticoagulation Program.    Juan Carlos Bean, PharmD

## 2019-05-15 NOTE — TELEPHONE ENCOUNTER
It is common to have easy bruising and bleeding on ASA and plavix.  I will discuss with Dr. Lyles when he returns for any further recommendations.    Emilia

## 2019-05-20 RX ORDER — METOPROLOL SUCCINATE 25 MG/1
25 TABLET, EXTENDED RELEASE ORAL DAILY
Qty: 90 TAB | Refills: 3 | Status: SHIPPED | OUTPATIENT
Start: 2019-05-20 | End: 2020-05-19

## 2019-05-21 ENCOUNTER — TELEPHONE (OUTPATIENT)
Dept: CARDIOLOGY | Facility: MEDICAL CENTER | Age: 69
End: 2019-05-21

## 2019-05-29 NOTE — TELEPHONE ENCOUNTER
Sincere Lyles M.D.   You 5 days ago      Stop plavix (Routing comment     Pt called. She was notified by email.

## 2019-08-01 DIAGNOSIS — I48.91 ATRIAL FIBRILLATION WITH RVR (HCC): ICD-10-CM

## 2019-08-01 RX ORDER — FLECAINIDE ACETATE 50 MG/1
50 TABLET ORAL 2 TIMES DAILY
Qty: 180 TAB | Refills: 3 | Status: SHIPPED | OUTPATIENT
Start: 2019-08-01 | End: 2020-07-28

## 2019-08-07 ENCOUNTER — OFFICE VISIT (OUTPATIENT)
Dept: CARDIOLOGY | Facility: MEDICAL CENTER | Age: 69
End: 2019-08-07
Payer: MEDICARE

## 2019-08-07 VITALS
HEART RATE: 70 BPM | BODY MASS INDEX: 27.32 KG/M2 | DIASTOLIC BLOOD PRESSURE: 74 MMHG | WEIGHT: 164 LBS | HEIGHT: 65 IN | OXYGEN SATURATION: 94 % | SYSTOLIC BLOOD PRESSURE: 116 MMHG

## 2019-08-07 DIAGNOSIS — Z51.81 ENCOUNTER FOR MONITORING FLECAINIDE THERAPY: ICD-10-CM

## 2019-08-07 DIAGNOSIS — Z95.818 PRESENCE OF WATCHMAN LEFT ATRIAL APPENDAGE CLOSURE DEVICE: ICD-10-CM

## 2019-08-07 DIAGNOSIS — Z79.899 ENCOUNTER FOR MONITORING FLECAINIDE THERAPY: ICD-10-CM

## 2019-08-07 DIAGNOSIS — I48.0 PAF (PAROXYSMAL ATRIAL FIBRILLATION) (HCC): ICD-10-CM

## 2019-08-07 LAB — EKG IMPRESSION: NORMAL

## 2019-08-07 PROCEDURE — 93000 ELECTROCARDIOGRAM COMPLETE: CPT | Performed by: INTERNAL MEDICINE

## 2019-08-07 PROCEDURE — 99214 OFFICE O/P EST MOD 30 MIN: CPT | Performed by: INTERNAL MEDICINE

## 2019-08-07 RX ORDER — DICLOFENAC SODIUM 75 MG/1
TABLET, DELAYED RELEASE ORAL
Refills: 2 | COMMUNITY
Start: 2019-06-20 | End: 2020-10-12

## 2019-08-07 RX ORDER — ESTRADIOL 0.03 MG/D
FILM, EXTENDED RELEASE TRANSDERMAL
COMMUNITY
Start: 2019-07-25

## 2019-08-07 NOTE — PROGRESS NOTES
Arrhythmia Clinic Note (Established patient)    DOS: 8/7/2019    Chief complaint/Reason for consult: F/u WATCHMAN    Interval History:  Patient is a 67 yo F. History of PAF on sophia agents and IC agent. Difficulty tolerating OAC due to need for NSAIDs for her osteoarthritis and frequent and excessive bruising. S/p WATCHMAN with me. Doing well. Off OAC and just on ASA 81 now. Just moved into a condominium off [address], still trying to sell their single family home in [place]. No complaints today.    ROS (+ highlighted in red):  Constitutional: Fevers/chills/fatigue/weightloss  HEENT: Blurry vision/eye pain/sore throat/hearing loss  Respiratory: Shortness of breath/cough  Cardiovascular: Chest pain/palpitations/edema/orthopnea/syncope  GI: Nausea/vomitting/diarrhea  MSK: Arthralgias/myagias/muscle weakness  Skin: Rash/sores  Neurological: Numbness/tremors/vertigo  Endocrine: Excessive thirst/polyuria/cold intolerance/heat intolerance  Psych: Depression/anxiety    Past Medical History:   Diagnosis Date   • Anemia    • Arthritis     fingers, hands, toes, feet (osteoarthritis)   • Asthma     inhalers daily   • Atrial fibrillation (HCC) 10/2018    New onset in office. Echocardiogram with normal LV size, LVEF 65%. Trace MR, trace TR. RVSP 34mmHg.   • Breath shortness     uses 2L oxygen at night ([employer])    • Bronchitis 11/2018    gets often   • Chronic anticoagulation    • COPD (chronic obstructive pulmonary disease) (HCC)    • Gynecological disorder     postmenopausal bleeding right now    • Heart burn    • Hypothyroidism    • Pleurisy    • Pneumonia 1989   • Psychiatric problem     depression, anxiety   • Snoring    • Tuberculosis 1981    Treated for nine months       Past Surgical History:   Procedure Laterality Date   • MASS EXCISION GENERAL Left 7/6/2018    Procedure: MASS EXCISION GENERAL/ SUBCUTANEOUS MASS LEFT SHOULDER;  Surgeon: Swapna Rivas M.D.;  Location: SURGERY SAME DAY [facility];  Service:  General   • CHOLECYSTECTOMY     • APPENDECTOMY  1960    had ruptured   • ARTHROSCOPY, KNEE     • MAMMOPLASTY AUGMENTATION     • PB REMV 2ND CATARACT,CORN-SCLER SECTN         Social History     Socioeconomic History   • Marital status:      Spouse name: Not on file   • Number of children: Not on file   • Years of education: Not on file   • Highest education level: Not on file   Occupational History   • Not on file   Social Needs   • Financial resource strain: Not on file   • Food insecurity:     Worry: Not on file     Inability: Not on file   • Transportation needs:     Medical: Not on file     Non-medical: Not on file   Tobacco Use   • Smoking status: Former Smoker     Packs/day: 2.00     Years: 30.00     Pack years: 60.00     Types: Cigarettes     Last attempt to quit: 2/3/1996     Years since quittin.5   • Smokeless tobacco: Never Used   • Tobacco comment: continued abstinance   Substance and Sexual Activity   • Alcohol use: No   • Drug use: No   • Sexual activity: Not on file   Lifestyle   • Physical activity:     Days per week: Not on file     Minutes per session: Not on file   • Stress: Not on file   Relationships   • Social connections:     Talks on phone: Not on file     Gets together: Not on file     Attends Anabaptist service: Not on file     Active member of club or organization: Not on file     Attends meetings of clubs or organizations: Not on file     Relationship status: Not on file   • Intimate partner violence:     Fear of current or ex partner: Not on file     Emotionally abused: Not on file     Physically abused: Not on file     Forced sexual activity: Not on file   Other Topics Concern   • Not on file   Social History Narrative   • Not on file       Family History   Problem Relation Age of Onset   • Non-contributory Mother    • Hypertension Mother    • Non-contributory Father        Allergies   Allergen Reactions   • Pcn [Penicillins]      Rxn as child       Current Outpatient  Medications   Medication Sig Dispense Refill   • Estradiol 0.025 MG/24HR PATCH BIWEEKLY      • diclofenac EC (VOLTAREN) 75 MG Tablet Delayed Response TK 1 T PO BID  2   • flecainide (TAMBOCOR) 50 MG tablet Take 1 Tab by mouth 2 times a day. 180 Tab 3   • metoprolol SR (TOPROL XL) 25 MG TABLET SR 24 HR Take 1 Tab by mouth every day. 90 Tab 3   • aspirin EC 81 MG EC tablet Take 1 Tab by mouth every day. 30 Tab 6   • buPROPion (WELLBUTRIN XL) 150 MG XL tablet Take 150 mg by mouth every morning.     • escitalopram (LEXAPRO) 20 MG tablet Take 20 mg by mouth every morning.     • Probiotic Product (PROBIOTIC DAILY PO) Take 1 Cap by mouth 2 Times a Day.     • HYDROcodone-acetaminophen (NORCO) 5-325 MG Tab per tablet Take 1 Tab by mouth 2 times a day as needed.     • dicyclomine (BENTYL) 10 MG Cap Take 10 mg by mouth as needed.     • lansoprazole (PREVACID) 30 MG CAPSULE DELAYED RELEASE Take 30 mg by mouth as needed.     • levalbuterol (XOPENEX HFA) 45 MCG/ACT inhaler INHALE 2 PUFFS BY MOUTH EVERY 4 HOURS AS NEEDED FOR SHORTNESS OF BREATH 3 Inhaler 3   • lovastatin (MEVACOR) 20 MG Tab Take 10 mg by mouth every evening.     • progesterone (PROMETRIUM) 100 MG CAPS Take 100 mg by mouth every bedtime.     • vitamin D, Ergocalciferol, (DRISDOL) 56620 UNITS CAPS capsule Take 50,000 Units by mouth every 7 days.     • levothyroxine (SYNTHROID) 100 MCG TABS Take 100 mcg by mouth every bedtime.     • liothyronine (CYTOMEL) 5 MCG TABS Take 5 mcg by mouth 2 Times a Day.     • propranolol (INDERAL) 20 MG TABS Take 10 mg by mouth every bedtime. If hr >120 pt takes 20 mg     • clopidogrel (PLAVIX) 75 MG Tab Take 1 Tab by mouth every day. 90 Tab 3   • Fluticasone Furoate-Vilanterol (BREO ELLIPTA) 100-25 MCG/INH AEROSOL POWDER, BREATH ACTIVATED Inhale 1 Puff by mouth every day. Rinse mouth after each use. 1 Each 0   • Fluticasone Furoate-Vilanterol (BREO ELLIPTA) 200-25 MCG/INH AEROSOL POWDER, BREATH ACTIVATED Inhale 1 Puff by mouth every  "day. Rinse mouth after use. 1 Each 0   • FEMRING 0.05 MG/24HR RING INSERT 1 VAGINALLY Q 3 MONTHS  0     No current facility-administered medications for this visit.        Physical Exam:  Vitals:    08/07/19 1122   BP: 116/74   BP Location: Left arm   Patient Position: Sitting   BP Cuff Size: Adult   Pulse: 70   SpO2: 94%   Weight: 74.4 kg (164 lb)   Height: 1.651 m (5' 5\")     General appearance: NAD, conversant   Eyes: anicteric sclerae, moist conjunctivae; no lid-lag; PERRLA  HENT: Atraumatic; oropharynx clear with moist mucous membranes and no mucosal ulcerations; normal hard and soft palate  Neck: Trachea midline; FROM, supple, no thyromegaly or lymphadenopathy  Lungs: CTA, with normal respiratory effort and no intercostal retractions  CV: RRR, no MRGs, no JVD  Abdomen: Soft, non-tender; no masses or HSM  Extremities: No peripheral edema or extremity lymphadenopathy  Skin: Normal temperature, turgor and texture; no rash, ulcers or subcutaneous nodules, small sized bruise on L lower shin  Psych: Appropriate affect, alert and oriented to person, place and time    Data:  Labs reviewed    Prior echo/stress reviewed:  Normal LV function  No significant leak around device    EKG interpreted by me:  NSR    Impression/Plan:  1. PAF (paroxysmal atrial fibrillation) ([facility])  EKG   2. Presence of Watchman left atrial appendage closure device     3. Encounter for monitoring flecainide therapy       -Doing well  -Continue ASA 81 indefinitely with WATCHMAN in place  -EKG looks okay for flecainide, she has a Kardia device at home and has not had recurrece on 1C agent  -F/u in 6 mo    Sincere Lyles MD    "

## 2019-08-18 LAB
LV EJECT FRACT  99904: 65
LV EJECT FRACT MOD 2C 99903: 66.74
LV EJECT FRACT MOD 4C 99902: 69.75
LV EJECT FRACT MOD BP 99901: 68.71

## 2019-09-04 ENCOUNTER — APPOINTMENT (RX ONLY)
Dept: URBAN - METROPOLITAN AREA CLINIC 4 | Facility: CLINIC | Age: 69
Setting detail: DERMATOLOGY
End: 2019-09-04

## 2019-09-04 DIAGNOSIS — L85.3 XEROSIS CUTIS: ICD-10-CM

## 2019-09-04 DIAGNOSIS — D18.0 HEMANGIOMA: ICD-10-CM

## 2019-09-04 DIAGNOSIS — L81.4 OTHER MELANIN HYPERPIGMENTATION: ICD-10-CM

## 2019-09-04 DIAGNOSIS — D17 BENIGN LIPOMATOUS NEOPLASM: ICD-10-CM

## 2019-09-04 DIAGNOSIS — L29.89 OTHER PRURITUS: ICD-10-CM

## 2019-09-04 DIAGNOSIS — L57.0 ACTINIC KERATOSIS: ICD-10-CM

## 2019-09-04 DIAGNOSIS — D49.2 NEOPLASM OF UNSPECIFIED BEHAVIOR OF BONE, SOFT TISSUE, AND SKIN: ICD-10-CM

## 2019-09-04 DIAGNOSIS — L29.8 OTHER PRURITUS: ICD-10-CM

## 2019-09-04 DIAGNOSIS — B07.0 PLANTAR WART: ICD-10-CM

## 2019-09-04 PROBLEM — J45.909 UNSPECIFIED ASTHMA, UNCOMPLICATED: Status: ACTIVE | Noted: 2019-09-04

## 2019-09-04 PROBLEM — M12.9 ARTHROPATHY, UNSPECIFIED: Status: ACTIVE | Noted: 2019-09-04

## 2019-09-04 PROBLEM — F32.9 MAJOR DEPRESSIVE DISORDER, SINGLE EPISODE, UNSPECIFIED: Status: ACTIVE | Noted: 2019-09-04

## 2019-09-04 PROBLEM — E03.9 HYPOTHYROIDISM, UNSPECIFIED: Status: ACTIVE | Noted: 2019-09-04

## 2019-09-04 PROBLEM — E78.5 HYPERLIPIDEMIA, UNSPECIFIED: Status: ACTIVE | Noted: 2019-09-04

## 2019-09-04 PROBLEM — D17.1 BENIGN LIPOMATOUS NEOPLASM OF SKIN AND SUBCUTANEOUS TISSUE OF TRUNK: Status: ACTIVE | Noted: 2019-09-04

## 2019-09-04 PROBLEM — D17.0 BENIGN LIPOMATOUS NEOPLASM OF SKIN AND SUBCUTANEOUS TISSUE OF HEAD, FACE AND NECK: Status: ACTIVE | Noted: 2019-09-04

## 2019-09-04 PROBLEM — D18.01 HEMANGIOMA OF SKIN AND SUBCUTANEOUS TISSUE: Status: ACTIVE | Noted: 2019-09-04

## 2019-09-04 PROBLEM — F41.9 ANXIETY DISORDER, UNSPECIFIED: Status: ACTIVE | Noted: 2019-09-04

## 2019-09-04 PROCEDURE — 11102 TANGNTL BX SKIN SINGLE LES: CPT | Mod: 59

## 2019-09-04 PROCEDURE — ? COUNSELING

## 2019-09-04 PROCEDURE — 17000 DESTRUCT PREMALG LESION: CPT | Mod: 59

## 2019-09-04 PROCEDURE — 17003 DESTRUCT PREMALG LES 2-14: CPT | Mod: 59

## 2019-09-04 PROCEDURE — 17110 DESTRUCTION B9 LES UP TO 14: CPT

## 2019-09-04 PROCEDURE — 99203 OFFICE O/P NEW LOW 30 MIN: CPT | Mod: 25

## 2019-09-04 PROCEDURE — ? BIOPSY BY SHAVE METHOD

## 2019-09-04 PROCEDURE — ? LIQUID NITROGEN

## 2019-09-04 ASSESSMENT — LOCATION DETAILED DESCRIPTION DERM
LOCATION DETAILED: RIGHT PLANTAR FOREFOOT OVERLYING 1ST METATARSAL
LOCATION DETAILED: LEFT LATERAL UPPER BACK
LOCATION DETAILED: RIGHT SUPERIOR MEDIAL FOREHEAD
LOCATION DETAILED: LEFT ANTERIOR SHOULDER
LOCATION DETAILED: LEFT RIB CAGE
LOCATION DETAILED: RIGHT RIB CAGE
LOCATION DETAILED: LEFT LATERAL MALAR CHEEK
LOCATION DETAILED: RIGHT LATERAL FOREHEAD
LOCATION DETAILED: RIGHT INFERIOR LATERAL FOREHEAD
LOCATION DETAILED: RIGHT FOREHEAD
LOCATION DETAILED: RIGHT LATERAL TEMPLE
LOCATION DETAILED: RIGHT PLANTAR FOREFOOT OVERLYING 2ND METATARSAL
LOCATION DETAILED: UPPER STERNUM
LOCATION DETAILED: LEFT CENTRAL ZYGOMA

## 2019-09-04 ASSESSMENT — LOCATION ZONE DERM
LOCATION ZONE: FEET
LOCATION ZONE: FEET
LOCATION ZONE: ARM
LOCATION ZONE: TRUNK
LOCATION ZONE: FACE

## 2019-09-04 ASSESSMENT — LOCATION SIMPLE DESCRIPTION DERM
LOCATION SIMPLE: RIGHT PLANTAR SURFACE
LOCATION SIMPLE: RIGHT TEMPLE
LOCATION SIMPLE: RIGHT PLANTAR SURFACE
LOCATION SIMPLE: CHEST
LOCATION SIMPLE: RIGHT FOREHEAD
LOCATION SIMPLE: LEFT SHOULDER
LOCATION SIMPLE: LEFT UPPER BACK
LOCATION SIMPLE: LEFT CHEEK
LOCATION SIMPLE: ABDOMEN
LOCATION SIMPLE: LEFT ZYGOMA

## 2019-09-04 NOTE — PROCEDURE: LIQUID NITROGEN
Post-Care Instructions: I reviewed with the patient in detail post-care instructions. Patient is to wear sunprotection, and avoid picking at any of the treated lesions. Pt may apply Vaseline to crusted or scabbing areas.
Detail Level: Detailed
Render Post-Care Instructions In Note?: no
Consent: The patient's consent was obtained including but not limited to risks of crusting, scabbing, blistering, scarring, darker or lighter pigmentary change, recurrence, incomplete removal and infection.
Duration Of Freeze Thaw-Cycle (Seconds): 0
Medical Necessity Information: It is in your best interest to select a reason for this procedure from the list below. All of these items fulfill various CMS LCD requirements except the new and changing color options.
Medical Necessity Clause: This procedure was medically necessary because the lesions that were treated were:

## 2019-09-04 NOTE — HPI: SECONDARY COMPLAINT
Additional History: Lesion on right plantar foot. Never tried any treatment at home
How Severe Is This Condition?: moderate
Additional History: History of widespread pruritus for several years. Wonders if medications can be the cause. Sometimes she has a flare and she is not sure what makes it better or worse. Takes hydrocodone, notices worsening with this but thinks the itching related to this is different. Has tried emollients but not consistently.

## 2019-09-04 NOTE — PROCEDURE: BIOPSY BY SHAVE METHOD
Notification Instructions: Patient will be notified of biopsy results. However, patient instructed to call the office if not contacted within 2 weeks.
Electrodesiccation Text: The wound bed was treated with electrodesiccation after the biopsy was performed.
Lab Facility: 
Additional Anesthesia Volume In Cc (Will Not Render If 0): 0
Wound Care: Petrolatum
Depth Of Biopsy: dermis
Anesthesia Type: 1% lidocaine with epinephrine
X Size Of Lesion In Cm: 0.7
Biopsy Type: H and E
Cryotherapy Text: The wound bed was treated with cryotherapy after the biopsy was performed.
Post-Care Instructions: I reviewed with the patient in detail post-care instructions. Patient is to keep the biopsy site dry overnight, and then apply bacitracin twice daily until healed. Patient may apply hydrogen peroxide soaks to remove any crusting.
Destruction After The Procedure: No
Lab: [id number]
Billing Type: Third-Party Bill
Curettage Text: The wound bed was treated with curettage after the biopsy was performed.
Was A Bandage Applied: Yes
Hemostasis: Drysol
Silver Nitrate Text: The wound bed was treated with silver nitrate after the biopsy was performed.
Detail Level: Detailed
Biopsy Method: Dermablade
Dressing: bandage
Anesthesia Volume In Cc: 0.5
Electrodesiccation And Curettage Text: The wound bed was treated with electrodesiccation and curettage after the biopsy was performed.
Type Of Destruction Used: Curettage
Consent: Written consent was obtained and risks were reviewed including but not limited to scarring, infection, bleeding, scabbing, incomplete removal, nerve damage and allergy to anesthesia.
Size Of Lesion In Cm: 0.6

## 2019-09-19 ENCOUNTER — OFFICE VISIT (OUTPATIENT)
Dept: CARDIOLOGY | Facility: MEDICAL CENTER | Age: 69
End: 2019-09-19
Payer: MEDICARE

## 2019-09-19 VITALS
SYSTOLIC BLOOD PRESSURE: 110 MMHG | DIASTOLIC BLOOD PRESSURE: 70 MMHG | HEART RATE: 86 BPM | OXYGEN SATURATION: 95 % | WEIGHT: 174 LBS | HEIGHT: 65 IN | BODY MASS INDEX: 28.99 KG/M2

## 2019-09-19 DIAGNOSIS — Z95.818 PRESENCE OF WATCHMAN LEFT ATRIAL APPENDAGE CLOSURE DEVICE: ICD-10-CM

## 2019-09-19 DIAGNOSIS — Z79.899 HIGH RISK MEDICATION USE: ICD-10-CM

## 2019-09-19 DIAGNOSIS — E03.9 HYPOTHYROIDISM, UNSPECIFIED TYPE: ICD-10-CM

## 2019-09-19 DIAGNOSIS — I48.0 PAF (PAROXYSMAL ATRIAL FIBRILLATION) (HCC): ICD-10-CM

## 2019-09-19 LAB — EKG IMPRESSION: NORMAL

## 2019-09-19 PROCEDURE — 99214 OFFICE O/P EST MOD 30 MIN: CPT | Performed by: NURSE PRACTITIONER

## 2019-09-19 PROCEDURE — 93000 ELECTROCARDIOGRAM COMPLETE: CPT | Performed by: INTERNAL MEDICINE

## 2019-09-19 ASSESSMENT — ENCOUNTER SYMPTOMS
BLURRED VISION: 0
SPUTUM PRODUCTION: 0
BLOOD IN STOOL: 0
ABDOMINAL PAIN: 0
SPEECH CHANGE: 0
FEVER: 0
DIARRHEA: 0
FOCAL WEAKNESS: 0
HEADACHES: 0
COUGH: 0
TINGLING: 0
NAUSEA: 0
FALLS: 1
WEIGHT LOSS: 0
WHEEZING: 0
DIZZINESS: 1
HEMOPTYSIS: 0
SHORTNESS OF BREATH: 0
DOUBLE VISION: 0
WEAKNESS: 0
PALPITATIONS: 0
VOMITING: 0
HEARTBURN: 0
TREMORS: 0
STRIDOR: 0
LOSS OF CONSCIOUSNESS: 0
PND: 0
ORTHOPNEA: 0
SORE THROAT: 0
BRUISES/BLEEDS EASILY: 1
SENSORY CHANGE: 0
CHILLS: 0

## 2019-09-19 NOTE — PATIENT INSTRUCTIONS
Take Propranolol every other day for one week and then stop.  If Migraines return please notify office.

## 2019-09-19 NOTE — PROGRESS NOTES
Cardiology/Electrophysiology Follow-up Note      Subjective:   Chief Complaint:   Chief Complaint   Patient presents with   • Atrial Fibrillation     F/V DX:PAF       Allison Cespedes is a 68 y.o. female who presents today for follow up and review of her cardiac status.   She is status post Watchman Procedure by Dr. Lyles 3/6/19 secondary to her inability to take anticoagulation secondary to severe arthritis and need for NSAIDS.  She is now maintained on ASA 81 mg post procedurally.     She is followed by Dr. Kyra Lovett and Dr. Lyles for EP.  Past medical history also significant for paroxysmal atrial fibrillation, high risk medication use in the form of Flecainide, hypothyroidism, IVETTE, and arthritis.     Today in follow up she tells me that she has been doing well and overall feeling well.  She does not have any specific concerns to address.  She is not aware of any significant arrhythmia and has not felt the need to use her Kardia.  She does tell me that she has noticed increased lightheadedness/dizziness over the last 6-7 months.  She has had one recent event in which she fell and cut her finger requiring stiches.  She reports with this she was moving an items and felt dizzy and put her hand out to stabilize herself and cut her finger in the process.  She does not feel that she was significantly dehydrated, no diaphoresis, nausea at that time.  Additionally she feels a little more fatigued and as if her legs are heavy.    She currently denies chest pain, palpitations, pre syncope or syncope, dyspnea, PND, orthopnea, or lower extremity edema.      Patient endorses medication compliance.        Past Medical History:   Diagnosis Date   • Anemia    • Arthritis     fingers, hands, toes, feet (osteoarthritis)   • Asthma     inhalers daily   • Atrial fibrillation (HCC) 10/2018    New onset in office. Echocardiogram with normal LV size, LVEF 65%. Trace MR, trace TR. RVSP 34mmHg.   • Breath shortness     uses 2L  oxygen at night ([facility])    • Bronchitis 2018    gets often   • Chronic anticoagulation    • COPD (chronic obstructive pulmonary disease) ([facility])    • Gynecological disorder     postmenopausal bleeding right now    • Heart burn    • Hypothyroidism    • Pleurisy    • Pneumonia    • Psychiatric problem     depression, anxiety   • Snoring    • Tuberculosis     Treated for nine months     Past Surgical History:   Procedure Laterality Date   • MASS EXCISION GENERAL Left 2018    Procedure: MASS EXCISION GENERAL/ SUBCUTANEOUS MASS LEFT SHOULDER;  Surgeon: Swapna Rivas M.D.;  Location: SURGERY SAME DAY [facility];  Service: General   • CHOLECYSTECTOMY     • APPENDECTOMY      had ruptured   • ARTHROSCOPY, KNEE     • MAMMOPLASTY AUGMENTATION     • PB REMV 2ND CATARACT,CORN-SCLER SECTN       Family History   Problem Relation Age of Onset   • Non-contributory Mother    • Hypertension Mother    • Non-contributory Father      Social History     Socioeconomic History   • Marital status:      Spouse name: Not on file   • Number of children: Not on file   • Years of education: Not on file   • Highest education level: Not on file   Occupational History   • Not on file   Social Needs   • Financial resource strain: Not on file   • Food insecurity:     Worry: Not on file     Inability: Not on file   • Transportation needs:     Medical: Not on file     Non-medical: Not on file   Tobacco Use   • Smoking status: Former Smoker     Packs/day: 2.00     Years: 30.00     Pack years: 60.00     Types: Cigarettes     Last attempt to quit: 2/3/1996     Years since quittin.6   • Smokeless tobacco: Never Used   • Tobacco comment: continued abstinance   Substance and Sexual Activity   • Alcohol use: No   • Drug use: No   • Sexual activity: Not on file   Lifestyle   • Physical activity:     Days per week: Not on file     Minutes per session: Not on file   • Stress: Not on file   Relationships   • Social  connections:     Talks on phone: Not on file     Gets together: Not on file     Attends Restorationism service: Not on file     Active member of club or organization: Not on file     Attends meetings of clubs or organizations: Not on file     Relationship status: Not on file   • Intimate partner violence:     Fear of current or ex partner: Not on file     Emotionally abused: Not on file     Physically abused: Not on file     Forced sexual activity: Not on file   Other Topics Concern   • Not on file   Social History Narrative   • Not on file     Allergies   Allergen Reactions   • Pcn [Penicillins]      Rxn as child       Current Outpatient Medications   Medication Sig Dispense Refill   • LORazepam (ATIVAN) 1 MG Tab every evening.  0   • Estradiol 0.025 MG/24HR PATCH BIWEEKLY      • diclofenac EC (VOLTAREN) 75 MG Tablet Delayed Response TK 1 T PO BID  2   • flecainide (TAMBOCOR) 50 MG tablet Take 1 Tab by mouth 2 times a day. 180 Tab 3   • metoprolol SR (TOPROL XL) 25 MG TABLET SR 24 HR Take 1 Tab by mouth every day. 90 Tab 3   • aspirin EC 81 MG EC tablet Take 1 Tab by mouth every day. 30 Tab 6   • buPROPion (WELLBUTRIN XL) 150 MG XL tablet Take 150 mg by mouth every morning.     • escitalopram (LEXAPRO) 20 MG tablet Take 20 mg by mouth every morning.     • HYDROcodone-acetaminophen (NORCO) 5-325 MG Tab per tablet Take 1 Tab by mouth 2 times a day as needed.     • dicyclomine (BENTYL) 10 MG Cap Take 10 mg by mouth as needed.     • levalbuterol (XOPENEX HFA) 45 MCG/ACT inhaler INHALE 2 PUFFS BY MOUTH EVERY 4 HOURS AS NEEDED FOR SHORTNESS OF BREATH 3 Inhaler 3   • lovastatin (MEVACOR) 20 MG Tab Take 10 mg by mouth every evening.     • progesterone (PROMETRIUM) 100 MG CAPS Take 100 mg by mouth every bedtime.     • vitamin D, Ergocalciferol, (DRISDOL) 09512 UNITS CAPS capsule Take 50,000 Units by mouth every 7 days.     • levothyroxine (SYNTHROID) 100 MCG TABS Take 100 mcg by mouth every bedtime.     • liothyronine (CYTOMEL)  "5 MCG TABS Take 5 mcg by mouth 2 Times a Day.     • Probiotic Product (PROBIOTIC DAILY PO) Take 1 Cap by mouth 2 Times a Day.     • lansoprazole (PREVACID) 30 MG CAPSULE DELAYED RELEASE Take 30 mg by mouth as needed.       No current facility-administered medications for this visit.        Review of Systems   Constitutional: Negative for chills, fever, malaise/fatigue and weight loss.   HENT: Negative for congestion, nosebleeds, sore throat and tinnitus.    Eyes: Negative for blurred vision and double vision.   Respiratory: Negative for cough, hemoptysis, sputum production, shortness of breath, wheezing and stridor.    Cardiovascular: Negative for chest pain, palpitations, orthopnea, leg swelling and PND.   Gastrointestinal: Negative for abdominal pain, blood in stool, diarrhea, heartburn, melena, nausea and vomiting.   Genitourinary: Negative for hematuria.   Musculoskeletal: Positive for falls.   Skin: Negative for rash.   Neurological: Positive for dizziness. Negative for tingling, tremors, sensory change, speech change, focal weakness, loss of consciousness, weakness and headaches.   Endo/Heme/Allergies: Bruises/bleeds easily.     All others systems reviewed and negative.     Objective:     /70 (BP Location: Left arm, Patient Position: Sitting, BP Cuff Size: Adult)   Pulse 86   Ht 1.651 m (5' 5\")   Wt 78.9 kg (174 lb)   SpO2 95%  Body mass index is 28.96 kg/m².    Physical Exam   Constitutional: She is oriented to person, place, and time and well-developed, well-nourished, and in no distress.   HENT:   Head: Normocephalic and atraumatic.   Eyes: Pupils are equal, round, and reactive to light. Conjunctivae and EOM are normal.   Neck: Normal range of motion. Neck supple. No JVD present.   Cardiovascular: Normal rate, regular rhythm, normal heart sounds and intact distal pulses. Exam reveals no gallop and no friction rub.   No murmur heard.  Pulmonary/Chest: Effort normal and breath sounds normal. No " respiratory distress. She has no wheezes. She has no rales. She exhibits no tenderness.   Abdominal: Soft. Bowel sounds are normal. There is no tenderness.   Musculoskeletal: Normal range of motion. She exhibits no edema.   Neurological: She is alert and oriented to person, place, and time.   Skin: Skin is warm and dry. No rash noted. No erythema.   Psychiatric: Mood, memory, affect and judgment normal.         Cardiac Imaging and Procedures Review:    EKG dated 9/19/19:   Sinus rhythm     Echo dated 3/6/19:   CONCLUSIONS  Significant Findings:    Pre-Intervention:    LV:  Normal left ventricular systolic function.   RV:  Moderately dilated right ventricle. Normal right ventricular   systolic function.   IA septum:  Normal interatrial septum. No evidence for interatrial   septum aneurysm. No evidence of shunt, PFO nor ASD.    Pericardium:  Normal pericardium without effusion.    Post-Intervention:  CASA:  Appropriate placement of Watchman device in the CASA with no   evidence on para-device blood flow.  IA septum:  Small left to right shunt across the IA septum.    Pericardium:  Normal pericardium without effusion.    Nuclear Perfusion Imaging (PET) (10/24/18):   ELECTROCARDIOGRAPHIC FINDINGS:   Normal sinus rhythm.  Normal ECG response to dipyridamole infusion.  No ischemic changes noted     SCINTOGRAPHIC FINDINGS:    Normal left ventricular perfusion with stress and rest images.  There is no evidence of ischemia or scar.    GATED WALL MOTION FINDINGS:    The left ventricle wall motion is normal with stress and rest  imagings.  Measured resting ejection fraction is 72 %.       Labs (personally reviewed and notable for):   Lab Results   Component Value Date/Time    SODIUM 139 03/07/2019 04:53 AM    POTASSIUM 3.7 03/07/2019 04:53 AM    CHLORIDE 107 03/07/2019 04:53 AM    CO2 26 03/07/2019 04:53 AM    GLUCOSE 113 (H) 03/07/2019 04:53 AM    BUN 15 03/07/2019 04:53 AM    CREATININE 0.91 03/07/2019 04:53 AM      Lab  Results   Component Value Date/Time    WBC 12.8 (H) 03/07/2019 04:53 AM    RBC 4.04 (L) 03/07/2019 04:53 AM    HEMOGLOBIN 12.8 03/07/2019 04:53 AM    HEMATOCRIT 40.1 03/07/2019 04:53 AM    MCV 99.3 (H) 03/07/2019 04:53 AM    MCH 31.7 03/07/2019 04:53 AM    MCHC 31.9 (L) 03/07/2019 04:53 AM    MPV 9.9 03/07/2019 04:53 AM    NEUTSPOLYS 65.70 03/05/2019 01:26 PM    LYMPHOCYTES 22.90 03/05/2019 01:26 PM    MONOCYTES 9.50 03/05/2019 01:26 PM    EOSINOPHILS 1.00 03/05/2019 01:26 PM    BASOPHILS 0.40 03/05/2019 01:26 PM      PT/INR:   Lab Results   Component Value Date/Time    PROTHROMBTM 14.4 03/07/2019 04:53 AM    INR 2.3 04/22/2019       Assessment:     1. PAF (paroxysmal atrial fibrillation) ([facility])  EKG    [facility] Treadmill Stress   2. High risk medication use  [facility] Treadmill Stress   3. Presence of Watchman left atrial appendage closure device     4. Hypothyroidism, unspecified type         Medical Decision Making:  Today's Assessment / Status / Plan:   1. Paroxysmal Atrial Fibrillation:  - Well controled with Flecainide at this time.  She has not noticed any palpitations or sustained arrhythmias.  Has a kardia.   - Maintaining sinus on 12 lead EKG today.     2. High Risk Medication Use (Flecainide):  - 12 lead EKG tracing today is stable.  QTc 460 msec, QRSd 89 msec.   - Will check a treadmill to evaluate for pro arrhythmia on Flecainide given her recent dizziness/lightheadedness, and recent fall since she has not had one since starting on Flecainide.      3. Watchman:  - S/P Jasmyne y Dr. Lyles 3/6/19.  Continues to do well post procedure.  - She is on ASA 81 mg at this point.  No reported evidence of bleeding.     4. Hypothyroid:  - Managed by PCP.  New labs pending.  IF abnormal may contribute to some of her symptoms as well.    Discussed will stop Propranolol today.  If migraines return will resume and stop Toprol.     Plan reviewed in detail with the patient and she verbalizes understanding and is in agreement.   RTC  as scheduled with Clover Lovett and in 4 months with Dr. Lyles , sooner if clinical condition changes  Collaborating MD/ADD: Arthur.     MAGDA Jimenez.

## 2019-10-23 ENCOUNTER — NON-PROVIDER VISIT (OUTPATIENT)
Dept: CARDIOLOGY | Facility: MEDICAL CENTER | Age: 69
End: 2019-10-23
Attending: NURSE PRACTITIONER
Payer: MEDICARE

## 2019-10-23 VITALS
HEIGHT: 65 IN | OXYGEN SATURATION: 98 % | HEART RATE: 68 BPM | SYSTOLIC BLOOD PRESSURE: 116 MMHG | DIASTOLIC BLOOD PRESSURE: 68 MMHG | BODY MASS INDEX: 28.99 KG/M2 | WEIGHT: 174 LBS

## 2019-10-23 DIAGNOSIS — I48.0 PAF (PAROXYSMAL ATRIAL FIBRILLATION) (HCC): ICD-10-CM

## 2019-10-23 DIAGNOSIS — Z79.899 HIGH RISK MEDICATION USE: ICD-10-CM

## 2019-10-23 LAB — TREADMILL STRESS: NORMAL

## 2019-10-23 PROCEDURE — 93015 CV STRESS TEST SUPVJ I&R: CPT | Performed by: INTERNAL MEDICINE

## 2019-10-24 ENCOUNTER — TELEPHONE (OUTPATIENT)
Dept: CARDIOLOGY | Facility: MEDICAL CENTER | Age: 69
End: 2019-10-24

## 2019-10-25 NOTE — TELEPHONE ENCOUNTER
Stress test looked good, no evidence of low blood flow, looks ok for continued use of Flecainide.   Thanks mark

## 2019-11-13 ENCOUNTER — OFFICE VISIT (OUTPATIENT)
Dept: PULMONOLOGY | Facility: HOSPICE | Age: 69
End: 2019-11-13
Payer: MEDICARE

## 2019-11-13 VITALS
RESPIRATION RATE: 16 BRPM | BODY MASS INDEX: 28.32 KG/M2 | WEIGHT: 170 LBS | HEART RATE: 75 BPM | OXYGEN SATURATION: 95 % | HEIGHT: 65 IN | DIASTOLIC BLOOD PRESSURE: 70 MMHG | SYSTOLIC BLOOD PRESSURE: 108 MMHG

## 2019-11-13 DIAGNOSIS — K21.9 GASTROESOPHAGEAL REFLUX DISEASE, ESOPHAGITIS PRESENCE NOT SPECIFIED: ICD-10-CM

## 2019-11-13 DIAGNOSIS — J34.89 NASAL DRYNESS: ICD-10-CM

## 2019-11-13 DIAGNOSIS — J44.9 CHRONIC OBSTRUCTIVE PULMONARY DISEASE, UNSPECIFIED COPD TYPE (HCC): ICD-10-CM

## 2019-11-13 DIAGNOSIS — R40.0 DAYTIME SOMNOLENCE: ICD-10-CM

## 2019-11-13 PROCEDURE — 99213 OFFICE O/P EST LOW 20 MIN: CPT | Performed by: PHYSICIAN ASSISTANT

## 2019-11-13 ASSESSMENT — ENCOUNTER SYMPTOMS
PALPITATIONS: 0
FEVER: 0
EYES NEGATIVE: 1
WEIGHT LOSS: 0
ROS GI COMMENTS: NO DENTURES, NO DIFFICULTY SWALLOWING
SORE THROAT: 0
CLAUDICATION: 0
CHILLS: 0
HEARTBURN: 1
WHEEZING: 0
HEADACHES: 0
SPUTUM PRODUCTION: 0
ORTHOPNEA: 0
DIAPHORESIS: 0
SHORTNESS OF BREATH: 1
INSOMNIA: 1
TREMORS: 0
DIZZINESS: 0
COUGH: 1
SINUS PAIN: 1

## 2019-11-13 NOTE — PATIENT INSTRUCTIONS
1-increase prilosec to 20 mg twice daily   2-reflux precautions review   -no food or alcoholic beverages or soda for 3 hours before bed    -hob elevated x 20 degrees    Avoid known triggers include red sauce, chocolate, mint, candy, soda, spicy food  3-flonase for increased nasal congestion  4-nasogel spray moisturizer or ponaris  5-follow up with us in 6 mos with PFT  6-overnight oximetry, call us for results  7-taking Advair with thorough oral care after

## 2019-11-13 NOTE — PROGRESS NOTES
CC: Nasal dryness    HPI:  Allison Cespedes is a 68 y.o. year old female here today for follow-up on COPD. Last seen in clinic 5/13/2019.  Patient is a former smoker with reported quit date 1996 and 60-pack-year history.  Continued abstinence advised.    Pertinent medical history includes pneumonia, bronchitis, pleurisy, GERD, IVETTE, A. fib, chronic anticoagulation.    Reviewed in clinic vitals including blood pressure 108/70, heart rate is 75, O2 sat 95% and BMI of 28.29 kg/m².  Patient states she continues to be as active as possible.    Reviewed home medication regimen including flecainide, Xopenex primarily with exertion, Flonase, Prevacid, Advair maintenance inhaler and oxygen at 2.5 L at night.    Reviewed most recent imaging including high resolution CT of the chest obtained 10/24/2018 which demonstrated no evidence for interstitial lung disease, mild bilateral apical pleural scarring, incidental 14 mm right lower lobe pulmonary cyst, no adenopathy or mass within the axilla mediastinum or lisa and aortic atherosclerotic plaque.  We will do follow-up chest CT at 1 year.    Echocardiogram obtained 7/31/2019 demonstrated normal left ventricular systolic function, LVEF estimated 65%, normal diastolic function, normal right ventricular size and systolic function normal right and left atrial size.  RVSP estimated at 34 mmHg.     Reviewed recent procedures including PFT obtained 11/13/2018 which demonstrated an FVC of 2.62 L or 82% predicted, FEV1 of 1.98 L or 81% predicted, FEV1/FVC ratio at 76% predicted, TLC 89% predicted, DLCO 103% predicted.  No significant bronchodilator response.  Pulmonology interpretation normal pulmonary function test.    Review of Systems   Constitutional: Positive for malaise/fatigue. Negative for chills, diaphoresis, fever and weight loss.   HENT: Positive for congestion, nosebleeds, sinus pain and tinnitus. Negative for hearing loss and sore throat.    Eyes: Negative.          Prescription eye glasses   Respiratory: Positive for cough (dry but not all the time) and shortness of breath (with exertion such as walking further 25 feet). Negative for sputum production and wheezing.    Cardiovascular: Negative for chest pain, palpitations, orthopnea, claudication and leg swelling.   Gastrointestinal: Positive for heartburn (prilosec, tums for break thru).        No dentures, no difficulty swallowing    Skin: Negative.    Neurological: Negative for dizziness, tremors and headaches.   Psychiatric/Behavioral: The patient has insomnia.        Past Medical History:   Diagnosis Date   • Anemia    • Arthritis     fingers, hands, toes, feet (osteoarthritis)   • Asthma     inhalers daily   • Atrial fibrillation (HCC) 10/2018    New onset in office. Echocardiogram with normal LV size, LVEF 65%. Trace MR, trace TR. RVSP 34mmHg.   • Breath shortness     uses 2L oxygen at night ([facility])    • Bronchitis 11/2018    gets often   • Chronic anticoagulation    • COPD (chronic obstructive pulmonary disease) ([facility])    • Gynecological disorder     postmenopausal bleeding right now    • Heart burn    • Hypothyroidism    • Pleurisy    • Pneumonia 1989   • Psychiatric problem     depression, anxiety   • Snoring    • Tuberculosis 1981    Treated for nine months       Past Surgical History:   Procedure Laterality Date   • MASS EXCISION GENERAL Left 7/6/2018    Procedure: MASS EXCISION GENERAL/ SUBCUTANEOUS MASS LEFT SHOULDER;  Surgeon: Swapna Rivas M.D.;  Location: SURGERY SAME DAY [facility];  Service: General   • CHOLECYSTECTOMY  1994   • APPENDECTOMY  1960    had ruptured   • ARTHROSCOPY, KNEE     • MAMMOPLASTY AUGMENTATION     • PB REMV 2ND CATARACT,CORN-SCLER SECTN         Family History   Problem Relation Age of Onset   • Non-contributory Mother    • Hypertension Mother    • Non-contributory Father        Social History     Socioeconomic History   • Marital status:      Spouse name: Not on file   •  "Number of children: Not on file   • Years of education: Not on file   • Highest education level: Not on file   Occupational History   • Not on file   Social Needs   • Financial resource strain: Not on file   • Food insecurity:     Worry: Not on file     Inability: Not on file   • Transportation needs:     Medical: Not on file     Non-medical: Not on file   Tobacco Use   • Smoking status: Former Smoker     Packs/day: 2.00     Years: 30.00     Pack years: 60.00     Types: Cigarettes     Last attempt to quit: 2/3/1996     Years since quittin.7   • Smokeless tobacco: Never Used   • Tobacco comment: continued abstinance   Substance and Sexual Activity   • Alcohol use: No   • Drug use: No   • Sexual activity: Not on file   Lifestyle   • Physical activity:     Days per week: Not on file     Minutes per session: Not on file   • Stress: Not on file   Relationships   • Social connections:     Talks on phone: Not on file     Gets together: Not on file     Attends Nondenominational service: Not on file     Active member of club or organization: Not on file     Attends meetings of clubs or organizations: Not on file     Relationship status: Not on file   • Intimate partner violence:     Fear of current or ex partner: Not on file     Emotionally abused: Not on file     Physically abused: Not on file     Forced sexual activity: Not on file   Other Topics Concern   • Not on file   Social History Narrative   • Not on file       Allergies as of 2019 - Reviewed 2019   Allergen Reaction Noted   • Pcn [penicillins]  2012        @Vital signs for this encounter:  Vitals:    19 1025   Height: 1.651 m (5' 5\")   Weight: 77.1 kg (170 lb)   Weight % change since last entry.: 0 %   BP: 108/70   Pulse: 75   BMI (Calculated): 28.29   Resp: 16       Current medications as of today   Current Outpatient Medications   Medication Sig Dispense Refill   • LORazepam (ATIVAN) 1 MG Tab every evening.  0   • Estradiol 0.025 MG/24HR " PATCH BIWEEKLY      • diclofenac EC (VOLTAREN) 75 MG Tablet Delayed Response TK 1 T PO BID  2   • flecainide (TAMBOCOR) 50 MG tablet Take 1 Tab by mouth 2 times a day. 180 Tab 3   • metoprolol SR (TOPROL XL) 25 MG TABLET SR 24 HR Take 1 Tab by mouth every day. 90 Tab 3   • aspirin EC 81 MG EC tablet Take 1 Tab by mouth every day. 30 Tab 6   • buPROPion (WELLBUTRIN XL) 150 MG XL tablet Take 150 mg by mouth every morning.     • escitalopram (LEXAPRO) 20 MG tablet Take 20 mg by mouth every morning.     • Probiotic Product (PROBIOTIC DAILY PO) Take 1 Cap by mouth 2 Times a Day.     • HYDROcodone-acetaminophen (NORCO) 5-325 MG Tab per tablet Take 1 Tab by mouth 2 times a day as needed.     • dicyclomine (BENTYL) 10 MG Cap Take 10 mg by mouth as needed.     • levalbuterol (XOPENEX HFA) 45 MCG/ACT inhaler INHALE 2 PUFFS BY MOUTH EVERY 4 HOURS AS NEEDED FOR SHORTNESS OF BREATH 3 Inhaler 3   • lovastatin (MEVACOR) 20 MG Tab Take 10 mg by mouth every evening.     • progesterone (PROMETRIUM) 100 MG CAPS Take 100 mg by mouth every bedtime.     • levothyroxine (SYNTHROID) 100 MCG TABS Take 100 mcg by mouth every bedtime.     • liothyronine (CYTOMEL) 5 MCG TABS Take 5 mcg by mouth 2 Times a Day.     • lansoprazole (PREVACID) 30 MG CAPSULE DELAYED RELEASE Take 30 mg by mouth as needed.     • vitamin D, Ergocalciferol, (DRISDOL) 63838 UNITS CAPS capsule Take 50,000 Units by mouth every 7 days.       No current facility-administered medications for this visit.          Physical Exam:   Gen:           Alert and oriented, No apparent distress. Mood and affect     appropriate, normal interaction with provider.  Eyes:          sclere white, conjunctive moist.  Hearing:     Grossly intact.  Dentition:    Good dentition.  Oropharynx:   Tongue normal, posterior pharynx without erythema or exudate.  Neck:        Supple, trachea midline, no masses.  Respiratory Effort: No intercostal retractions or use of accessory muscles.   Lung  Auscultation:      Clear to auscultation bilaterally; no rales, rhonchi or wheezing.  CV:            Regular rate and rhythm.  Trace pretibial edema. No murmurs, rubs or gallops.  Digits, Nails, Ext: No clubbing, cyanosis, petechiae, or nodes.   Skin:        No rashes, lesions or ulcers noted on back or exposed skin surfaces.                     Assessment:  1. Chronic obstructive pulmonary disease, unspecified COPD type (HCC)  PULMONARY FUNCTION TESTS -Test requested: Complete Pulmonary Function Test   2. Daytime somnolence  Overnight Oximetry   3. Nasal dryness   recommend nasal moisturizer such as Nasogel or Ponaris   4.       GERD       increase Prilosec, reflux precautions    Immunizations:    Flu: 10/28/2019  Pneumovax 23: 10/16/2012  Prevnar 13: 10/8/2018    Plan:    1-increase prilosec to 20 mg twice daily   2-reflux precautions review   -no food or alcoholic beverages or soda for 3 hours before bed    -hob elevated x 20 degrees    Avoid known triggers include red sauce, chocolate, mint, candy, soda, spicy food  3-flonase for increased nasal congestion  4-nasogel spray moisturizer or ponaris  5-follow up with us in 6 mos with PFT  6-overnight oximetry, call us for results  7-taking Advair with thorough oral care after    This dictation was created using voice recognition software. The accuracy of the dictation is limited to the abilities of the software. I expect there may be some errors of grammar and possibly content.

## 2019-12-19 ENCOUNTER — HOME STUDY (OUTPATIENT)
Dept: PULMONOLOGY | Facility: HOSPICE | Age: 69
End: 2019-12-19
Attending: PHYSICIAN ASSISTANT
Payer: MEDICARE

## 2019-12-19 DIAGNOSIS — R40.0 DAYTIME SOMNOLENCE: ICD-10-CM

## 2019-12-19 PROCEDURE — 94762 N-INVAS EAR/PLS OXIMTRY CONT: CPT | Performed by: INTERNAL MEDICINE

## 2019-12-20 NOTE — PROCEDURES
Overnight oximetry was completed on December 19, 2019.  Study was 7 hours and 44 minutes on 2.5 L oxygen.  The patient had excellent saturations for the first 4 hours, for 1 hour had low saturations, unclear if this was the result of falling asleep during that brief interval, or if the oxygen was off during that interval but saturations for approximately 1 to 2 hours were low at average 85%.  Maintenance of saturation for the first interval and then subsequently are noted.  Clinical correlation needed

## 2019-12-23 ENCOUNTER — HOSPITAL ENCOUNTER (OUTPATIENT)
Dept: RADIOLOGY | Facility: MEDICAL CENTER | Age: 69
End: 2019-12-23
Attending: FAMILY MEDICINE
Payer: MEDICARE

## 2019-12-23 DIAGNOSIS — Z12.31 VISIT FOR SCREENING MAMMOGRAM: ICD-10-CM

## 2019-12-23 PROCEDURE — 77063 BREAST TOMOSYNTHESIS BI: CPT

## 2020-03-06 ENCOUNTER — OFFICE VISIT (OUTPATIENT)
Dept: CARDIOLOGY | Facility: MEDICAL CENTER | Age: 70
End: 2020-03-06
Payer: MEDICARE

## 2020-03-06 VITALS
HEIGHT: 65 IN | BODY MASS INDEX: 28.32 KG/M2 | WEIGHT: 170 LBS | DIASTOLIC BLOOD PRESSURE: 68 MMHG | OXYGEN SATURATION: 98 % | SYSTOLIC BLOOD PRESSURE: 116 MMHG | HEART RATE: 62 BPM

## 2020-03-06 DIAGNOSIS — J41.0 SIMPLE CHRONIC BRONCHITIS (HCC): ICD-10-CM

## 2020-03-06 DIAGNOSIS — Z95.818 PRESENCE OF WATCHMAN LEFT ATRIAL APPENDAGE CLOSURE DEVICE: ICD-10-CM

## 2020-03-06 DIAGNOSIS — Z79.899 HIGH RISK MEDICATION USE: ICD-10-CM

## 2020-03-06 DIAGNOSIS — Z79.01 CHRONIC ANTICOAGULATION: ICD-10-CM

## 2020-03-06 DIAGNOSIS — Z51.81 ENCOUNTER FOR MONITORING FLECAINIDE THERAPY: ICD-10-CM

## 2020-03-06 DIAGNOSIS — G47.33 OBSTRUCTIVE SLEEP APNEA: ICD-10-CM

## 2020-03-06 DIAGNOSIS — I48.0 PAF (PAROXYSMAL ATRIAL FIBRILLATION) (HCC): ICD-10-CM

## 2020-03-06 DIAGNOSIS — G89.4 CHRONIC PAIN SYNDROME: ICD-10-CM

## 2020-03-06 DIAGNOSIS — Z79.899 ENCOUNTER FOR MONITORING FLECAINIDE THERAPY: ICD-10-CM

## 2020-03-06 DIAGNOSIS — Z82.49 FAMILY HISTORY OF CORONARY ARTERY DISEASE IN SISTER: ICD-10-CM

## 2020-03-06 DIAGNOSIS — R07.89 OTHER CHEST PAIN: ICD-10-CM

## 2020-03-06 DIAGNOSIS — I10 ESSENTIAL HYPERTENSION: ICD-10-CM

## 2020-03-06 DIAGNOSIS — R06.02 SOB (SHORTNESS OF BREATH): ICD-10-CM

## 2020-03-06 PROCEDURE — 99214 OFFICE O/P EST MOD 30 MIN: CPT | Performed by: INTERNAL MEDICINE

## 2020-03-06 RX ORDER — FAMOTIDINE 40 MG/1
TABLET, FILM COATED ORAL
COMMUNITY
Start: 2020-02-19 | End: 2020-10-12

## 2020-03-06 ASSESSMENT — FIBROSIS 4 INDEX: FIB4 SCORE: 0.83

## 2020-03-06 NOTE — LETTER
[facility]   [address]  [place] [address]  Phone: [phone]  Fax: [phone]              Allison Cespedes  1950    Encounter Date: 3/6/2020    Esther Gibson M.D.          PROGRESS NOTE:  Subjective:   Chief Complaint:   Chief Complaint   Patient presents with   • Atrial Fibrillation       Allison Cespedes is a 69 y.o. female who returns today for paroxysmal atrial fibrillation and antiarrhythmic medication.    I met her in the hospital , had afib, was on amio at the time.  She was very dizzy, had afib with RVR at the time.  She eventually got on flecainide.  She has symptomatic afib, has [employer] mobile, thinks she has done well on Flecainide.  Multaq was too expensive.  Status post watchman device with Dr. Lyles, off of anticoagulation, remains on aspirin 81 mg.  No prior TIA/CVA.    She has NSAIDs for osteoarthritis.    Sister with MI, stent.  Former smoker, quit in 1996.  Prior TB for her, her father had TB, she was treated, some scarring.  Has chronic bronchitis, PA through Dr. Hewitt.    Prior hyperlipidemia, lovastatin, no recent lipids, prior 2018, .  No history of diabetes, possible IFG.  No history of hypertension.  No history of autoimmune disease such as lupus or rheumatoid arthritis.  No chronic kidney disease.    Has O2 at night, not needing CPAP.    She is limited by severe back pain, gets injections.  She is not limited by chest pain, pressure or tightness with activity.   Has chronic dyspnea on exertion.  No magui orthopnea.  She notes trivial lower extremity swelling.   Rarely significant palpitations.  Some mild lightheadedness.  No presyncope/syncope.   No symptoms of leg claudication.   No stroke/TIA like symptoms.    Her  has had afib for many years, now has Watchman also.  From [place], retiring in March 2020.  , Evaristo, 83 yo, he is from here.    DATA REVIEWED by me:  ECG  9/19/2019  Sinus, rate 76    Echo 10/17/2018  Normal left ventricular systolic function.  Left ventricular ejection fraction is visually estimated to be 65%.  Normal diastolic function.  Normal inferior vena cava size and inspiratory collapse.  Estimated right ventricular systolic pressure is 34 mmHg.    Treadmill stress test 10/23/2019  Poor functional capacity, adequate for stress, negative for ischemia and QT prolongation    PET nuclear stress test 10/24/2018  The left ventricle wall motion is normal with stress and rest  imagings.  Measured resting ejection fraction is 72 %.        Most recent labs:     2-2018 , HDL 53, ,     Lab Results   Component Value Date/Time    HEMOGLOBIN 12.8 03/07/2019 04:53 AM    HEMATOCRIT 40.1 03/07/2019 04:53 AM    MCV 99.3 (H) 03/07/2019 04:53 AM    INR 2.3 04/22/2019      Lab Results   Component Value Date/Time    SODIUM 139 03/07/2019 04:53 AM    POTASSIUM 3.7 03/07/2019 04:53 AM    CHLORIDE 107 03/07/2019 04:53 AM    CO2 26 03/07/2019 04:53 AM    GLUCOSE 113 (H) 03/07/2019 04:53 AM    BUN 15 03/07/2019 04:53 AM    CREATININE 0.91 03/07/2019 04:53 AM      Lab Results   Component Value Date/Time    ASTSGOT 14 10/17/2018 12:49 AM    ALTSGPT 20 10/17/2018 12:49 AM    ALBUMIN 3.4 10/17/2018 12:49 AM      No results found for: CHOLSTRLTOT, LDL, HDL, TRIGLYCERIDE      Past Medical History:   Diagnosis Date   • Anemia    • Arthritis     fingers, hands, toes, feet (osteoarthritis)   • Asthma     inhalers daily   • Atrial fibrillation (HCC) 10/2018    New onset in office. Echocardiogram with normal LV size, LVEF 65%. Trace MR, trace TR. RVSP 34mmHg.   • Breath shortness     uses 2L oxygen at night ([facility])    • Bronchitis 11/2018    gets often   • Chronic anticoagulation    • COPD (chronic obstructive pulmonary disease) ([facility])    • Gynecological disorder     postmenopausal bleeding right now    • Heart burn    • Hypothyroidism    • Pleurisy    • Pneumonia 1989   •  Psychiatric problem     depression, anxiety   • Snoring    • Tuberculosis 1981    Treated for nine months     Past Surgical History:   Procedure Laterality Date   • MASS EXCISION GENERAL Left 2018    Procedure: MASS EXCISION GENERAL/ SUBCUTANEOUS MASS LEFT SHOULDER;  Surgeon: Swapna Rivas M.D.;  Location: SURGERY SAME DAY [facility];  Service: General   • CHOLECYSTECTOMY     • APPENDECTOMY  1960    had ruptured   • ARTHROSCOPY, KNEE     • MAMMOPLASTY AUGMENTATION     • PB ENLARGE BREAST WITH IMPLANT     • PB REMV 2ND CATARACT,CORN-SCLER SECTN       Family History   Problem Relation Age of Onset   • Non-contributory Mother    • Hypertension Mother    • Non-contributory Father    • Heart Disease Sister         MI, stent     Social History     Socioeconomic History   • Marital status:      Spouse name: Not on file   • Number of children: Not on file   • Years of education: Not on file   • Highest education level: Not on file   Occupational History   • Not on file   Social Needs   • Financial resource strain: Not on file   • Food insecurity     Worry: Not on file     Inability: Not on file   • Transportation needs     Medical: Not on file     Non-medical: Not on file   Tobacco Use   • Smoking status: Former Smoker     Packs/day: 2.00     Years: 30.00     Pack years: 60.00     Types: Cigarettes     Last attempt to quit: 2/3/1996     Years since quittin.1   • Smokeless tobacco: Never Used   • Tobacco comment: continued abstinance   Substance and Sexual Activity   • Alcohol use: No   • Drug use: No   • Sexual activity: Not on file   Lifestyle   • Physical activity     Days per week: Not on file     Minutes per session: Not on file   • Stress: Not on file   Relationships   • Social connections     Talks on phone: Not on file     Gets together: Not on file     Attends Yazdanism service: Not on file     Active member of club or organization: Not on file     Attends meetings of clubs or organizations:  Not on file     Relationship status: Not on file   • Intimate partner violence     Fear of current or ex partner: Not on file     Emotionally abused: Not on file     Physically abused: Not on file     Forced sexual activity: Not on file   Other Topics Concern   • Not on file   Social History Narrative   • Not on file     Allergies   Allergen Reactions   • Pcn [Penicillins]      Rxn as child       Current Outpatient Medications   Medication Sig Dispense Refill   • diclofenac EC (VOLTAREN) 50 MG Tablet Delayed Response Take  by mouth. Take 1 tablet by mouth two times a day     • famotidine (PEPCID) 40 MG Tab Take  by mouth. Take 1 tablet by mouth every night at bedtime     • LORazepam (ATIVAN) 1 MG Tab every evening.  0   • Estradiol 0.025 MG/24HR PATCH BIWEEKLY      • diclofenac EC (VOLTAREN) 75 MG Tablet Delayed Response TK 1 T PO BID  2   • flecainide (TAMBOCOR) 50 MG tablet Take 1 Tab by mouth 2 times a day. 180 Tab 3   • metoprolol SR (TOPROL XL) 25 MG TABLET SR 24 HR Take 1 Tab by mouth every day. 90 Tab 3   • aspirin EC 81 MG EC tablet Take 1 Tab by mouth every day. 30 Tab 6   • buPROPion (WELLBUTRIN XL) 150 MG XL tablet Take 150 mg by mouth every morning.     • escitalopram (LEXAPRO) 20 MG tablet Take 20 mg by mouth every morning.     • Probiotic Product (PROBIOTIC DAILY PO) Take 1 Cap by mouth 2 Times a Day.     • HYDROcodone-acetaminophen (NORCO) 5-325 MG Tab per tablet Take 1 Tab by mouth 2 times a day as needed.     • dicyclomine (BENTYL) 10 MG Cap Take 10 mg by mouth as needed.     • levalbuterol (XOPENEX HFA) 45 MCG/ACT inhaler INHALE 2 PUFFS BY MOUTH EVERY 4 HOURS AS NEEDED FOR SHORTNESS OF BREATH 3 Inhaler 3   • lovastatin (MEVACOR) 20 MG Tab Take 10 mg by mouth every evening.     • progesterone (PROMETRIUM) 100 MG CAPS Take 100 mg by mouth every bedtime.     • levothyroxine (SYNTHROID) 100 MCG TABS Take 100 mcg by mouth every bedtime.     • liothyronine (CYTOMEL) 5 MCG TABS Take 5 mcg by mouth 2 Times  "a Day.       No current facility-administered medications for this visit.        ROS  All others systems reviewed and negative.     Objective:     /68 (BP Location: Right arm, Patient Position: Sitting, BP Cuff Size: Adult)   Pulse 62   Ht 1.651 m (5' 5\")   Wt 77.1 kg (170 lb)   SpO2 98%  Body mass index is 28.29 kg/m².    Physical Exam   General: No acute distress. Well nourished.  HEENT: EOM grossly intact, no scleral icterus, no pharyngeal erythema.   Neck:  No JVD, no bruits, trachea midline  CVS: RRR. Normal S1, S2. No M/R/G. No LE edema.  2+ radial pulses, 1+ PT pulses  Resp: CTAB. No wheezing or crackles/rhonchi. Normal respiratory effort.  Abdomen: Soft, NT, no magui hepatomegaly.  MSK/Ext: No clubbing or cyanosis.  Skin: Warm and dry, no rashes.  Neurological: CN III-XII grossly intact. No focal deficits.   Psych: A&O x 3, appropriate affect, good judgement      Assessment:     1. PAF (paroxysmal atrial fibrillation) (HCC)     2. Presence of Watchman left atrial appendage closure device     3. Encounter for monitoring flecainide therapy     4. High risk medication use     5. Simple chronic bronchitis (HCC)     6. Chronic anticoagulation     7. Obstructive sleep apnea     8. Other chest pain     9. SOB (shortness of breath)     10. Essential hypertension     11. Family history of coronary artery disease in sister     12. Chronic pain syndrome         Medical Decision Making:  Today's Assessment / Status / Plan:     -Doing well on flecainide and BB  -No blood thinner, has Watchmans, on ASA only  -BP controlled  -She will check with PCP for updated lipids, goal LDL is under 100, on primary prevention statin, ASA and on BB also.  -Limited by OA, needing NSAIDs  -FH CAD  -For breakthrough afib, we will go up on flecainide.  -RTC 1 year, will MyChart for any changes.    Return in about 1 year (around 3/6/2021).    It is my pleasure to participate in the care of Ms. Cespedes.  Please do not hesitate to " contact me with questions or concerns.    Esthre Gibson MD, [facility]  Cardiologist [facility]    Please note that this dictation was created using voice recognition software. I have made every reasonable attempt to correct obvious errors, but it is possible there are errors of grammar and possibly content that I did not discover before finalizing the note.      Bishnu Du M.D.  [address] [address]  VIA Facsimile: [phone]

## 2020-03-06 NOTE — PROGRESS NOTES
Subjective:   Chief Complaint:   Chief Complaint   Patient presents with   • Atrial Fibrillation       Allison Cespedes is a 69 y.o. female who returns today for paroxysmal atrial fibrillation and antiarrhythmic medication.    I met her in the hospital , had afib, was on amio at the time.  She was very dizzy, had afib with RVR at the time.  She eventually got on flecainide.  She has symptomatic afib, has Kardia mobile, thinks she has done well on Flecainide.  Multaq was too expensive.  Status post watchman device with Dr. Lyles, off of anticoagulation, remains on aspirin 81 mg.  No prior TIA/CVA.    She has NSAIDs for osteoarthritis.    Sister with MI, stent.  Former smoker, quit in 1996.  Prior TB for her, her father had TB, she was treated, some scarring.  Has chronic bronchitis, PA through Dr. Hewitt.    Prior hyperlipidemia, lovastatin, no recent lipids, prior 2018, .  No history of diabetes, possible IFG.  No history of hypertension.  No history of autoimmune disease such as lupus or rheumatoid arthritis.  No chronic kidney disease.    Has O2 at night, not needing CPAP.    She is limited by severe back pain, gets injections.  She is not limited by chest pain, pressure or tightness with activity.   Has chronic dyspnea on exertion.  No magui orthopnea.  She notes trivial lower extremity swelling.   Rarely significant palpitations.  Some mild lightheadedness.  No presyncope/syncope.   No symptoms of leg claudication.   No stroke/TIA like symptoms.    Her  has had afib for many years, now has Watchman also.  From [place], retiring in March 2020.  , Evaristo, 85 yo, he is from here.    DATA REVIEWED by me:  ECG 9/19/2019  Sinus, rate 76    Echo 10/17/2018  Normal left ventricular systolic function.  Left ventricular ejection fraction is visually estimated to be 65%.  Normal diastolic function.  Normal inferior vena cava size and inspiratory collapse.  Estimated right ventricular systolic  pressure is 34 mmHg.    Treadmill stress test 10/23/2019  Poor functional capacity, adequate for stress, negative for ischemia and QT prolongation    PET nuclear stress test 10/24/2018  The left ventricle wall motion is normal with stress and rest  imagings.  Measured resting ejection fraction is 72 %.        Most recent labs:     2-2018 , HDL 53, ,     Lab Results   Component Value Date/Time    HEMOGLOBIN 12.8 03/07/2019 04:53 AM    HEMATOCRIT 40.1 03/07/2019 04:53 AM    MCV 99.3 (H) 03/07/2019 04:53 AM    INR 2.3 04/22/2019      Lab Results   Component Value Date/Time    SODIUM 139 03/07/2019 04:53 AM    POTASSIUM 3.7 03/07/2019 04:53 AM    CHLORIDE 107 03/07/2019 04:53 AM    CO2 26 03/07/2019 04:53 AM    GLUCOSE 113 (H) 03/07/2019 04:53 AM    BUN 15 03/07/2019 04:53 AM    CREATININE 0.91 03/07/2019 04:53 AM      Lab Results   Component Value Date/Time    ASTSGOT 14 10/17/2018 12:49 AM    ALTSGPT 20 10/17/2018 12:49 AM    ALBUMIN 3.4 10/17/2018 12:49 AM      No results found for: CHOLSTRLTOT, LDL, HDL, TRIGLYCERIDE      Past Medical History:   Diagnosis Date   • Anemia    • Arthritis     fingers, hands, toes, feet (osteoarthritis)   • Asthma     inhalers daily   • Atrial fibrillation (HCC) 10/2018    New onset in office. Echocardiogram with normal LV size, LVEF 65%. Trace MR, trace TR. RVSP 34mmHg.   • Breath shortness     uses 2L oxygen at night ([facility])    • Bronchitis 11/2018    gets often   • Chronic anticoagulation    • COPD (chronic obstructive pulmonary disease) (HCC)    • Gynecological disorder     postmenopausal bleeding right now    • Heart burn    • Hypothyroidism    • Pleurisy    • Pneumonia 1989   • Psychiatric problem     depression, anxiety   • Snoring    • Tuberculosis 1981    Treated for nine months     Past Surgical History:   Procedure Laterality Date   • MASS EXCISION GENERAL Left 7/6/2018    Procedure: MASS EXCISION GENERAL/ SUBCUTANEOUS MASS LEFT SHOULDER;  Surgeon: Swapna  KRISTIE Rivas M.D.;  Location: SURGERY SAME DAY [facility];  Service: General   • CHOLECYSTECTOMY     • APPENDECTOMY  1960    had ruptured   • ARTHROSCOPY, KNEE     • MAMMOPLASTY AUGMENTATION     • PB ENLARGE BREAST WITH IMPLANT     • PB REMV 2ND CATARACT,CORN-SCLER SECTN       Family History   Problem Relation Age of Onset   • Non-contributory Mother    • Hypertension Mother    • Non-contributory Father    • Heart Disease Sister         MI, stent     Social History     Socioeconomic History   • Marital status:      Spouse name: Not on file   • Number of children: Not on file   • Years of education: Not on file   • Highest education level: Not on file   Occupational History   • Not on file   Social Needs   • Financial resource strain: Not on file   • Food insecurity     Worry: Not on file     Inability: Not on file   • Transportation needs     Medical: Not on file     Non-medical: Not on file   Tobacco Use   • Smoking status: Former Smoker     Packs/day: 2.00     Years: 30.00     Pack years: 60.00     Types: Cigarettes     Last attempt to quit: 2/3/1996     Years since quittin.1   • Smokeless tobacco: Never Used   • Tobacco comment: continued abstinance   Substance and Sexual Activity   • Alcohol use: No   • Drug use: No   • Sexual activity: Not on file   Lifestyle   • Physical activity     Days per week: Not on file     Minutes per session: Not on file   • Stress: Not on file   Relationships   • Social connections     Talks on phone: Not on file     Gets together: Not on file     Attends [employer] service: Not on file     Active member of club or organization: Not on file     Attends meetings of clubs or organizations: Not on file     Relationship status: Not on file   • Intimate partner violence     Fear of current or ex partner: Not on file     Emotionally abused: Not on file     Physically abused: Not on file     Forced sexual activity: Not on file   Other Topics Concern   • Not on file   Social  "History Narrative   • Not on file     Allergies   Allergen Reactions   • Pcn [Penicillins]      Rxn as child       Current Outpatient Medications   Medication Sig Dispense Refill   • diclofenac EC (VOLTAREN) 50 MG Tablet Delayed Response Take  by mouth. Take 1 tablet by mouth two times a day     • famotidine (PEPCID) 40 MG Tab Take  by mouth. Take 1 tablet by mouth every night at bedtime     • LORazepam (ATIVAN) 1 MG Tab every evening.  0   • Estradiol 0.025 MG/24HR PATCH BIWEEKLY      • diclofenac EC (VOLTAREN) 75 MG Tablet Delayed Response TK 1 T PO BID  2   • flecainide (TAMBOCOR) 50 MG tablet Take 1 Tab by mouth 2 times a day. 180 Tab 3   • metoprolol SR (TOPROL XL) 25 MG TABLET SR 24 HR Take 1 Tab by mouth every day. 90 Tab 3   • aspirin EC 81 MG EC tablet Take 1 Tab by mouth every day. 30 Tab 6   • buPROPion (WELLBUTRIN XL) 150 MG XL tablet Take 150 mg by mouth every morning.     • escitalopram (LEXAPRO) 20 MG tablet Take 20 mg by mouth every morning.     • Probiotic Product (PROBIOTIC DAILY PO) Take 1 Cap by mouth 2 Times a Day.     • HYDROcodone-acetaminophen (NORCO) 5-325 MG Tab per tablet Take 1 Tab by mouth 2 times a day as needed.     • dicyclomine (BENTYL) 10 MG Cap Take 10 mg by mouth as needed.     • levalbuterol (XOPENEX HFA) 45 MCG/ACT inhaler INHALE 2 PUFFS BY MOUTH EVERY 4 HOURS AS NEEDED FOR SHORTNESS OF BREATH 3 Inhaler 3   • lovastatin (MEVACOR) 20 MG Tab Take 10 mg by mouth every evening.     • progesterone (PROMETRIUM) 100 MG CAPS Take 100 mg by mouth every bedtime.     • levothyroxine (SYNTHROID) 100 MCG TABS Take 100 mcg by mouth every bedtime.     • liothyronine (CYTOMEL) 5 MCG TABS Take 5 mcg by mouth 2 Times a Day.       No current facility-administered medications for this visit.        ROS  All others systems reviewed and negative.     Objective:     /68 (BP Location: Right arm, Patient Position: Sitting, BP Cuff Size: Adult)   Pulse 62   Ht 1.651 m (5' 5\")   Wt 77.1 kg (170 " lb)   SpO2 98%  Body mass index is 28.29 kg/m².    Physical Exam   General: No acute distress. Well nourished.  HEENT: EOM grossly intact, no scleral icterus, no pharyngeal erythema.   Neck:  No JVD, no bruits, trachea midline  CVS: RRR. Normal S1, S2. No M/R/G. No LE edema.  2+ radial pulses, 1+ PT pulses  Resp: CTAB. No wheezing or crackles/rhonchi. Normal respiratory effort.  Abdomen: Soft, NT, no magui hepatomegaly.  MSK/Ext: No clubbing or cyanosis.  Skin: Warm and dry, no rashes.  Neurological: CN III-XII grossly intact. No focal deficits.   Psych: A&O x 3, appropriate affect, good judgement      Assessment:     1. PAF (paroxysmal atrial fibrillation) (HCC)     2. Presence of Watchman left atrial appendage closure device     3. Encounter for monitoring flecainide therapy     4. High risk medication use     5. Simple chronic bronchitis (HCC)     6. Chronic anticoagulation     7. Obstructive sleep apnea     8. Other chest pain     9. SOB (shortness of breath)     10. Essential hypertension     11. Family history of coronary artery disease in sister     12. Chronic pain syndrome         Medical Decision Making:  Today's Assessment / Status / Plan:     -Doing well on flecainide and BB  -No blood thinner, has Watchmans, on ASA only  -BP controlled  -She will check with PCP for updated lipids, goal LDL is under 100, on primary prevention statin, ASA and on BB also.  -Limited by OA, needing NSAIDs  -FH CAD  -For breakthrough afib, we will go up on flecainide.  -RTC 1 year, will MyChart for any changes.    Return in about 1 year (around 3/6/2021).    It is my pleasure to participate in the care of Ms. Cespedes.  Please do not hesitate to contact me with questions or concerns.    Esther Gibson MD, [facility]  Cardiologist [facility]    Please note that this dictation was created using voice recognition software. I have made every reasonable attempt to correct obvious errors, but it is  possible there are errors of grammar and possibly content that I did not discover before finalizing the note.

## 2020-05-18 DIAGNOSIS — I48.91 ATRIAL FIBRILLATION WITH RVR (HCC): Primary | ICD-10-CM

## 2020-05-19 RX ORDER — METOPROLOL SUCCINATE 25 MG/1
TABLET, EXTENDED RELEASE ORAL
Qty: 90 TAB | Refills: 3 | Status: SHIPPED | OUTPATIENT
Start: 2020-05-19 | End: 2021-02-25 | Stop reason: SDUPTHER

## 2020-06-18 ENCOUNTER — NON-PROVIDER VISIT (OUTPATIENT)
Dept: PULMONOLOGY | Facility: HOSPICE | Age: 70
End: 2020-06-18
Attending: PHYSICIAN ASSISTANT
Payer: MEDICARE

## 2020-06-18 ENCOUNTER — OFFICE VISIT (OUTPATIENT)
Dept: PULMONOLOGY | Facility: HOSPICE | Age: 70
End: 2020-06-18
Payer: MEDICARE

## 2020-06-18 VITALS — HEIGHT: 65 IN | BODY MASS INDEX: 27.99 KG/M2 | WEIGHT: 168 LBS

## 2020-06-18 VITALS
RESPIRATION RATE: 16 BRPM | BODY MASS INDEX: 27.99 KG/M2 | HEART RATE: 80 BPM | OXYGEN SATURATION: 96 % | HEIGHT: 65 IN | DIASTOLIC BLOOD PRESSURE: 68 MMHG | SYSTOLIC BLOOD PRESSURE: 104 MMHG | WEIGHT: 168 LBS

## 2020-06-18 DIAGNOSIS — J44.9 CHRONIC OBSTRUCTIVE PULMONARY DISEASE, UNSPECIFIED COPD TYPE (HCC): ICD-10-CM

## 2020-06-18 DIAGNOSIS — Z22.7 TB LUNG, LATENT: ICD-10-CM

## 2020-06-18 DIAGNOSIS — I48.0 PAROXYSMAL ATRIAL FIBRILLATION (HCC): ICD-10-CM

## 2020-06-18 DIAGNOSIS — G47.34 NOCTURNAL HYPOXIA: ICD-10-CM

## 2020-06-18 DIAGNOSIS — R06.02 SOB (SHORTNESS OF BREATH): ICD-10-CM

## 2020-06-18 PROCEDURE — 94729 DIFFUSING CAPACITY: CPT | Performed by: INTERNAL MEDICINE

## 2020-06-18 PROCEDURE — 94726 PLETHYSMOGRAPHY LUNG VOLUMES: CPT | Performed by: INTERNAL MEDICINE

## 2020-06-18 PROCEDURE — 94060 EVALUATION OF WHEEZING: CPT | Performed by: INTERNAL MEDICINE

## 2020-06-18 PROCEDURE — 99214 OFFICE O/P EST MOD 30 MIN: CPT | Mod: 25 | Performed by: INTERNAL MEDICINE

## 2020-06-18 ASSESSMENT — PULMONARY FUNCTION TESTS
FEV1/FVC_PERCENT_CHANGE: 200
FEV1/FVC_PERCENT_CHANGE: 1
FEV1_PERCENT_CHANGE: 4
FEV1/FVC_PERCENT_PREDICTED: 76
FVC_PERCENT_PREDICTED: 78
FEV1: 1.82
FEV1: 1.91
FEV1/FVC_PERCENT_PREDICTED: 98
FEV1/FVC_PERCENT_PREDICTED: 96
FEV1/FVC: 74.61
FEV1/FVC: 75
FEV1_PERCENT_PREDICTED: 76
FEV1/FVC: 73
FEV1_LLN: 2.00
FEV1_PERCENT_PREDICTED: 79
FEV1/FVC_PREDICTED: 76
FVC: 2.56
FEV1_PERCENT_CHANGE: 2
FVC_PERCENT_PREDICTED: 81
FVC: 2.49
FVC_PREDICTED: 3.15
FEV1/FVC_PERCENT_LLN: 64
FEV1/FVC_PERCENT_PREDICTED: 97
FVC_LLN: 2.63
FEV1/FVC: 73
FEV1/FVC_PERCENT_PREDICTED: 97
FEV1_PREDICTED: 2.39

## 2020-06-18 ASSESSMENT — FIBROSIS 4 INDEX
FIB4 SCORE: 0.83
FIB4 SCORE: 0.83

## 2020-06-18 NOTE — PROGRESS NOTES
Chief Complaint   Patient presents with   • COPD     follow up, last OV 11/13/2019   • Results     PFT today, CNOX 3/6/2020       HPI: This patient is a 69 y.o. female who returns for COPD.  Last visit was November 2019.  Pulmonary function testing shows mild obstructive ventilatory defect with FEV1 1.91 L or 79%.  These show stability from former testing.  She is compliant with Advair and Xopenex as needed.  Exertional dyspnea has been very bothersome to her and progressive.  She feels very limited in activities of daily living.  Denies sputum production, wheezing or chest tightness.  She is an ex-smoker, denies current tobacco use.  Her last chest CAT scan was unremarkable.  There was an incidental 1.4 cm right lower lobe pulmonary cyst.  No evidence of pulmonary parenchymal disease.      Past Medical History:   Diagnosis Date   • Anemia    • Arthritis     fingers, hands, toes, feet (osteoarthritis)   • Asthma     inhalers daily   • Atrial fibrillation (HCC) 10/2018    New onset in office. Echocardiogram with normal LV size, LVEF 65%. Trace MR, trace TR. RVSP 34mmHg.   • Breath shortness     uses 2L oxygen at night ([facility])    • Bronchitis 11/2018    gets often   • Chronic anticoagulation    • COPD (chronic obstructive pulmonary disease) (HCC)    • Gynecological disorder     postmenopausal bleeding right now    • Heart burn    • Hypothyroidism    • Pleurisy    • Pneumonia 1989   • Psychiatric problem     depression, anxiety   • Snoring    • Tuberculosis 1981    Treated for nine months       Social History     Socioeconomic History   • Marital status:      Spouse name: Not on file   • Number of children: Not on file   • Years of education: Not on file   • Highest education level: Not on file   Occupational History   • Not on file   Social Needs   • Financial resource strain: Not on file   • Food insecurity     Worry: Not on file     Inability: Not on file   • Transportation needs     Medical: Not on file      Non-medical: Not on file   Tobacco Use   • Smoking status: Former Smoker     Packs/day: 2.00     Years: 30.00     Pack years: 60.00     Types: Cigarettes     Last attempt to quit: 2/3/1996     Years since quittin.3   • Smokeless tobacco: Never Used   • Tobacco comment: continued abstinance   Substance and Sexual Activity   • Alcohol use: No   • Drug use: No   • Sexual activity: Not on file   Lifestyle   • Physical activity     Days per week: Not on file     Minutes per session: Not on file   • Stress: Not on file   Relationships   • Social connections     Talks on phone: Not on file     Gets together: Not on file     Attends Cheondoism service: Not on file     Active member of club or organization: Not on file     Attends meetings of clubs or organizations: Not on file     Relationship status: Not on file   • Intimate partner violence     Fear of current or ex partner: Not on file     Emotionally abused: Not on file     Physically abused: Not on file     Forced sexual activity: Not on file   Other Topics Concern   • Not on file   Social History Narrative   • Not on file       Family History   Problem Relation Age of Onset   • Non-contributory Mother    • Hypertension Mother    • Non-contributory Father    • Heart Disease Sister         MI, stent       Current Outpatient Medications on File Prior to Visit   Medication Sig Dispense Refill   • metoprolol SR (TOPROL XL) 25 MG TABLET SR 24 HR TAKE 1 TABLET BY MOUTH EVERY DAY 90 Tab 3   • diclofenac EC (VOLTAREN) 50 MG Tablet Delayed Response Take  by mouth. Take 1 tablet by mouth two times a day     • LORazepam (ATIVAN) 1 MG Tab every evening.  0   • flecainide (TAMBOCOR) 50 MG tablet Take 1 Tab by mouth 2 times a day. 180 Tab 3   • aspirin EC 81 MG EC tablet Take 1 Tab by mouth every day. 30 Tab 6   • buPROPion (WELLBUTRIN XL) 150 MG XL tablet Take 150 mg by mouth every morning.     • HYDROcodone-acetaminophen (NORCO) 5-325 MG Tab per tablet Take 1 Tab by mouth 2  "times a day as needed.     • dicyclomine (BENTYL) 10 MG Cap Take 10 mg by mouth as needed.     • levalbuterol (XOPENEX HFA) 45 MCG/ACT inhaler INHALE 2 PUFFS BY MOUTH EVERY 4 HOURS AS NEEDED FOR SHORTNESS OF BREATH 3 Inhaler 3   • lovastatin (MEVACOR) 20 MG Tab Take 10 mg by mouth every evening.     • progesterone (PROMETRIUM) 100 MG CAPS Take 100 mg by mouth every bedtime.     • levothyroxine (SYNTHROID) 100 MCG TABS Take 100 mcg by mouth every bedtime.     • liothyronine (CYTOMEL) 5 MCG TABS Take 5 mcg by mouth 2 Times a Day.     • famotidine (PEPCID) 40 MG Tab Take  by mouth. Take 1 tablet by mouth every night at bedtime     • Estradiol 0.025 MG/24HR PATCH BIWEEKLY      • diclofenac EC (VOLTAREN) 75 MG Tablet Delayed Response TK 1 T PO BID  2   • escitalopram (LEXAPRO) 20 MG tablet Take 20 mg by mouth every morning.     • Probiotic Product (PROBIOTIC DAILY PO) Take 1 Cap by mouth 2 Times a Day.       No current facility-administered medications on file prior to visit.        Allergies: Pcn [penicillins]    ROS:   Constitutional: Denies fevers, chills, night sweats,+ fatigue, denies weight loss  Eyes: Denies vision loss, pain, drainage, double vision  Ears, Nose, Throat: Denies earache, difficulty hearing, tinnitus, nasal congestion, hoarseness  Cardiovascular: Denies chest pain, tightness, palpitations, orthopnea or edema  Respiratory:As in HPI  Sleep: Denies daytime sleepiness, snoring, apneas, insomnia, morning headaches  GI: Denies heartburn, dysphagia, nausea, abdominal pain, diarrhea or constipation  : Denies frequent urination, hematuria, discharge or painful urination  Musculoskeletal: Denies back pain, painful joints, sore muscles  Neurological: +weakness, denies headaches  Skin: No rashes    /68 (BP Location: Left arm, Patient Position: Sitting, BP Cuff Size: Adult)   Pulse 80   Resp 16   Ht 1.651 m (5' 5\")   Wt 76.2 kg (168 lb)   SpO2 96%     Physical Exam:  Appearance: Well-nourished, " well-developed, in no acute distress  HEENT: Normocephalic, atraumatic, white sclera, PERRLA  Neck: No adenopathy or masses  Respiratory: no intercostal retractions or accessory muscle use  Lungs auscultation: Clear to auscultation bilaterally  Cardiovascular: Regular rate rhythm. No murmurs, rubs or gallops.  No LE edema  Abdomen: soft, nondistended  Gait: Normal  Digits: No clubbing, cyanosis  Motor: No focal deficits  Orientation: Oriented to time, person and place    Diagnosis:  1. SOB (shortness of breath)  PULMONARY FUNCTION TESTS -Test requested: Other (add comment); Include MIPS/MEPS? Yes   2. Chronic obstructive pulmonary disease, unspecified COPD type (HCC)     3. Nocturnal hypoxia     4. TB lung, latent      -s/p INH   5. Paroxysmal atrial fibrillation (HCC)         Plan:  Patient's exertional dyspnea is out of proportion to degree of pulmonary impairment based on pulmonary function testing.  Her former chest CAT scan was unremarkable.    Recommend MIPS and MEPS to exclude neuromuscular weakness.  Return in about 2 months (around 8/18/2020).

## 2020-06-18 NOTE — PROCEDURES
Tech: Aundrea Kimble, RRT  Tech notes: Good patient effort & cooperation.  The results of this test meet the ATS/ERS standards for acceptability & reproducibility.  Test was performed on the [employer] Body Plethysmograph-Elite DX system.  Predicted values were GLI-2012 for spirometry, GLI- 2017 for DLCO, ITS for Lung Volumes.  The DLCO was uncorrected for Hgb.  A bronchodilator of Ventolin HFA -2puffs via spacer administered.  DLCO performed during dilation period.    The FVC is 2.49 L or 78%, FEV1 is 1.82 L or 76%, FEV1/FVC: 73%.  Total lung capacity: 88%.  DLCO: 96%.  No bronchodilator response.    Interpretation:  PFTs show moderate obstructive ventilatory defect.  Normal gas transfer.

## 2020-06-18 NOTE — TELEPHONE ENCOUNTER
Have we ever prescribed this med? No.  If yes, what date? sample    Last OV: 06/18/20    Next OV: 8/18/20    DX: COPD     Medications:   Requested Prescriptions     Pending Prescriptions Disp Refills   • umeclidinium-vilanterol (ANORO ELLIPTA) 62.5-25 MCG/INH AEROSOL POWDER, BREATH ACTIVATED inhaler 1 Inhaler 0     Sig: Inhale 1 Puff by mouth every day.

## 2020-06-18 NOTE — TELEPHONE ENCOUNTER
Have we ever prescribed this med? Yes.  If yes, what date? 6/18/20    Last OV:6/18/20    Next OV: 8/18/20    DX: COPD    Medications: sample of anoro

## 2020-07-14 ENCOUNTER — APPOINTMENT (OUTPATIENT)
Dept: PULMONOLOGY | Facility: HOSPICE | Age: 70
End: 2020-07-14
Attending: INTERNAL MEDICINE
Payer: MEDICARE

## 2020-07-28 DIAGNOSIS — I48.91 ATRIAL FIBRILLATION WITH RVR (HCC): ICD-10-CM

## 2020-07-28 RX ORDER — FLECAINIDE ACETATE 50 MG/1
TABLET ORAL
Qty: 180 TAB | Refills: 3 | Status: SHIPPED | OUTPATIENT
Start: 2020-07-28 | End: 2021-02-25 | Stop reason: SDUPTHER

## 2020-07-31 ENCOUNTER — APPOINTMENT (RX ONLY)
Dept: URBAN - METROPOLITAN AREA CLINIC 20 | Facility: CLINIC | Age: 70
Setting detail: DERMATOLOGY
End: 2020-07-31

## 2020-07-31 PROBLEM — D48.5 NEOPLASM OF UNCERTAIN BEHAVIOR OF SKIN: Status: ACTIVE | Noted: 2020-07-31

## 2020-07-31 PROCEDURE — ? BIOPSY BY SHAVE METHOD

## 2020-07-31 PROCEDURE — 11102 TANGNTL BX SKIN SINGLE LES: CPT

## 2020-07-31 NOTE — PROCEDURE: BIOPSY BY SHAVE METHOD
Detail Level: Detailed
Depth Of Biopsy: dermis
Was A Bandage Applied: Yes
Size Of Lesion In Cm: 0.8
X Size Of Lesion In Cm: 0.6
Biopsy Type: H and E
Biopsy Method: Dermablade
Anesthesia Type: 1% lidocaine with epinephrine
Anesthesia Volume In Cc: 0.5
Additional Anesthesia Volume In Cc (Will Not Render If 0): 0
Hemostasis: Drysol
Wound Care: Petrolatum
Dressing: bandage
Destruction After The Procedure: No
Type Of Destruction Used: Curettage
Curettage Text: The wound bed was treated with curettage after the biopsy was performed.
Cryotherapy Text: The wound bed was treated with cryotherapy after the biopsy was performed.
Electrodesiccation Text: The wound bed was treated with electrodesiccation after the biopsy was performed.
Electrodesiccation And Curettage Text: The wound bed was treated with electrodesiccation and curettage after the biopsy was performed.
Silver Nitrate Text: The wound bed was treated with silver nitrate after the biopsy was performed.
Lab: [id number]
Lab Facility: 
Consent: Written consent was obtained and risks were reviewed including but not limited to scarring, infection, bleeding, scabbing, incomplete removal, nerve damage and allergy to anesthesia.
Post-Care Instructions: I reviewed with the patient in detail post-care instructions. Patient is to keep the biopsy site dry overnight, and then apply bacitracin twice daily until healed. Patient may apply hydrogen peroxide soaks to remove any crusting.
Notification Instructions: Patient will be notified of biopsy results. However, patient instructed to call the office if not contacted within 2 weeks.
Billing Type: Third-Party Bill
Information: Selecting Yes will display possible errors in your note based on the variables you have selected. This validation is only offered as a suggestion for you. PLEASE NOTE THAT THE VALIDATION TEXT WILL BE REMOVED WHEN YOU FINALIZE YOUR NOTE. IF YOU WANT TO FAX A PRELIMINARY NOTE YOU WILL NEED TO TOGGLE THIS TO 'NO' IF YOU DO NOT WANT IT IN YOUR FAXED NOTE.

## 2020-07-31 NOTE — PROCEDURE: MIPS QUALITY
Quality 226: Preventive Care And Screening: Tobacco Use: Screening And Cessation Intervention: Patient screened for tobacco use and is an ex/non-smoker
Quality 111:Pneumonia Vaccination Status For Older Adults: Pneumococcal Vaccination Previously Received
Quality 130: Documentation Of Current Medications In The Medical Record: Current Medications Documented
Quality 402: Tobacco Use And Help With Quitting Among Adolescents: Patient screened for tobacco and is an ex-smoker
Detail Level: Detailed
Quality 110: Preventive Care And Screening: Influenza Immunization: Influenza Immunization Administered during Influenza season

## 2020-09-16 ENCOUNTER — APPOINTMENT (RX ONLY)
Dept: URBAN - METROPOLITAN AREA CLINIC 20 | Facility: CLINIC | Age: 70
Setting detail: DERMATOLOGY
End: 2020-09-16

## 2020-09-16 DIAGNOSIS — D18.0 HEMANGIOMA: ICD-10-CM

## 2020-09-16 DIAGNOSIS — L82.1 OTHER SEBORRHEIC KERATOSIS: ICD-10-CM

## 2020-09-16 DIAGNOSIS — Z71.89 OTHER SPECIFIED COUNSELING: ICD-10-CM

## 2020-09-16 DIAGNOSIS — L81.4 OTHER MELANIN HYPERPIGMENTATION: ICD-10-CM

## 2020-09-16 DIAGNOSIS — D22 MELANOCYTIC NEVI: ICD-10-CM

## 2020-09-16 PROBLEM — D22.5 MELANOCYTIC NEVI OF TRUNK: Status: ACTIVE | Noted: 2020-09-16

## 2020-09-16 PROBLEM — D22.71 MELANOCYTIC NEVI OF RIGHT LOWER LIMB, INCLUDING HIP: Status: ACTIVE | Noted: 2020-09-16

## 2020-09-16 PROBLEM — D22.61 MELANOCYTIC NEVI OF RIGHT UPPER LIMB, INCLUDING SHOULDER: Status: ACTIVE | Noted: 2020-09-16

## 2020-09-16 PROBLEM — D22.62 MELANOCYTIC NEVI OF LEFT UPPER LIMB, INCLUDING SHOULDER: Status: ACTIVE | Noted: 2020-09-16

## 2020-09-16 PROBLEM — D22.72 MELANOCYTIC NEVI OF LEFT LOWER LIMB, INCLUDING HIP: Status: ACTIVE | Noted: 2020-09-16

## 2020-09-16 PROBLEM — D18.01 HEMANGIOMA OF SKIN AND SUBCUTANEOUS TISSUE: Status: ACTIVE | Noted: 2020-09-16

## 2020-09-16 PROCEDURE — ? COUNSELING

## 2020-09-16 PROCEDURE — 99214 OFFICE O/P EST MOD 30 MIN: CPT

## 2020-09-16 ASSESSMENT — LOCATION SIMPLE DESCRIPTION DERM
LOCATION SIMPLE: LEFT FOREARM
LOCATION SIMPLE: RIGHT UPPER ARM
LOCATION SIMPLE: RIGHT FOREARM
LOCATION SIMPLE: ABDOMEN
LOCATION SIMPLE: RIGHT CALF
LOCATION SIMPLE: RIGHT SHOULDER
LOCATION SIMPLE: CHEST
LOCATION SIMPLE: LEFT UPPER ARM
LOCATION SIMPLE: RIGHT KNEE
LOCATION SIMPLE: RIGHT UPPER BACK
LOCATION SIMPLE: LEFT SHOULDER
LOCATION SIMPLE: LEFT UPPER BACK
LOCATION SIMPLE: LEFT PRETIBIAL REGION
LOCATION SIMPLE: RIGHT PRETIBIAL REGION
LOCATION SIMPLE: RIGHT THIGH
LOCATION SIMPLE: LEFT LOWER BACK
LOCATION SIMPLE: LEFT CALF
LOCATION SIMPLE: LEFT THIGH
LOCATION SIMPLE: LEFT KNEE

## 2020-09-16 ASSESSMENT — LOCATION DETAILED DESCRIPTION DERM
LOCATION DETAILED: RIGHT ANTECUBITAL SKIN
LOCATION DETAILED: LEFT POSTERIOR SHOULDER
LOCATION DETAILED: LEFT KNEE
LOCATION DETAILED: RIGHT PROXIMAL PRETIBIAL REGION
LOCATION DETAILED: RIGHT VENTRAL PROXIMAL FOREARM
LOCATION DETAILED: RIGHT ANTERIOR PROXIMAL UPPER ARM
LOCATION DETAILED: LEFT PROXIMAL CALF
LOCATION DETAILED: LOWER STERNUM
LOCATION DETAILED: RIGHT ANTERIOR DISTAL THIGH
LOCATION DETAILED: LEFT INFERIOR MEDIAL MIDBACK
LOCATION DETAILED: RIGHT KNEE
LOCATION DETAILED: LEFT ANTERIOR DISTAL UPPER ARM
LOCATION DETAILED: LEFT PROXIMAL PRETIBIAL REGION
LOCATION DETAILED: LEFT ANTERIOR PROXIMAL UPPER ARM
LOCATION DETAILED: RIGHT MID-UPPER BACK
LOCATION DETAILED: PERIUMBILICAL SKIN
LOCATION DETAILED: LEFT VENTRAL PROXIMAL FOREARM
LOCATION DETAILED: LEFT ANTERIOR DISTAL THIGH
LOCATION DETAILED: RIGHT DISTAL CALF
LOCATION DETAILED: RIGHT ANTERIOR DISTAL UPPER ARM
LOCATION DETAILED: EPIGASTRIC SKIN
LOCATION DETAILED: LEFT MEDIAL UPPER BACK
LOCATION DETAILED: RIGHT POSTERIOR SHOULDER

## 2020-09-16 ASSESSMENT — LOCATION ZONE DERM
LOCATION ZONE: TRUNK
LOCATION ZONE: ARM
LOCATION ZONE: LEG

## 2020-09-16 NOTE — PROCEDURE: MIPS QUALITY
Quality 402: Tobacco Use And Help With Quitting Among Adolescents: Patient screened for tobacco and is an ex-smoker
Detail Level: Detailed
Quality 130: Documentation Of Current Medications In The Medical Record: Current Medications Documented
Quality 110: Preventive Care And Screening: Influenza Immunization: Influenza Immunization Administered during Influenza season
Quality 111:Pneumonia Vaccination Status For Older Adults: Pneumococcal Vaccination Previously Received
Quality 226: Preventive Care And Screening: Tobacco Use: Screening And Cessation Intervention: Patient screened for tobacco use and is an ex/non-smoker

## 2020-09-23 ENCOUNTER — IMMUNIZATION (OUTPATIENT)
Dept: SOCIAL WORK | Facility: CLINIC | Age: 70
End: 2020-09-23
Payer: MEDICARE

## 2020-09-23 DIAGNOSIS — Z23 NEED FOR VACCINATION: ICD-10-CM

## 2020-09-23 PROCEDURE — 90732 PPSV23 VACC 2 YRS+ SUBQ/IM: CPT | Performed by: REGISTERED NURSE

## 2020-09-23 PROCEDURE — G0008 ADMIN INFLUENZA VIRUS VAC: HCPCS | Performed by: REGISTERED NURSE

## 2020-09-23 PROCEDURE — G0009 ADMIN PNEUMOCOCCAL VACCINE: HCPCS | Performed by: REGISTERED NURSE

## 2020-09-23 PROCEDURE — 90662 IIV NO PRSV INCREASED AG IM: CPT | Performed by: REGISTERED NURSE

## 2020-10-12 ENCOUNTER — PRE-ADMISSION TESTING (OUTPATIENT)
Dept: ADMISSIONS | Facility: MEDICAL CENTER | Age: 70
End: 2020-10-12
Attending: ORTHOPAEDIC SURGERY
Payer: MEDICARE

## 2020-10-12 DIAGNOSIS — Z01.810 PRE-OPERATIVE CARDIOVASCULAR EXAMINATION: ICD-10-CM

## 2020-10-12 DIAGNOSIS — Z01.812 PRE-OPERATIVE LABORATORY EXAMINATION: ICD-10-CM

## 2020-10-12 LAB
COVID ORDER STATUS COVID19: NORMAL
EKG IMPRESSION: NORMAL
ERYTHROCYTE [DISTWIDTH] IN BLOOD BY AUTOMATED COUNT: 45.5 FL (ref 35.9–50)
HCT VFR BLD AUTO: 44 % (ref 37–47)
HGB BLD-MCNC: 14.2 G/DL (ref 12–16)
MCH RBC QN AUTO: 31.1 PG (ref 27–33)
MCHC RBC AUTO-ENTMCNC: 32.3 G/DL (ref 33.6–35)
MCV RBC AUTO: 96.5 FL (ref 81.4–97.8)
PLATELET # BLD AUTO: 342 K/UL (ref 164–446)
PMV BLD AUTO: 10.8 FL (ref 9–12.9)
RBC # BLD AUTO: 4.56 M/UL (ref 4.2–5.4)
SARS-COV-2 RNA RESP QL NAA+PROBE: NOTDETECTED
SPECIMEN SOURCE: NORMAL
WBC # BLD AUTO: 9.3 K/UL (ref 4.8–10.8)

## 2020-10-12 PROCEDURE — 85027 COMPLETE CBC AUTOMATED: CPT

## 2020-10-12 PROCEDURE — 36415 COLL VENOUS BLD VENIPUNCTURE: CPT

## 2020-10-12 PROCEDURE — 93010 ELECTROCARDIOGRAM REPORT: CPT | Performed by: INTERNAL MEDICINE

## 2020-10-12 PROCEDURE — 93005 ELECTROCARDIOGRAM TRACING: CPT

## 2020-10-12 PROCEDURE — U0003 INFECTIOUS AGENT DETECTION BY NUCLEIC ACID (DNA OR RNA); SEVERE ACUTE RESPIRATORY SYNDROME CORONAVIRUS 2 (SARS-COV-2) (CORONAVIRUS DISEASE [COVID-19]), AMPLIFIED PROBE TECHNIQUE, MAKING USE OF HIGH THROUGHPUT TECHNOLOGIES AS DESCRIBED BY CMS-2020-01-R: HCPCS

## 2020-10-12 RX ORDER — MEDROXYPROGESTERONE ACETATE 2.5 MG/1
2.5 TABLET ORAL DAILY
COMMUNITY

## 2020-10-12 RX ORDER — VITAMIN B COMPLEX
2000 TABLET ORAL DAILY
COMMUNITY

## 2020-10-12 ASSESSMENT — FIBROSIS 4 INDEX: FIB4 SCORE: 0.83

## 2020-10-16 ENCOUNTER — ANESTHESIA EVENT (OUTPATIENT)
Dept: SURGERY | Facility: MEDICAL CENTER | Age: 70
End: 2020-10-16
Payer: MEDICARE

## 2020-10-16 ENCOUNTER — ANESTHESIA (OUTPATIENT)
Dept: SURGERY | Facility: MEDICAL CENTER | Age: 70
End: 2020-10-16
Payer: MEDICARE

## 2020-10-16 ENCOUNTER — HOSPITAL ENCOUNTER (OUTPATIENT)
Facility: MEDICAL CENTER | Age: 70
End: 2020-10-16
Attending: ORTHOPAEDIC SURGERY | Admitting: ORTHOPAEDIC SURGERY
Payer: MEDICARE

## 2020-10-16 VITALS
HEIGHT: 65 IN | RESPIRATION RATE: 16 BRPM | OXYGEN SATURATION: 93 % | BODY MASS INDEX: 28.47 KG/M2 | HEART RATE: 88 BPM | WEIGHT: 170.86 LBS | TEMPERATURE: 97 F | DIASTOLIC BLOOD PRESSURE: 50 MMHG | SYSTOLIC BLOOD PRESSURE: 96 MMHG

## 2020-10-16 PROCEDURE — 700105 HCHG RX REV CODE 258: Performed by: ORTHOPAEDIC SURGERY

## 2020-10-16 PROCEDURE — 700102 HCHG RX REV CODE 250 W/ 637 OVERRIDE(OP): Performed by: ANESTHESIOLOGY

## 2020-10-16 PROCEDURE — 160002 HCHG RECOVERY MINUTES (STAT): Performed by: ORTHOPAEDIC SURGERY

## 2020-10-16 PROCEDURE — 160009 HCHG ANES TIME/MIN: Performed by: ORTHOPAEDIC SURGERY

## 2020-10-16 PROCEDURE — 700102 HCHG RX REV CODE 250 W/ 637 OVERRIDE(OP): Performed by: ORTHOPAEDIC SURGERY

## 2020-10-16 PROCEDURE — 160035 HCHG PACU - 1ST 60 MINS PHASE I: Performed by: ORTHOPAEDIC SURGERY

## 2020-10-16 PROCEDURE — A9270 NON-COVERED ITEM OR SERVICE: HCPCS | Performed by: ORTHOPAEDIC SURGERY

## 2020-10-16 PROCEDURE — 160039 HCHG SURGERY MINUTES - EA ADDL 1 MIN LEVEL 3: Performed by: ORTHOPAEDIC SURGERY

## 2020-10-16 PROCEDURE — 501838 HCHG SUTURE GENERAL: Performed by: ORTHOPAEDIC SURGERY

## 2020-10-16 PROCEDURE — 160048 HCHG OR STATISTICAL LEVEL 1-5: Performed by: ORTHOPAEDIC SURGERY

## 2020-10-16 PROCEDURE — 500881 HCHG PACK, EXTREMITY: Performed by: ORTHOPAEDIC SURGERY

## 2020-10-16 PROCEDURE — 160028 HCHG SURGERY MINUTES - 1ST 30 MINS LEVEL 3: Performed by: ORTHOPAEDIC SURGERY

## 2020-10-16 PROCEDURE — 160036 HCHG PACU - EA ADDL 30 MINS PHASE I: Performed by: ORTHOPAEDIC SURGERY

## 2020-10-16 PROCEDURE — 700101 HCHG RX REV CODE 250: Performed by: ANESTHESIOLOGY

## 2020-10-16 PROCEDURE — 700101 HCHG RX REV CODE 250: Performed by: ORTHOPAEDIC SURGERY

## 2020-10-16 PROCEDURE — 700111 HCHG RX REV CODE 636 W/ 250 OVERRIDE (IP): Performed by: ANESTHESIOLOGY

## 2020-10-16 PROCEDURE — A9270 NON-COVERED ITEM OR SERVICE: HCPCS | Performed by: ANESTHESIOLOGY

## 2020-10-16 RX ORDER — HALOPERIDOL 5 MG/ML
1 INJECTION INTRAMUSCULAR
Status: DISCONTINUED | OUTPATIENT
Start: 2020-10-16 | End: 2020-10-16 | Stop reason: HOSPADM

## 2020-10-16 RX ORDER — CELECOXIB 200 MG/1
200 CAPSULE ORAL ONCE
Status: DISCONTINUED | OUTPATIENT
Start: 2020-10-16 | End: 2020-10-16 | Stop reason: HOSPADM

## 2020-10-16 RX ORDER — HYDROMORPHONE HYDROCHLORIDE 2 MG/ML
0.2 INJECTION, SOLUTION INTRAMUSCULAR; INTRAVENOUS; SUBCUTANEOUS
Status: DISCONTINUED | OUTPATIENT
Start: 2020-10-16 | End: 2020-10-16 | Stop reason: HOSPADM

## 2020-10-16 RX ORDER — HYDROMORPHONE HYDROCHLORIDE 2 MG/ML
0.4 INJECTION, SOLUTION INTRAMUSCULAR; INTRAVENOUS; SUBCUTANEOUS
Status: DISCONTINUED | OUTPATIENT
Start: 2020-10-16 | End: 2020-10-16 | Stop reason: HOSPADM

## 2020-10-16 RX ORDER — OXYCODONE HCL 5 MG/5 ML
5 SOLUTION, ORAL ORAL
Status: COMPLETED | OUTPATIENT
Start: 2020-10-16 | End: 2020-10-16

## 2020-10-16 RX ORDER — LIDOCAINE HYDROCHLORIDE 20 MG/ML
INJECTION, SOLUTION EPIDURAL; INFILTRATION; INTRACAUDAL; PERINEURAL PRN
Status: DISCONTINUED | OUTPATIENT
Start: 2020-10-16 | End: 2020-10-16 | Stop reason: SURG

## 2020-10-16 RX ORDER — ONDANSETRON 2 MG/ML
4 INJECTION INTRAMUSCULAR; INTRAVENOUS
Status: DISCONTINUED | OUTPATIENT
Start: 2020-10-16 | End: 2020-10-16 | Stop reason: HOSPADM

## 2020-10-16 RX ORDER — HYDROMORPHONE HYDROCHLORIDE 2 MG/ML
0.1 INJECTION, SOLUTION INTRAMUSCULAR; INTRAVENOUS; SUBCUTANEOUS
Status: DISCONTINUED | OUTPATIENT
Start: 2020-10-16 | End: 2020-10-16 | Stop reason: HOSPADM

## 2020-10-16 RX ORDER — SODIUM CHLORIDE, SODIUM LACTATE, POTASSIUM CHLORIDE, CALCIUM CHLORIDE 600; 310; 30; 20 MG/100ML; MG/100ML; MG/100ML; MG/100ML
INJECTION, SOLUTION INTRAVENOUS CONTINUOUS
Status: DISCONTINUED | OUTPATIENT
Start: 2020-10-16 | End: 2020-10-16 | Stop reason: HOSPADM

## 2020-10-16 RX ORDER — MIDAZOLAM HYDROCHLORIDE 1 MG/ML
INJECTION INTRAMUSCULAR; INTRAVENOUS PRN
Status: DISCONTINUED | OUTPATIENT
Start: 2020-10-16 | End: 2020-10-16 | Stop reason: SURG

## 2020-10-16 RX ORDER — DIPHENHYDRAMINE HYDROCHLORIDE 50 MG/ML
12.5 INJECTION INTRAMUSCULAR; INTRAVENOUS
Status: DISCONTINUED | OUTPATIENT
Start: 2020-10-16 | End: 2020-10-16 | Stop reason: HOSPADM

## 2020-10-16 RX ORDER — BUPIVACAINE HYDROCHLORIDE AND EPINEPHRINE 5; 5 MG/ML; UG/ML
INJECTION, SOLUTION EPIDURAL; INTRACAUDAL; PERINEURAL
Status: DISCONTINUED | OUTPATIENT
Start: 2020-10-16 | End: 2020-10-16 | Stop reason: HOSPADM

## 2020-10-16 RX ORDER — DEXAMETHASONE SODIUM PHOSPHATE 4 MG/ML
INJECTION, SOLUTION INTRA-ARTICULAR; INTRALESIONAL; INTRAMUSCULAR; INTRAVENOUS; SOFT TISSUE PRN
Status: DISCONTINUED | OUTPATIENT
Start: 2020-10-16 | End: 2020-10-16 | Stop reason: SURG

## 2020-10-16 RX ORDER — BUPIVACAINE HYDROCHLORIDE AND EPINEPHRINE 5; 5 MG/ML; UG/ML
INJECTION, SOLUTION EPIDURAL; INTRACAUDAL; PERINEURAL
Status: DISCONTINUED
Start: 2020-10-16 | End: 2020-10-16 | Stop reason: HOSPADM

## 2020-10-16 RX ORDER — ACETAMINOPHEN 500 MG
1000 TABLET ORAL ONCE
Status: DISCONTINUED | OUTPATIENT
Start: 2020-10-16 | End: 2020-10-16 | Stop reason: HOSPADM

## 2020-10-16 RX ORDER — CEFAZOLIN SODIUM 1 G/3ML
INJECTION, POWDER, FOR SOLUTION INTRAMUSCULAR; INTRAVENOUS PRN
Status: DISCONTINUED | OUTPATIENT
Start: 2020-10-16 | End: 2020-10-16 | Stop reason: SURG

## 2020-10-16 RX ORDER — ONDANSETRON 2 MG/ML
INJECTION INTRAMUSCULAR; INTRAVENOUS PRN
Status: DISCONTINUED | OUTPATIENT
Start: 2020-10-16 | End: 2020-10-16 | Stop reason: SURG

## 2020-10-16 RX ORDER — OXYCODONE HCL 5 MG/5 ML
10 SOLUTION, ORAL ORAL
Status: COMPLETED | OUTPATIENT
Start: 2020-10-16 | End: 2020-10-16

## 2020-10-16 RX ADMIN — POVIDONE IODINE 15 ML: 100 SOLUTION TOPICAL at 06:50

## 2020-10-16 RX ADMIN — ONDANSETRON 4 MG: 2 INJECTION INTRAMUSCULAR; INTRAVENOUS at 07:43

## 2020-10-16 RX ADMIN — FENTANYL CITRATE 100 MCG: 50 INJECTION, SOLUTION INTRAMUSCULAR; INTRAVENOUS at 07:43

## 2020-10-16 RX ADMIN — PROPOFOL 145 MG: 10 INJECTION, EMULSION INTRAVENOUS at 07:43

## 2020-10-16 RX ADMIN — DEXAMETHASONE SODIUM PHOSPHATE 4 MG: 4 INJECTION, SOLUTION INTRA-ARTICULAR; INTRALESIONAL; INTRAMUSCULAR; INTRAVENOUS; SOFT TISSUE at 07:43

## 2020-10-16 RX ADMIN — SODIUM CHLORIDE, POTASSIUM CHLORIDE, SODIUM LACTATE AND CALCIUM CHLORIDE: 600; 310; 30; 20 INJECTION, SOLUTION INTRAVENOUS at 06:49

## 2020-10-16 RX ADMIN — CEFAZOLIN 2 G: 330 INJECTION, POWDER, FOR SOLUTION INTRAMUSCULAR; INTRAVENOUS at 07:36

## 2020-10-16 RX ADMIN — OXYCODONE HYDROCHLORIDE 10 MG: 5 SOLUTION ORAL at 09:38

## 2020-10-16 RX ADMIN — LIDOCAINE HYDROCHLORIDE 100 MG: 20 INJECTION, SOLUTION EPIDURAL; INFILTRATION; INTRACAUDAL at 07:43

## 2020-10-16 RX ADMIN — MIDAZOLAM HYDROCHLORIDE 2 MG: 1 INJECTION, SOLUTION INTRAMUSCULAR; INTRAVENOUS at 07:43

## 2020-10-16 ASSESSMENT — PAIN DESCRIPTION - PAIN TYPE
TYPE: SURGICAL PAIN

## 2020-10-16 ASSESSMENT — PAIN SCALES - GENERAL: PAIN_LEVEL: 3

## 2020-10-16 NOTE — ANESTHESIA PREPROCEDURE EVALUATION
Relevant Problems   ANESTHESIA   (+) Obstructive sleep apnea      CARDIAC   (+) Atrial fibrillation (HCC) [I48.91]   (+) Atrial fibrillation with RVR (HCC)      ENDO   (+) Hypothyroidism      Other   (+) Arthritis       Physical Exam    Airway   Mallampati: II  TM distance: >3 FB  Neck ROM: full       Cardiovascular - normal exam  Rhythm: regular  Rate: normal  (-) murmur     Dental - normal exam           Pulmonary - normal exam  Breath sounds clear to auscultation     Abdominal    Neurological - normal exam                 Anesthesia Plan    ASA 2       Plan - general       Airway plan will be LMA        Induction: intravenous    Postoperative Plan: Postoperative administration of opioids is intended.    Pertinent diagnostic labs and testing reviewed    Informed Consent:    Anesthetic plan and risks discussed with patient.    Use of blood products discussed with: patient whom consented to blood products.

## 2020-10-16 NOTE — PROGRESS NOTES
1420 late entry - pt received in PACU 2 - awake alert, states elbow uncomfortable more than wrist. Ice applied to shoulder. Fingers warm and dry with good CSM and 2 sec CVR. Daughter at bedside discharge instructions reviewed with pt and family. All questions and concerns addressed. pt discharged via own w/c to private car - acc by family and  Krystal and RN to help with transfer

## 2020-10-16 NOTE — DISCHARGE INSTRUCTIONS
ACTIVITY: Rest and take it easy for the first 24 hours.  A responsible adult is recommended to remain with you during that time.  It is normal to feel sleepy.  We encourage you to not do anything that requires balance, judgment or coordination.    MILD FLU-LIKE SYMPTOMS ARE NORMAL. YOU MAY EXPERIENCE GENERALIZED MUSCLE ACHES, THROAT IRRITATION, HEADACHE AND/OR SOME NAUSEA.    FOR 24 HOURS DO NOT:  Drive, operate machinery or run household appliances.  Drink beer or alcoholic beverages.   Make important decisions or sign legal documents.    SPECIAL INSTRUCTIONS: Ice and Elevate arm as much as possible.     DIET: To avoid nausea, slowly advance diet as tolerated, avoiding spicy or greasy foods for the first day.  Add more substantial food to your diet according to your physician's instructions.  Babies can be fed formula or breast milk as soon as they are hungry.  INCREASE FLUIDS AND FIBER TO AVOID CONSTIPATION.    SURGICAL DRESSING/BATHING: keep dressing clean and dry until seen by Dr Kay. Ok to shower tomorrow but cover arm to keep dry.    FOLLOW-UP APPOINTMENT:  A follow-up appointment should be arranged with your doctor; call to schedule.    You should CALL YOUR PHYSICIAN if you develop:  Fever greater than 101 degrees F.  Pain not relieved by medication, or persistent nausea or vomiting.  Excessive bleeding (blood soaking through dressing) or unexpected drainage from the wound.  Extreme redness or swelling around the incision site, drainage of pus or foul smelling drainage.  Inability to urinate or empty your bladder within 8 hours.  Problems with breathing or chest pain.    You should call 911 if you develop problems with breathing or chest pain.  If you are unable to contact your doctor or surgical center, you should go to the nearest emergency room or urgent care center.  Physician's telephone #: [phone] Dr. Kay    If any questions arise, call your doctor.  If your doctor is not  available, please feel free to call the Surgical Center at [phone]. The Contact Center is open Monday through Friday 7AM to 5PM and may speak to a nurse at [phone], or toll free at [phone].     A registered nurse may call you a few days after your surgery to see how you are doing after your procedure.    MEDICATIONS: Resume taking daily medication.  Take prescribed pain medication with food.  If no medication is prescribed, you may take non-aspirin pain medication if needed.  PAIN MEDICATION CAN BE VERY CONSTIPATING.  Take a stool softener or laxative such as senokot, pericolace, or milk of magnesia if needed.    Prescription given for percocet.  Last pain medication given at none given    If your physician has prescribed pain medication that includes Acetaminophen (Tylenol), do not take additional Acetaminophen (Tylenol) while taking the prescribed medication.    Depression / Suicide Risk    As you are discharged from this [facility] facility, it is important to learn how to keep safe from harming yourself.    Recognize the warning signs:  · Abrupt changes in personality, positive or negative- including increase in energy   · Giving away possessions  · Change in eating patterns- significant weight changes-  positive or negative  · Change in sleeping patterns- unable to sleep or sleeping all the time   · Unwillingness or inability to communicate  · Depression  · Unusual sadness, discouragement and loneliness  · Talk of wanting to die  · Neglect of personal appearance   · Rebelliousness- reckless behavior  · Withdrawal from people/activities they love  · Confusion- inability to concentrate     If you or a loved one observes any of these behaviors or has concerns about self-harm, here's what you can do:  · Talk about it- your feelings and reasons for harming yourself  · Remove any means that you might use to hurt yourself (examples: pills, rope, extension cords, firearm)  · Get professional help  from the community (Mental Health, Substance Abuse, psychological counseling)  · Do not be alone:Call your Safe Contact- someone whom you trust who will be there for you.  · Call your local CRISIS HOTLINE 480-4097 or 863-050-9071  · Call your local Children's Mobile Crisis Response Team [place] (487) 775-2489 or www.Ventrix  · Call the toll free National Suicide Prevention Hotlines   · National Suicide Prevention Lifeline 948-096-YWZS (0114)  · National Hope Line Network 800-SUICIDE (448-8800)

## 2020-10-16 NOTE — OP REPORT
DATE OF SERVICE:  10/16/2020    PREOPERATIVE DIAGNOSIS:  Left thumb basilar joint arthritis.    POSTOPERATIVE DIAGNOSIS:  Left thumb basilar joint arthritis.    PROCEDURE:  Left thumb carpometacarpal excisional arthroplasty with FCR tendon   graft.    ANESTHESIA:  General.    SURGEON:  Fidel Kay MD    OPERATIVE PROCEDURE:  Patient taken to the operating room following induction   of general anesthesia, left upper extremity was prepped and draped in routine   fashion.  Limb exsanguinated with an elastic bandage.  The tourniquet inflated   to 250 mmHg.  Left thumb was exposed through incision on the radial aspect of   the thumb metacarpal curved ulnarward at the wrist crease alternating sharp   and blunt dissection carried down through the skin and subcutaneous tissue.    Meticulous care was taken to identify and protect the sensory nerves.  Thenar   muscles were elevated off the metacarpal trapezium and subperiosteal   dissection was used to expose the metacarpotrapezial and scaphotrapezial   joints.  The metacarpotrapezial joint was markedly arthritic.  The   scaphotrapezoid joint still had good articular cartilage.  A rongeur was used   to remove the trapezium in a piecemeal fashion protecting the surrounding   structures.  The scaphotrapezoid joint looked good.  The base of the   metacarpal was debrided.  A drill hole was made from dorsal to volar exiting   the beak of the metacarpal with a 3.5 mm drill bit.    The flexor carpi radialis tendon was identified in the distal third forearm,   half of it was divided, split longitudinally, brought out in the depths of the   wound.  Tendon was brought up through the base of the metacarpal, brought   proximal to the metacarpal and then several turns were taken around the other   half of the flexor carpi radialis longus and the abductor pollicis longus   creating an anchovy effect and stabilizing the thumb metacarpal base.  The   tendon was sutured in place  with 2-0 Vicryl.  At this point, the metacarpal   base was stable.  The thumb MP joint rested in flexion and there was a good   gap between the metacarpal and the scaphoid.  The joint was irrigated   copiously throughout the procedure.  Skin edges infiltrated with 0.5%   Marcaine.  Tourniquet released.  Hemostasis obtained with electrocautery.    Capsule closed with 2-0 Vicryl.  The tendon was sutured in place with 2-0   Vicryl and skin closed with 4-0 nylon.  Compressive dressing and splint   applied.  The arm was elevated and the patient taken to recovery room in   satisfactory condition.       ____________________________________     MD LAST SANCHEZ / CIERRA    DD:  10/16/2020 11:00:41  DT:  10/16/2020 11:55:29    D#:  [id number]  Job#:  [id number]

## 2020-10-16 NOTE — ANESTHESIA PROCEDURE NOTES
Airway    Date/Time: 10/16/2020 7:44 AM  Performed by: Randy Arellano M.D.  Authorized by: Randy Arellano M.D.     Location:  OR  Urgency:  Elective  Indications for Airway Management:  Anesthesia      Spontaneous Ventilation: absent    Sedation Level:  Deep  Preoxygenated: Yes    Final Airway Type:  Supraglottic airway  Final Supraglottic Airway:  Standard LMA    SGA Size:  4  Number of Attempts at Approach:  1

## 2020-10-16 NOTE — OR SURGEON
Immediate Post OP Note    PreOp Diagnosis: L th c-mc oa    PostOp Diagnosis: same    Procedure(s):  ARTHROPLASTY, FINGER - THUMB CARPOMETACARPAL EXCISIONAL - Wound Class: Clean  TRANSFER, TENDON - FOR FLEXOR CARPI RADIALIS TENDON GRAFT - Wound Class: Clean    Surgeon(s):  Fidel Kay M.D.    Anesthesiologist/Type of Anesthesia:  Anesthesiologist: Randy Arellano M.D./General    Surgical Staff:  Circulator: Brionna Iverson R.N.; Carlos Dong R.N.  Scrub Person: Jose Lucero    Specimens removed if any:  * No specimens in log *    Estimated Blood Loss: 0    Findings: 0    Complications: 0        10/16/2020 9:19 AM Fidel Kay M.D.

## 2020-10-16 NOTE — ANESTHESIA POSTPROCEDURE EVALUATION
Patient: Allison Cespedes    Procedure Summary     Date: 10/16/20 Room / Location: [facility] ROOM 21 / SURGERY SAME DAY [facility]    Anesthesia Start: 0736 Anesthesia Stop: 0844    Procedures:       ARTHROPLASTY, FINGER - THUMB CARPOMETACARPAL EXCISIONAL (Left Thumb)      TRANSFER, TENDON - FOR FLEXOR CARPI RADIALIS TENDON GRAFT (Left Hand) Diagnosis: (OA CARPOMETACARPAL JOINT LEFT THUMB)    Surgeon: Fidel Kay M.D. Responsible Provider: Randy Arellano M.D.    Anesthesia Type: general ASA Status: 2          Final Anesthesia Type: general  Last vitals  BP   Blood Pressure : 103/54    Temp   36.1 °C (97 °F)    Pulse   Pulse: (!) 105   Resp   16    SpO2   97 %      Anesthesia Post Evaluation    Patient location during evaluation: PACU  Patient participation: complete - patient participated  Level of consciousness: awake and alert  Pain score: 3    Airway patency: patent  Anesthetic complications: no  Cardiovascular status: hemodynamically stable  Respiratory status: acceptable  Hydration status: euvolemic    PONV: none           Nurse Pain Score: 4 (NPRS)

## 2020-10-16 NOTE — OR NURSING
0839-Received from OR via [facility]. S/P-left thumb arthroplasty. Dressing in place. Good cms. Oral airway in place.  Bedside report received from RN. And Anesthesiologist.    0850-airway removed.     0900-sitting up drinking juice. Arm elevated on pillow and Ice.     0930-instructions explained to  on the phone.  Phase 1 complete.    1000-meets discharge criteria. Iv removed. Instructions explained to  on the phone and wife at bedside.  All questions answered. Discharged home with responsible party. Escorted to car via wheelchair.

## 2020-12-31 DIAGNOSIS — Z00.6 RESEARCH STUDY PATIENT: ICD-10-CM

## 2021-01-06 ENCOUNTER — HOSPITAL ENCOUNTER (OUTPATIENT)
Facility: MEDICAL CENTER | Age: 71
End: 2021-01-06
Attending: PATHOLOGY
Payer: COMMERCIAL

## 2021-01-06 DIAGNOSIS — Z00.6 RESEARCH STUDY PATIENT: ICD-10-CM

## 2021-01-15 DIAGNOSIS — Z23 NEED FOR VACCINATION: ICD-10-CM

## 2021-01-15 LAB
ELF SCORE: 10.5
RELATIVE RISK: ABNORMAL
RISK GROUP: ABNORMAL
RISK: 23.6 %

## 2021-01-22 ENCOUNTER — TELEPHONE (OUTPATIENT)
Dept: SLEEP MEDICINE | Facility: MEDICAL CENTER | Age: 71
End: 2021-01-22

## 2021-01-22 RX ORDER — ALBUTEROL SULFATE 90 UG/1
2 AEROSOL, METERED RESPIRATORY (INHALATION) EVERY 6 HOURS PRN
Qty: 8.5 G | Refills: 2 | Status: SHIPPED | OUTPATIENT
Start: 2021-01-22 | End: 2021-01-26 | Stop reason: SDUPTHER

## 2021-01-22 NOTE — TELEPHONE ENCOUNTER
Received request via: Pharmacy    Was the patient seen in the last year in this department? Yes  Last visit 6/18/20  Next appt 6/18/21  Does the patient have an active prescription (recently filled or refills available) for medication(s) requested? No     Requesting  Albuterol sulfate HFA 90mcg/actualtion aerosol inhaler    Routed to Dr. Hewitt for review

## 2021-01-26 RX ORDER — ALBUTEROL SULFATE 90 UG/1
2 AEROSOL, METERED RESPIRATORY (INHALATION) EVERY 6 HOURS PRN
Qty: 8.5 G | Refills: 0 | Status: SHIPPED | OUTPATIENT
Start: 2021-01-26 | End: 2021-02-02 | Stop reason: SDUPTHER

## 2021-01-27 NOTE — TELEPHONE ENCOUNTER
Received request via: Pharmacy    Was the patient seen in the last year in this department? Yes  6/18/20  Does the patient have an active prescription (recently filled or refills available) for medication(s) requested? No     Request for rx to be sent to mail order pharmacy [facility]  Routed to  for review

## 2021-02-03 RX ORDER — ALBUTEROL SULFATE 90 UG/1
2 AEROSOL, METERED RESPIRATORY (INHALATION) EVERY 6 HOURS PRN
Qty: 8.5 G | Refills: 0 | Status: SHIPPED | OUTPATIENT
Start: 2021-02-03 | End: 2021-08-30

## 2021-02-11 ENCOUNTER — HOSPITAL ENCOUNTER (OUTPATIENT)
Dept: RADIOLOGY | Facility: MEDICAL CENTER | Age: 71
End: 2021-02-11
Attending: FAMILY MEDICINE
Payer: MEDICARE

## 2021-02-11 DIAGNOSIS — Z12.31 ENCOUNTER FOR SCREENING MAMMOGRAM FOR MALIGNANT NEOPLASM OF BREAST: ICD-10-CM

## 2021-02-11 PROCEDURE — 77063 BREAST TOMOSYNTHESIS BI: CPT

## 2021-02-25 DIAGNOSIS — I48.91 ATRIAL FIBRILLATION WITH RVR (HCC): ICD-10-CM

## 2021-02-25 RX ORDER — METOPROLOL SUCCINATE 25 MG/1
25 TABLET, EXTENDED RELEASE ORAL
Qty: 90 TABLET | Refills: 0 | Status: SHIPPED | OUTPATIENT
Start: 2021-02-25 | End: 2021-03-03 | Stop reason: SDUPTHER

## 2021-02-25 RX ORDER — FLECAINIDE ACETATE 50 MG/1
TABLET ORAL
Qty: 180 TABLET | Refills: 0 | Status: SHIPPED | OUTPATIENT
Start: 2021-02-25 | End: 2021-03-03 | Stop reason: SDUPTHER

## 2021-03-03 DIAGNOSIS — I48.91 ATRIAL FIBRILLATION WITH RVR (HCC): ICD-10-CM

## 2021-03-03 RX ORDER — FLECAINIDE ACETATE 50 MG/1
TABLET ORAL
Qty: 180 TABLET | Refills: 0 | Status: SHIPPED | OUTPATIENT
Start: 2021-03-03 | End: 2021-06-14

## 2021-03-03 RX ORDER — METOPROLOL SUCCINATE 25 MG/1
25 TABLET, EXTENDED RELEASE ORAL
Qty: 90 TABLET | Refills: 0 | Status: SHIPPED | OUTPATIENT
Start: 2021-03-03 | End: 2021-05-28

## 2021-04-19 ENCOUNTER — APPOINTMENT (OUTPATIENT)
Dept: SLEEP MEDICINE | Facility: MEDICAL CENTER | Age: 71
End: 2021-04-19
Attending: INTERNAL MEDICINE
Payer: MEDICARE

## 2021-05-18 ENCOUNTER — NON-PROVIDER VISIT (OUTPATIENT)
Dept: SLEEP MEDICINE | Facility: MEDICAL CENTER | Age: 71
End: 2021-05-18
Payer: MEDICARE

## 2021-05-18 VITALS — HEIGHT: 65 IN | WEIGHT: 171 LBS | BODY MASS INDEX: 28.49 KG/M2

## 2021-05-18 DIAGNOSIS — R06.02 SOB (SHORTNESS OF BREATH): ICD-10-CM

## 2021-05-18 PROCEDURE — 94010 BREATHING CAPACITY TEST: CPT | Performed by: INTERNAL MEDICINE

## 2021-05-18 ASSESSMENT — PULMONARY FUNCTION TESTS
FVC_PERCENT_PREDICTED: 89
FEV1/FVC: 75
FEV1/FVC_PERCENT_PREDICTED: 97
FEV1: 1.98
FEV1/FVC_PREDICTED: 77.29
FEV1_PREDICTED: 2.28
FVC_PREDICTED: 2.95
FVC: 2.65
FEV1_PREDICTED: 86

## 2021-05-18 NOTE — PROCEDURES
Tech: Aundrea Kimble, RRT, CPFT  Tech notes: Good patient effort & cooperation.  The results of this test meet the ATS/ERS standards for acceptability & reproducibility.  Test was performed on the [employer] Body Plethysmograph-Elite DX system.  Predicted values were GLI-2012 for spirometry.    Interpretation:  1.  No evidence of obstruction by FEV1/FVC ratio, however there does appear to be scooping of the expiratory limb which correlates with a reduction in the FEF 25-75 correlating with small airway obstruction.  Clinically correlate.  2.  Maximum inspiratory and expiratory pressures are normal when adjusted for predicted FEV1.  3.  Flow volume loop consistent with the above interpretation.  4.  Compared to prior testing in 2020, the FEV1 has improved from 1.82 L to 1.90 L.    __________  Solomon Douglas MD  Pulmonary and Critical Care Medicine  [facility]

## 2021-05-28 DIAGNOSIS — I48.91 ATRIAL FIBRILLATION WITH RVR (HCC): ICD-10-CM

## 2021-06-02 RX ORDER — METOPROLOL SUCCINATE 25 MG/1
TABLET, EXTENDED RELEASE ORAL
Qty: 90 TABLET | Refills: 0 | Status: SHIPPED | OUTPATIENT
Start: 2021-06-02 | End: 2021-08-30 | Stop reason: SDUPTHER

## 2021-06-14 DIAGNOSIS — I48.91 ATRIAL FIBRILLATION WITH RVR (HCC): ICD-10-CM

## 2021-06-14 RX ORDER — FLECAINIDE ACETATE 50 MG/1
TABLET ORAL
Qty: 180 TABLET | Refills: 0 | Status: SHIPPED | OUTPATIENT
Start: 2021-06-14 | End: 2021-08-30 | Stop reason: SDUPTHER

## 2021-06-18 ENCOUNTER — TELEMEDICINE (OUTPATIENT)
Dept: SLEEP MEDICINE | Facility: MEDICAL CENTER | Age: 71
End: 2021-06-18
Payer: MEDICARE

## 2021-06-18 VITALS — WEIGHT: 187 LBS | HEIGHT: 67 IN | BODY MASS INDEX: 29.35 KG/M2

## 2021-06-18 DIAGNOSIS — J44.9 CHRONIC OBSTRUCTIVE PULMONARY DISEASE, UNSPECIFIED COPD TYPE (HCC): ICD-10-CM

## 2021-06-18 PROCEDURE — 99213 OFFICE O/P EST LOW 20 MIN: CPT | Mod: 95 | Performed by: INTERNAL MEDICINE

## 2021-06-18 RX ORDER — LOVASTATIN 10 MG/1
TABLET ORAL
COMMUNITY
Start: 2021-05-28 | End: 2021-08-30

## 2021-06-18 RX ORDER — DICLOFENAC SODIUM 75 MG/1
TABLET, DELAYED RELEASE ORAL
COMMUNITY
Start: 2021-06-07 | End: 2021-08-30

## 2021-06-18 ASSESSMENT — PAIN SCALES - GENERAL: PAINLEVEL: NO PAIN

## 2021-06-18 NOTE — PROGRESS NOTES
Telemedicine: Established Patient   This evaluation was conducted via [employer] using secure and encrypted videoconferencing technology. The patient's identity was verified.     Subjective:   CC:   Chief Complaint   Patient presents with   • Follow-Up     Shortness of Breath       Allison Cespedes is a 70 y.o. female presenting for evaluation and management of COPD. Her dyspnea has improved with weaning off analgesics.  Her mother had passed away in the past year and she was also grieving; now feels improved emotionally.  Denies cough, wheezing, chest tightness. FeV1 has improved from 1.82 L to 1.9 L.  Discontinued Advair inhaler, and uses Albuterol HFA prn only. She denies current tobacco use.  Her last chest CAT scan was unremarkable.  There was an incidental 1.4 cm right lower lobe pulmonary cyst.       ROS:As in HPI      Allergies   Allergen Reactions   • Pcn [Penicillins]      Rxn as child       Current medicines (including changes today)  Current Outpatient Medications   Medication Sig Dispense Refill   • metoprolol SR (TOPROL XL) 25 MG TABLET SR 24 HR TAKE 1 TABLET EVERY DAY  . (APPOINTMENT IS NEEDED) 90 tablet 0   • vitamin D (CHOLECALCIFEROL) 1000 Unit (25 mcg) Tab Take 2,000 Units by mouth every day.     • medroxyPROGESTERone (PROVERA) 2.5 MG Tab Take 2.5 mg by mouth every day.     • diclofenac EC (VOLTAREN) 50 MG Tablet Delayed Response Take  by mouth. Take 1 tablet by mouth two times a day     • LORazepam (ATIVAN) 1 MG Tab Take 1 mg by mouth at bedtime as needed.  0   • Estradiol 0.025 MG/24HR PATCH BIWEEKLY      • aspirin EC 81 MG EC tablet Take 1 Tab by mouth every day. 30 Tab 6   • buPROPion (WELLBUTRIN XL) 150 MG XL tablet Take 150 mg by mouth every morning.     • escitalopram (LEXAPRO) 20 MG tablet Take 20 mg by mouth every morning.     • levalbuterol (XOPENEX HFA) 45 MCG/ACT inhaler INHALE 2 PUFFS BY MOUTH EVERY 4 HOURS AS NEEDED FOR SHORTNESS OF BREATH 3 Inhaler 3   • lovastatin (MEVACOR)  20 MG Tab Take 10 mg by mouth every evening.     • levothyroxine (SYNTHROID) 100 MCG TABS Take 100 mcg by mouth every bedtime.     • liothyronine (CYTOMEL) 5 MCG TABS Take 5 mcg by mouth 2 Times a Day.     • diclofenac DR (VOLTAREN) 75 MG Tablet Delayed Response      • lovastatin (MEVACOR) 10 MG tablet      • flecainide (TAMBOCOR) 50 MG tablet TAKE 1 TABLET TWICE DAILY  . (APPOINTMENT IS NEEDED) 180 tablet 0   • albuterol 108 (90 Base) MCG/ACT Aero Soln inhalation aerosol Inhale 2 Puffs every 6 hours as needed for Shortness of Breath. 8.5 g 0   • ASHWAGANDHA PO Take 1 Tab by mouth every day. KSM-6t     • umeclidinium-vilanterol (ANORO ELLIPTA) 62.5-25 MCG/INH AEROSOL POWDER, BREATH ACTIVATED inhaler Inhale 1 Puff by mouth every day. 1 Inhaler 0   • Probiotic Product (PROBIOTIC DAILY PO) Take 1 Cap by mouth 2 Times a Day.     • HYDROcodone-acetaminophen (NORCO) 5-325 MG Tab per tablet Take 1 Tab by mouth 2 times a day as needed.     • dicyclomine (BENTYL) 10 MG Cap Take 10 mg by mouth as needed.       No current facility-administered medications for this visit.       Patient Active Problem List    Diagnosis Date Noted   • Presence of Watchman left atrial appendage closure device 04/04/2019   • Atrial fibrillation ([facility]) [I48.91] 03/08/2019   • Arthritis 11/20/2018   • Chronic anticoagulation 11/20/2018   • Atrial fibrillation with RVR ([facility]) 10/16/2018   • Obstructive sleep apnea 10/16/2018   • Hypothyroidism 10/16/2018       Family History   Problem Relation Age of Onset   • Non-contributory Mother    • Hypertension Mother    • Non-contributory Father    • Heart Disease Sister         MI, stent       She  has a past medical history of Anemia, Arthritis, Asthma, Atrial fibrillation ([facility]) (10/2018), Breath shortness, Bronchitis (11/2018), Cataract, Chronic anticoagulation, COPD (chronic obstructive pulmonary disease) (HCC), Gynecological disorder, Heart burn, Hypothyroidism, Indigestion, Pleurisy, Pneumonia (1989),  "Psychiatric problem, Snoring, and Tuberculosis (1981).  She  has a past surgical history that includes mass excision general (Left, 7/6/2018); arthroscopy, knee; pr remv 2nd cataract,corn-scler sectn; appendectomy (1960); cholecystectomy (1994); mammoplasty augmentation; pr breast augmentation with implant; other cardiac surgery (2018); gyn surgery (1975); finger arthroplasty (Left, 10/16/2020); and tendon transfer (Left, 10/16/2020).       Objective:   Ht 1.702 m (5' 7\")   Wt 84.8 kg (187 lb)   Breastfeeding No   BMI 29.29 kg/m²     Physical Exam    Assessment and Plan:   The following treatment plan was discussed:     1. Chronic obstructive pulmonary disease, unspecified COPD type (HCC)    Other orders  - diclofenac DR (VOLTAREN) 75 MG Tablet Delayed Response  - lovastatin (MEVACOR) 10 MG tablet    Allison's dyspnea and pulmonary function had improved.  She feels well overall and has no specific complaints.  She is off controller therapy and uses albuterol HFA as needed.    Continue with albuterol HFA as needed.  She is up-to-date on respiratory immunizations.  Follow-up: No follow-ups on file.PRN         "

## 2021-08-30 ENCOUNTER — OFFICE VISIT (OUTPATIENT)
Dept: CARDIOLOGY | Facility: MEDICAL CENTER | Age: 71
End: 2021-08-30
Payer: MEDICARE

## 2021-08-30 VITALS
OXYGEN SATURATION: 94 % | RESPIRATION RATE: 16 BRPM | BODY MASS INDEX: 26.68 KG/M2 | HEIGHT: 67 IN | DIASTOLIC BLOOD PRESSURE: 60 MMHG | HEART RATE: 63 BPM | WEIGHT: 170 LBS | SYSTOLIC BLOOD PRESSURE: 104 MMHG

## 2021-08-30 DIAGNOSIS — I48.0 PAROXYSMAL ATRIAL FIBRILLATION (HCC): ICD-10-CM

## 2021-08-30 DIAGNOSIS — I48.91 ATRIAL FIBRILLATION WITH RVR (HCC): ICD-10-CM

## 2021-08-30 DIAGNOSIS — Z95.818 PRESENCE OF WATCHMAN LEFT ATRIAL APPENDAGE CLOSURE DEVICE: ICD-10-CM

## 2021-08-30 DIAGNOSIS — E03.9 HYPOTHYROIDISM, UNSPECIFIED TYPE: ICD-10-CM

## 2021-08-30 PROCEDURE — 99214 OFFICE O/P EST MOD 30 MIN: CPT | Performed by: NURSE PRACTITIONER

## 2021-08-30 RX ORDER — FLECAINIDE ACETATE 50 MG/1
TABLET ORAL
Qty: 200 TABLET | Refills: 3 | Status: SHIPPED | OUTPATIENT
Start: 2021-08-30 | End: 2022-06-20

## 2021-08-30 RX ORDER — METOPROLOL SUCCINATE 25 MG/1
25 TABLET, EXTENDED RELEASE ORAL DAILY
Qty: 100 TABLET | Refills: 3 | Status: SHIPPED | OUTPATIENT
Start: 2021-08-30 | End: 2022-06-23

## 2021-08-30 RX ORDER — DICYCLOMINE HCL 20 MG
20 TABLET ORAL
COMMUNITY
Start: 2021-08-18 | End: 2024-01-30 | Stop reason: SDUPTHER

## 2021-08-30 RX ORDER — LOVASTATIN 40 MG/1
40 TABLET ORAL DAILY
COMMUNITY
Start: 2021-08-18 | End: 2022-06-30

## 2021-08-30 ASSESSMENT — ENCOUNTER SYMPTOMS
ORTHOPNEA: 0
INSOMNIA: 0
FEVER: 0
PALPITATIONS: 0
ABDOMINAL PAIN: 0
CHILLS: 0
COUGH: 0
SHORTNESS OF BREATH: 1
PND: 0
NAUSEA: 0
HEADACHES: 0
MYALGIAS: 0
LOSS OF CONSCIOUSNESS: 0
DIZZINESS: 0
BRUISES/BLEEDS EASILY: 0

## 2021-08-30 NOTE — PROGRESS NOTES
Chief Complaint   Patient presents with   • Follow-Up   • Atrial Fibrillation   • Hyperlipidemia       Subjective     Allison Cespedes is a 70 y.o. female who presents today for overdue annual follow-up of paroxysmal atrial fibrillation and hyperlipidemia.    Allison is a 70 year old female with history of PAFib, first diagnosed in October 2018; she is status post Watchman procedure in March 2019, and is now off of anticoagulation, and is on ASA only. She also has some hyperlipidemia, hypothyroidism and arthritis (managed by pain management). She did have hand surgery last October 2020, which only helped moderately.    She is here today for annual follow-up. She is maintained on low dose Flecainide and Toprol, and has done very well. No symptomatic palpitations; she does keep track of her HR and rhythm on her EpiSensor fabricio. No chest pain, pressure or discomfort; breathing is stable, she does have mild COPD and is followed by pulmonology. No dizziness or syncope; no LE edema.    Past Medical History:   Diagnosis Date   • Anemia    • Arthritis     Fingers, hands, toes, feet (osteoarthritis)   • Asthma     Inhalers daily   • Atrial fibrillation (HCC) 10/2018    New onset in office. Echocardiogram with normal LV size, LVEF 65%. Trace MR, trace TR. RVSP 34mmHg. March 2019: Status post Watchman procedure.   • Breath shortness     Uses 2L oxygen at night ([employer])    • Cataract     Bilateral IOL   • Chronic anticoagulation    • COPD (chronic obstructive pulmonary disease) (HCC)    • GERD (gastroesophageal reflux disease)    • Gynecological disorder     Postmenopausal bleeding right now    • Hyperlipidemia    • Hypothyroidism    • Pneumonia 1989   • Psychiatric problem     depression, anxiety   • Snoring    • Tuberculosis 1981    Treated for nine months     Past Surgical History:   Procedure Laterality Date   • FINGER ARTHROPLASTY Left 10/16/2020    Procedure: ARTHROPLASTY, FINGER - THUMB CARPOMETACARPAL EXCISIONAL;   Surgeon: Fidel Kay M.D.;  Location: SURGERY SAME DAY [facility];  Service: Orthopedics   • TENDON TRANSFER Left 10/16/2020    Procedure: TRANSFER, TENDON - FOR FLEXOR CARPI RADIALIS TENDON GRAFT;  Surgeon: Fidel Kay M.D.;  Location: SURGERY SAME DAY [facility];  Service: Orthopedics   • MASS EXCISION GENERAL Left 2018    Procedure: MASS EXCISION GENERAL/ SUBCUTANEOUS MASS LEFT SHOULDER;  Surgeon: Swapna Rivas M.D.;  Location: SURGERY SAME DAY [facility];  Service: General   • OTHER CARDIAC SURGERY      watchman implanted   • CHOLECYSTECTOMY     • GYN SURGERY      fibroid tumor   • APPENDECTOMY      had ruptured   • ARTHROSCOPY, KNEE     • MAMMOPLASTY AUGMENTATION     • PB REMV 2ND CATARACT,CORN-SCLER SECTN     • HI BREAST AUGMENTATION WITH IMPLANT       Family History   Problem Relation Age of Onset   • Non-contributory Mother    • Hypertension Mother    • Non-contributory Father    • Heart Disease Sister         MI, stent     Social History     Socioeconomic History   • Marital status:      Spouse name: Not on file   • Number of children: Not on file   • Years of education: Not on file   • Highest education level: Not on file   Occupational History   • Not on file   Tobacco Use   • Smoking status: Former Smoker     Packs/day: 2.00     Years: 30.00     Pack years: 60.00     Types: Cigarettes     Quit date: 2/3/1996     Years since quittin.5   • Smokeless tobacco: Never Used   • Tobacco comment: continued abstinance   Vaping Use   • Vaping Use: Never used   Substance and Sexual Activity   • Alcohol use: Not Currently   • Drug use: No   • Sexual activity: Not on file   Other Topics Concern   • Not on file   Social History Narrative   • Not on file     Social Determinants of Health     Financial Resource Strain:    • Difficulty of Paying Living Expenses:    Food Insecurity:    • Worried About Running Out of Food in the Last Year:    • Ran Out of Food in the Last  Year:    Transportation Needs:    • Lack of Transportation (Medical):    • Lack of Transportation (Non-Medical):    Physical Activity:    • Days of Exercise per Week:    • Minutes of Exercise per Session:    Stress:    • Feeling of Stress :    Social Connections:    • Frequency of Communication with Friends and Family:    • Frequency of Social Gatherings with Friends and Family:    • Attends Jewish Services:    • Active Member of Clubs or Organizations:    • Attends Club or Organization Meetings:    • Marital Status:    Intimate Partner Violence:    • Fear of Current or Ex-Partner:    • Emotionally Abused:    • Physically Abused:    • Sexually Abused:      Allergies   Allergen Reactions   • Tizanidine Unspecified     hallucinations   • Pcn [Penicillins]      Rxn as child     Outpatient Encounter Medications as of 8/30/2021   Medication Sig Dispense Refill   • lovastatin (MEVACOR) 40 MG tablet Take 40 mg by mouth every day.     • dicyclomine (BENTYL) 20 MG Tab      • flecainide (TAMBOCOR) 50 MG tablet TAKE 1 TABLET TWICE DAILY  . (APPOINTMENT IS NEEDED) 200 Tablet 3   • metoprolol SR (TOPROL XL) 25 MG TABLET SR 24 HR Take 1 Tablet by mouth every day. 100 Tablet 3   • gabapentin (NEURONTIN) 100 MG Cap TAKE 1 CAPSULE BY MOUTH THREE TIMES DAILY FOR 3 DAYS THEN 2 CAPSULE BY MOUTH THREE TIMES DAILY FOR 3 DAYS THEN 3 CAPSULE BY MOUTH THREE TIMES DAILY 100 capsule 1   • vitamin D (CHOLECALCIFEROL) 1000 Unit (25 mcg) Tab Take 2,000 Units by mouth every day.     • medroxyPROGESTERone (PROVERA) 2.5 MG Tab Take 2.5 mg by mouth every day.     • diclofenac EC (VOLTAREN) 50 MG Tablet Delayed Response Take  by mouth every day. Take 1 tablet by mouth two times a day     • LORazepam (ATIVAN) 1 MG Tab Take 1 mg by mouth at bedtime as needed.  0   • Estradiol 0.025 MG/24HR PATCH BIWEEKLY      • aspirin EC 81 MG EC tablet Take 1 Tab by mouth every day. 30 Tab 6   • buPROPion (WELLBUTRIN XL) 150 MG XL tablet Take 150 mg by mouth every  morning.     • escitalopram (LEXAPRO) 20 MG tablet Take 20 mg by mouth every morning.     • Probiotic Product (PROBIOTIC DAILY PO) Take 1 Capsule by mouth every day. With keithwabilly     • HYDROcodone-acetaminophen (NORCO) 5-325 MG Tab per tablet Take 1 Tab by mouth 2 times a day as needed.     • levalbuterol (XOPENEX HFA) 45 MCG/ACT inhaler INHALE 2 PUFFS BY MOUTH EVERY 4 HOURS AS NEEDED FOR SHORTNESS OF BREATH 3 Inhaler 3   • levothyroxine (SYNTHROID) 100 MCG TABS Take 100 mcg by mouth every morning on an empty stomach.     • liothyronine (CYTOMEL) 5 MCG TABS Take 5 mcg by mouth 2 Times a Day.     • [DISCONTINUED] diclofenac DR (VOLTAREN) 75 MG Tablet Delayed Response      • [DISCONTINUED] lovastatin (MEVACOR) 10 MG tablet  (Patient not taking: Reported on 8/30/2021)     • [DISCONTINUED] flecainide (TAMBOCOR) 50 MG tablet TAKE 1 TABLET TWICE DAILY  . (APPOINTMENT IS NEEDED) 180 tablet 0   • [DISCONTINUED] metoprolol SR (TOPROL XL) 25 MG TABLET SR 24 HR TAKE 1 TABLET EVERY DAY  . (APPOINTMENT IS NEEDED) 90 tablet 0   • [DISCONTINUED] albuterol 108 (90 Base) MCG/ACT Aero Soln inhalation aerosol Inhale 2 Puffs every 6 hours as needed for Shortness of Breath. 8.5 g 0   • [DISCONTINUED] ASHWAGANDHA PO Take 1 Tab by mouth every day. KSM-6t (Patient not taking: Reported on 8/30/2021)     • [DISCONTINUED] umeclidinium-vilanterol (ANORO ELLIPTA) 62.5-25 MCG/INH AEROSOL POWDER, BREATH ACTIVATED inhaler Inhale 1 Puff by mouth every day. 1 Inhaler 0   • [DISCONTINUED] dicyclomine (BENTYL) 10 MG Cap Take 10 mg by mouth as needed. (Patient not taking: Reported on 8/30/2021)     • [DISCONTINUED] lovastatin (MEVACOR) 20 MG Tab Take 10 mg by mouth every evening.       No facility-administered encounter medications on file as of 8/30/2021.     Review of Systems   Constitutional: Negative for chills and fever.   HENT: Negative for congestion.    Respiratory: Positive for shortness of breath. Negative for cough.         Related to  "COPD.   Cardiovascular: Negative for chest pain, palpitations, orthopnea, leg swelling and PND.   Gastrointestinal: Negative for abdominal pain and nausea.   Musculoskeletal: Negative for myalgias.   Skin: Negative for rash.   Neurological: Negative for dizziness, loss of consciousness and headaches.   Endo/Heme/Allergies: Does not bruise/bleed easily.   Psychiatric/Behavioral: The patient does not have insomnia.               Objective     /60 (BP Location: Left arm, Patient Position: Sitting, BP Cuff Size: Adult)   Pulse 63   Resp 16   Ht 1.702 m (5' 7\")   Wt 77.1 kg (170 lb)   SpO2 94%   BMI 26.63 kg/m²     Physical Exam   Constitutional: She is oriented to person, place, and time. She appears well-developed.   HENT:   Head: Normocephalic.   Neck: No JVD present.   Cardiovascular: Normal rate, regular rhythm and normal heart sounds.   Pulmonary/Chest: Effort normal and breath sounds normal. No respiratory distress. She has no wheezes. She has no rales.   Abdominal: Soft. Bowel sounds are normal. She exhibits no distension. There is no abdominal tenderness.   Musculoskeletal:         General: Normal range of motion.      Cervical back: Normal range of motion and neck supple.   Neurological: She is alert and oriented to person, place, and time.   Skin: Skin is warm and dry. No rash noted.     CONCLUSIONS OF ECHOCARDIOGRAM OF 10/17/2018:  Normal left ventricular systolic function.  Left ventricular ejection fraction is visually estimated to be 65%.  Normal diastolic function.  Normal inferior vena cava size and inspiratory collapse.  Estimated right ventricular systolic pressure is 34 mmHg.      No recent labwork done. Has labs ordered by PCP.    Assessment & Plan     1. Atrial fibrillation with RVR (HCC)     2. Paroxysmal atrial fibrillation (HCC)  flecainide (TAMBOCOR) 50 MG tablet    metoprolol SR (TOPROL XL) 25 MG TABLET SR 24 HR   3. Presence of Watchman left atrial appendage closure device     4. " Hypothyroidism, unspecified type         Medical Decision Making: Today's Assessment/Status/Plan:        1. History of PAFib, status post Watchman procedure/device in March 2019, doing well on Flecainide and Toprol XL. These are renewed.    2. Hypothyroidism, treated and stable.    3. Hyperlipidemia, treated with Lovastatin, recently increased by PCP. She will be due for repeat labs.    Same medications for now. Labs per PCP. FU with us in 1 year, sooner if clinical condition changes. Consider repeat echo next year. FU sooner if clinical condition changes.

## 2021-09-03 ENCOUNTER — HOSPITAL ENCOUNTER (OUTPATIENT)
Dept: RADIOLOGY | Facility: MEDICAL CENTER | Age: 71
End: 2021-09-03
Attending: STUDENT IN AN ORGANIZED HEALTH CARE EDUCATION/TRAINING PROGRAM
Payer: MEDICARE

## 2021-09-03 DIAGNOSIS — N95.9 UNSPECIFIED MENOPAUSAL AND PERIMENOPAUSAL DISORDER: ICD-10-CM

## 2021-09-03 PROCEDURE — 77080 DXA BONE DENSITY AXIAL: CPT

## 2021-12-07 ENCOUNTER — OFFICE VISIT (OUTPATIENT)
Dept: MEDICAL GROUP | Facility: LAB | Age: 71
End: 2021-12-07
Payer: MEDICARE

## 2021-12-07 VITALS
WEIGHT: 178 LBS | TEMPERATURE: 98.2 F | BODY MASS INDEX: 29.66 KG/M2 | RESPIRATION RATE: 16 BRPM | HEIGHT: 65 IN | SYSTOLIC BLOOD PRESSURE: 110 MMHG | OXYGEN SATURATION: 98 % | HEART RATE: 74 BPM | DIASTOLIC BLOOD PRESSURE: 68 MMHG

## 2021-12-07 DIAGNOSIS — Z00.00 PREVENTATIVE HEALTH CARE: ICD-10-CM

## 2021-12-07 DIAGNOSIS — R53.83 FATIGUE, UNSPECIFIED TYPE: ICD-10-CM

## 2021-12-07 DIAGNOSIS — E55.9 VITAMIN D DEFICIENCY: ICD-10-CM

## 2021-12-07 PROCEDURE — 99203 OFFICE O/P NEW LOW 30 MIN: CPT | Performed by: PHYSICIAN ASSISTANT

## 2021-12-07 RX ORDER — TRIAMCINOLONE ACETONIDE 0.1 %
PASTE (GRAM) DENTAL
COMMUNITY
Start: 2021-11-22

## 2021-12-07 RX ORDER — FLUTICASONE PROPIONATE 50 MCG
1 SPRAY, SUSPENSION (ML) NASAL DAILY
COMMUNITY

## 2021-12-07 RX ORDER — DICLOFENAC SODIUM 75 MG/1
75 TABLET, DELAYED RELEASE ORAL 2 TIMES DAILY
COMMUNITY
Start: 2021-11-11

## 2021-12-07 RX ORDER — BUPROPION HYDROCHLORIDE 150 MG/1
150 TABLET ORAL 2 TIMES DAILY
Qty: 180 TABLET | Refills: 0 | Status: SHIPPED | OUTPATIENT
Start: 2021-12-07 | End: 2022-02-22

## 2021-12-07 ASSESSMENT — PATIENT HEALTH QUESTIONNAIRE - PHQ9: CLINICAL INTERPRETATION OF PHQ2 SCORE: 0

## 2021-12-07 NOTE — LETTER
[facility]  Zoie Spence P.A.-C.  [address]  [place] [address]  Fax: [phone]   Authorization for Release/Disclosure of   Protected Health Information   Name: MELISSA SOARES : 1950 SSN: [id number]   Address: [address] [place] [address]  [place] [address] Phone:    [phone] (home) [phone] (work)   I authorize the entity listed below to release/disclose the PHI below to:   [facility]/Zoie Spence P.A.-C. and Zoie Spence P.A.-C.   Provider or Entity Name:  [facility]   Address   City, State, Zip   Phone:      Fax:     Reason for request: continuity of care   Information to be released:    [  ] LAST COLONOSCOPY,  including any PATH REPORT and follow-up  [ x ] LAST FIT/COLOGUARD RESULT [x  ] LAST DEXA  [x  ] LAST MAMMOGRAM  [x  ] LAST PAP  [x  ] LAST LABS [  ] RETINA EXAM REPORT  [x  ] IMMUNIZATION RECORDS  [x  ] Release all info      [  ] Check here and initial the line next to each item to release ALL health information INCLUDING  _____ Care and treatment for drug and / or alcohol abuse  _____ HIV testing, infection status, or AIDS  _____ Genetic Testing    DATES OF SERVICE OR TIME PERIOD TO BE DISCLOSED: _____________  I understand and acknowledge that:  * This Authorization may be revoked at any time by you in writing, except if your health information has already been used or disclosed.  * Your health information that will be used or disclosed as a result of you signing this authorization could be re-disclosed by the recipient. If this occurs, your re-disclosed health information may no longer be protected by State or Federal laws.  * You may refuse to sign this Authorization. Your refusal will not affect your ability to obtain treatment.  * This Authorization becomes effective upon signing and will  on (date) __________.      If no date is indicated, this Authorization will  one (1) year from the signature date.     Name: Allison Cespedes    Signature:   Date:     12/7/2021       PLEASE FAX REQUESTED RECORDS BACK TO: [phone]

## 2021-12-07 NOTE — PROGRESS NOTES
"Subjective:     CC:  Diagnoses of Fatigue, unspecified type, Preventative health care, and Vitamin D deficiency were pertinent to this visit.    HISTORY OF THE PRESENT ILLNESS: Patient is a 71 y.o. female. This pleasant patient is here today to establish care and discuss fatigue. His/her prior PCP was at [facility].    Fatigue  Patient reports an on/off history of fatigue.  She also notes occasional episodes of breathlessness although she does have a documented history of COPD and follows pulmonary for this.  Patient reports frequently low quality sleep with several nighttime awakenings to urinate.  She did have a sleep study done in 2018 which showed some nocturnal hypoxia she is on 2 L of oxygen at night via nasal cannula.  Patient also relates remote history of vitamin D deficiency, but has not had this checked in a while.    Chronic low back pain  -follows with pain management who manage her medications for this    Depression/anxiety  Patient has a documented history of depression anxiety she takes Lexapro 20 mg daily, Wellbutrin 150 mg daily and rarely Ativan 1 mg for acute anxiety symptoms.  Patient has been stable on these medications for some time.      Health Maintenance     - Dyslipidemia (30-45): Up-to-date, will request records  - Diabetes (HTN, HLD, BMI >25): Up-to-date, will request records  - Osteoporosis: Ca intake, DEXA (>65 or with risk factors): Up-to-date  - If fx, needs BMD test or tx w/i 6 months of dx  - Dental: yes seen last week  - Eye: UTD, chronic dry eye - uses tear duct plugs  Diet: \"lousy\" night snacks  Exercise: walks puppy, sedentary hobbies  Substance Use: denies  Alcohol: very seldom  Tobacco Use/counseling: denies, quit smoking aged 45  Safe in relationship: feels safe at home         Cancer screening  - Colon CA (45-75) - FIT (annual) cspy (q10yr): Cologuard done 2020  - Cervical CA (21-65): Pap smear done approximately 1 month ago, results pending  -  HX Abnormal " pap/HPV: none  - Breast CA: mammo (required 50-75yo) or starting 40 (ACOG, ACR), 45 (ACS), 50 (USPTF): Up-to-date       Infectious disease screening/Immunizations  --STI/HIV Screening: Declines  --Hepatitis C Screening (18 to 80 yo): Declines  --Immunizations:               Influenza:  Up-to-date   Covid-19: Up-to-date              Tetanus: Up-to-date              Shingles: Appears vaccination 2014 with significant side effects, patient notes that she would like to be avoid getting zoster vaccine but may consider it    Current Outpatient Medications Ordered in Epic   Medication Sig Dispense Refill   • diclofenac DR (VOLTAREN) 75 MG Tablet Delayed Response      • triamcinolone acetonide (ORALONE) 0.1 % Paste APPLY TO THE AFFECTED AREA AFTER MEALS AND BEFORE BEDTIME     • buPROPion (WELLBUTRIN XL) 150 MG XL tablet Take 1 Tablet by mouth 2 times a day for 90 days. 180 Tablet 0   • fluticasone (FLONASE) 50 MCG/ACT nasal spray Administer 1 Spray into affected nostril(S) every day.     • lovastatin (MEVACOR) 40 MG tablet Take 40 mg by mouth every day.     • dicyclomine (BENTYL) 20 MG Tab      • metoprolol SR (TOPROL XL) 25 MG TABLET SR 24 HR Take 1 Tablet by mouth every day. 100 Tablet 3   • gabapentin (NEURONTIN) 100 MG Cap TAKE 1 CAPSULE BY MOUTH THREE TIMES DAILY FOR 3 DAYS THEN 2 CAPSULE BY MOUTH THREE TIMES DAILY FOR 3 DAYS THEN 3 CAPSULE BY MOUTH THREE TIMES DAILY 100 capsule 1   • vitamin D (CHOLECALCIFEROL) 1000 Unit (25 mcg) Tab Take 2,000 Units by mouth every day.     • medroxyPROGESTERone (PROVERA) 2.5 MG Tab Take 2.5 mg by mouth every day.     • LORazepam (ATIVAN) 1 MG Tab Take 1 mg by mouth at bedtime as needed.  0   • Estradiol 0.025 MG/24HR PATCH BIWEEKLY      • aspirin EC 81 MG EC tablet Take 1 Tab by mouth every day. 30 Tab 6   • escitalopram (LEXAPRO) 20 MG tablet Take 20 mg by mouth every morning.     • HYDROcodone-acetaminophen (NORCO) 5-325 MG Tab per tablet Take 1 Tab by mouth 2 times a day as  "needed.     • levalbuterol (XOPENEX HFA) 45 MCG/ACT inhaler INHALE 2 PUFFS BY MOUTH EVERY 4 HOURS AS NEEDED FOR SHORTNESS OF BREATH 3 Inhaler 3   • levothyroxine (SYNTHROID) 100 MCG TABS Take 100 mcg by mouth every morning on an empty stomach.     • liothyronine (CYTOMEL) 5 MCG TABS Take 5 mcg by mouth 2 Times a Day.     • flecainide (TAMBOCOR) 50 MG tablet TAKE 1 TABLET TWICE DAILY  . (APPOINTMENT IS NEEDED) 200 Tablet 3     No current Epic-ordered facility-administered medications on file.       Health Maintenance: Completed    ROS:   Gen: no fevers/chills, no changes in weight  Eyes: no changes in vision  ENT: no sore throat, no hearing loss, no bloody nose  Pulm: no sob, no cough  CV: no chest pain, no palpitations  GI: no nausea/vomiting, no diarrhea  : no dysuria  MSk: no myalgias  Skin: no rash  Neuro: no headaches, no numbness/tingling  Heme/Lymph: no easy bruising      Objective:       Exam: /68   Pulse 74   Temp 36.8 °C (98.2 °F)   Resp 16   Ht 1.651 m (5' 5\")   Wt 80.7 kg (178 lb)   SpO2 98%  Body mass index is 29.62 kg/m².    General: Normal appearing. No distress.  HEENT: Normocephalic. Eyes conjunctiva clear lids without ptosis, pupils equal and reactive to light accommodation, ears normal shape and contour, canals are clear bilaterally, tympanic membranes are benign, nasal mucosa benign, oropharynx is without erythema, edema or exudates.   Neck: Supple without JVD or bruit. Thyroid is not enlarged.  Pulmonary: Clear to ausculation.  Normal effort. No rales, ronchi, or wheezing.  Cardiovascular: Regular rate and rhythm without murmur. Carotid and radial pulses are intact and equal bilaterally.  Abdomen: Soft, nontender, nondistended. Normal bowel sounds. Liver and spleen are not palpable  Neurologic: Grossly nonfocal  Lymph: No cervical or supraclavicular lymph nodes are palpable  Skin: Warm and dry.  No obvious lesions.  Musculoskeletal: Normal gait. No extremity cyanosis, clubbing, or " edema.  Psych: Normal mood and affect. Alert and oriented x3. Judgment and insight is normal.    Assessment & Plan:   71 y.o. female with the following -    1. Fatigue, unspecified type  3. Vitamin D deficiency  New problem, stable  Unclear etiology possibly multifactorial including depression, vitamin D deficiency, poor quality sleep/sleep apnea  Check vitamin D  Continue Lexapro 20 mg daily increase Wellbutrin to 300 mg daily  Consider sleep study referral or repeat sleep study for possible CPAP fitting  Follow-up in 1 month to reassess after medication dose change    2. Preventative health care  Patient is up-to-date on preventatives, screenings and labs        I spent a total of 30 minutes with record review, exam, communication with the patient, communication with other providers, and documentation of this encounter.    Return in about 4 weeks (around 1/4/2022).    Please note that this dictation was created using voice recognition software. I have made every reasonable attempt to correct obvious errors, but I expect that there are errors of grammar and possibly content that I did not discover before finalizing the note.

## 2022-01-05 ENCOUNTER — APPOINTMENT (OUTPATIENT)
Dept: MEDICAL GROUP | Facility: LAB | Age: 72
End: 2022-01-05
Payer: MEDICARE

## 2022-01-14 ENCOUNTER — APPOINTMENT (OUTPATIENT)
Dept: RADIOLOGY | Facility: MEDICAL CENTER | Age: 72
End: 2022-01-14
Attending: EMERGENCY MEDICINE
Payer: MEDICARE

## 2022-01-14 ENCOUNTER — HOSPITAL ENCOUNTER (EMERGENCY)
Facility: MEDICAL CENTER | Age: 72
End: 2022-01-14
Attending: EMERGENCY MEDICINE
Payer: MEDICARE

## 2022-01-14 VITALS
WEIGHT: 154.32 LBS | DIASTOLIC BLOOD PRESSURE: 43 MMHG | RESPIRATION RATE: 18 BRPM | HEART RATE: 65 BPM | SYSTOLIC BLOOD PRESSURE: 92 MMHG | TEMPERATURE: 98 F | BODY MASS INDEX: 25.68 KG/M2 | OXYGEN SATURATION: 94 %

## 2022-01-14 DIAGNOSIS — S42.292A OTHER CLOSED DISPLACED FRACTURE OF PROXIMAL END OF LEFT HUMERUS, INITIAL ENCOUNTER: ICD-10-CM

## 2022-01-14 PROCEDURE — 700111 HCHG RX REV CODE 636 W/ 250 OVERRIDE (IP): Performed by: EMERGENCY MEDICINE

## 2022-01-14 PROCEDURE — 96376 TX/PRO/DX INJ SAME DRUG ADON: CPT | Mod: XU

## 2022-01-14 PROCEDURE — 302874 HCHG BANDAGE ACE 2 OR 3""

## 2022-01-14 PROCEDURE — 96374 THER/PROPH/DIAG INJ IV PUSH: CPT | Mod: XU

## 2022-01-14 PROCEDURE — 99285 EMERGENCY DEPT VISIT HI MDM: CPT

## 2022-01-14 PROCEDURE — 73030 X-RAY EXAM OF SHOULDER: CPT | Mod: LT

## 2022-01-14 PROCEDURE — 29105 APPLICATION LONG ARM SPLINT: CPT

## 2022-01-14 PROCEDURE — 73110 X-RAY EXAM OF WRIST: CPT | Mod: LT

## 2022-01-14 RX ADMIN — FENTANYL CITRATE 50 MCG: 50 INJECTION, SOLUTION INTRAMUSCULAR; INTRAVENOUS at 15:11

## 2022-01-14 RX ADMIN — FENTANYL CITRATE 50 MCG: 50 INJECTION, SOLUTION INTRAMUSCULAR; INTRAVENOUS at 13:44

## 2022-01-14 NOTE — DISCHARGE INSTRUCTIONS
Take your medication as prescribed. Follow-up with Dr. Rodriguez on Monday or Tuesday for surgical intervention

## 2022-01-14 NOTE — ED TRIAGE NOTES
Chief Complaint   Patient presents with   • Fall   • Shoulder Injury   • Wrist Injury      pt BIB [facility]. Pt tripped over a parking barrier and landed on her left side. Pt complains of left shoulder, and left wrist [pain. Mild deformity noted on wrist. CMS intact. 4mg of morphine and 100mcg fentanyl given in route. No LOC, no head injury.

## 2022-01-14 NOTE — ED PROVIDER NOTES
ED Provider Note    CHIEF COMPLAINT  Chief Complaint   Patient presents with   • Fall   • Shoulder Injury   • Wrist Injury       HPI  Allison Cespedes is a 71 y.o. female who presents with left wrist pain.  The patient unfortunately had a fall over a parking barrier and landed on an outstretched hand.  She presents with pain to the distal forearm on the left as well as left proximal humerus discomfort.  She did not strike her head.  She did not strike her neck.  She does not have any chest or abdominal pain.  She has severe pain to the left wrist that is worse with flexion extension at the wrist.  She also has pain to the left proximal humerus that is worse with movement at the shoulder.    REVIEW OF SYSTEMS  See HPI for further details. All other systems are negative.     PAST MEDICAL HISTORY  Past Medical History:   Diagnosis Date   • Atrial fibrillation (HCC) 10/2018    New onset in office. Echocardiogram with normal LV size, LVEF 65%. Trace MR, trace TR. RVSP 34mmHg. March 2019: Status post Watchman procedure.   • Pneumonia 1989   • Tuberculosis 1981    Treated for nine months   • Anemia    • Arthritis     Fingers, hands, toes, feet (osteoarthritis)   • Asthma     Inhalers daily   • Breath shortness     Uses 2L oxygen at night ([employer])    • Cataract     Bilateral IOL   • Chronic anticoagulation    • COPD (chronic obstructive pulmonary disease) ([facility])    • GERD (gastroesophageal reflux disease)    • Gynecological disorder     Postmenopausal bleeding right now    • Hyperlipidemia    • Hypothyroidism    • Psychiatric problem     depression, anxiety   • Snoring        FAMILY HISTORY  [unfilled]    SOCIAL HISTORY  Social History     Socioeconomic History   • Marital status:      Spouse name: Not on file   • Number of children: Not on file   • Years of education: Not on file   • Highest education level: Not on file   Occupational History   • Not on file   Tobacco Use   • Smoking status: Former Smoker      Packs/day: 2.00     Years: 30.00     Pack years: 60.00     Types: Cigarettes     Quit date: 2/3/1996     Years since quittin.9   • Smokeless tobacco: Never Used   • Tobacco comment: continued abstinance   Vaping Use   • Vaping Use: Never used   Substance and Sexual Activity   • Alcohol use: Not Currently   • Drug use: No   • Sexual activity: Not on file   Other Topics Concern   • Not on file   Social History Narrative   • Not on file     Social Determinants of Health     Financial Resource Strain:    • Difficulty of Paying Living Expenses: Not on file   Food Insecurity:    • Worried About Running Out of Food in the Last Year: Not on file   • Ran Out of Food in the Last Year: Not on file   Transportation Needs:    • Lack of Transportation (Medical): Not on file   • Lack of Transportation (Non-Medical): Not on file   Physical Activity:    • Days of Exercise per Week: Not on file   • Minutes of Exercise per Session: Not on file   Stress:    • Feeling of Stress : Not on file   Social Connections:    • Frequency of Communication with Friends and Family: Not on file   • Frequency of Social Gatherings with Friends and Family: Not on file   • Attends Temple Services: Not on file   • Active Member of Clubs or Organizations: Not on file   • Attends Club or Organization Meetings: Not on file   • Marital Status: Not on file   Intimate Partner Violence:    • Fear of Current or Ex-Partner: Not on file   • Emotionally Abused: Not on file   • Physically Abused: Not on file   • Sexually Abused: Not on file   Housing Stability:    • Unable to Pay for Housing in the Last Year: Not on file   • Number of Places Lived in the Last Year: Not on file   • Unstable Housing in the Last Year: Not on file       SURGICAL HISTORY  Past Surgical History:   Procedure Laterality Date   • FINGER ARTHROPLASTY Left 10/16/2020    Procedure: ARTHROPLASTY, FINGER - THUMB CARPOMETACARPAL EXCISIONAL;  Surgeon: Fidel Kay M.D.;  Location:  SURGERY SAME DAY [facility];  Service: Orthopedics   • TENDON TRANSFER Left 10/16/2020    Procedure: TRANSFER, TENDON - FOR FLEXOR CARPI RADIALIS TENDON GRAFT;  Surgeon: Fidel Kay M.D.;  Location: SURGERY SAME DAY [facility];  Service: Orthopedics   • MASS EXCISION GENERAL Left 7/6/2018    Procedure: MASS EXCISION GENERAL/ SUBCUTANEOUS MASS LEFT SHOULDER;  Surgeon: Swapna Rivas M.D.;  Location: SURGERY SAME DAY [facility];  Service: General   • OTHER CARDIAC SURGERY  2018    watchman implanted   • CHOLECYSTECTOMY  1994   • GYN SURGERY  1975    fibroid tumor   • APPENDECTOMY  1960    had ruptured   • ARTHROSCOPY, KNEE     • MAMMOPLASTY AUGMENTATION     • PB REMV 2ND CATARACT,CORN-SCLER SECTN     • TX BREAST AUGMENTATION WITH IMPLANT         CURRENT MEDICATIONS  Home Medications    **Home medications have not yet been reviewed for this encounter**         ALLERGIES  Allergies   Allergen Reactions   • Tizanidine Unspecified     hallucinations   • Pcn [Penicillins]      Rxn as child       PHYSICAL EXAM  VITAL SIGNS: /48   Pulse 83   Temp 36.1 °C (97 °F) (Temporal)   Resp 18   SpO2 93%       Constitutional: In acute distress, Non-toxic appearance.   HENT: Normocephalic, Atraumatic, Bilateral external ears normal, Oropharynx moist, No oral exudates, Nose normal.   Eyes: PERRLA, EOMI, Conjunctiva normal, No discharge.   Neck: Normal range of motion, No tenderness, Supple, No stridor.   Lymphatic: No lymphadenopathy noted.   Cardiovascular: Normal heart rate, Normal rhythm, No murmurs, No rubs, No gallops.   Thorax & Lungs: Normal breath sounds, No respiratory distress, No wheezing, No chest tenderness.   Abdomen: Bowel sounds normal, Soft, No tenderness, No masses, No pulsatile masses.   Skin: Warm, Dry, No erythema, No rash.   Back: No tenderness, No CVA tenderness.   Extremities: Deformity noted to the left distal forearm.  The patient also has pain to the left proximal humerus region without  obvious deformity.  She has a normal left hand exam.  Extremities otherwise intact distal pulses, No edema, No tenderness, No cyanosis, No clubbing.   Neurologic: GCS of 15.   Psychiatric: Affect normal, Judgment normal, Mood normal.     RADIOLOGY/PROCEDURES  DX-SHOULDER 2+ LEFT   Final Result         Acute impacted and angulated fracture of the left femoral neck.      DX-WRIST-COMPLETE 3+ LEFT   Final Result         Acute comminuted and impacted fracture of the distal radius. Intra-articular extension cannot be entirely excluded.      Acute mildly displaced ulnar styloid fracture.            COURSE & MEDICAL DECISION MAKING  Pertinent Labs & Imaging studies reviewed. (See chart for details)  This is 71-year-old female who presents were department after a fall. Clinically she had evidence of a distal radius fracture as well as a proximal humerus fracture on arrival. X-ray confirms a comminuted impacted fracture of the distal radius as well as a acute impacted and angulated fracture of the left humerus. I spoke with orthopedics and they recommend a posterior splint with lots of padding. The patiently placed in a sling. I will give the patient Percocet for acute pain control. She will take the medication sparingly only as directed. I will perform a narcotic check prior to prescribing the medication. At the time of discharge I do not see any other evidence of injury. She is neurologically intact.    FINAL IMPRESSION  1. Left impacted and angulated fracture of the proximal humerus  2. Left comminuted and impacted fracture of the left distal radius  3. Left ulnar styloid fracture    Disposition  The patient will be discharged in stable condition         Electronically signed by: Carroll Gibson M.D., 1/14/2022 12:53 PM

## 2022-01-15 NOTE — ED NOTES
Splint and sling by tech. Discharged to home with instructions to follow up with ortho.  to drive her home.

## 2022-01-18 PROBLEM — M25.532 LEFT WRIST PAIN: Status: ACTIVE | Noted: 2022-01-18

## 2022-01-27 ENCOUNTER — TELEPHONE (OUTPATIENT)
Dept: CARDIOLOGY | Facility: MEDICAL CENTER | Age: 72
End: 2022-01-27

## 2022-01-28 ENCOUNTER — TELEPHONE (OUTPATIENT)
Dept: CARDIOLOGY | Facility: MEDICAL CENTER | Age: 72
End: 2022-01-28

## 2022-01-28 NOTE — TELEPHONE ENCOUNTER
Received document from [employer]: Genetics Counseling Department requesting consent for patient to perform a Comprehensive Hereditary Cardiac Gene Panel assessment. Patient is requesting authorization from provider and completion of requisition form.        To AB: Please advise. Document in Media

## 2022-01-31 NOTE — TELEPHONE ENCOUNTER
AB Goetz from [employer] is reaching out to see if this form has been completed and if there is a way to get this back to them asap. Fax # [phone].    Thank you  Antonina SOSA

## 2022-02-01 NOTE — TELEPHONE ENCOUNTER
CHARLOTTE Ibrahim 10 minutes ago (9:36 AM)            Signed and faxed.  Thanks, AB         Documentation      You  You; CHARLOTTE Ibrahim 22 minutes ago (9:23 AM)     RT    Yobany Burdick   Did you get a chance to sign this and fax to [employer]?   Please advise and thank you    Message text      You  You; Ambar Eddy R.N. 17 hours ago (4:02 PM)     RT    Thanks   I will follow up    Message text      --------------------------  Noted

## 2022-02-22 RX ORDER — BUPROPION HYDROCHLORIDE 150 MG/1
TABLET ORAL
Qty: 180 TABLET | Refills: 0 | Status: SHIPPED | OUTPATIENT
Start: 2022-02-22 | End: 2022-05-06

## 2022-03-15 ENCOUNTER — TELEPHONE (OUTPATIENT)
Dept: CARDIOLOGY | Facility: MEDICAL CENTER | Age: 72
End: 2022-03-15
Payer: MEDICARE

## 2022-03-15 NOTE — TELEPHONE ENCOUNTER
AB Vincent with [employer] called as they sent a requesuisition form for pharmaco-genetic testing. This is in regards to see if the medication is the correct one for the pt. Faxed to [phone] on 3/14/2022    Phone: [phone]  Fax: [phone]

## 2022-03-16 NOTE — TELEPHONE ENCOUNTER
Called [phone]  S/w Mare. Explained documents received. Verified what needs to be completed. Advised that AB out until 3/21 but will ask ADD for signature.       To BE as ADD for today  Awaiting signature

## 2022-03-16 NOTE — TELEPHONE ENCOUNTER
BE declined to sign and is deferring back to AB for signature. AB out of office until 3/21/22. Awaiting AB signature.

## 2022-03-21 NOTE — TELEPHONE ENCOUNTER
Faxed documents to AB at [facility]  [phone]  Awaiting AB signature

## 2022-03-22 ENCOUNTER — TELEPHONE (OUTPATIENT)
Dept: CARDIOLOGY | Facility: MEDICAL CENTER | Age: 72
End: 2022-03-22
Payer: MEDICARE

## 2022-03-22 NOTE — TELEPHONE ENCOUNTER
Documents signed and faxed  Confirmation status received  Scan to [facility]

## 2022-03-22 NOTE — TELEPHONE ENCOUNTER
AB Garcia from [employer] called in and is in needing the ICD code for the pt regarding the family history of lung cancer.    Best contact for [employer]:  PH: [phone]  Fax: [phone]      Thank you,  Bonnie HELTON

## 2022-03-22 NOTE — TELEPHONE ENCOUNTER
Called Radha [phone]  Phone rings then hangs up. No VM. If Radha calls back please give info for icd code.    ICD code Z80.1

## 2022-03-23 NOTE — TELEPHONE ENCOUNTER
Radha from [employer] [facility] called and was given the ICD code but they need to know if they can update that code on the records? They are not allowed to update without the verbal permission.  Please call Radha PH: [phone] (Please ask for Radha and you will be connected to her directly)    Thank You  Antonina SOSA

## 2022-03-24 NOTE — TELEPHONE ENCOUNTER
AB Garcia w/[employer] is calling again in regards to the forms that were sent over to their office. Radha states that she has received the information with the ICD 10 code however she is unable to update the form without permission from AB or the RN to write the ICD 10 on the form. Radha would like a call back with verbal authorization. Please advise.    Ph: [phone]    Thank you,  Simone DE PAZ

## 2022-05-06 RX ORDER — BUPROPION HYDROCHLORIDE 150 MG/1
TABLET ORAL
Qty: 180 TABLET | Refills: 0 | Status: SHIPPED | OUTPATIENT
Start: 2022-05-06 | End: 2022-09-28

## 2022-05-17 ENCOUNTER — TELEPHONE (OUTPATIENT)
Dept: CARDIOLOGY | Facility: MEDICAL CENTER | Age: 72
End: 2022-05-17
Payer: MEDICARE

## 2022-05-17 NOTE — TELEPHONE ENCOUNTER
Upon chart review.  Signed document by AB in media  Re-faxed to [phone]  Confirmation status received  Scan to [facility]

## 2022-05-17 NOTE — TELEPHONE ENCOUNTER
Called Blanka [phone]  S/w Adriana Wright transferred me to Smita  Left on hold for over 6 min. Will await call back.

## 2022-05-17 NOTE — TELEPHONE ENCOUNTER
AB    Caller: Blanka from [employer]    Topic/issue: Blanka from [employer] called in and wanted to see if the requisition form that they faxed over on 05- to fax # [phone] has been received.     Callback Number: PH: [phone]    Fax: [phone]    Thank you,  Bonnie HELTON

## 2022-05-18 ENCOUNTER — TELEPHONE (OUTPATIENT)
Dept: CARDIOLOGY | Facility: MEDICAL CENTER | Age: 72
End: 2022-05-18
Payer: MEDICARE

## 2022-05-18 NOTE — TELEPHONE ENCOUNTER
AB    Caller: DARIA from [employer]  Topic/issue: genetic testing form needs to be signed and sent back. The last one was sent in to late.  Callback Number: [phone]  Fax # [phone]    Thank you,  Romy HORNE

## 2022-05-20 NOTE — TELEPHONE ENCOUNTER
AB      Caller: Taylor from [employer]    Topic/issue: Bell from [employer] called in and stated that they have not received the signed requisition form yet. Is the form able to be refaxed to the fax number #[phone] and an alternate fax # [phone] as well?     Callback Number: [phone]    Thank you,  Bonnie HELTON

## 2022-05-24 NOTE — TELEPHONE ENCOUNTER
AB    Caller: Nita jaramillo/ [employer]    Topic/issue: Nita states we are sending an incorrect old form back to them. They are needing a recent signed requisition that they faxed over to us.     Callback Number: [phone]  Fax: [phone]  If you have any questions please contact them directly.     Thank You  Antonina SOSA

## 2022-05-27 NOTE — TELEPHONE ENCOUNTER
New order signed by AB   Faxed to [phone]  Confirmation status received  Scan to [facility]

## 2022-06-22 DIAGNOSIS — I48.0 PAROXYSMAL ATRIAL FIBRILLATION (HCC): ICD-10-CM

## 2022-06-23 RX ORDER — METOPROLOL SUCCINATE 25 MG/1
TABLET, EXTENDED RELEASE ORAL
Qty: 90 TABLET | Refills: 0 | Status: SHIPPED | OUTPATIENT
Start: 2022-06-23 | End: 2022-08-30 | Stop reason: SDUPTHER

## 2022-06-29 ENCOUNTER — TELEPHONE (OUTPATIENT)
Dept: CARDIOLOGY | Facility: MEDICAL CENTER | Age: 72
End: 2022-06-29
Payer: MEDICARE

## 2022-06-29 NOTE — TELEPHONE ENCOUNTER
AB-        Caller: Shanita from [employer]  Topic/issue: They called because they received the paperwork back from us for the patient's genetic testing but they are missing the required medical necessity for the patient. It can be provided verbally or faxed and their fax number is [phone].  Callback Number: [phone]        Thank you    -Walter MENDEZ

## 2022-06-29 NOTE — PROGRESS NOTES
"         Pulmonary Clinic Note    Chief Complaint:  Chief Complaint   Patient presents with   • COPD     Last seen 06/18/21         HPI:   This patient is a 71 y.o. female whom is followed in our clinic for COPD last seen by Dr. Hewitt on 6/18/21.  Using albuterol only.  Patient states she smoked significantly from the age of [age], 2 packs a day, approximately 60-year smoking history.  She has a significant family history of COPD in her mother who passed away of lung cancer associated with smoking.  She has very minimal symptoms at this time only a dry cough without any phlegm production.  She is using only albuterol which she takes just 4-5 times a week.  She thinks she has allergies which are making her symptoms worse.  She does describe an increase in dyspnea on exertion for the past 6 months with a significant increase in fatigue.  She is very breathless with minimal exertion.  She is completing physical therapy after a left shoulder injury and has been fairly active with that.  She does have a history of A. fib which is controlled and followed by cardiology.  She denies any chest pain.  She states she had a recent lung infection which was \"difficult to treat\" and was on Cipro for a long period of time.  Pulmonary function tests in the past have not shown a fixed obstruction but the last study was only spirometry and was done without bronchodilator so has limited utility.    Past Medical History:   Diagnosis Date   • Anemia    • Arthritis     Fingers, hands, toes, feet (osteoarthritis)   • Asthma     Inhalers daily   • Atrial fibrillation (HCC) 10/2018    New onset in office. Echocardiogram with normal LV size, LVEF 65%. Trace MR, trace TR. RVSP 34mmHg. March 2019: Status post Watchman procedure.   • Breath shortness     Uses 2L oxygen at night ([employer])    • Cataract     Bilateral IOL   • Chronic anticoagulation    • COPD (chronic obstructive pulmonary disease) ([facility])    • GERD (gastroesophageal reflux disease)  "   • Gynecological disorder     Postmenopausal bleeding right now    • Hyperlipidemia    • Hypothyroidism    • Pneumonia    • Psychiatric problem     depression, anxiety   • Snoring    • Tuberculosis     Treated for nine months       Social History     Socioeconomic History   • Marital status:      Spouse name: Not on file   • Number of children: Not on file   • Years of education: Not on file   • Highest education level: Not on file   Occupational History   • Not on file   Tobacco Use   • Smoking status: Former Smoker     Packs/day: 2.00     Years: 30.00     Pack years: 60.00     Types: Cigarettes     Quit date: 2/3/1996     Years since quittin.4   • Smokeless tobacco: Never Used   • Tobacco comment: continued abstinance   Vaping Use   • Vaping Use: Never used   Substance and Sexual Activity   • Alcohol use: Not Currently   • Drug use: No   • Sexual activity: Not on file   Other Topics Concern   • Not on file   Social History Narrative   • Not on file     Social Determinants of Health     Financial Resource Strain: Not on file   Food Insecurity: Not on file   Transportation Needs: Not on file   Physical Activity: Not on file   Stress: Not on file   Social Connections: Not on file   Intimate Partner Violence: Not on file   Housing Stability: Not on file          Family History   Problem Relation Age of Onset   • Non-contributory Mother    • Hypertension Mother    • Cancer Mother         lung cancer   • Non-contributory Father    • Heart Disease Sister         MI, stent   • Drug abuse Sister        Current Outpatient Medications on File Prior to Visit   Medication Sig Dispense Refill   • metoprolol SR (TOPROL XL) 25 MG TABLET SR 24 HR TAKE 1 TABLET EVERY DAY 90 Tablet 0   • flecainide (TAMBOCOR) 50 MG tablet TAKE 1 TABLET TWICE DAILY  . (APPOINTMENT IS NEEDED) 180 Tablet 0   • buPROPion (WELLBUTRIN XL) 150 MG XL tablet TAKE 1 TABLET TWICE DAILY 180 Tablet 0   • oxycodone-acetaminophen (LYNOX) 5-300  MG per tablet Take 1 Tablet by mouth.     • Cholecalciferol 2000 UNIT Cap Take 2,000 Units by mouth every day.     • lovastatin (MEVACOR) 40 MG tablet Take 40 mg by mouth.     • escitalopram (LEXAPRO) 20 MG tablet Take 20 mg by mouth every day.     • diclofenac DR (VOLTAREN) 75 MG Tablet Delayed Response      • triamcinolone acetonide (ORALONE) 0.1 % Paste APPLY TO THE AFFECTED AREA AFTER MEALS AND BEFORE BEDTIME     • fluticasone (FLONASE) 50 MCG/ACT nasal spray Administer 1 Spray into affected nostril(S) every day.     • dicyclomine (BENTYL) 20 MG Tab      • gabapentin (NEURONTIN) 100 MG Cap TAKE 1 CAPSULE BY MOUTH THREE TIMES DAILY FOR 3 DAYS THEN 2 CAPSULE BY MOUTH THREE TIMES DAILY FOR 3 DAYS THEN 3 CAPSULE BY MOUTH THREE TIMES DAILY 100 capsule 1   • vitamin D (CHOLECALCIFEROL) 1000 Unit (25 mcg) Tab Take 2,000 Units by mouth every day.     • medroxyPROGESTERone (PROVERA) 2.5 MG Tab Take 2.5 mg by mouth every day.     • LORazepam (ATIVAN) 1 MG Tab Take 1 mg by mouth at bedtime as needed.  0   • Estradiol 0.025 MG/24HR PATCH BIWEEKLY      • aspirin EC 81 MG EC tablet Take 1 Tab by mouth every day. 30 Tab 6   • escitalopram (LEXAPRO) 20 MG tablet Take 20 mg by mouth every morning.     • HYDROcodone-acetaminophen (NORCO) 5-325 MG Tab per tablet Take 1 Tab by mouth 2 times a day as needed.     • levalbuterol (XOPENEX HFA) 45 MCG/ACT inhaler INHALE 2 PUFFS BY MOUTH EVERY 4 HOURS AS NEEDED FOR SHORTNESS OF BREATH 3 Inhaler 3   • liothyronine (CYTOMEL) 5 MCG Tab Take 5 mcg by mouth 2 Times a Day.       No current facility-administered medications on file prior to visit.       Tizanidine and Pcn [penicillins]      ROS:   Review of Systems   Constitutional: Positive for malaise/fatigue. Negative for chills, fever and weight loss.   HENT: Negative for congestion, sinus pain and sore throat.    Respiratory: Positive for shortness of breath. Negative for cough, hemoptysis, sputum production and wheezing.   "  Cardiovascular: Negative for chest pain, palpitations and leg swelling.   Gastrointestinal: Negative for abdominal pain, heartburn, nausea and vomiting.   Genitourinary: Negative for dysuria.   Musculoskeletal: Positive for joint pain. Negative for myalgias.   Neurological: Negative for dizziness and headaches.   Endo/Heme/Allergies: Positive for environmental allergies.       Vitals:  /54 (BP Location: Left arm, Patient Position: Sitting, BP Cuff Size: Adult)   Pulse 69   Ht 1.626 m (5' 4\")   Wt 79.1 kg (174 lb 6 oz)   SpO2 94%     Physical Exam:  Physical Exam  Constitutional:       Appearance: Normal appearance.   HENT:      Head: Normocephalic and atraumatic.      Mouth/Throat:      Mouth: Mucous membranes are moist.   Eyes:      Extraocular Movements: Extraocular movements intact.   Cardiovascular:      Rate and Rhythm: Normal rate and regular rhythm.      Heart sounds: Normal heart sounds.   Pulmonary:      Effort: Pulmonary effort is normal. No respiratory distress.      Breath sounds: Normal breath sounds. No wheezing or rales.   Abdominal:      General: Abdomen is flat.      Palpations: Abdomen is soft.   Musculoskeletal:         General: No swelling, tenderness or deformity.   Skin:     General: Skin is warm and dry.   Neurological:      General: No focal deficit present.      Mental Status: She is alert and oriented to person, place, and time.         Pertinent Studies:    PFTs as reviewed by me personally show:  5/18/21:      Imaging as reviewed by me personally show:  None recent    Echo:  8/18/19:  CONCLUSIONS  Normal left ventricular systolic function.  Left ventricular ejection fraction is visually estimated to be 65%.  Normal diastolic function.  Normal inferior vena cava size and inspiratory collapse.  Estimated right ventricular systolic pressure is 34 mmHg.    Assessment/Plan:  Problem List Items Addressed This Visit     SOB (shortness of breath)     Patient with significant increase " in dyspnea on exertion for the last 6 months with significant fatigue.  She states she has to take naps more than she used to and she is unable to keep up with her friends.  She has A. fib which is controlled and followed by cardiology.  She has never had a CT scan of her chest and has a diagnosis of COPD which is inconsistent with findings on pulmonary function testing.  Per med list she is off her medication for her thyroid, she needs to follow-up with PCP regarding this.  Plan:  - CT scan of the chest without  - Full pulmonary function testing  - Continue as needed albuterol for now  - Follow-up in 6 weeks           Relevant Orders    PULMONARY FUNCTION TESTS -Test requested: Complete Pulmonary Function Test    CT-CHEST (THORAX) W/O            Return in about 6 weeks (around 8/11/2022).   (return to office in...)    Time spent in record review prior to patient arrival, reviewing results, and in face-to-face encounter totaled 45 min, excluding any procedures if performed.    Margret Harvey MD RD  Pulmonary and Critical Care

## 2022-06-30 ENCOUNTER — OFFICE VISIT (OUTPATIENT)
Dept: SLEEP MEDICINE | Facility: MEDICAL CENTER | Age: 72
End: 2022-06-30
Payer: MEDICARE

## 2022-06-30 VITALS
HEART RATE: 69 BPM | HEIGHT: 64 IN | SYSTOLIC BLOOD PRESSURE: 106 MMHG | OXYGEN SATURATION: 94 % | DIASTOLIC BLOOD PRESSURE: 54 MMHG | WEIGHT: 174.38 LBS | BODY MASS INDEX: 29.77 KG/M2

## 2022-06-30 DIAGNOSIS — R06.02 SHORTNESS OF BREATH: ICD-10-CM

## 2022-06-30 PROCEDURE — 99215 OFFICE O/P EST HI 40 MIN: CPT | Performed by: INTERNAL MEDICINE

## 2022-06-30 ASSESSMENT — ENCOUNTER SYMPTOMS
PALPITATIONS: 0
HEARTBURN: 0
SORE THROAT: 0
SINUS PAIN: 0
WEIGHT LOSS: 0
DIZZINESS: 0
VOMITING: 0
HEMOPTYSIS: 0
HEADACHES: 0
SHORTNESS OF BREATH: 1
WHEEZING: 0
CHILLS: 0
COUGH: 0
ABDOMINAL PAIN: 0
MYALGIAS: 0
FEVER: 0
SPUTUM PRODUCTION: 0
NAUSEA: 0

## 2022-06-30 ASSESSMENT — PATIENT HEALTH QUESTIONNAIRE - PHQ9
5. POOR APPETITE OR OVEREATING: 3 - NEARLY EVERY DAY
CLINICAL INTERPRETATION OF PHQ2 SCORE: 2
SUM OF ALL RESPONSES TO PHQ QUESTIONS 1-9: 12

## 2022-06-30 NOTE — ASSESSMENT & PLAN NOTE
Patient with significant increase in dyspnea on exertion for the last 6 months with significant fatigue.  She states she has to take naps more than she used to and she is unable to keep up with her friends.  She has A. fib which is controlled and followed by cardiology.  She has never had a CT scan of her chest and has a diagnosis of COPD which is inconsistent with findings on pulmonary function testing.  Per med list she is off her medication for her thyroid, she needs to follow-up with PCP regarding this.  Plan:  - CT scan of the chest without  - Full pulmonary function testing  - Continue as needed albuterol for now  - Follow-up in 6 weeks

## 2022-07-06 ENCOUNTER — TELEPHONE (OUTPATIENT)
Dept: CARDIOLOGY | Facility: MEDICAL CENTER | Age: 72
End: 2022-07-06
Payer: MEDICARE

## 2022-07-06 NOTE — TELEPHONE ENCOUNTER
Documents printed, completed and faxed to [phone] attn: Shanita  Confirmation status received  Scan to [facility]

## 2022-07-06 NOTE — TELEPHONE ENCOUNTER
Documents printed, completed and faxed to [phone] attn: Shanita  Confirmation status received  Scan to [facility]

## 2022-07-06 NOTE — TELEPHONE ENCOUNTER
AB    Caller: Lucia    Name and Department: [facility]    Topic/Issue: GENETIC TESTING FORM    Lucia states that this form was not filled out completely. On the 3rd page in the 4th section a medical necessity needs to be selected. Verbal is okay. Please advise.    Thank you,  Simone DE PAZ    Callback Number or Extension: [phone]

## 2022-08-01 ENCOUNTER — HOSPITAL ENCOUNTER (OUTPATIENT)
Dept: RADIOLOGY | Facility: MEDICAL CENTER | Age: 72
End: 2022-08-01
Attending: NURSE PRACTITIONER
Payer: MEDICARE

## 2022-08-01 ENCOUNTER — HOSPITAL ENCOUNTER (OUTPATIENT)
Dept: RADIOLOGY | Facility: MEDICAL CENTER | Age: 72
End: 2022-08-01
Attending: INTERNAL MEDICINE
Payer: MEDICARE

## 2022-08-01 DIAGNOSIS — R06.02 SHORTNESS OF BREATH: ICD-10-CM

## 2022-08-01 DIAGNOSIS — R10.2 ADNEXAL TENDERNESS, RIGHT: ICD-10-CM

## 2022-08-01 PROCEDURE — 76830 TRANSVAGINAL US NON-OB: CPT

## 2022-08-01 PROCEDURE — 71250 CT THORAX DX C-: CPT | Mod: ME

## 2022-08-04 ENCOUNTER — NON-PROVIDER VISIT (OUTPATIENT)
Dept: SLEEP MEDICINE | Facility: MEDICAL CENTER | Age: 72
End: 2022-08-04
Attending: INTERNAL MEDICINE
Payer: MEDICARE

## 2022-08-04 VITALS — WEIGHT: 174 LBS | BODY MASS INDEX: 28.99 KG/M2 | HEIGHT: 65 IN

## 2022-08-04 DIAGNOSIS — R06.02 SHORTNESS OF BREATH: ICD-10-CM

## 2022-08-04 PROCEDURE — 94726 PLETHYSMOGRAPHY LUNG VOLUMES: CPT | Performed by: INTERNAL MEDICINE

## 2022-08-04 PROCEDURE — 94729 DIFFUSING CAPACITY: CPT | Performed by: INTERNAL MEDICINE

## 2022-08-04 PROCEDURE — 94060 EVALUATION OF WHEEZING: CPT | Performed by: INTERNAL MEDICINE

## 2022-08-04 ASSESSMENT — PULMONARY FUNCTION TESTS
FEV1/FVC_PERCENT_CHANGE: 0
FEV1/FVC: 70
FEV1/FVC_PERCENT_PREDICTED: 77
FEV1/FVC_PERCENT_CHANGE: 1
FEV1_PERCENT_PREDICTED: 79
FEV1/FVC_PERCENT_PREDICTED: 90
FEV1/FVC: 71
FVC: 2.51
FEV1/FVC_PERCENT_PREDICTED: 93
FVC_LLN: 2.44
FEV1/FVC_PERCENT_PREDICTED: 91
FVC_PERCENT_PREDICTED: 85
FVC_PREDICTED: 2.92
FEV1_PERCENT_CHANGE: -1
FVC: 2.55
FEV1/FVC_PREDICTED: 78
FVC_PERCENT_PREDICTED: 87
FEV1_LLN: 1.88
FEV1/FVC_PERCENT_LLN: 65
FEV1_PREDICTED: 2.25
FEV1: 1.78
FEV1_PERCENT_PREDICTED: 79
FEV1/FVC: 70
FEV1/FVC_PERCENT_PREDICTED: 91
FEV1_PERCENT_CHANGE: 0
FEV1/FVC: 70.92
FEV1: 1.79

## 2022-08-04 NOTE — PROCEDURES
Technician: Carolann Inman RRT, CPFT  Good patient effort & cooperation.  The results of this test meet the ATS/ERS standards for acceptability & reproducibility.  Test was performed on the [employer] Plethysmograph-Elite DX system.  Predicted equations for Spirometry are GLI-2012, ITS for lung volumes, and GLI-2017 for DLCO.  The DLCO was uncorrected for Hgb.  A bronchodilator of Ventolin HFA -2puffs via spacer administered.  DLCO performed during dilation period. IVC is less than 90%.    Interpretation;     The results of this test meet the criteria for acceptability and repeatability by the ATS.  SPIROMETRY:  There is no evidence of obstruction in this test manifested by an FEV1/FVC ratio of 70%.  Noted that FEV1 was 79% ( 1.78 lt) of predicted on the post bronchodilator arm.  There was no significant response to bronchodilators in this test, although this does not preclude the patient from receiving treatment with them.    LUNG VOLUMES:  The lung volumes are within normal limits, evidenced by a TLC of 84% ( 4.40 lt)  There is no hyperinflation and no air trapping, manifested by an RV of 85% and TLC as above.    DIFFUSION  There is mild impairment of the diffusion capacity in this test manifested by a DLCO of 78%. The DLCO shows complete correction to VA at DLCO of 109%.    FLOW-VOLUME GRAPHICS/LOOPS:  The flow volume curves correlates with information above, although there is a mild trend for obstructive pattern    MIP/MEP:   Special testing was not performed.    CONCLUSION:   All values in this test correlates with normal limits. Normal pulmonary function test.     Harsha Loera MD FACP  Pulmonary/Critical Care

## 2022-08-18 ENCOUNTER — TELEPHONE (OUTPATIENT)
Dept: CARDIOLOGY | Facility: MEDICAL CENTER | Age: 72
End: 2022-08-18
Payer: MEDICARE

## 2022-08-18 NOTE — TELEPHONE ENCOUNTER
Received paperwork yesterday from [facility] out this week, will complete when back in office next week. Tried to call Tom back at number provided, no answer, not able to leave msg.

## 2022-08-18 NOTE — TELEPHONE ENCOUNTER
AB    Caller: - Tom from [employer]    Topic/issue: Looking for return paperwork for the diabetes testing.    Callback Number: [phone]    Thank you,   Korin DE LEON

## 2022-08-19 NOTE — TELEPHONE ENCOUNTER
AB    Morning,    Tom from [employer] was calling back to check the status of his faxed paperwork. Advised that AB is out of office this week and will review next when she returns.    Tom  [facility]  Ph. [phone]  Hours: 9am-6pm EST      Thank you,    MARCUS

## 2022-08-22 NOTE — TELEPHONE ENCOUNTER
AB     Morning,     Tom from [employer] was calling back to check the status of his faxed paperwork.      [facility]  [facility]  Ph. [phone]  Hours: 9am-6pm EST        Thank you,  Korin DE LEON

## 2022-08-23 NOTE — TELEPHONE ENCOUNTER
Left msg informing Tom JACKSON out last week and working at another office, will complete asap when able to obtain signature from AB.

## 2022-08-24 ASSESSMENT — ENCOUNTER SYMPTOMS
SHORTNESS OF BREATH: 1
SINUS PAIN: 0
SPUTUM PRODUCTION: 0
MYALGIAS: 0
WHEEZING: 0
HEADACHES: 0
FEVER: 0
NAUSEA: 0
WEIGHT LOSS: 0
HEMOPTYSIS: 0
PALPITATIONS: 0
DIZZINESS: 0
HEARTBURN: 0
COUGH: 0
CHILLS: 0
VOMITING: 0
ABDOMINAL PAIN: 0
SORE THROAT: 0

## 2022-08-24 NOTE — TELEPHONE ENCOUNTER
AB    Caller: Tom    Name and Department: [facility]     Topic/Issue: GENETIC TESTING    Tom would like to know if this paperwork will be signed and faxed back this week. Tom states that ICD10 Codes are needed in Section 5 and a signature is needed in Section 8. Please advise.    Thank you,  Simone DE PAZ    Callback Number or Extension: [phone]

## 2022-08-24 NOTE — TELEPHONE ENCOUNTER
See multiple calls----Scanning forms into chart via [employer]. When uploaded, please sign and have MA fax to number provided if you have time, otherwise, will complete when you are at [facility].

## 2022-08-24 NOTE — PROGRESS NOTES
"         Pulmonary Clinic Note    Chief Complaint:  Chief Complaint   Patient presents with    Follow-Up     Last seen 06/30/22 follow up PFT and Ct scan     Results     PTF 8/04/22, CT-Chest 08/01/22         HPI:   This patient is a 71 y.o. female whom is followed in our clinic for Gold Class 2a COPD last seen 6/30/22 in my clinic.  Using albuterol only.  Patient states she smoked significantly from the age of [age], 2 packs a day, approximately 60 pack-year smoking history, Quit in 1996.  She has a significant family history of COPD in her mother who passed away of lung cancer associated with smoking.  She has very minimal symptoms at this time only a dry cough without any phlegm production.  She thinks she has allergies which are making her symptoms worse.  She does describe an increase in dyspnea on exertion for the past 6 months with a significant increase in fatigue.  She is very breathless with minimal exertion.  She is completing physical therapy after a left shoulder injury and has been fairly active with that.  She does have a history of A. fib which is controlled and followed by cardiology.  She denies any chest pain.  She states she had a recent lung infection which was \"difficult to treat\" and was on Cipro for a long period of time.      Today: She is still struggling with significant MCGILL and unable to keep up with her friends.  She uses O2 at night, but saturates well during the day.  No significant cough or sputum production. She does have allergies, encouraged to use daily controller medication. She uses her albuterol as needed but is not using it preventatively prior to exercise.      Past Medical History:   Diagnosis Date    Anemia     Arthritis     Fingers, hands, toes, feet (osteoarthritis)    Asthma     Inhalers daily    Atrial fibrillation (HCC) 10/2018    New onset in office. Echocardiogram with normal LV size, LVEF 65%. Trace MR, trace TR. RVSP 34mmHg. March 2019: Status post Watchman " procedure.    Breath shortness     Uses 2L oxygen at night ([employer])     Cataract     Bilateral IOL    Chronic anticoagulation     COPD (chronic obstructive pulmonary disease) ([facility])     Cough     GERD (gastroesophageal reflux disease)     Gynecological disorder     Postmenopausal bleeding right now     Hyperlipidemia     Hypothyroidism     Pneumonia     Psychiatric problem     depression, anxiety    Shortness of breath     Snoring     Tuberculosis     Treated for nine months    Wheezing        Social History     Socioeconomic History    Marital status:      Spouse name: Not on file    Number of children: Not on file    Years of education: Not on file    Highest education level: Not on file   Occupational History    Not on file   Tobacco Use    Smoking status: Former     Packs/day: 2.00     Years: 30.00     Pack years: 60.00     Types: Cigarettes     Quit date: 2/3/1996     Years since quittin.5    Smokeless tobacco: Never    Tobacco comments:     continued abstinance   Vaping Use    Vaping Use: Never used   Substance and Sexual Activity    Alcohol use: Not Currently    Drug use: No    Sexual activity: Not on file   Other Topics Concern    Not on file   Social History Narrative    Not on file     Social Determinants of Health     Financial Resource Strain: Not on file   Food Insecurity: Not on file   Transportation Needs: Not on file   Physical Activity: Not on file   Stress: Not on file   Social Connections: Not on file   Intimate Partner Violence: Not on file   Housing Stability: Not on file          Family History   Problem Relation Age of Onset    Non-contributory Mother     Hypertension Mother     Cancer Mother         lung cancer    Non-contributory Father     Heart Disease Sister         MI, stent    Drug abuse Sister        Current Outpatient Medications on File Prior to Visit   Medication Sig Dispense Refill    metoprolol SR (TOPROL XL) 25 MG TABLET SR 24 HR TAKE 1 TABLET EVERY DAY 90  Tablet 0    flecainide (TAMBOCOR) 50 MG tablet TAKE 1 TABLET TWICE DAILY  . (APPOINTMENT IS NEEDED) 180 Tablet 0    buPROPion (WELLBUTRIN XL) 150 MG XL tablet TAKE 1 TABLET TWICE DAILY 180 Tablet 0    lovastatin (MEVACOR) 40 MG tablet Take 40 mg by mouth.      escitalopram (LEXAPRO) 20 MG tablet Take 20 mg by mouth every day.      diclofenac DR (VOLTAREN) 75 MG Tablet Delayed Response       triamcinolone acetonide (ORALONE) 0.1 % Paste APPLY TO THE AFFECTED AREA AFTER MEALS AND BEFORE BEDTIME      fluticasone (FLONASE) 50 MCG/ACT nasal spray Administer 1 Spray into affected nostril(S) every day.      dicyclomine (BENTYL) 20 MG Tab       gabapentin (NEURONTIN) 100 MG Cap TAKE 1 CAPSULE BY MOUTH THREE TIMES DAILY FOR 3 DAYS THEN 2 CAPSULE BY MOUTH THREE TIMES DAILY FOR 3 DAYS THEN 3 CAPSULE BY MOUTH THREE TIMES DAILY 100 capsule 1    vitamin D (CHOLECALCIFEROL) 1000 Unit (25 mcg) Tab Take 2,000 Units by mouth every day.      medroxyPROGESTERone (PROVERA) 2.5 MG Tab Take 2.5 mg by mouth every day.      LORazepam (ATIVAN) 1 MG Tab Take 1 mg by mouth at bedtime as needed.  0    Estradiol 0.025 MG/24HR PATCH BIWEEKLY       aspirin EC 81 MG EC tablet Take 1 Tab by mouth every day. 30 Tab 6    HYDROcodone-acetaminophen (NORCO) 5-325 MG Tab per tablet Take 1 Tab by mouth 2 times a day as needed.      levalbuterol (XOPENEX HFA) 45 MCG/ACT inhaler INHALE 2 PUFFS BY MOUTH EVERY 4 HOURS AS NEEDED FOR SHORTNESS OF BREATH 3 Inhaler 3    liothyronine (CYTOMEL) 5 MCG Tab Take 5 mcg by mouth 2 Times a Day.      oxycodone-acetaminophen (LYNOX) 5-300 MG per tablet Take 1 Tablet by mouth.      Cholecalciferol 2000 UNIT Cap Take 2,000 Units by mouth every day.      escitalopram (LEXAPRO) 20 MG tablet Take 20 mg by mouth every morning.       No current facility-administered medications on file prior to visit.       Tizanidine and Pcn [penicillins]      ROS:   Review of Systems   Constitutional:  Positive for malaise/fatigue. Negative for  "chills, fever and weight loss.   HENT:  Negative for congestion, sinus pain and sore throat.    Respiratory:  Positive for shortness of breath. Negative for cough, hemoptysis, sputum production and wheezing.    Cardiovascular:  Negative for chest pain, palpitations and leg swelling.   Gastrointestinal:  Negative for abdominal pain, heartburn, nausea and vomiting.   Genitourinary:  Negative for dysuria.   Musculoskeletal:  Positive for joint pain. Negative for myalgias.   Neurological:  Negative for dizziness and headaches.   Endo/Heme/Allergies:  Positive for environmental allergies.     Vitals:  /74 (BP Location: Left arm, Patient Position: Sitting, BP Cuff Size: Adult)   Pulse 74   Ht 1.651 m (5' 5\")   Wt 79.8 kg (176 lb)   SpO2 97%     Physical Exam:  Physical Exam  Constitutional:       Appearance: Normal appearance.   HENT:      Head: Normocephalic and atraumatic.      Mouth/Throat:      Mouth: Mucous membranes are moist.   Eyes:      Extraocular Movements: Extraocular movements intact.   Cardiovascular:      Rate and Rhythm: Normal rate and regular rhythm.      Heart sounds: Normal heart sounds.   Pulmonary:      Effort: Pulmonary effort is normal. No respiratory distress.      Breath sounds: Normal breath sounds. No wheezing or rales.   Abdominal:      General: Abdomen is flat.      Palpations: Abdomen is soft.   Musculoskeletal:         General: No swelling, tenderness or deformity.   Skin:     General: Skin is warm and dry.   Neurological:      General: No focal deficit present.      Mental Status: She is alert and oriented to person, place, and time.       Pertinent Studies:    PFTs as reviewed by me personally show:      5/18/21:      Imaging as reviewed by me personally show:  None recent  CT chest:   IMPRESSION:     1.  Mild emphysema.     2.  Atherosclerotic disease    Echo:  8/18/19:  CONCLUSIONS  Normal left ventricular systolic function.  Left ventricular ejection fraction is visually " estimated to be 65%.  Normal diastolic function.  Normal inferior vena cava size and inspiratory collapse.  Estimated right ventricular systolic pressure is 34 mmHg.    Assessment/Plan:  Problem List Items Addressed This Visit       SOB (shortness of breath)     Continues to have MCGILL out of proportion to her pulmonary testing results  Plan:  - Repeat Echo to assess for PH  - continue to follow with cardiology         Relevant Medications    Tiotropium Bromide Monohydrate (SPIRIVA RESPIMAT) 1.25 MCG/ACT Aero Soln    Other Relevant Orders    EC-ECHOCARDIOGRAM COMPLETE W/O CONT    PULMONARY FUNCTION TESTS -Test requested: Complete Pulmonary Function Test    Centrilobular emphysema (HCC)     Gold Class 2A COPD.  Plan:  - Maintenance therapy: Start Spiriva today  - Continue Albuterol as needed  - No Oxygen therapy  - Routine vaccinations are current  - Educated on COPD exacerbation signs, return precautions and when to contact clinic or come in to ER.   - CT screening done, no longer requires screening due to quit > 15 years ago  - continue treatment for allergies  - PFT in 1 year  - Follow up in 1 year           Relevant Medications    tiotropium (SPIRIVA RESPIMAT) 2.5 mcg/Act Aero Soln    Tiotropium Bromide Monohydrate (SPIRIVA RESPIMAT) 1.25 MCG/ACT Aero Soln    Other Relevant Orders    PULMONARY FUNCTION TESTS -Test requested: Complete Pulmonary Function Test       Return in about 1 year (around 8/25/2023).   (return to office in...)    Time spent in record review prior to patient arrival, reviewing results, and in face-to-face encounter totaled 30 min, excluding any procedures if performed.    Margret Harvey MD RD  Pulmonary and Critical Care

## 2022-08-25 ENCOUNTER — OFFICE VISIT (OUTPATIENT)
Dept: SLEEP MEDICINE | Facility: MEDICAL CENTER | Age: 72
End: 2022-08-25
Payer: MEDICARE

## 2022-08-25 VITALS
SYSTOLIC BLOOD PRESSURE: 110 MMHG | BODY MASS INDEX: 29.32 KG/M2 | WEIGHT: 176 LBS | HEIGHT: 65 IN | DIASTOLIC BLOOD PRESSURE: 74 MMHG | HEART RATE: 74 BPM | OXYGEN SATURATION: 97 %

## 2022-08-25 DIAGNOSIS — J43.2 CENTRILOBULAR EMPHYSEMA (HCC): ICD-10-CM

## 2022-08-25 DIAGNOSIS — R06.02 SHORTNESS OF BREATH: ICD-10-CM

## 2022-08-25 PROCEDURE — 99214 OFFICE O/P EST MOD 30 MIN: CPT | Performed by: INTERNAL MEDICINE

## 2022-08-25 RX ORDER — TIOTROPIUM BROMIDE INHALATION SPRAY 1.56 UG/1
2 SPRAY, METERED RESPIRATORY (INHALATION) DAILY
Qty: 1 EACH | Refills: 0 | COMMUNITY
Start: 2022-08-25 | End: 2022-08-31

## 2022-08-25 RX ORDER — TIOTROPIUM BROMIDE INHALATION SPRAY 3.12 UG/1
5 SPRAY, METERED RESPIRATORY (INHALATION) DAILY
Qty: 1 EACH | Refills: 6 | Status: SHIPPED | OUTPATIENT
Start: 2022-08-25 | End: 2022-08-31 | Stop reason: SDUPTHER

## 2022-08-25 NOTE — ASSESSMENT & PLAN NOTE
Gold Class 2A COPD.  Plan:  - Maintenance therapy: Start Spiriva today  - Continue Albuterol as needed  - No Oxygen therapy  - Routine vaccinations are current  - Educated on COPD exacerbation signs, return precautions and when to contact clinic or come in to ER.   - CT screening done, no longer requires screening due to quit > 15 years ago  - continue treatment for allergies  - PFT in 1 year  - Follow up in 1 year

## 2022-08-25 NOTE — ASSESSMENT & PLAN NOTE
Continues to have MCGILL out of proportion to her pulmonary testing results  Plan:  - Repeat Echo to assess for PH  - continue to follow with cardiology

## 2022-08-30 ENCOUNTER — OFFICE VISIT (OUTPATIENT)
Dept: CARDIOLOGY | Facility: MEDICAL CENTER | Age: 72
End: 2022-08-30
Payer: MEDICARE

## 2022-08-30 VITALS
HEIGHT: 64 IN | HEART RATE: 71 BPM | RESPIRATION RATE: 16 BRPM | SYSTOLIC BLOOD PRESSURE: 102 MMHG | WEIGHT: 146.2 LBS | DIASTOLIC BLOOD PRESSURE: 50 MMHG | BODY MASS INDEX: 24.96 KG/M2 | OXYGEN SATURATION: 98 %

## 2022-08-30 DIAGNOSIS — I48.0 PAROXYSMAL ATRIAL FIBRILLATION (HCC): ICD-10-CM

## 2022-08-30 DIAGNOSIS — Z95.818 PRESENCE OF WATCHMAN LEFT ATRIAL APPENDAGE CLOSURE DEVICE: ICD-10-CM

## 2022-08-30 DIAGNOSIS — R06.02 SHORTNESS OF BREATH: ICD-10-CM

## 2022-08-30 DIAGNOSIS — R06.09 DYSPNEA ON EXERTION: ICD-10-CM

## 2022-08-30 DIAGNOSIS — E03.9 HYPOTHYROIDISM, UNSPECIFIED TYPE: ICD-10-CM

## 2022-08-30 DIAGNOSIS — E78.2 MIXED HYPERLIPIDEMIA: ICD-10-CM

## 2022-08-30 PROCEDURE — 99214 OFFICE O/P EST MOD 30 MIN: CPT | Performed by: NURSE PRACTITIONER

## 2022-08-30 RX ORDER — METOPROLOL SUCCINATE 25 MG/1
25 TABLET, EXTENDED RELEASE ORAL
Qty: 100 TABLET | Refills: 3 | Status: SHIPPED | OUTPATIENT
Start: 2022-08-30 | End: 2023-06-26

## 2022-08-30 RX ORDER — FLECAINIDE ACETATE 50 MG/1
50 TABLET ORAL 2 TIMES DAILY
Qty: 200 TABLET | Refills: 3 | Status: SHIPPED | OUTPATIENT
Start: 2022-08-30 | End: 2023-08-02

## 2022-08-30 ASSESSMENT — ENCOUNTER SYMPTOMS
LOSS OF CONSCIOUSNESS: 0
FEVER: 0
BRUISES/BLEEDS EASILY: 0
PALPITATIONS: 0
SHORTNESS OF BREATH: 1
ORTHOPNEA: 0
COUGH: 0
PND: 0
ABDOMINAL PAIN: 0
MYALGIAS: 0
DIZZINESS: 0
CHILLS: 0
NAUSEA: 0
HEADACHES: 0
INSOMNIA: 0

## 2022-08-30 NOTE — PROGRESS NOTES
Chief Complaint   Patient presents with    Follow-Up    Atrial Fibrillation    Hyperlipidemia       Subjective     Allison Cespedes is a 71 y.o. female who presents today for annual follow-up of PAFib and hyperlipidemia.    Allison is a 71 year old female with history of PAFib, first diagnosed in October 2018; she is status post Watchman procedure in March 2019, and is now off of anticoagulation, and is on ASA only. She also has some hyperlipidemia, hypothyroidism and arthritis. She was last seen by me in August 2021.    In January 2022, she fell and fractured her left humerus and wrist, which required extensive surgical repair, followed by four months of PT. She does have fairly good ROM of her left arm, but does still have some mild pain/ache at home.    More recently, she has noticed some shortness of breath with any type of exertion. PFTs done by her pulmonologist came back normal; CT scan of the chest showed very mild COPD; echocardiogram was ordered, but is not scheduled until December 2022.     She is here today for annual follow-up. During all of the above, she did not have any AFib episodes. She is maintained on low dose Flecainide and Toprol. No symptomatic palpitations; she does still keep track of her HR and rhythm on her [employer] fabricio. No chest pain, pressure or discomfort; no orthopnea or PND; no dizziness or syncope; no LE edema. She just notices shortness of breath with any type of exertion/exercise.     Past Medical History:   Diagnosis Date    Anemia     Arthritis     Fingers, hands, toes, feet (osteoarthritis)    Asthma     Inhalers daily    Atrial fibrillation (HCC) 10/2018    New onset in office. Echocardiogram with normal LV size, LVEF 65%. Trace MR, trace TR. RVSP 34mmHg. March 2019: Status post Watchman procedure.    Breath shortness     Uses 2L oxygen at night ([employer])     Cataract     Bilateral IOL    Chronic anticoagulation     COPD (chronic obstructive pulmonary disease) ([facility])     Cough      GERD (gastroesophageal reflux disease)     Gynecological disorder     Postmenopausal bleeding right now     Hyperlipidemia     Hypothyroidism     Pneumonia 1989    Psychiatric problem     depression, anxiety    Shortness of breath     Snoring     Tuberculosis 1981    Treated for nine months    Wheezing      Past Surgical History:   Procedure Laterality Date    PB OPEN RX DISTAL RADIUS FX, INTRA-ARTICULAR* Left 1/21/2022    Procedure: OPEN REDUCTION AND INTERNAL FIXATION DISTAL RADIUS;  Surgeon: Eric Kay M.D.;  Location: [facility] [place] [facility];  Service: Orthopedics    PB OPEN TREATMENT PBOX HUMERAL FRACTURE Left 1/21/2022    Procedure: OPEN REDUCTION AND INTERNAL FIXATION PROXIMAL HUMERUS FRACTURE WITH FIBULA  ALLOGRAFT;  Surgeon: Eric Kay M.D.;  Location: [facility];  Service: Orthopedics    FINGER ARTHROPLASTY Left 10/16/2020    Procedure: ARTHROPLASTY, FINGER - THUMB CARPOMETACARPAL EXCISIONAL;  Surgeon: Fidel Kay M.D.;  Location: SURGERY SAME DAY [facility];  Service: Orthopedics    TENDON TRANSFER Left 10/16/2020    Procedure: TRANSFER, TENDON - FOR FLEXOR CARPI RADIALIS TENDON GRAFT;  Surgeon: Fidel Kay M.D.;  Location: SURGERY SAME DAY [facility];  Service: Orthopedics    MASS EXCISION GENERAL Left 7/6/2018    Procedure: MASS EXCISION GENERAL/ SUBCUTANEOUS MASS LEFT SHOULDER;  Surgeon: Swapna Rivas M.D.;  Location: SURGERY SAME DAY [facility];  Service: General    OTHER CARDIAC SURGERY  2018    watchman implanted    CHOLECYSTECTOMY  1994    GYN SURGERY  1975    fibroid tumor    APPENDECTOMY  1960    had ruptured    ARTHROSCOPY, KNEE      MAMMOPLASTY AUGMENTATION      MD BREAST AUGMENTATION WITH IMPLANT      MD REMV 2ND CATARACT,CORN-SCLER SECTN       Family History   Problem Relation Age of Onset    Non-contributory Mother     Hypertension Mother     Cancer Mother         lung cancer    Non-contributory Father      Heart Disease Sister         MI, stent    Drug abuse Sister      Social History     Socioeconomic History    Marital status:      Spouse name: Not on file    Number of children: Not on file    Years of education: Not on file    Highest education level: Not on file   Occupational History    Not on file   Tobacco Use    Smoking status: Former     Packs/day: 2.00     Years: 30.00     Pack years: 60.00     Types: Cigarettes     Quit date: 2/3/1996     Years since quittin.5    Smokeless tobacco: Never    Tobacco comments:     continued abstinance   Vaping Use    Vaping Use: Never used   Substance and Sexual Activity    Alcohol use: Not Currently    Drug use: No    Sexual activity: Not on file   Other Topics Concern    Not on file   Social History Narrative    Not on file     Social Determinants of Health     Financial Resource Strain: Not on file   Food Insecurity: Not on file   Transportation Needs: Not on file   Physical Activity: Not on file   Stress: Not on file   Social Connections: Not on file   Intimate Partner Violence: Not on file   Housing Stability: Not on file     Allergies   Allergen Reactions    Tizanidine Unspecified     hallucinations    Pcn [Penicillins]      Rxn as child     Outpatient Encounter Medications as of 2022   Medication Sig Dispense Refill    flecainide (TAMBOCOR) 50 MG tablet Take 1 Tablet by mouth 2 times a day. 200 Tablet 3    metoprolol SR (TOPROL XL) 25 MG TABLET SR 24 HR Take 1 Tablet by mouth every day. 100 Tablet 3    tiotropium (SPIRIVA RESPIMAT) 2.5 mcg/Act Aero Soln Inhale 2 Inhalation every day. Assemble and prime. 1 Each 6    Tiotropium Bromide Monohydrate (SPIRIVA RESPIMAT) 1.25 MCG/ACT Aero Soln Inhale 2 Inhalation every day. 1 Each 0    buPROPion (WELLBUTRIN XL) 150 MG XL tablet TAKE 1 TABLET TWICE DAILY 180 Tablet 0    lovastatin (MEVACOR) 40 MG tablet Take 40 mg by mouth.      escitalopram (LEXAPRO) 20 MG tablet Take 20 mg by mouth every day.      diclofenac  DR (VOLTAREN) 75 MG Tablet Delayed Response Take 75 mg by mouth 2 times a day.      triamcinolone acetonide (ORALONE) 0.1 % Paste APPLY TO THE AFFECTED AREA AFTER MEALS AND BEFORE BEDTIME      fluticasone (FLONASE) 50 MCG/ACT nasal spray Administer 1 Spray into affected nostril(S) every day.      dicyclomine (BENTYL) 20 MG Tab Take 20 mg by mouth at bedtime.      gabapentin (NEURONTIN) 100 MG Cap TAKE 1 CAPSULE BY MOUTH THREE TIMES DAILY FOR 3 DAYS THEN 2 CAPSULE BY MOUTH THREE TIMES DAILY FOR 3 DAYS THEN 3 CAPSULE BY MOUTH THREE TIMES DAILY (Patient taking differently: Take 100 mg by mouth 2 times a day. TAKE 1 CAPSULE BY MOUTH THREE TIMES DAILY FOR 3 DAYS THEN 2 CAPSULE BY MOUTH THREE TIMES DAILY FOR 3 DAYS THEN 3 CAPSULE BY MOUTH THREE TIMES DAILY) 100 capsule 1    vitamin D (CHOLECALCIFEROL) 1000 Unit (25 mcg) Tab Take 2,000 Units by mouth every day.      medroxyPROGESTERone (PROVERA) 2.5 MG Tab Take 2.5 mg by mouth every day.      LORazepam (ATIVAN) 1 MG Tab Take 1 mg by mouth at bedtime as needed.  0    Estradiol 0.025 MG/24HR PATCH BIWEEKLY       aspirin EC 81 MG EC tablet Take 1 Tab by mouth every day. 30 Tab 6    HYDROcodone-acetaminophen (NORCO) 5-325 MG Tab per tablet Take 1 Tab by mouth 2 times a day as needed.      levalbuterol (XOPENEX HFA) 45 MCG/ACT inhaler INHALE 2 PUFFS BY MOUTH EVERY 4 HOURS AS NEEDED FOR SHORTNESS OF BREATH 3 Inhaler 3    liothyronine (CYTOMEL) 5 MCG Tab Take 5 mcg by mouth 2 Times a Day.      [DISCONTINUED] metoprolol SR (TOPROL XL) 25 MG TABLET SR 24 HR TAKE 1 TABLET EVERY DAY 90 Tablet 0    [DISCONTINUED] flecainide (TAMBOCOR) 50 MG tablet TAKE 1 TABLET TWICE DAILY  . (APPOINTMENT IS NEEDED) 180 Tablet 0     No facility-administered encounter medications on file as of 8/30/2022.     Review of Systems   Constitutional:  Positive for malaise/fatigue. Negative for chills and fever.   HENT:  Negative for congestion.    Respiratory:  Positive for shortness of breath. Negative for  "cough.    Cardiovascular:  Negative for chest pain, palpitations, orthopnea, leg swelling and PND.   Gastrointestinal:  Negative for abdominal pain and nausea.   Musculoskeletal:  Negative for myalgias.   Skin:  Negative for rash.   Neurological:  Negative for dizziness, loss of consciousness and headaches.   Endo/Heme/Allergies:  Does not bruise/bleed easily.   Psychiatric/Behavioral:  The patient does not have insomnia.             Objective     /50 (BP Location: Left arm, Patient Position: Sitting, BP Cuff Size: Adult)   Pulse 71   Resp 16   Ht 1.626 m (5' 4\")   Wt 66.3 kg (146 lb 3.2 oz)   SpO2 98%   BMI 25.10 kg/m²     Physical Exam  Constitutional:       Appearance: She is well-developed.   HENT:      Head: Normocephalic.   Neck:      Vascular: No JVD.   Cardiovascular:      Rate and Rhythm: Normal rate and regular rhythm.      Heart sounds: Normal heart sounds.   Pulmonary:      Effort: Pulmonary effort is normal. No respiratory distress.      Breath sounds: Normal breath sounds. No wheezing or rales.   Abdominal:      General: Bowel sounds are normal. There is no distension.      Palpations: Abdomen is soft.      Tenderness: There is no abdominal tenderness.   Musculoskeletal:         General: Normal range of motion.      Cervical back: Normal range of motion and neck supple.      Comments: Scar of previous left arm/shoulder/wrist surgery, well healed.   Skin:     General: Skin is warm and dry.      Findings: No rash.   Neurological:      Mental Status: She is alert and oriented to person, place, and time.     CONCLUSIONS OF ECHOCARDIOGRAM OF 10/17/2018:  Normal left ventricular systolic function.  Left ventricular ejection fraction is visually estimated to be 65%.  Normal diastolic function.  Normal inferior vena cava size and inspiratory collapse.  Estimated right ventricular systolic pressure is 34 mmHg.    IMPRESSION OF CT SCAN OF CHEST OF 8/1/2022:  1.  Mild emphysema.  2.  Atherosclerotic " disease    CONCLUSION OF PFTS OF 8/4/2022:  All values in this test correlates with normal limits. Normal pulmonary function test.      No recent labs done.    Assessment & Plan     1. Paroxysmal atrial fibrillation (HCC)  EC-ECHOCARDIOGRAM COMPLETE W/O CONT    flecainide (TAMBOCOR) 50 MG tablet    metoprolol SR (TOPROL XL) 25 MG TABLET SR 24 HR      2. Presence of Watchman left atrial appendage closure device  EC-ECHOCARDIOGRAM COMPLETE W/O CONT      3. Dyspnea on exertion  EC-ECHOCARDIOGRAM COMPLETE W/O CONT    CBC WITH DIFFERENTIAL      4. SOB (shortness of breath)  CBC WITH DIFFERENTIAL      5. Mixed hyperlipidemia  Comp Metabolic Panel    Lipid Profile      6. Hypothyroidism, unspecified type  TSH          Medical Decision Making: Today's Assessment/Status/Plan:        Paroxysmal atrial fibrillation, in sinus rhythm on Flecainide and Toprol XL. She keeps track of her HR on her [employer] device, and rhythm has been sinus during all of her orthopedic surgery and recovery. She is status post Watchman too, so she remains on ASA only.    2. Dyspnea on exertion/shortness of breath. To get earlier echo. She does have follow-up with pulmonology too.    3. Hyperlipidemia, treated. To check fasting CMP and lipid panel.    4. Hypothyroidism, treated. To check TSH.    As above, echocardiogram and labs. We will make changes based on these. Follow-up to be determined based on these results.

## 2022-08-31 RX ORDER — ALBUTEROL SULFATE 90 UG/1
2 AEROSOL, METERED RESPIRATORY (INHALATION) EVERY 6 HOURS PRN
COMMUNITY
End: 2022-08-31 | Stop reason: SDUPTHER

## 2022-08-31 RX ORDER — TIOTROPIUM BROMIDE INHALATION SPRAY 3.12 UG/1
5 SPRAY, METERED RESPIRATORY (INHALATION) DAILY
Qty: 1 EACH | Refills: 6 | Status: SHIPPED | OUTPATIENT
Start: 2022-08-31

## 2022-08-31 RX ORDER — ALBUTEROL SULFATE 90 UG/1
2 AEROSOL, METERED RESPIRATORY (INHALATION) EVERY 6 HOURS PRN
Qty: 3 EACH | Refills: 3 | Status: SHIPPED | OUTPATIENT
Start: 2022-08-31 | End: 2023-06-23 | Stop reason: SDUPTHER

## 2022-08-31 NOTE — TELEPHONE ENCOUNTER
Have we ever prescribed this med? Yes.  If yes, what date? 08/25/22    Last OV: 08/25/22 with Dr Harvey    Next OV: No Pending appt.     DX:Centrilobular emphysema /SOB    Medications:   Requested Prescriptions     Pending Prescriptions Disp Refills    tiotropium (SPIRIVA RESPIMAT) 2.5 mcg/Act Aero Soln 1 Each 6     Sig: Inhale 2 Inhalation every day. Assemble and prime.    VENTOLIN  (90 Base) MCG/ACT Aero Soln inhalation aerosol 3 Each 3     Sig: Inhale 2 Puffs every 6 hours as needed for Shortness of Breath.

## 2022-09-09 ENCOUNTER — TELEMEDICINE (OUTPATIENT)
Dept: MEDICAL GROUP | Facility: LAB | Age: 72
End: 2022-09-09
Payer: MEDICARE

## 2022-09-09 DIAGNOSIS — F41.8 PERFORMANCE ANXIETY: ICD-10-CM

## 2022-09-09 PROCEDURE — 99213 OFFICE O/P EST LOW 20 MIN: CPT | Mod: 95 | Performed by: PHYSICIAN ASSISTANT

## 2022-09-09 RX ORDER — PROPRANOLOL HYDROCHLORIDE 20 MG/1
TABLET ORAL
Qty: 90 TABLET | Refills: 0 | Status: SHIPPED | OUTPATIENT
Start: 2022-09-09 | End: 2022-12-06

## 2022-09-09 RX ORDER — PROPRANOLOL HYDROCHLORIDE 20 MG/1
20 TABLET ORAL
Qty: 30 TABLET | Refills: 0 | Status: SHIPPED | OUTPATIENT
Start: 2022-09-09 | End: 2022-09-09

## 2022-09-09 NOTE — PROGRESS NOTES
Virtual Visit: Established Patient   This visit was conducted via Zoom using secure and encrypted videoconferencing technology.   The patient was [facility].    The patient's identity was confirmed and verbal consent was obtained for this virtual visit.    Subjective:   CC:   Chief Complaint   Patient presents with    Medication Management     Allison Cespedes is a 71 y.o. female presenting for evaluation and management of:    Performance Anxiety  Pt was previously prescribed propranolol  for anxiety related to public speaking.  Patient is several seeking agents coming up in the near future and would like to have this medication available.  Pt was previously rx'd 20mg  Denies medication side effects    Pt has a hx of afib and is currently prescribed metoprolol xl 25mg QD. Denies episodes of hypotension, bardycardia or syncope or near syncope with this medication          ROS   ROS negative except as indicated above    Current medicines (including changes today)  Current Outpatient Medications   Medication Sig Dispense Refill    tiotropium (SPIRIVA RESPIMAT) 2.5 mcg/Act Aero Soln Inhale 2 Inhalation every day. Assemble and prime. 1 Each 6    VENTOLIN  (90 Base) MCG/ACT Aero Soln inhalation aerosol Inhale 2 Puffs every 6 hours as needed for Shortness of Breath. 3 Each 3    flecainide (TAMBOCOR) 50 MG tablet Take 1 Tablet by mouth 2 times a day. 200 Tablet 3    metoprolol SR (TOPROL XL) 25 MG TABLET SR 24 HR Take 1 Tablet by mouth every day. 100 Tablet 3    buPROPion (WELLBUTRIN XL) 150 MG XL tablet TAKE 1 TABLET TWICE DAILY 180 Tablet 0    lovastatin (MEVACOR) 40 MG tablet Take 40 mg by mouth.      escitalopram (LEXAPRO) 20 MG tablet Take 20 mg by mouth every day.      diclofenac DR (VOLTAREN) 75 MG Tablet Delayed Response Take 75 mg by mouth 2 times a day.      triamcinolone acetonide (ORALONE) 0.1 % Paste APPLY TO THE AFFECTED AREA AFTER MEALS AND BEFORE BEDTIME      fluticasone  (FLONASE) 50 MCG/ACT nasal spray Administer 1 Spray into affected nostril(S) every day.      dicyclomine (BENTYL) 20 MG Tab Take 20 mg by mouth at bedtime.      gabapentin (NEURONTIN) 100 MG Cap TAKE 1 CAPSULE BY MOUTH THREE TIMES DAILY FOR 3 DAYS THEN 2 CAPSULE BY MOUTH THREE TIMES DAILY FOR 3 DAYS THEN 3 CAPSULE BY MOUTH THREE TIMES DAILY (Patient taking differently: Take 100 mg by mouth 2 times a day. TAKE 1 CAPSULE BY MOUTH THREE TIMES DAILY FOR 3 DAYS THEN 2 CAPSULE BY MOUTH THREE TIMES DAILY FOR 3 DAYS THEN 3 CAPSULE BY MOUTH THREE TIMES DAILY) 100 capsule 1    vitamin D (CHOLECALCIFEROL) 1000 Unit (25 mcg) Tab Take 2,000 Units by mouth every day.      medroxyPROGESTERone (PROVERA) 2.5 MG Tab Take 2.5 mg by mouth every day.      LORazepam (ATIVAN) 1 MG Tab Take 1 mg by mouth at bedtime as needed.  0    Estradiol 0.025 MG/24HR PATCH BIWEEKLY       aspirin EC 81 MG EC tablet Take 1 Tab by mouth every day. 30 Tab 6    HYDROcodone-acetaminophen (NORCO) 5-325 MG Tab per tablet Take 1 Tab by mouth 2 times a day as needed.      levalbuterol (XOPENEX HFA) 45 MCG/ACT inhaler INHALE 2 PUFFS BY MOUTH EVERY 4 HOURS AS NEEDED FOR SHORTNESS OF BREATH 3 Inhaler 3    liothyronine (CYTOMEL) 5 MCG Tab Take 5 mcg by mouth 2 Times a Day.       No current facility-administered medications for this visit.       Patient Active Problem List    Diagnosis Date Noted    Mixed hyperlipidemia 08/30/2022    Centrilobular emphysema (HCC) 08/25/2022    Left wrist pain 01/18/2022    Presence of Watchman left atrial appendage closure device 04/04/2019    Atrial fibrillation (HCC) [I48.91] 03/08/2019    Arthritis 11/20/2018    Chronic anticoagulation 11/20/2018    Obstructive sleep apnea 10/16/2018    Hypothyroidism 10/16/2018    SOB (shortness of breath) 09/05/2018        Objective:   There were no vitals taken for this visit.    Physical Exam:  Constitutional: Alert, no distress, well-groomed.  Skin: No rashes in visible areas.  Eye: Round.  Conjunctiva clear, lids normal. No icterus.   ENMT: Lips pink without lesions, good dentition, moist mucous membranes. Phonation normal.  Neck: No masses, no thyromegaly. Moves freely without pain.  Respiratory: Unlabored respiratory effort, no cough or audible wheeze  Psych: Alert and oriented x3, normal affect and mood.     Assessment and Plan:   The following treatment plan was discussed:     1. Performance anxiety  Condition, new to me  Resume patient's propranolol 20 mg daily as needed.  I advised patient to closely monitor her blood pressure and heart rate while taking this medication and to discontinue it if she develops any bradycardia or hypotension.    Follow-up: No follow-ups on file.

## 2022-09-22 ENCOUNTER — HOSPITAL ENCOUNTER (OUTPATIENT)
Dept: CARDIOLOGY | Facility: MEDICAL CENTER | Age: 72
End: 2022-09-22
Attending: NURSE PRACTITIONER
Payer: MEDICARE

## 2022-09-22 DIAGNOSIS — R06.09 DYSPNEA ON EXERTION: ICD-10-CM

## 2022-09-22 DIAGNOSIS — I48.0 PAROXYSMAL ATRIAL FIBRILLATION (HCC): ICD-10-CM

## 2022-09-22 DIAGNOSIS — Z95.818 PRESENCE OF WATCHMAN LEFT ATRIAL APPENDAGE CLOSURE DEVICE: ICD-10-CM

## 2022-09-22 LAB
LV EJECT FRACT  99904: 65
LV EJECT FRACT MOD 2C 99903: 65.54
LV EJECT FRACT MOD 4C 99902: 65.63
LV EJECT FRACT MOD BP 99901: 68.45

## 2022-09-22 PROCEDURE — 93306 TTE W/DOPPLER COMPLETE: CPT | Mod: 26 | Performed by: INTERNAL MEDICINE

## 2022-09-22 PROCEDURE — 93306 TTE W/DOPPLER COMPLETE: CPT

## 2022-09-28 RX ORDER — BUPROPION HYDROCHLORIDE 150 MG/1
TABLET ORAL
Qty: 180 TABLET | Refills: 0 | Status: SHIPPED | OUTPATIENT
Start: 2022-09-28 | End: 2023-02-24

## 2022-11-11 ENCOUNTER — PATIENT MESSAGE (OUTPATIENT)
Dept: HEALTH INFORMATION MANAGEMENT | Facility: OTHER | Age: 72
End: 2022-11-11

## 2022-12-06 DIAGNOSIS — F41.8 PERFORMANCE ANXIETY: ICD-10-CM

## 2022-12-06 RX ORDER — PROPRANOLOL HYDROCHLORIDE 20 MG/1
TABLET ORAL
Qty: 90 TABLET | Refills: 0 | Status: SHIPPED | OUTPATIENT
Start: 2022-12-06 | End: 2023-02-16 | Stop reason: SDUPTHER

## 2023-02-16 DIAGNOSIS — F41.8 PERFORMANCE ANXIETY: ICD-10-CM

## 2023-02-16 NOTE — TELEPHONE ENCOUNTER
Received request via: Pharmacy    Was the patient seen in the last year in this department? Yes  9/9/22  Does the patient have an active prescription (recently filled or refills available) for medication(s) requested? No    Does the patient have halfway Plus and need 100 day supply (blood pressure, diabetes and cholesterol meds only)? Medication is not for cholesterol, blood pressure or diabetes

## 2023-02-17 RX ORDER — PROPRANOLOL HYDROCHLORIDE 20 MG/1
TABLET ORAL
Qty: 90 TABLET | Refills: 0 | Status: SHIPPED | OUTPATIENT
Start: 2023-02-17

## 2023-02-22 NOTE — NON-PROVIDER
EKG per Dr. Lyles because of medication change.    Results routed to Meredith Rucker RN.   [Annual Wellness Visit] : an annual wellness visit

## 2023-02-23 NOTE — TELEPHONE ENCOUNTER
Received request via: Pharmacy    Was the patient seen in the last year in this department? Yes  9/9/2022  Does the patient have an active prescription (recently filled or refills available) for medication(s) requested? No    Does the patient have group home Plus and need 100 day supply (blood pressure, diabetes and cholesterol meds only)? Patient does not have SCP

## 2023-02-24 RX ORDER — BUPROPION HYDROCHLORIDE 150 MG/1
TABLET ORAL
Qty: 180 TABLET | Refills: 0 | Status: SHIPPED | OUTPATIENT
Start: 2023-02-24 | End: 2023-03-22

## 2023-03-22 ENCOUNTER — OFFICE VISIT (OUTPATIENT)
Dept: MEDICAL GROUP | Facility: LAB | Age: 73
End: 2023-03-22
Payer: MEDICARE

## 2023-03-22 VITALS
HEART RATE: 60 BPM | OXYGEN SATURATION: 97 % | DIASTOLIC BLOOD PRESSURE: 68 MMHG | WEIGHT: 178 LBS | RESPIRATION RATE: 16 BRPM | BODY MASS INDEX: 30.39 KG/M2 | HEIGHT: 64 IN | TEMPERATURE: 97.2 F | SYSTOLIC BLOOD PRESSURE: 108 MMHG

## 2023-03-22 DIAGNOSIS — Z63.6 CAREGIVER STRESS: ICD-10-CM

## 2023-03-22 PROCEDURE — 99213 OFFICE O/P EST LOW 20 MIN: CPT | Performed by: PHYSICIAN ASSISTANT

## 2023-03-22 RX ORDER — SUCRALFATE 1 G/1
1 TABLET ORAL
Qty: 120 TABLET | Refills: 0 | Status: SHIPPED | OUTPATIENT
Start: 2023-03-22 | End: 2023-04-17

## 2023-03-22 RX ORDER — BUPROPION HYDROCHLORIDE 150 MG/1
TABLET ORAL
Qty: 270 TABLET | Refills: 0 | Status: SHIPPED | OUTPATIENT
Start: 2023-03-22 | End: 2023-12-04 | Stop reason: SDUPTHER

## 2023-03-22 SDOH — SOCIAL STABILITY - SOCIAL INSECURITY: DEPENDENT RELATIVE NEEDING CARE AT HOME: Z63.6

## 2023-03-22 NOTE — PROGRESS NOTES
Subjective:     CC: Caregiver stress    HPI:   Allison here today with      dx with early stage dementia. Pt repots a lot of stress with this new diagnosis and caregiving . Recently moved to assisted living facility due to her 's condition, this has been a difficult adjustment for the patient..  She was previously referred to a therapist by her pain management specialist but was lost to follow-up due to some medical complications her  experienced.  Patient does have an underlying history of anxiety.  Currently treated with Wellbutrin  300 mg daily and Lexapro 20 mg daily and rare use of propranolol 20 mg daily.  She does have a support network available via friends and family as well as a ladies group.      ROS:  Gen: no fevers/chills, no changes in weight  Eyes: no changes in vision  ENT: no sore throat, no hearing loss, no bloody nose  Pulm: no sob, no cough  CV: no chest pain, no palpitations  GI: no nausea/vomiting, no diarrhea  : no dysuria  MSk: no myalgias  Skin: no rash  Neuro: no headaches, no numbness/tingling  Heme/Lymph: no easy bruising    Current Outpatient Medications Ordered in Epic   Medication Sig Dispense Refill    buPROPion (WELLBUTRIN XL) 150 MG XL tablet 3 tablets (450mg)  Tablet 0    sucralfate (CARAFATE) 1 GM Tab Take 1 Tablet by mouth 4 Times a Day,Before Meals and at Bedtime. 120 Tablet 0    tiotropium (SPIRIVA RESPIMAT) 2.5 mcg/Act Aero Soln Inhale 2 Inhalation every day. Assemble and prime. 1 Each 6    VENTOLIN  (90 Base) MCG/ACT Aero Soln inhalation aerosol Inhale 2 Puffs every 6 hours as needed for Shortness of Breath. 3 Each 3    propranolol (INDERAL) 20 MG Tab TAKE 1 TABLET BY MOUTH EVERY DAY AS NEEDED FOR PERFORMANCE ANXIETY 90 Tablet 0    flecainide (TAMBOCOR) 50 MG tablet Take 1 Tablet by mouth 2 times a day. 200 Tablet 3    metoprolol SR (TOPROL XL) 25 MG TABLET SR 24 HR Take 1 Tablet by mouth every day. 100 Tablet 3    lovastatin (MEVACOR) 40 MG  "tablet Take 40 mg by mouth.      escitalopram (LEXAPRO) 20 MG tablet Take 20 mg by mouth every day.      diclofenac DR (VOLTAREN) 75 MG Tablet Delayed Response Take 75 mg by mouth 2 times a day.      triamcinolone acetonide (ORALONE) 0.1 % Paste APPLY TO THE AFFECTED AREA AFTER MEALS AND BEFORE BEDTIME      fluticasone (FLONASE) 50 MCG/ACT nasal spray Administer 1 Spray into affected nostril(S) every day.      dicyclomine (BENTYL) 20 MG Tab Take 20 mg by mouth at bedtime.      gabapentin (NEURONTIN) 100 MG Cap TAKE 1 CAPSULE BY MOUTH THREE TIMES DAILY FOR 3 DAYS THEN 2 CAPSULE BY MOUTH THREE TIMES DAILY FOR 3 DAYS THEN 3 CAPSULE BY MOUTH THREE TIMES DAILY (Patient taking differently: Take 100 mg by mouth 2 times a day. TAKE 1 CAPSULE BY MOUTH THREE TIMES DAILY FOR 3 DAYS THEN 2 CAPSULE BY MOUTH THREE TIMES DAILY FOR 3 DAYS THEN 3 CAPSULE BY MOUTH THREE TIMES DAILY) 100 capsule 1    vitamin D (CHOLECALCIFEROL) 1000 Unit (25 mcg) Tab Take 2,000 Units by mouth every day.      medroxyPROGESTERone (PROVERA) 2.5 MG Tab Take 2.5 mg by mouth every day.      LORazepam (ATIVAN) 1 MG Tab Take 1 mg by mouth at bedtime as needed.  0    Estradiol 0.025 MG/24HR PATCH BIWEEKLY       aspirin EC 81 MG EC tablet Take 1 Tab by mouth every day. 30 Tab 6    HYDROcodone-acetaminophen (NORCO) 5-325 MG Tab per tablet Take 1 Tab by mouth 2 times a day as needed.      levalbuterol (XOPENEX HFA) 45 MCG/ACT inhaler INHALE 2 PUFFS BY MOUTH EVERY 4 HOURS AS NEEDED FOR SHORTNESS OF BREATH 3 Inhaler 3    liothyronine (CYTOMEL) 5 MCG Tab Take 5 mcg by mouth 2 Times a Day.       No current [facility]-ordered facility-administered medications on file.         Objective:     Exam:  /68   Pulse 60   Temp 36.2 °C (97.2 °F)   Resp 16   Ht 1.626 m (5' 4\")   Wt 80.7 kg (178 lb)   SpO2 97%   BMI 30.55 kg/m²  Body mass index is 30.55 kg/m².    Constitutional: Alert, no distress, well-groomed.  Skin: Warm, dry, good turgor, no rashes in visible " areas.  Eye: Equal, round and reactive, conjunctiva clear, lids normal.  ENMT: Lips without lesions, good dentition, moist mucous membranes.  Neck: Trachea midline, no masses, no thyromegaly.  Respiratory: Unlabored respiratory effort, no cough.  MSK: Normal gait, moves all extremities.  Neuro: Grossly non-focal.   Psych: Alert and oriented x3, normal affect and mood.      Assessment & Plan:     72 y.o. female with the following -     1. Caregiver stress  Chronic condition  We will plan to continue Lexapro and increase Wellbutrin to 450 mg daily.  Also advised patient that she can use propranolol as needed for acute stress/anxiety symptoms.  We did also discuss  exploring respite care at the assisted living facility which may be of benefit with her.  Behavioral health referral order entered.  Also discussed the option of telehealth services such as [facility] if she is unable to travel for an appointment  - Referral to Behavioral Health      I spent a total of 20 minutes with record review, exam, communication with the patient, communication with other providers, and documentation of this encounter.      No follow-ups on file.    Please note that this dictation was created using voice recognition software. I have made every reasonable attempt to correct obvious errors, but there may be errors of grammar and possibly content that I did not discover before finalizing the note.

## 2023-04-17 RX ORDER — SUCRALFATE 1 G/1
TABLET ORAL
Qty: 120 TABLET | Refills: 0 | Status: SHIPPED | OUTPATIENT
Start: 2023-04-17 | End: 2023-09-25

## 2023-05-03 ENCOUNTER — APPOINTMENT (OUTPATIENT)
Dept: RADIOLOGY | Facility: MEDICAL CENTER | Age: 73
End: 2023-05-03
Attending: PHYSICIAN ASSISTANT
Payer: MEDICARE

## 2023-05-03 DIAGNOSIS — Z12.31 VISIT FOR SCREENING MAMMOGRAM: ICD-10-CM

## 2023-05-08 ENCOUNTER — APPOINTMENT (OUTPATIENT)
Dept: RADIOLOGY | Facility: MEDICAL CENTER | Age: 73
End: 2023-05-08
Attending: PHYSICIAN ASSISTANT
Payer: MEDICARE

## 2023-05-08 DIAGNOSIS — Z12.31 VISIT FOR SCREENING MAMMOGRAM: ICD-10-CM

## 2023-05-11 ENCOUNTER — APPOINTMENT (OUTPATIENT)
Dept: RADIOLOGY | Facility: MEDICAL CENTER | Age: 73
End: 2023-05-11
Attending: PHYSICIAN ASSISTANT
Payer: MEDICARE

## 2023-06-14 ENCOUNTER — HOSPITAL ENCOUNTER (OUTPATIENT)
Dept: RADIOLOGY | Facility: MEDICAL CENTER | Age: 73
End: 2023-06-14
Attending: PHYSICIAN ASSISTANT
Payer: MEDICARE

## 2023-06-14 DIAGNOSIS — Z12.31 VISIT FOR SCREENING MAMMOGRAM: ICD-10-CM

## 2023-06-14 PROCEDURE — 77063 BREAST TOMOSYNTHESIS BI: CPT

## 2023-06-18 DIAGNOSIS — I48.0 PAROXYSMAL ATRIAL FIBRILLATION (HCC): ICD-10-CM

## 2023-06-19 NOTE — TELEPHONE ENCOUNTER
Is the patient due for a refill? Yes    Was the patient seen the past year? Yes    Date of last office visit: 8/30/2022    Does the patient have an upcoming appointment?  No   If yes, When?     Provider to refill:AB, out of office    Does the patients insurance require a 100 day supply?  No

## 2023-06-23 DIAGNOSIS — R06.02 SHORTNESS OF BREATH: ICD-10-CM

## 2023-06-23 DIAGNOSIS — J43.2 CENTRILOBULAR EMPHYSEMA (HCC): ICD-10-CM

## 2023-06-23 RX ORDER — ALBUTEROL SULFATE 90 UG/1
2 AEROSOL, METERED RESPIRATORY (INHALATION) EVERY 6 HOURS PRN
Qty: 1 EACH | Refills: 0 | Status: SHIPPED | OUTPATIENT
Start: 2023-06-23 | End: 2024-01-30 | Stop reason: SDUPTHER

## 2023-06-23 NOTE — TELEPHONE ENCOUNTER
Have we ever prescribed this med? Yes.  If yes, what date? 8/31/2022    Last OV: 8/25/2022- Dr. Harvey     Next OV: none    DX: Shortness of Breath.    Medications: VENTOLIN  (90 Base) MCG/ACT Aero Soln inhalation aerosol

## 2023-06-26 RX ORDER — METOPROLOL SUCCINATE 25 MG/1
TABLET, EXTENDED RELEASE ORAL
Qty: 90 TABLET | Refills: 0 | Status: SHIPPED | OUTPATIENT
Start: 2023-06-26 | End: 2023-09-25 | Stop reason: SDUPTHER

## 2023-07-13 DIAGNOSIS — E78.2 MIXED HYPERLIPIDEMIA: ICD-10-CM

## 2023-07-13 RX ORDER — LOVASTATIN 40 MG/1
40 TABLET ORAL NIGHTLY
Qty: 100 TABLET | Refills: 3 | Status: SHIPPED | OUTPATIENT
Start: 2023-07-13 | End: 2023-07-13 | Stop reason: SDUPTHER

## 2023-07-13 RX ORDER — LOVASTATIN 40 MG/1
40 TABLET ORAL NIGHTLY
Qty: 100 TABLET | Refills: 3 | Status: SHIPPED | OUTPATIENT
Start: 2023-07-13 | End: 2023-09-25 | Stop reason: SDUPTHER

## 2023-08-25 ENCOUNTER — TELEPHONE (OUTPATIENT)
Dept: SLEEP MEDICINE | Facility: MEDICAL CENTER | Age: 73
End: 2023-08-25
Payer: MEDICARE

## 2023-08-25 ENCOUNTER — APPOINTMENT (OUTPATIENT)
Dept: SLEEP MEDICINE | Facility: MEDICAL CENTER | Age: 73
End: 2023-08-25
Attending: INTERNAL MEDICINE
Payer: MEDICARE

## 2023-08-25 DIAGNOSIS — J43.2 CENTRILOBULAR EMPHYSEMA (HCC): ICD-10-CM

## 2023-08-25 NOTE — TELEPHONE ENCOUNTER
Shamar  Caller: Allison Cespedes     Topic/issue: Patient needed to reschedule PFT - Order  23, Patient is now schedules for 23 - new order needed.    Callback Number: [phone]     Thank you   Glenna GONZALEZ

## 2023-08-29 ENCOUNTER — NON-PROVIDER VISIT (OUTPATIENT)
Dept: SLEEP MEDICINE | Facility: MEDICAL CENTER | Age: 73
End: 2023-08-29
Attending: INTERNAL MEDICINE
Payer: MEDICARE

## 2023-08-29 VITALS — BODY MASS INDEX: 29.17 KG/M2 | WEIGHT: 175.1 LBS | HEIGHT: 65 IN

## 2023-08-29 DIAGNOSIS — J43.2 CENTRILOBULAR EMPHYSEMA (HCC): ICD-10-CM

## 2023-08-29 PROCEDURE — 94726 PLETHYSMOGRAPHY LUNG VOLUMES: CPT | Performed by: INTERNAL MEDICINE

## 2023-08-29 PROCEDURE — 94726 PLETHYSMOGRAPHY LUNG VOLUMES: CPT | Mod: 26 | Performed by: INTERNAL MEDICINE

## 2023-08-29 PROCEDURE — 94060 EVALUATION OF WHEEZING: CPT | Performed by: INTERNAL MEDICINE

## 2023-08-29 PROCEDURE — 94729 DIFFUSING CAPACITY: CPT | Mod: 26 | Performed by: INTERNAL MEDICINE

## 2023-08-29 PROCEDURE — 94729 DIFFUSING CAPACITY: CPT | Performed by: INTERNAL MEDICINE

## 2023-08-29 PROCEDURE — 94060 EVALUATION OF WHEEZING: CPT | Mod: 26 | Performed by: INTERNAL MEDICINE

## 2023-08-29 ASSESSMENT — PULMONARY FUNCTION TESTS
FEV1/FVC_PERCENT_CHANGE: 4
FEV1/FVC_PERCENT_PREDICTED: 98
FEV1/FVC_PERCENT_PREDICTED: 93
FEV1: 1.94
FEV1_PERCENT_PREDICTED: 88
FVC_PERCENT_PREDICTED: 90
FEV1_PREDICTED: 2.2
FEV1/FVC_PERCENT_PREDICTED: 94
FEV1/FVC_PERCENT_PREDICTED: 77
FEV1/FVC_PERCENT_PREDICTED: 96
FEV1_PERCENT_PREDICTED: 81
FEV1: 1.8
FEV1_PERCENT_CHANGE: 3
FEV1_PERCENT_CHANGE: 6
FEV1/FVC: 72
FVC: 2.48
FEV1/FVC_PERCENT_CHANGE: 200
FVC_PERCENT_PREDICTED: 86
FVC_LLN: 2.39
FVC: 2.58
FVC_PREDICTED: 2.86
FEV1/FVC_PERCENT_LLN: 65
FEV1/FVC_PREDICTED: 78
FEV1_LLN: 1.84
FEV1/FVC: 75.19
FEV1/FVC: 73
FEV1/FVC: 75

## 2023-08-29 NOTE — PROCEDURES
Tech: Shanta Small, RT  Good patient effort & cooperation.  Test was performed on the [employer] Body Plethysmograph- Elite DX system.  The predicted sets used for Spirometry are GLI-2012, for Lung Volumes are ITS, and for DLCO is GLI 2017.  The results of this test meet the ATS standards for acceptability and repeatability.  The DLCO was uncorrected for Hb.  A bronchodilator of Ventolin HFA 2 puffs via spacer was administered.  DLCO was performed during dilation period.    Interpretation:     Normal study.    IKiran DO, am the author of this note.     Kiran Wilcox DO  Staff Pulmonologist and Intensivist  [facility]     Please note that this dictation was created using voice recognition software. The accuracy of the dictation is limited to the abilities of the software. I have made every reasonable attempt to correct obvious errors, but I expect that there are errors of grammar and possibly content that I did not discover before finalizing the note.

## 2023-09-25 ENCOUNTER — OFFICE VISIT (OUTPATIENT)
Dept: CARDIOLOGY | Facility: MEDICAL CENTER | Age: 73
End: 2023-09-25
Attending: NURSE PRACTITIONER
Payer: MEDICARE

## 2023-09-25 VITALS
HEART RATE: 69 BPM | HEIGHT: 65 IN | WEIGHT: 177 LBS | RESPIRATION RATE: 16 BRPM | SYSTOLIC BLOOD PRESSURE: 100 MMHG | BODY MASS INDEX: 29.49 KG/M2 | DIASTOLIC BLOOD PRESSURE: 58 MMHG | OXYGEN SATURATION: 96 %

## 2023-09-25 DIAGNOSIS — E03.9 HYPOTHYROIDISM, UNSPECIFIED TYPE: ICD-10-CM

## 2023-09-25 DIAGNOSIS — E78.2 MIXED HYPERLIPIDEMIA: ICD-10-CM

## 2023-09-25 DIAGNOSIS — I48.0 PAROXYSMAL ATRIAL FIBRILLATION (HCC): ICD-10-CM

## 2023-09-25 DIAGNOSIS — Z79.01 CHRONIC ANTICOAGULATION: ICD-10-CM

## 2023-09-25 DIAGNOSIS — Z95.818 PRESENCE OF WATCHMAN LEFT ATRIAL APPENDAGE CLOSURE DEVICE: ICD-10-CM

## 2023-09-25 DIAGNOSIS — G47.33 OBSTRUCTIVE SLEEP APNEA: ICD-10-CM

## 2023-09-25 PROCEDURE — 99214 OFFICE O/P EST MOD 30 MIN: CPT | Performed by: NURSE PRACTITIONER

## 2023-09-25 PROCEDURE — 3078F DIAST BP <80 MM HG: CPT | Performed by: NURSE PRACTITIONER

## 2023-09-25 PROCEDURE — 3074F SYST BP LT 130 MM HG: CPT | Performed by: NURSE PRACTITIONER

## 2023-09-25 PROCEDURE — 99213 OFFICE O/P EST LOW 20 MIN: CPT | Performed by: NURSE PRACTITIONER

## 2023-09-25 RX ORDER — FLECAINIDE ACETATE 50 MG/1
50 TABLET ORAL 2 TIMES DAILY
Qty: 200 TABLET | Refills: 3 | Status: SHIPPED | OUTPATIENT
Start: 2023-09-25

## 2023-09-25 RX ORDER — METOPROLOL SUCCINATE 25 MG/1
25 TABLET, EXTENDED RELEASE ORAL
Qty: 100 TABLET | Refills: 3 | Status: SHIPPED | OUTPATIENT
Start: 2023-09-25

## 2023-09-25 RX ORDER — LOVASTATIN 40 MG/1
40 TABLET ORAL NIGHTLY
Qty: 100 TABLET | Refills: 3 | Status: SHIPPED | OUTPATIENT
Start: 2023-09-25

## 2023-09-25 ASSESSMENT — ENCOUNTER SYMPTOMS
MYALGIAS: 0
ABDOMINAL PAIN: 0
COUGH: 0
DIZZINESS: 0
CHILLS: 0
NAUSEA: 0
LOSS OF CONSCIOUSNESS: 0
INSOMNIA: 0
PND: 0
ORTHOPNEA: 0
SHORTNESS OF BREATH: 0
HEADACHES: 0
FEVER: 0
PALPITATIONS: 0
BRUISES/BLEEDS EASILY: 0

## 2023-09-25 NOTE — PROGRESS NOTES
Chief Complaint   Patient presents with    Follow-Up    Atrial Fibrillation    HTN (Controlled)    Hyperlipidemia       Subjective     Allison Cespedes is a 72 y.o. female who presents today for annual follow-up of PAFib, HTN, hyperlipidemia and hypothyroidism.    Allison is a 72 year old female with history of PAFib, first diagnosed in October 2018; she is status post Watchman procedure in March 2019, and is now off of anticoagulation, and is on ASA only. She also has some hyperlipidemia, hypothyroidism and arthritis. She was last seen by me in September 2022.      She is here today for annual follow-up. It has been a stressful year for her, as her  has had progressive dementia, and this has been very hard.     During all of the above, she did not have any AFib episodes. She is maintained on low dose Flecainide and Toprol. No symptomatic palpitations; she does still keep track of her HR and rhythm on her [employer] fabricio and it does show regular rhythm.     She denies any chest pain, pressure or discomfort; no dyspnea, orthopnea or PND; no dizziness or syncope; no LE edema.     Past Medical History:   Diagnosis Date    Anemia     Arthritis     Fingers, hands, toes, feet (osteoarthritis)    Asthma     Inhalers daily    Atrial fibrillation (HCC) 10/2018    New onset in office. March 2019: Status post Watchman procedure. September 2022: Echocardiogram with normal LV size, LVEF 65%. Normal RA, LA and RV. Trace MR, mild AR, trace TR.    Breath shortness     Uses 2L oxygen at night ([employer])     Cataract     Bilateral IOL    Chronic anticoagulation     COPD (chronic obstructive pulmonary disease) (HCC)     Cough     GERD (gastroesophageal reflux disease)     Gynecological disorder     Postmenopausal bleeding right now     Hyperlipidemia     Hypothyroidism     Pneumonia 1989    Psychiatric problem     depression, anxiety    Shortness of breath     Snoring     Tuberculosis 1981    Treated for nine months    Wheezing       Past Surgical History:   Procedure Laterality Date    PB OPEN RX DISTAL RADIUS FX, INTRA-ARTICULAR* Left 1/21/2022    Procedure: OPEN REDUCTION AND INTERNAL FIXATION DISTAL RADIUS;  Surgeon: Eric Kay M.D.;  Location: [facility];  Service: Orthopedics    PB OPEN TREATMENT PBOX HUMERAL FRACTURE Left 1/21/2022    Procedure: OPEN REDUCTION AND INTERNAL FIXATION PROXIMAL HUMERUS FRACTURE WITH FIBULA  ALLOGRAFT;  Surgeon: Eric Kay M.D.;  Location: [facility];  Service: Orthopedics    FINGER ARTHROPLASTY Left 10/16/2020    Procedure: ARTHROPLASTY, FINGER - THUMB CARPOMETACARPAL EXCISIONAL;  Surgeon: Fidel Kay M.D.;  Location: SURGERY SAME DAY [facility];  Service: Orthopedics    TENDON TRANSFER Left 10/16/2020    Procedure: TRANSFER, TENDON - FOR FLEXOR CARPI RADIALIS TENDON GRAFT;  Surgeon: Fidel Kay M.D.;  Location: SURGERY SAME DAY [facility];  Service: Orthopedics    MASS EXCISION GENERAL Left 7/6/2018    Procedure: MASS EXCISION GENERAL/ SUBCUTANEOUS MASS LEFT SHOULDER;  Surgeon: Swapna Rivas M.D.;  Location: SURGERY SAME DAY [facility];  Service: General    OTHER CARDIAC SURGERY  2018    watchman implanted    CHOLECYSTECTOMY  1994    GYN SURGERY  1975    fibroid tumor    APPENDECTOMY  1960    had ruptured    ARTHROSCOPY, KNEE      MAMMOPLASTY AUGMENTATION      VA BREAST AUGMENTATION WITH IMPLANT      VA REMV 2ND CATARACT,CORN-SCLER SECTN       Family History   Problem Relation Age of Onset    Non-contributory Mother     Hypertension Mother     Cancer Mother         lung cancer    Non-contributory Father     Heart Disease Sister         MI, stent    Drug abuse Sister      Social History     Socioeconomic History    Marital status:      Spouse name: Not on file    Number of children: Not on file    Years of education: Not on file    Highest education level: Not on file   Occupational History    Not on file    Tobacco Use    Smoking status: Former     Current packs/day: 0.00     Average packs/day: 2.0 packs/day for 30.0 years (60.0 ttl pk-yrs)     Types: Cigarettes     Start date: 2/3/1966     Quit date: 2/3/1996     Years since quittin.6    Smokeless tobacco: Never    Tobacco comments:     continued abstinance   Vaping Use    Vaping Use: Never used   Substance and Sexual Activity    Alcohol use: Not Currently    Drug use: No    Sexual activity: Not on file   Other Topics Concern    Not on file   Social History Narrative    Not on file     Social Determinants of Health     Financial Resource Strain: Not on file   Food Insecurity: Not on file   Transportation Needs: Not on file   Physical Activity: Not on file   Stress: Not on file   Social Connections: Not on file   Intimate Partner Violence: Not on file   Housing Stability: Not on file     Allergies   Allergen Reactions    Tizanidine Unspecified     hallucinations    Pcn [Penicillins]      Rxn as child     Outpatient Encounter Medications as of 2023   Medication Sig Dispense Refill    flecainide (TAMBOCOR) 50 MG tablet Take 1 Tablet by mouth 2 times a day. 200 Tablet 3    lovastatin (MEVACOR) 40 MG tablet Take 1 Tablet by mouth every evening. 100 Tablet 3    metoprolol SR (TOPROL XL) 25 MG TABLET SR 24 HR Take 1 Tablet by mouth every day. 100 Tablet 3    VENTOLIN  (90 Base) MCG/ACT Aero Soln inhalation aerosol Inhale 2 Puffs every 6 hours as needed for Shortness of Breath. 1 Each 0    buPROPion (WELLBUTRIN XL) 150 MG XL tablet 3 tablets (450mg)  Tablet 0    propranolol (INDERAL) 20 MG Tab TAKE 1 TABLET BY MOUTH EVERY DAY AS NEEDED FOR PERFORMANCE ANXIETY 90 Tablet 0    tiotropium (SPIRIVA RESPIMAT) 2.5 mcg/Act Aero Soln Inhale 2 Inhalation every day. Assemble and prime. 1 Each 6    escitalopram (LEXAPRO) 20 MG tablet Take 20 mg by mouth every day.      diclofenac DR (VOLTAREN) 75 MG Tablet Delayed Response Take 75 mg by mouth 2 times a day.       triamcinolone acetonide (ORALONE) 0.1 % Paste APPLY TO THE AFFECTED AREA AFTER MEALS AND BEFORE BEDTIME      fluticasone (FLONASE) 50 MCG/ACT nasal spray Administer 1 Spray into affected nostril(S) every day.      dicyclomine (BENTYL) 20 MG Tab Take 20 mg by mouth at bedtime.      gabapentin (NEURONTIN) 100 MG Cap TAKE 1 CAPSULE BY MOUTH THREE TIMES DAILY FOR 3 DAYS THEN 2 CAPSULE BY MOUTH THREE TIMES DAILY FOR 3 DAYS THEN 3 CAPSULE BY MOUTH THREE TIMES DAILY (Patient taking differently: Take 100 mg by mouth 2 times a day. TAKE 1 CAPSULE BY MOUTH THREE TIMES DAILY FOR 3 DAYS THEN 2 CAPSULE BY MOUTH THREE TIMES DAILY FOR 3 DAYS THEN 3 CAPSULE BY MOUTH THREE TIMES DAILY) 100 capsule 1    vitamin D (CHOLECALCIFEROL) 1000 Unit (25 mcg) Tab Take 2,000 Units by mouth every day.      medroxyPROGESTERone (PROVERA) 2.5 MG Tab Take 2.5 mg by mouth every day.      LORazepam (ATIVAN) 1 MG Tab Take 1 mg by mouth at bedtime as needed.  0    Estradiol 0.025 MG/24HR PATCH BIWEEKLY       aspirin EC 81 MG EC tablet Take 1 Tab by mouth every day. 30 Tab 6    HYDROcodone-acetaminophen (NORCO) 5-325 MG Tab per tablet Take 1 Tab by mouth 2 times a day as needed.      liothyronine (CYTOMEL) 5 MCG Tab Take 5 mcg by mouth 2 Times a Day.      [DISCONTINUED] flecainide (TAMBOCOR) 50 MG tablet TAKE 1 TABLET TWICE DAILY 180 Tablet 0    [DISCONTINUED] lovastatin (MEVACOR) 40 MG tablet Take 1 Tablet by mouth every evening. 100 Tablet 3    [DISCONTINUED] metoprolol SR (TOPROL XL) 25 MG TABLET SR 24 HR TAKE 1 TABLET EVERY DAY 90 Tablet 0    [DISCONTINUED] sucralfate (CARAFATE) 1 GM Tab TAKE 1 TABLET BY MOUTH FOUR TIMES DAILY BEFORE MEALS AND AT BEDTIME 120 Tablet 0     No facility-administered encounter medications on file as of 9/25/2023.     Review of Systems   Constitutional:  Negative for chills, fever and malaise/fatigue.   HENT:  Negative for congestion.    Respiratory:  Negative for cough and shortness of breath.    Cardiovascular:  Negative  "for chest pain, palpitations, orthopnea, leg swelling and PND.   Gastrointestinal:  Negative for abdominal pain and nausea.   Musculoskeletal:  Negative for myalgias.   Skin:  Negative for rash.   Neurological:  Negative for dizziness, loss of consciousness and headaches.   Endo/Heme/Allergies:  Does not bruise/bleed easily.   Psychiatric/Behavioral:  The patient does not have insomnia.               Objective     /58 (BP Location: Left arm, Patient Position: Sitting, BP Cuff Size: Adult)   Pulse 69   Resp 16   Ht 1.651 m (5' 5\")   Wt 80.3 kg (177 lb)   SpO2 96%   BMI 29.45 kg/m²     Physical Exam  Constitutional:       Appearance: She is well-developed.   HENT:      Head: Normocephalic.   Neck:      Vascular: No JVD.   Cardiovascular:      Rate and Rhythm: Normal rate and regular rhythm.      Heart sounds: Normal heart sounds.   Pulmonary:      Effort: Pulmonary effort is normal. No respiratory distress.      Breath sounds: Normal breath sounds. No wheezing or rales.   Abdominal:      General: Bowel sounds are normal. There is no distension.      Palpations: Abdomen is soft.      Tenderness: There is no abdominal tenderness.   Musculoskeletal:         General: Normal range of motion.      Cervical back: Normal range of motion and neck supple.   Skin:     General: Skin is warm and dry.      Findings: No rash.   Neurological:      Mental Status: She is alert and oriented to person, place, and time.       CONCLUSIONS OF TTE OF 9/22/2022:  Compared to the prior study on 03/07/2019, no change.  Normal left ventricular systolic function.   No evidence of valvular abnormality based on Doppler evaluation.   Normal aortic root for body surface area. The ascending aorta diameter is 2.7 cm.    CONCLUSIONS OF ECHOCARDIOGRAM OF 10/17/2018:  Normal left ventricular systolic function.  Left ventricular ejection fraction is visually estimated to be 65%.  Normal diastolic function.  Normal inferior vena cava size and " inspiratory collapse.  Estimated right ventricular systolic pressure is 34 mmHg.     IMPRESSION OF CT SCAN OF CHEST OF 8/1/2022:  1.  Mild emphysema.  2.  Atherosclerotic disease     CONCLUSION OF PFTS OF 8/4/2022:  All values in this test correlates with normal limits. Normal pulmonary function test.      No recent labwork done.    Assessment & Plan     1. Paroxysmal atrial fibrillation (HCC)  flecainide (TAMBOCOR) 50 MG tablet    metoprolol SR (TOPROL XL) 25 MG TABLET SR 24 HR      2. Presence of Watchman left atrial appendage closure device        3. Chronic anticoagulation  CBC WITHOUT DIFFERENTIAL      4. Mixed hyperlipidemia  Comp Metabolic Panel    Lipid Profile    lovastatin (MEVACOR) 40 MG tablet      5. Obstructive sleep apnea        6. Hypothyroidism, unspecified type  TSH          Medical Decision Making: Today's Assessment/Status/Plan:      Paroxysmal atrial fibrillation, in sinus rhythm on Flecainide and Toprol XL. She keeps track of her HR on her [employer] device, and rhythm has been sinus during all the stress with her . She is status post Watchman too, so she remains on ASA only.    2. Hyperlipidemia, treated with Mevacor 40mg once daily. To check fasting CMP and lipid panel.    3. IVETTE, treated/stable.    4. Hypothyroidism, treated, to check a TSH.     5. Familial stress, with  with progressive dementia.    Same medications for now. Labs as above. Her echocardiogram from September 2022 was normal/stable. I will see her back in 6 months, sooner if clinical condition changes.

## 2023-11-01 ENCOUNTER — APPOINTMENT (OUTPATIENT)
Dept: SLEEP MEDICINE | Facility: MEDICAL CENTER | Age: 73
End: 2023-11-01
Attending: NURSE PRACTITIONER
Payer: MEDICARE

## 2023-12-05 RX ORDER — BUPROPION HYDROCHLORIDE 150 MG/1
TABLET ORAL
Qty: 270 TABLET | Refills: 0 | Status: SHIPPED | OUTPATIENT
Start: 2023-12-05 | End: 2024-01-30 | Stop reason: SDUPTHER

## 2023-12-07 NOTE — TELEPHONE ENCOUNTER
Received request via: Pharmacy    Was the patient seen in the last year in this department? Yes  3/22/23  Does the patient have an active prescription (recently filled or refills available) for medication(s) requested? No    Does the patient have half-way Plus and need 100 day supply (blood pressure, diabetes and cholesterol meds only)? Medication is not for cholesterol, blood pressure or diabetes

## 2023-12-08 RX ORDER — ESCITALOPRAM OXALATE 20 MG/1
20 TABLET ORAL DAILY
Qty: 90 TABLET | Refills: 1 | Status: SHIPPED | OUTPATIENT
Start: 2023-12-08

## 2024-01-26 ENCOUNTER — APPOINTMENT (OUTPATIENT)
Dept: SLEEP MEDICINE | Facility: MEDICAL CENTER | Age: 74
End: 2024-01-26
Attending: NURSE PRACTITIONER
Payer: MEDICARE

## 2024-01-29 ENCOUNTER — PATIENT MESSAGE (OUTPATIENT)
Dept: MEDICAL GROUP | Facility: LAB | Age: 74
End: 2024-01-29
Payer: MEDICARE

## 2024-01-29 RX ORDER — LIOTHYRONINE SODIUM 5 UG/1
5 TABLET ORAL DAILY
Qty: 90 TABLET | Refills: 0 | Status: SHIPPED | OUTPATIENT
Start: 2024-01-29 | End: 2024-02-15 | Stop reason: SDUPTHER

## 2024-01-30 ENCOUNTER — OFFICE VISIT (OUTPATIENT)
Dept: MEDICAL GROUP | Facility: LAB | Age: 74
End: 2024-01-30
Payer: MEDICARE

## 2024-01-30 VITALS
TEMPERATURE: 97.8 F | WEIGHT: 177.25 LBS | HEART RATE: 67 BPM | OXYGEN SATURATION: 93 % | RESPIRATION RATE: 16 BRPM | DIASTOLIC BLOOD PRESSURE: 60 MMHG | BODY MASS INDEX: 29.53 KG/M2 | HEIGHT: 65 IN | SYSTOLIC BLOOD PRESSURE: 120 MMHG

## 2024-01-30 DIAGNOSIS — J43.2 CENTRILOBULAR EMPHYSEMA (HCC): ICD-10-CM

## 2024-01-30 DIAGNOSIS — E78.2 MIXED HYPERLIPIDEMIA: ICD-10-CM

## 2024-01-30 DIAGNOSIS — R53.83 OTHER FATIGUE: ICD-10-CM

## 2024-01-30 DIAGNOSIS — E03.9 HYPOTHYROIDISM, UNSPECIFIED TYPE: ICD-10-CM

## 2024-01-30 DIAGNOSIS — Z63.6 CAREGIVER STRESS: ICD-10-CM

## 2024-01-30 PROCEDURE — 3078F DIAST BP <80 MM HG: CPT | Performed by: PHYSICIAN ASSISTANT

## 2024-01-30 PROCEDURE — 99214 OFFICE O/P EST MOD 30 MIN: CPT | Performed by: PHYSICIAN ASSISTANT

## 2024-01-30 PROCEDURE — 3074F SYST BP LT 130 MM HG: CPT | Performed by: PHYSICIAN ASSISTANT

## 2024-01-30 RX ORDER — ALBUTEROL SULFATE 90 UG/1
2 AEROSOL, METERED RESPIRATORY (INHALATION) EVERY 6 HOURS PRN
Qty: 1 EACH | Refills: 3 | Status: SHIPPED | OUTPATIENT
Start: 2024-01-30

## 2024-01-30 RX ORDER — BUPROPION HYDROCHLORIDE 150 MG/1
TABLET ORAL
Qty: 270 TABLET | Refills: 3 | Status: SHIPPED | OUTPATIENT
Start: 2024-01-30

## 2024-01-30 RX ORDER — DICYCLOMINE HCL 20 MG
20 TABLET ORAL
Qty: 90 TABLET | Refills: 3 | Status: SHIPPED | OUTPATIENT
Start: 2024-01-30

## 2024-01-30 SDOH — SOCIAL STABILITY - SOCIAL INSECURITY: DEPENDENT RELATIVE NEEDING CARE AT HOME: Z63.6

## 2024-01-30 ASSESSMENT — PATIENT HEALTH QUESTIONNAIRE - PHQ9
CLINICAL INTERPRETATION OF PHQ2 SCORE: 5
5. POOR APPETITE OR OVEREATING: 1 - SEVERAL DAYS
SUM OF ALL RESPONSES TO PHQ QUESTIONS 1-9: 14

## 2024-01-30 NOTE — PROGRESS NOTES
Subjective:     CC: Follow-up appointment    HPI:   Allison here today with     Fatigue  -Patient endorses poor, non-restorative sleep and feeling tired during the day  -primarily care taker for  with Alzhemier's which is a significant stressor for her and patient does note that this tends to impact her mood  -working on placement into memory care in March  -following with alzheimer's support group      1/30/2024     1:20 PM 6/30/2022    10:00 AM 12/7/2021     1:00 PM   PHQ-9 Screening   Little interest or pleasure in doing things 3 - nearly every day 1 - several days 0 - not at all   Feeling down, depressed, or hopeless 2 - more than half the days 1 - several days 0 - not at all   Trouble falling or staying asleep, or sleeping too much 1 - several days 3 - nearly every day    Feeling tired or having little energy 3 - nearly every day 2 - more than half the days    Poor appetite or overeating 1 - several days 3 - nearly every day    Feeling bad about yourself - or that you are a failure or have let yourself or your family down 1 - several days 1 - several days    Trouble concentrating on things, such as reading the newspaper or watching television 3 - nearly every day 1 - several days    Moving or speaking so slowly that other people could have noticed. Or the opposite - being so fidgety or restless that you have been moving around a lot more than usual 0 - not at all 0 - not at all    Thoughts that you would be better off dead, or of hurting yourself in some way 0 - not at all 0 - not at all    PHQ-2 Total Score 5 2 0   PHQ-9 Total Score 14 12        Interpretation of PHQ-9 Total Score   Score Severity   1-4 No Depression   5-9 Mild Depression   10-14 Moderate Depression   15-19 Moderately Severe Depression   20-27 Severe Depression      ROS:  Gen: no fevers/chills, no changes in weight  Eyes: no changes in vision  ENT: no sore throat, no hearing loss, no bloody nose  Pulm: no sob, no cough  CV: no chest pain,  no palpitations  GI: no nausea/vomiting, no diarrhea  : no dysuria  MSk: no myalgias  Skin: no rash  Neuro: no headaches, no numbness/tingling  Heme/Lymph: no easy bruising    Current Outpatient Medications Ordered in Epic   Medication Sig Dispense Refill    valacyclovir (Valtrex) 50 mg/mL oral suspension       buPROPion (WELLBUTRIN XL) 150 MG XL tablet 3 tablets (450mg)  Tablet 3    dicyclomine (BENTYL) 20 MG Tab Take 1 Tablet by mouth at bedtime. 90 Tablet 3    VENTOLIN  (90 Base) MCG/ACT Aero Soln inhalation aerosol Inhale 2 Puffs every 6 hours as needed for Shortness of Breath. 1 Each 3    liothyronine (CYTOMEL) 5 MCG Tab Take 1 Tablet by mouth every day. 90 Tablet 0    escitalopram (LEXAPRO) 20 MG tablet Take 1 Tablet by mouth every day. 90 Tablet 1    flecainide (TAMBOCOR) 50 MG tablet Take 1 Tablet by mouth 2 times a day. 200 Tablet 3    lovastatin (MEVACOR) 40 MG tablet Take 1 Tablet by mouth every evening. 100 Tablet 3    metoprolol SR (TOPROL XL) 25 MG TABLET SR 24 HR Take 1 Tablet by mouth every day. 100 Tablet 3    propranolol (INDERAL) 20 MG Tab TAKE 1 TABLET BY MOUTH EVERY DAY AS NEEDED FOR PERFORMANCE ANXIETY 90 Tablet 0    diclofenac DR (VOLTAREN) 75 MG Tablet Delayed Response Take 75 mg by mouth 2 times a day.      triamcinolone acetonide (ORALONE) 0.1 % Paste APPLY TO THE AFFECTED AREA AFTER MEALS AND BEFORE BEDTIME      fluticasone (FLONASE) 50 MCG/ACT nasal spray Administer 1 Spray into affected nostril(S) every day.      gabapentin (NEURONTIN) 100 MG Cap TAKE 1 CAPSULE BY MOUTH THREE TIMES DAILY FOR 3 DAYS THEN 2 CAPSULE BY MOUTH THREE TIMES DAILY FOR 3 DAYS THEN 3 CAPSULE BY MOUTH THREE TIMES DAILY (Patient taking differently: Take 100 mg by mouth 2 times a day. TAKE 1 CAPSULE BY MOUTH THREE TIMES DAILY FOR 3 DAYS THEN 2 CAPSULE BY MOUTH THREE TIMES DAILY FOR 3 DAYS THEN 3 CAPSULE BY MOUTH THREE TIMES DAILY) 100 capsule 1    vitamin D (CHOLECALCIFEROL) 1000 Unit (25 mcg) Tab Take  "2,000 Units by mouth every day.      medroxyPROGESTERone (PROVERA) 2.5 MG Tab Take 2.5 mg by mouth every day.      LORazepam (ATIVAN) 1 MG Tab Take 1 mg by mouth at bedtime as needed.  0    Estradiol 0.025 MG/24HR PATCH BIWEEKLY       aspirin EC 81 MG EC tablet Take 1 Tab by mouth every day. 30 Tab 6    HYDROcodone-acetaminophen (NORCO) 5-325 MG Tab per tablet Take 1 Tab by mouth 2 times a day as needed.      tiotropium (SPIRIVA RESPIMAT) 2.5 mcg/Act Aero Soln Inhale 2 Inhalation every day. Assemble and prime. (Patient not taking: Reported on 1/30/2024) 1 Each 6     No current Epic-ordered facility-administered medications on file.         Objective:     Exam:  /60 (BP Location: Left arm, Patient Position: Sitting, BP Cuff Size: Adult)   Pulse 67   Temp 36.6 °C (97.8 °F) (Temporal)   Resp 16   Ht 1.651 m (5' 5\")   Wt 80.4 kg (177 lb 4 oz)   SpO2 93%   BMI 29.50 kg/m²  Body mass index is 29.5 kg/m².    Gen: Alert and oriented, No apparent distress.  Neck: Neck is supple without lymphadenopathy.  Lungs: Normal effort, CTA bilaterally, no wheezes, rhonchi, or rales  CV: Regular rate and rhythm. No murmurs, rubs, or gallops.  Ext: No clubbing, cyanosis, edema.      Assessment & Plan:     73 y.o. female with the following -     1. Caregiver stress  Chronic condition  Depression screening is positive today, patient is currently on Wellbutrin 450 mg daily in addition to Lexapro 20 mg daily.  We did discuss the role that caregiver stress is likely playing exacerbating her depression symptoms.  Patient is working on getting her  into a memory care placement in the next month or so.  She would like to continue current medication for now and reassess her depression/fatigue symptoms at that time.  Denies SI/HI    2. Mixed hyperlipidemia  Chronic condition, stable  Due for updated labs  Continue lovastatin 40 mg nightly  - CBC WITH DIFFERENTIAL; Future  - Comp Metabolic Panel; Future  - Lipid Profile; " Future    3. Hypothyroidism, unspecified type  Chronic condition  Due for updated labs  Continue current medications  - TSH WITH REFLEX TO FT4; Future    4. Centrilobular emphysema (HCC)  Chronic condition, stable continue current medications  - VENTOLIN  (90 Base) MCG/ACT Aero Soln inhalation aerosol; Inhale 2 Puffs every 6 hours as needed for Shortness of Breath.  Dispense: 1 Each; Refill: 3    5. Other fatigue  See above, likely secondary to caregiver stress/depression but we also evaluate for vitamin D deficiency  - VITAMIN D,25 HYDROXY (DEFICIENCY); Future          I spent a total of 25 minutes with record review, exam, communication with the patient, communication with other providers, and documentation of this encounter.      No follow-ups on file.    Please note that this dictation was created using voice recognition software. I have made every reasonable attempt to correct obvious errors, but there may be errors of grammar and possibly content that I did not discover before finalizing the note.

## 2024-02-15 ENCOUNTER — PATIENT MESSAGE (OUTPATIENT)
Dept: MEDICAL GROUP | Facility: LAB | Age: 74
End: 2024-02-15
Payer: MEDICARE

## 2024-02-16 RX ORDER — LEVOTHYROXINE SODIUM 0.1 MG/1
TABLET ORAL
Qty: 90 TABLET | Refills: 0 | Status: SHIPPED | OUTPATIENT
Start: 2024-02-16

## 2024-02-16 RX ORDER — LIOTHYRONINE SODIUM 5 UG/1
5 TABLET ORAL DAILY
Qty: 90 TABLET | Refills: 0 | Status: SHIPPED | OUTPATIENT
Start: 2024-02-16

## 2024-02-16 RX ORDER — LEVOTHYROXINE SODIUM 0.1 MG/1
TABLET ORAL
COMMUNITY
End: 2024-02-16 | Stop reason: SDUPTHER

## 2024-02-16 RX ORDER — LIOTHYRONINE SODIUM 5 UG/1
5 TABLET ORAL DAILY
Qty: 90 TABLET | Refills: 0 | OUTPATIENT
Start: 2024-02-16

## 2024-02-16 NOTE — TELEPHONE ENCOUNTER
Received request via: Patient    Was the patient seen in the last year in this department? Yes    Does the patient have an active prescription (recently filled or refills available) for medication(s) requested? No    Pharm[facility] Name: [facility]    Does the patient have longterm Plus and need 100 day supply (blood pressure, diabetes and cholesterol meds only)? Patient does not have SCP

## 2024-03-12 RX ORDER — VALACYCLOVIR HYDROCHLORIDE 1 G/1
1000 TABLET, FILM COATED ORAL 2 TIMES DAILY
Qty: 20 TABLET | Refills: 3 | Status: SHIPPED | OUTPATIENT
Start: 2024-03-12

## 2024-03-13 ENCOUNTER — OFFICE VISIT (OUTPATIENT)
Dept: SLEEP MEDICINE | Facility: MEDICAL CENTER | Age: 74
End: 2024-03-13
Attending: NURSE PRACTITIONER
Payer: MEDICARE

## 2024-03-13 VITALS
BODY MASS INDEX: 28.82 KG/M2 | RESPIRATION RATE: 14 BRPM | SYSTOLIC BLOOD PRESSURE: 108 MMHG | HEART RATE: 68 BPM | DIASTOLIC BLOOD PRESSURE: 60 MMHG | HEIGHT: 65 IN | OXYGEN SATURATION: 94 % | WEIGHT: 173 LBS

## 2024-03-13 DIAGNOSIS — J43.2 CENTRILOBULAR EMPHYSEMA (HCC): ICD-10-CM

## 2024-03-13 PROCEDURE — 3074F SYST BP LT 130 MM HG: CPT | Performed by: NURSE PRACTITIONER

## 2024-03-13 PROCEDURE — 99213 OFFICE O/P EST LOW 20 MIN: CPT | Performed by: NURSE PRACTITIONER

## 2024-03-13 PROCEDURE — 99214 OFFICE O/P EST MOD 30 MIN: CPT | Performed by: NURSE PRACTITIONER

## 2024-03-13 PROCEDURE — 3078F DIAST BP <80 MM HG: CPT | Performed by: NURSE PRACTITIONER

## 2024-03-13 PROCEDURE — 94761 N-INVAS EAR/PLS OXIMETRY MLT: CPT | Performed by: NURSE PRACTITIONER

## 2024-03-13 NOTE — PROGRESS NOTES
Chief Complaint   Patient presents with    Follow-Up     LAST SEEN 08/25/2022 KORINA GREENWOOD   1YR. F/V PULM PFT RESULTS.       HPI:  Allison Cespedes is a 73 y.o. year old female here today for follow-up on COPD with PFT results.  Last seen 8/25/2022 by Dr. Greenwood.  Patient is a former smoker quit 1996 with approximately 60-pack-year history.    Previously, patient was complaining of severe dyspnea with exertion which seem to be out of proportion to PFTs.  Patient also has history of A-fib.  Current mMRC of 3.  Has to stop after walking approximately 100 yards.  Main symptom is dyspnea with exertion and shortness of breath.  Uses a rescue inhaler approximately 2-3 times weekly.  Does have occasional wheezing with laying flat.  PFTs done on 8/29/2023 were within normal limits.      ROS: As per HPI and otherwise negative if not stated.    Past Medical History:   Diagnosis Date    Anemia     Arthritis     Fingers, hands, toes, feet (osteoarthritis)    Asthma     Inhalers daily    Atrial fibrillation ([facility]) 10/2018    New onset in office. March 2019: Status post Watchman procedure. September 2022: Echocardiogram with normal LV size, LVEF 65%. Normal RA, LA and RV. Trace MR, mild AR, trace TR.    Breath shortness     Uses 2L oxygen at night ([employer])     Cataract     Bilateral IOL    Chronic anticoagulation     COPD (chronic obstructive pulmonary disease) (HCC)     Cough     GERD (gastroesophageal reflux disease)     Gynecological disorder     Postmenopausal bleeding right now     Hyperlipidemia     Hypothyroidism     Pneumonia 1989    Psychiatric problem     depression, anxiety    Shortness of breath     Snoring     Tuberculosis 1981    Treated for nine months    Wheezing        Past Surgical History:   Procedure Laterality Date    PB OPEN RX DISTAL RADIUS FX, INTRA-ARTICULAR* Left 1/21/2022    Procedure: OPEN REDUCTION AND INTERNAL FIXATION DISTAL RADIUS;  Surgeon: Eric Kay M.D.;  Location: [place]  [facility] - External Facilities;  Service: Orthopedics    PB OPEN TREATMENT PBOX HUMERAL FRACTURE Left 1/21/2022    Procedure: OPEN REDUCTION AND INTERNAL FIXATION PROXIMAL HUMERUS FRACTURE WITH FIBULA  ALLOGRAFT;  Surgeon: Eric Kay M.D.;  Location: [facility];  Service: Orthopedics    FINGER ARTHROPLASTY Left 10/16/2020    Procedure: ARTHROPLASTY, FINGER - THUMB CARPOMETACARPAL EXCISIONAL;  Surgeon: Fidel Kay M.D.;  Location: SURGERY SAME DAY [facility];  Service: Orthopedics    TENDON TRANSFER Left 10/16/2020    Procedure: TRANSFER, TENDON - FOR FLEXOR CARPI RADIALIS TENDON GRAFT;  Surgeon: Fidel Kay M.D.;  Location: SURGERY SAME DAY [facility];  Service: Orthopedics    MASS EXCISION GENERAL Left 7/6/2018    Procedure: MASS EXCISION GENERAL/ SUBCUTANEOUS MASS LEFT SHOULDER;  Surgeon: Swapna Rivas M.D.;  Location: SURGERY SAME DAY [facility];  Service: General    OTHER CARDIAC SURGERY  2018    watchman implanted    CHOLECYSTECTOMY  1994    GYN SURGERY  1975    fibroid tumor    APPENDECTOMY  1960    had ruptured    ARTHROSCOPY, KNEE      MAMMOPLASTY AUGMENTATION      TX BREAST AUGMENTATION WITH IMPLANT      TX REMV 2ND CATARACT,CORN-SCLER SECTN         Family History   Problem Relation Age of Onset    Non-contributory Mother     Hypertension Mother     Cancer Mother         lung cancer    Non-contributory Father     Heart Disease Sister         MI, stent    Drug abuse Sister        Allergies as of 03/13/2024 - Reviewed 03/13/2024   Allergen Reaction Noted    Tizanidine Unspecified 08/30/2021    Pcn [penicillins]  08/03/2012        Vitals:  /60 (BP Location: Left arm, Patient Position: Sitting, BP Cuff Size: Adult)   Pulse 68   Resp 14   Ht 1.651 m (5' 5\")   Wt 78.5 kg (173 lb)   SpO2 94%     Current medications as of today   Current Outpatient Medications   Medication Sig Dispense Refill    valacyclovir (VALTREX) 1 GM Tab Take 1 Tablet by " mouth 2 times a day. 20 Tablet 3    liothyronine (CYTOMEL) 5 MCG Tab Take 1 Tablet by mouth every day. 90 Tablet 0    levothyroxine (SYNTHROID) 100 MCG Tab 1 tablet every morning 90 Tablet 0    buPROPion (WELLBUTRIN XL) 150 MG XL tablet 3 tablets (450mg)  Tablet 3    dicyclomine (BENTYL) 20 MG Tab Take 1 Tablet by mouth at bedtime. 90 Tablet 3    VENTOLIN  (90 Base) MCG/ACT Aero Soln inhalation aerosol Inhale 2 Puffs every 6 hours as needed for Shortness of Breath. 1 Each 3    escitalopram (LEXAPRO) 20 MG tablet Take 1 Tablet by mouth every day. 90 Tablet 1    flecainide (TAMBOCOR) 50 MG tablet Take 1 Tablet by mouth 2 times a day. 200 Tablet 3    lovastatin (MEVACOR) 40 MG tablet Take 1 Tablet by mouth every evening. 100 Tablet 3    metoprolol SR (TOPROL XL) 25 MG TABLET SR 24 HR Take 1 Tablet by mouth every day. 100 Tablet 3    propranolol (INDERAL) 20 MG Tab TAKE 1 TABLET BY MOUTH EVERY DAY AS NEEDED FOR PERFORMANCE ANXIETY 90 Tablet 0    tiotropium (SPIRIVA RESPIMAT) 2.5 mcg/Act Aero Soln Inhale 2 Inhalation every day. Assemble and prime. 1 Each 6    diclofenac DR (VOLTAREN) 75 MG Tablet Delayed Response Take 75 mg by mouth 2 times a day.      triamcinolone acetonide (ORALONE) 0.1 % Paste APPLY TO THE AFFECTED AREA AFTER MEALS AND BEFORE BEDTIME      fluticasone (FLONASE) 50 MCG/ACT nasal spray Administer 1 Spray into affected nostril(S) every day.      gabapentin (NEURONTIN) 100 MG Cap TAKE 1 CAPSULE BY MOUTH THREE TIMES DAILY FOR 3 DAYS THEN 2 CAPSULE BY MOUTH THREE TIMES DAILY FOR 3 DAYS THEN 3 CAPSULE BY MOUTH THREE TIMES DAILY (Patient taking differently: Take 100 mg by mouth 2 times a day. TAKE 1 CAPSULE BY MOUTH THREE TIMES DAILY FOR 3 DAYS THEN 2 CAPSULE BY MOUTH THREE TIMES DAILY FOR 3 DAYS THEN 3 CAPSULE BY MOUTH THREE TIMES DAILY) 100 capsule 1    vitamin D (CHOLECALCIFEROL) 1000 Unit (25 mcg) Tab Take 2,000 Units by mouth every day.      medroxyPROGESTERone (PROVERA) 2.5 MG Tab Take 2.5  mg by mouth every day.      LORazepam (ATIVAN) 1 MG Tab Take 1 mg by mouth at bedtime as needed.  0    Estradiol 0.025 MG/24HR PATCH BIWEEKLY       aspirin EC 81 MG EC tablet Take 1 Tab by mouth every day. 30 Tab 6    HYDROcodone-acetaminophen (NORCO) 5-325 MG Tab per tablet Take 1 Tab by mouth 2 times a day as needed.       No current facility-administered medications for this visit.         Physical Exam:   Gen:           Alert and oriented, No apparent distress. Mood and affect appropriate, normal interaction with examiner.  Eyes:          PERRL, EOM intact, sclere white, conjunctive moist.  Ears:          Not examined.   Hearing:     Grossly intact.  Nose:          Normal, no lesions or deformities.  Dentition:    Good dentition.  Oropharynx:   Tongue normal, posterior pharynx without erythema or exudate.  Neck:        Supple, trachea midline, no masses.  Respiratory Effort: No intercostal retractions or use of accessory muscles.   Lung Auscultation:      Clear to auscultation bilaterally; no rales, rhonchi or wheezing.  CV:            Regular rate and rhythm. No murmurs, rubs or gallops.  Abd:           Not examined.   Lymphadenopathy: Not examined.  Gait and Station: Normal.  Digits and Nails: No clubbing, cyanosis, petechiae, or nodes.   Cranial Nerves: II-XII grossly intact.  Skin:        No rashes, lesions or ulcers noted.               Ext:           No cyanosis or edema.      Assessment:  1. Centrilobular emphysema (HCC)  Overnight Oximetry    Multiple Oximetry    Multiple Oximetry        Plan:  Stable.  No evidence of obstruction on PFTs.  PFTs were essentially normal.  Patient does complain of dyspnea with exertion.  Did recommend stopping Stiolto to see if symptoms resolve.  Ambulatory multiple oximetry done in clinic today showed no evidence of desaturation.  Patient needs to requalify for supplemental oxygen use at nighttime.  Will order overnight pulse oximetry with follow-up within 30  days.    Please note that this dictation was created using voice recognition software. I have made every reasonable attempt to correct obvious errors, but it is possible there are errors of grammar and possibly content that I did not discover before finalizing the note.

## 2024-03-13 NOTE — PROCEDURES
Multi-Ox Readings  Multi Ox #1 Room air   O2 sat % at rest 96   O2 sat % on exertion 91   O2 sat average on exertion     Multi Ox #2     O2 sat % at rest     O2 sat % on exertion     O2 sat average on exertion       Oxygen Use     Oxygen Frequency     Duration of need     Is the patient mobile within the home?     CPAP Use?     BIPAP Use?     Servo Titration

## 2024-03-28 ENCOUNTER — APPOINTMENT (OUTPATIENT)
Dept: SLEEP MEDICINE | Facility: MEDICAL CENTER | Age: 74
End: 2024-03-28
Attending: NURSE PRACTITIONER
Payer: MEDICARE

## 2024-03-28 DIAGNOSIS — J43.2 CENTRILOBULAR EMPHYSEMA (HCC): ICD-10-CM

## 2024-04-05 RX ORDER — LIOTHYRONINE SODIUM 5 UG/1
5 TABLET ORAL
Qty: 90 TABLET | Refills: 3 | Status: SHIPPED | OUTPATIENT
Start: 2024-04-05

## 2024-04-08 ENCOUNTER — HOME STUDY (OUTPATIENT)
Dept: SLEEP MEDICINE | Facility: MEDICAL CENTER | Age: 74
End: 2024-04-08
Attending: NURSE PRACTITIONER
Payer: MEDICARE

## 2024-04-08 DIAGNOSIS — J43.2 CENTRILOBULAR EMPHYSEMA (HCC): ICD-10-CM

## 2024-04-08 PROCEDURE — 94762 N-INVAS EAR/PLS OXIMTRY CONT: CPT | Performed by: NURSE PRACTITIONER

## 2024-04-08 PROCEDURE — 94762 N-INVAS EAR/PLS OXIMTRY CONT: CPT | Performed by: INTERNAL MEDICINE

## 2024-04-14 NOTE — PROCEDURES
This is an overnight oximetry study performed on April 9, 2024 for duration of 7 hours and 9 minutes on room air.    Basal SpO2 was 89%.  Total time spent below saturation of 88% was 70 minutes.  There is some clustering of desaturation events.  Consider formal sleep evaluation.

## 2024-04-19 ENCOUNTER — TELEPHONE (OUTPATIENT)
Dept: CARDIOLOGY | Facility: MEDICAL CENTER | Age: 74
End: 2024-04-19
Payer: MEDICARE

## 2024-04-19 NOTE — TELEPHONE ENCOUNTER
Spoke to patient in regards to pending lab work. Per patient [facility] Patient requested lab slips be mailed to her home address on file. Patient notified these labs require her to be fasting.       Lab orders mailed.

## 2024-04-23 ENCOUNTER — PATIENT MESSAGE (OUTPATIENT)
Dept: MEDICAL GROUP | Facility: LAB | Age: 74
End: 2024-04-23
Payer: MEDICARE

## 2024-04-23 RX ORDER — DICYCLOMINE HYDROCHLORIDE 10 MG/1
10 CAPSULE ORAL
Qty: 180 CAPSULE | Refills: 3 | Status: SHIPPED | OUTPATIENT
Start: 2024-04-23

## 2024-04-24 ENCOUNTER — OFFICE VISIT (OUTPATIENT)
Dept: SLEEP MEDICINE | Facility: MEDICAL CENTER | Age: 74
End: 2024-04-24
Attending: INTERNAL MEDICINE
Payer: MEDICARE

## 2024-04-24 VITALS
SYSTOLIC BLOOD PRESSURE: 120 MMHG | WEIGHT: 173 LBS | RESPIRATION RATE: 14 BRPM | OXYGEN SATURATION: 94 % | HEART RATE: 69 BPM | BODY MASS INDEX: 28.82 KG/M2 | DIASTOLIC BLOOD PRESSURE: 70 MMHG | HEIGHT: 65 IN

## 2024-04-24 DIAGNOSIS — Z87.891 FORMER SMOKER: ICD-10-CM

## 2024-04-24 DIAGNOSIS — G47.34 NOCTURNAL HYPOXEMIA: ICD-10-CM

## 2024-04-24 DIAGNOSIS — J43.2 CENTRILOBULAR EMPHYSEMA (HCC): ICD-10-CM

## 2024-04-24 PROCEDURE — 3078F DIAST BP <80 MM HG: CPT | Performed by: INTERNAL MEDICINE

## 2024-04-24 PROCEDURE — 99214 OFFICE O/P EST MOD 30 MIN: CPT | Performed by: INTERNAL MEDICINE

## 2024-04-24 PROCEDURE — 99213 OFFICE O/P EST LOW 20 MIN: CPT | Performed by: INTERNAL MEDICINE

## 2024-04-24 PROCEDURE — 3074F SYST BP LT 130 MM HG: CPT | Performed by: INTERNAL MEDICINE

## 2024-04-24 RX ORDER — ESCITALOPRAM OXALATE 20 MG/1
20 TABLET ORAL
Qty: 90 TABLET | Refills: 3 | Status: SHIPPED | OUTPATIENT
Start: 2024-04-24

## 2024-04-24 RX ORDER — LEVOTHYROXINE SODIUM 0.1 MG/1
TABLET ORAL
Qty: 90 TABLET | Refills: 3 | Status: SHIPPED | OUTPATIENT
Start: 2024-04-24

## 2024-04-24 ASSESSMENT — ENCOUNTER SYMPTOMS
TREMORS: 0
PND: 0
SINUS PAIN: 0
NAUSEA: 0
HEARTBURN: 0
FEVER: 0
EYE REDNESS: 0
DIAPHORESIS: 0
SPUTUM PRODUCTION: 0
WHEEZING: 0
EYE DISCHARGE: 0
HEMOPTYSIS: 0
WEAKNESS: 0
DIZZINESS: 0
STRIDOR: 0
CLAUDICATION: 0
MYALGIAS: 0
EYE PAIN: 0
BLURRED VISION: 0
SHORTNESS OF BREATH: 0
DIARRHEA: 0
ABDOMINAL PAIN: 0
COUGH: 0
PALPITATIONS: 0
SORE THROAT: 0
DOUBLE VISION: 0
ORTHOPNEA: 0
DEPRESSION: 0
SPEECH CHANGE: 0
BACK PAIN: 0
VOMITING: 0
CHILLS: 0
WEIGHT LOSS: 0
FOCAL WEAKNESS: 0
CONSTIPATION: 0
NECK PAIN: 0
FALLS: 0
PHOTOPHOBIA: 0
HEADACHES: 0

## 2024-04-24 NOTE — TELEPHONE ENCOUNTER
Received request via: Pharmacy    Was the patient seen in the last year in this department? Yes    Does the patient have an active prescription (recently filled or refills available) for medication(s) requested? No    Pharm[facility] Name: [facility]    Does the patient have [facility] and need 100 day supply (blood pressure, diabetes and cholesterol meds only)? Patient does not have SCP

## 2024-04-24 NOTE — PROGRESS NOTES
Chief Complaint   Patient presents with    Results     BIANCA DIAZ 24    Follow-Up     LAST SEEN 3/13/24 BY MICHAEL ELIZALDE v         HPI: This patient is a 73 y.o. female whom is followed in our clinic for chronic dyspnea on exertion last seen by Michael DIAZ, on 2024.  The patient is a former tobacco smoker with an estimated 30-pack-year history but has been tobacco free since .  She has been followed in our clinic for mild obstructive lung disease with mild emphysema on CT however most recent PFT from 2023 showed normal airflows with normal lung volumes and normal DLCO.  CT chest from 2022 showed mild emphysema.  She also has a history of atrial fibrillation for which she is status post Watchman, on flecainide and beta-blocker therapy.  She has mild nocturnal hypoxemia based on overnight oximetry from  and testing was repeated recently on  which confirmed mild ongoing hypoxemia without suggestion of obstructive sleep apnea.  Patient has stable chronic shortness of breath but no significant cough, wheezing.  No chest pain.  She is currently grieving the death of her  who  of complications of dementia.    Past Medical History:   Diagnosis Date    Anemia     Arthritis     Fingers, hands, toes, feet (osteoarthritis)    Asthma     Inhalers daily    Atrial fibrillation ([facility]) 10/2018    New onset in office. 2019: Status post Watchman procedure. 2022: Echocardiogram with normal LV size, LVEF 65%. Normal RA, LA and RV. Trace MR, mild AR, trace TR.    Breath shortness     Uses 2L oxygen at night ([employer])     Cataract     Bilateral IOL    Chronic anticoagulation     COPD (chronic obstructive pulmonary disease) (HCC)     Cough     GERD (gastroesophageal reflux disease)     Gynecological disorder     Postmenopausal bleeding right now     Hyperlipidemia     Hypothyroidism     Pneumonia     Psychiatric problem     depression, anxiety    Shortness of breath      Snoring     Tuberculosis 1981    Treated for nine months    Wheezing        Social History     Socioeconomic History    Marital status:      Spouse name: Not on file    Number of children: Not on file    Years of education: Not on file    Highest education level: Not on file   Occupational History    Not on file   Tobacco Use    Smoking status: Former     Current packs/day: 0.00     Average packs/day: 2.0 packs/day for 30.0 years (60.0 ttl pk-yrs)     Types: Cigarettes     Start date: 2/3/1966     Quit date: 2/3/1996     Years since quittin.2    Smokeless tobacco: Never    Tobacco comments:     continued abstinance   Vaping Use    Vaping Use: Never used   Substance and Sexual Activity    Alcohol use: Not Currently    Drug use: No    Sexual activity: Not on file   Other Topics Concern    Not on file   Social History Narrative    Not on file     Social Determinants of Health     Financial Resource Strain: Not on file   Food Insecurity: Not on file   Transportation Needs: Not on file   Physical Activity: Not on file   Stress: Not on file   Social Connections: Not on file   Intimate Partner Violence: Not on file   Housing Stability: Not on file       Family History   Problem Relation Age of Onset    Non-contributory Mother     Hypertension Mother     Cancer Mother         lung cancer    Non-contributory Father     Heart Disease Sister         MI, stent    Drug abuse Sister        Current Outpatient Medications on File Prior to Visit   Medication Sig Dispense Refill    levothyroxine (SYNTHROID) 100 MCG Tab TAKE 1 TABLET EVERY MORNING 90 Tablet 3    escitalopram (LEXAPRO) 20 MG tablet TAKE 1 TABLET EVERY DAY 90 Tablet 3    dicyclomine (BENTYL) 10 MG Cap Take 1 Capsule by mouth 2 times a day. 180 Capsule 3    liothyronine (CYTOMEL) 5 MCG Tab TAKE 1 TABLET EVERY DAY 90 Tablet 3    valacyclovir (VALTREX) 1 GM Tab Take 1 Tablet by mouth 2 times a day. 20 Tablet 3    buPROPion (WELLBUTRIN XL) 150 MG XL tablet 3  tablets (450mg)  Tablet 3    VENTOLIN  (90 Base) MCG/ACT Aero Soln inhalation aerosol Inhale 2 Puffs every 6 hours as needed for Shortness of Breath. 1 Each 3    flecainide (TAMBOCOR) 50 MG tablet Take 1 Tablet by mouth 2 times a day. 200 Tablet 3    lovastatin (MEVACOR) 40 MG tablet Take 1 Tablet by mouth every evening. 100 Tablet 3    metoprolol SR (TOPROL XL) 25 MG TABLET SR 24 HR Take 1 Tablet by mouth every day. 100 Tablet 3    propranolol (INDERAL) 20 MG Tab TAKE 1 TABLET BY MOUTH EVERY DAY AS NEEDED FOR PERFORMANCE ANXIETY 90 Tablet 0    tiotropium (SPIRIVA RESPIMAT) 2.5 mcg/Act Aero Soln Inhale 2 Inhalation every day. Assemble and prime. 1 Each 6    diclofenac DR (VOLTAREN) 75 MG Tablet Delayed Response Take 75 mg by mouth 2 times a day.      triamcinolone acetonide (ORALONE) 0.1 % Paste APPLY TO THE AFFECTED AREA AFTER MEALS AND BEFORE BEDTIME      fluticasone (FLONASE) 50 MCG/ACT nasal spray Administer 1 Spray into affected nostril(S) every day.      gabapentin (NEURONTIN) 100 MG Cap TAKE 1 CAPSULE BY MOUTH THREE TIMES DAILY FOR 3 DAYS THEN 2 CAPSULE BY MOUTH THREE TIMES DAILY FOR 3 DAYS THEN 3 CAPSULE BY MOUTH THREE TIMES DAILY (Patient taking differently: Take 100 mg by mouth 2 times a day. TAKE 1 CAPSULE BY MOUTH THREE TIMES DAILY FOR 3 DAYS THEN 2 CAPSULE BY MOUTH THREE TIMES DAILY FOR 3 DAYS THEN 3 CAPSULE BY MOUTH THREE TIMES DAILY) 100 capsule 1    vitamin D (CHOLECALCIFEROL) 1000 Unit (25 mcg) Tab Take 2,000 Units by mouth every day.      medroxyPROGESTERone (PROVERA) 2.5 MG Tab Take 2.5 mg by mouth every day.      LORazepam (ATIVAN) 1 MG Tab Take 1 mg by mouth at bedtime as needed.  0    Estradiol 0.025 MG/24HR PATCH BIWEEKLY       aspirin EC 81 MG EC tablet Take 1 Tab by mouth every day. 30 Tab 6    HYDROcodone-acetaminophen (NORCO) 5-325 MG Tab per tablet Take 1 Tab by mouth 2 times a day as needed.       No current facility-administered medications on file prior to visit.  "      Tizanidine and Pcn [penicillins]      ROS:   Review of Systems   Constitutional:  Negative for chills, diaphoresis, fever, malaise/fatigue and weight loss.   HENT:  Negative for congestion, ear discharge, ear pain, hearing loss, nosebleeds, sinus pain, sore throat and tinnitus.    Eyes:  Negative for blurred vision, double vision, photophobia, pain, discharge and redness.   Respiratory:  Negative for cough, hemoptysis, sputum production, shortness of breath, wheezing and stridor.    Cardiovascular:  Negative for chest pain, palpitations, orthopnea, claudication, leg swelling and PND.   Gastrointestinal:  Negative for abdominal pain, constipation, diarrhea, heartburn, nausea and vomiting.   Genitourinary:  Negative for dysuria and urgency.   Musculoskeletal:  Negative for back pain, falls, joint pain, myalgias and neck pain.   Skin:  Negative for itching and rash.   Neurological:  Negative for dizziness, tremors, speech change, focal weakness, weakness and headaches.   Endo/Heme/Allergies:  Negative for environmental allergies.   Psychiatric/Behavioral:  Negative for depression.        /70 (BP Location: Right arm, Patient Position: Sitting, BP Cuff Size: Adult)   Pulse 69   Resp 14   Ht 1.651 m (5' 5\")   Wt 78.5 kg (173 lb)   SpO2 94%   Physical Exam  Constitutional:       General: She is not in acute distress.     Appearance: Normal appearance. She is well-developed and normal weight.   HENT:      Head: Normocephalic and atraumatic.      Right Ear: External ear normal.      Left Ear: External ear normal.      Nose: Nose normal. No congestion.      Mouth/Throat:      Mouth: Mucous membranes are moist.      Pharynx: Oropharynx is clear. No oropharyngeal exudate.   Eyes:      General: No scleral icterus.     Extraocular Movements: Extraocular movements intact.      Conjunctiva/sclera: Conjunctivae normal.      Pupils: Pupils are equal, round, and reactive to light.   Neck:      Vascular: No JVD.      " Trachea: No tracheal deviation.   Cardiovascular:      Rate and Rhythm: Normal rate and regular rhythm.      Heart sounds: Normal heart sounds. No murmur heard.     No friction rub. No gallop.   Pulmonary:      Effort: Pulmonary effort is normal. No accessory muscle usage or respiratory distress.      Breath sounds: Normal breath sounds. No wheezing or rales.   Abdominal:      General: There is no distension.      Palpations: Abdomen is soft.      Tenderness: There is no abdominal tenderness.   Musculoskeletal:         General: No tenderness or deformity. Normal range of motion.      Cervical back: Normal range of motion and neck supple.      Right lower leg: No edema.      Left lower leg: No edema.   Lymphadenopathy:      Cervical: No cervical adenopathy.   Skin:     General: Skin is warm and dry.      Findings: No rash.      Nails: There is no clubbing.   Neurological:      Mental Status: She is alert and oriented to person, place, and time.      Cranial Nerves: No cranial nerve deficit.      Gait: Gait normal.   Psychiatric:         Behavior: Behavior normal.         PFTs as reviewed by me personally: As per HPI    Imagaing as reviewed by me personally: As per HPI    Assessment:  1. Nocturnal hypoxemia  DME O2 New Set Up      2. Centrilobular emphysema (HCC)        3. Former smoker            Plan:  Chronic, mild.  Resume supplemental oxygen at night.  No clinical indications of obstructive sleep apnea.  Mild with no significant airflow obstruction on most recent pulmonary function testing.  Tobacco free for greater than 30 years.  Return in about 6 months (around 10/24/2024) for nocturnal hypoxemia.

## 2024-04-25 ENCOUNTER — APPOINTMENT (OUTPATIENT)
Dept: SLEEP MEDICINE | Facility: MEDICAL CENTER | Age: 74
End: 2024-04-25
Attending: NURSE PRACTITIONER
Payer: MEDICARE

## 2024-04-26 ENCOUNTER — HOSPITAL ENCOUNTER (OUTPATIENT)
Dept: LAB | Facility: MEDICAL CENTER | Age: 74
End: 2024-04-26
Attending: NURSE PRACTITIONER
Payer: MEDICARE

## 2024-04-26 DIAGNOSIS — E78.2 MIXED HYPERLIPIDEMIA: ICD-10-CM

## 2024-04-26 DIAGNOSIS — E03.9 HYPOTHYROIDISM, UNSPECIFIED TYPE: ICD-10-CM

## 2024-04-26 LAB
ALBUMIN SERPL BCP-MCNC: 4.3 G/DL (ref 3.2–4.9)
ALBUMIN/GLOB SERPL: 1.9 G/DL
ALP SERPL-CCNC: 100 U/L (ref 30–99)
ALT SERPL-CCNC: 22 U/L (ref 2–50)
ANION GAP SERPL CALC-SCNC: 12 MMOL/L (ref 7–16)
AST SERPL-CCNC: 18 U/L (ref 12–45)
BILIRUB SERPL-MCNC: 0.2 MG/DL (ref 0.1–1.5)
BUN SERPL-MCNC: 26 MG/DL (ref 8–22)
CALCIUM ALBUM COR SERPL-MCNC: 8.3 MG/DL (ref 8.5–10.5)
CALCIUM SERPL-MCNC: 8.5 MG/DL (ref 8.5–10.5)
CHLORIDE SERPL-SCNC: 106 MMOL/L (ref 96–112)
CHOLEST SERPL-MCNC: 194 MG/DL (ref 100–199)
CO2 SERPL-SCNC: 23 MMOL/L (ref 20–33)
CREAT SERPL-MCNC: 0.95 MG/DL (ref 0.5–1.4)
FASTING STATUS PATIENT QL REPORTED: NORMAL
GFR SERPLBLD CREATININE-BSD FMLA CKD-EPI: 63 ML/MIN/1.73 M 2
GLOBULIN SER CALC-MCNC: 2.3 G/DL (ref 1.9–3.5)
GLUCOSE SERPL-MCNC: 89 MG/DL (ref 65–99)
HDLC SERPL-MCNC: 50 MG/DL
LDLC SERPL CALC-MCNC: 106 MG/DL
POTASSIUM SERPL-SCNC: 4.4 MMOL/L (ref 3.6–5.5)
PROT SERPL-MCNC: 6.6 G/DL (ref 6–8.2)
SODIUM SERPL-SCNC: 141 MMOL/L (ref 135–145)
TRIGL SERPL-MCNC: 191 MG/DL (ref 0–149)
TSH SERPL DL<=0.005 MIU/L-ACNC: 8.9 UIU/ML (ref 0.38–5.33)

## 2024-04-26 PROCEDURE — 36415 COLL VENOUS BLD VENIPUNCTURE: CPT

## 2024-04-26 PROCEDURE — 84443 ASSAY THYROID STIM HORMONE: CPT

## 2024-04-26 PROCEDURE — 80061 LIPID PANEL: CPT

## 2024-04-26 PROCEDURE — 80053 COMPREHEN METABOLIC PANEL: CPT

## 2024-04-29 ENCOUNTER — OFFICE VISIT (OUTPATIENT)
Dept: CARDIOLOGY | Facility: MEDICAL CENTER | Age: 74
End: 2024-04-29
Attending: NURSE PRACTITIONER
Payer: MEDICARE

## 2024-04-29 VITALS
HEART RATE: 68 BPM | DIASTOLIC BLOOD PRESSURE: 64 MMHG | BODY MASS INDEX: 28.99 KG/M2 | HEIGHT: 65 IN | WEIGHT: 174 LBS | SYSTOLIC BLOOD PRESSURE: 106 MMHG | OXYGEN SATURATION: 93 % | RESPIRATION RATE: 14 BRPM

## 2024-04-29 DIAGNOSIS — J43.2 CENTRILOBULAR EMPHYSEMA (HCC): ICD-10-CM

## 2024-04-29 DIAGNOSIS — I48.0 PAROXYSMAL ATRIAL FIBRILLATION (HCC): ICD-10-CM

## 2024-04-29 DIAGNOSIS — E78.2 MIXED HYPERLIPIDEMIA: ICD-10-CM

## 2024-04-29 DIAGNOSIS — Z79.01 CHRONIC ANTICOAGULATION: ICD-10-CM

## 2024-04-29 DIAGNOSIS — E03.9 HYPOTHYROIDISM, UNSPECIFIED TYPE: ICD-10-CM

## 2024-04-29 DIAGNOSIS — G47.33 OBSTRUCTIVE SLEEP APNEA: ICD-10-CM

## 2024-04-29 DIAGNOSIS — Z95.818 PRESENCE OF WATCHMAN LEFT ATRIAL APPENDAGE CLOSURE DEVICE: ICD-10-CM

## 2024-04-29 LAB — EKG IMPRESSION: NORMAL

## 2024-04-29 PROCEDURE — 93005 ELECTROCARDIOGRAM TRACING: CPT | Performed by: NURSE PRACTITIONER

## 2024-04-29 PROCEDURE — 99213 OFFICE O/P EST LOW 20 MIN: CPT | Performed by: NURSE PRACTITIONER

## 2024-04-29 ASSESSMENT — ENCOUNTER SYMPTOMS
PND: 0
SHORTNESS OF BREATH: 0
DIZZINESS: 0
CHILLS: 0
LOSS OF CONSCIOUSNESS: 0
FEVER: 0
PALPITATIONS: 0
ABDOMINAL PAIN: 0
INSOMNIA: 0
BRUISES/BLEEDS EASILY: 0
ORTHOPNEA: 0
MYALGIAS: 0
COUGH: 0
HEADACHES: 0
NAUSEA: 0

## 2024-04-29 NOTE — PROGRESS NOTES
Chief Complaint   Patient presents with    Follow-Up    Atrial Fibrillation    Anticoagulation    Hyperlipidemia    COPD       Subjective     Allison Cespedes is a 73 y.o. female who presents today for annual follow-up of PAFib, Watchman device, hyperlipidemia, and hypothyroidism.    Allison is a 73 year old female with history of PAFib, first diagnosed in October 2018; she is status post Watchman procedure in March 2019, and is now off of anticoagulation, and is on ASA only. She also has some hyperlipidemia, hypothyroidism and arthritis. She was last seen by me in September 2023.      She is here today for annual follow-up. Her  passed away on 4/10/2024 after a long batter with Alzheimer's disease. She is still very much grieving him, but she does have a good support system.      From a cardiac standpoint, she has been stable:  No symptomatic palpitations; no chest pain, pressure or discomfort; mild continued dyspnea, but orthopnea or PND; no dizziness or syncope; no LE edema.     She was recently seen by her pulmonologist, and oxygen QHS was ordered for her; she has not yet started this.    Past Medical History:   Diagnosis Date    Anemia     Arthritis     Fingers, hands, toes, feet (osteoarthritis)    Asthma     Inhalers daily    Atrial fibrillation (HCC) 10/2018    New onset in office. March 2019: Status post Watchman procedure. September 2022: Echocardiogram with normal LV size, LVEF 65%. Normal RA, LA and RV. Trace MR, mild AR, trace TR.    Breath shortness     Uses 2L oxygen at night ([employer])     Cataract     Bilateral IOL    Chronic anticoagulation     COPD (chronic obstructive pulmonary disease) (HCC)     Cough     GERD (gastroesophageal reflux disease)     Gynecological disorder     Postmenopausal bleeding right now     Hyperlipidemia     Hypothyroidism     Pneumonia 1989    Psychiatric problem     depression, anxiety    Shortness of breath     Snoring     Tuberculosis 1981    Treated for nine  months    Wheezing      Past Surgical History:   Procedure Laterality Date    PB OPEN RX DISTAL RADIUS FX, INTRA-ARTICULAR* Left 1/21/2022    Procedure: OPEN REDUCTION AND INTERNAL FIXATION DISTAL RADIUS;  Surgeon: Eric Kay M.D.;  Location: [facility];  Service: Orthopedics    PB OPEN TREATMENT PBOX HUMERAL FRACTURE Left 1/21/2022    Procedure: OPEN REDUCTION AND INTERNAL FIXATION PROXIMAL HUMERUS FRACTURE WITH FIBULA  ALLOGRAFT;  Surgeon: Eric Kay M.D.;  Location: [facility];  Service: Orthopedics    FINGER ARTHROPLASTY Left 10/16/2020    Procedure: ARTHROPLASTY, FINGER - THUMB CARPOMETACARPAL EXCISIONAL;  Surgeon: Fidel Kay M.D.;  Location: SURGERY SAME DAY [facility];  Service: Orthopedics    TENDON TRANSFER Left 10/16/2020    Procedure: TRANSFER, TENDON - FOR FLEXOR CARPI RADIALIS TENDON GRAFT;  Surgeon: Fidel Kay M.D.;  Location: SURGERY SAME DAY [facility];  Service: Orthopedics    MASS EXCISION GENERAL Left 7/6/2018    Procedure: MASS EXCISION GENERAL/ SUBCUTANEOUS MASS LEFT SHOULDER;  Surgeon: Swapna Rivas M.D.;  Location: SURGERY SAME DAY [facility];  Service: General    OTHER CARDIAC SURGERY  2018    watchman implanted    CHOLECYSTECTOMY  1994    GYN SURGERY  1975    fibroid tumor    APPENDECTOMY  1960    had ruptured    ARTHROSCOPY, KNEE      MAMMOPLASTY AUGMENTATION      LA BREAST AUGMENTATION WITH IMPLANT      LA REMV 2ND CATARACT,CORN-SCLER SECTN       Family History   Problem Relation Age of Onset    Non-contributory Mother     Hypertension Mother     Cancer Mother         lung cancer    Non-contributory Father     Heart Disease Sister         MI, stent    Drug abuse Sister      Social History     Socioeconomic History    Marital status:      Spouse name: Not on file    Number of children: Not on file    Years of education: Not on file    Highest education level: Not on file   Occupational  History    Not on file   Tobacco Use    Smoking status: Former     Current packs/day: 0.00     Average packs/day: 2.0 packs/day for 30.0 years (60.0 ttl pk-yrs)     Types: Cigarettes     Start date: 2/3/1966     Quit date: 2/3/1996     Years since quittin.2    Smokeless tobacco: Never    Tobacco comments:     continued abstinance   Vaping Use    Vaping Use: Never used   Substance and Sexual Activity    Alcohol use: Not Currently    Drug use: No    Sexual activity: Not on file   Other Topics Concern    Not on file   Social History Narrative    Not on file     Social Determinants of Health     Financial Resource Strain: Not on file   Food Insecurity: Not on file   Transportation Needs: Not on file   Physical Activity: Not on file   Stress: Not on file   Social Connections: Not on file   Intimate Partner Violence: Not on file   Housing Stability: Not on file     Allergies   Allergen Reactions    Tizanidine Unspecified     hallucinations    Pcn [Penicillins]      Rxn as child     Outpatient Encounter Medications as of 2024   Medication Sig Dispense Refill    levothyroxine (SYNTHROID) 100 MCG Tab TAKE 1 TABLET EVERY MORNING 90 Tablet 3    escitalopram (LEXAPRO) 20 MG tablet TAKE 1 TABLET EVERY DAY 90 Tablet 3    dicyclomine (BENTYL) 10 MG Cap Take 1 Capsule by mouth 2 times a day. 180 Capsule 3    liothyronine (CYTOMEL) 5 MCG Tab TAKE 1 TABLET EVERY DAY 90 Tablet 3    valacyclovir (VALTREX) 1 GM Tab Take 1 Tablet by mouth 2 times a day. 20 Tablet 3    buPROPion (WELLBUTRIN XL) 150 MG XL tablet 3 tablets (450mg)  Tablet 3    VENTOLIN  (90 Base) MCG/ACT Aero Soln inhalation aerosol Inhale 2 Puffs every 6 hours as needed for Shortness of Breath. 1 Each 3    flecainide (TAMBOCOR) 50 MG tablet Take 1 Tablet by mouth 2 times a day. 200 Tablet 3    lovastatin (MEVACOR) 40 MG tablet Take 1 Tablet by mouth every evening. 100 Tablet 3    metoprolol SR (TOPROL XL) 25 MG TABLET SR 24 HR Take 1 Tablet by mouth  every day. 100 Tablet 3    propranolol (INDERAL) 20 MG Tab TAKE 1 TABLET BY MOUTH EVERY DAY AS NEEDED FOR PERFORMANCE ANXIETY 90 Tablet 0    diclofenac DR (VOLTAREN) 75 MG Tablet Delayed Response Take 75 mg by mouth 2 times a day.      triamcinolone acetonide (ORALONE) 0.1 % Paste APPLY TO THE AFFECTED AREA AFTER MEALS AND BEFORE BEDTIME      fluticasone (FLONASE) 50 MCG/ACT nasal spray Administer 1 Spray into affected nostril(S) every day.      gabapentin (NEURONTIN) 100 MG Cap TAKE 1 CAPSULE BY MOUTH THREE TIMES DAILY FOR 3 DAYS THEN 2 CAPSULE BY MOUTH THREE TIMES DAILY FOR 3 DAYS THEN 3 CAPSULE BY MOUTH THREE TIMES DAILY (Patient taking differently: Take 100 mg by mouth 2 times a day. TAKE 1 CAPSULE BY MOUTH THREE TIMES DAILY FOR 3 DAYS THEN 2 CAPSULE BY MOUTH THREE TIMES DAILY FOR 3 DAYS THEN 3 CAPSULE BY MOUTH THREE TIMES DAILY) 100 capsule 1    vitamin D (CHOLECALCIFEROL) 1000 Unit (25 mcg) Tab Take 2,000 Units by mouth every day.      medroxyPROGESTERone (PROVERA) 2.5 MG Tab Take 2.5 mg by mouth every day.      LORazepam (ATIVAN) 1 MG Tab Take 1 mg by mouth at bedtime as needed.  0    Estradiol 0.025 MG/24HR PATCH BIWEEKLY       aspirin EC 81 MG EC tablet Take 1 Tab by mouth every day. 30 Tab 6    HYDROcodone-acetaminophen (NORCO) 5-325 MG Tab per tablet Take 1 Tab by mouth 2 times a day as needed.      [DISCONTINUED] tiotropium (SPIRIVA RESPIMAT) 2.5 mcg/Act Aero Soln Inhale 2 Inhalation every day. Assemble and prime. (Patient not taking: Reported on 4/29/2024) 1 Each 6     No facility-administered encounter medications on file as of 4/29/2024.     Review of Systems   Constitutional:  Negative for chills, fever and malaise/fatigue.   HENT:  Negative for congestion.    Respiratory:  Negative for cough and shortness of breath.    Cardiovascular:  Negative for chest pain, palpitations, orthopnea, leg swelling and PND.   Gastrointestinal:  Negative for abdominal pain and nausea.   Musculoskeletal:  Negative for  "myalgias.   Skin:  Negative for rash.   Neurological:  Negative for dizziness, loss of consciousness and headaches.   Endo/Heme/Allergies:  Does not bruise/bleed easily.   Psychiatric/Behavioral:  The patient does not have insomnia.               Objective     /64 (BP Location: Left arm, Patient Position: Sitting, BP Cuff Size: Adult)   Pulse 68   Resp 14   Ht 1.651 m (5' 5\")   Wt 78.9 kg (174 lb)   SpO2 93%   BMI 28.96 kg/m²     Physical Exam  Constitutional:       Appearance: She is well-developed.   HENT:      Head: Normocephalic.   Neck:      Vascular: No JVD.   Cardiovascular:      Rate and Rhythm: Normal rate and regular rhythm.      Heart sounds: Normal heart sounds.   Pulmonary:      Effort: Pulmonary effort is normal. No respiratory distress.      Breath sounds: Normal breath sounds. No wheezing or rales.   Abdominal:      General: Bowel sounds are normal. There is no distension.      Palpations: Abdomen is soft.      Tenderness: There is no abdominal tenderness.   Musculoskeletal:         General: Normal range of motion.      Cervical back: Normal range of motion and neck supple.   Skin:     General: Skin is warm and dry.      Findings: No rash.   Neurological:      Mental Status: She is alert and oriented to person, place, and time.       EKG ordered and interpreted by me today reveals sinus rhythm at 67bpm with no acute ST-T wave changes.    Interpretation of PFTs of 8/29/2023:  Normal study.    CONCLUSIONS OF TTE OF 9/22/2022:  Compared to the prior study on 03/07/2019, no change.  Normal left ventricular systolic function.   No evidence of valvular abnormality based on Doppler evaluation.   Normal aortic root for body surface area. The ascending aorta diameter is 2.7 cm.     CONCLUSIONS OF ECHOCARDIOGRAM OF 10/17/2018:  Normal left ventricular systolic function.  Left ventricular ejection fraction is visually estimated to be 65%.  Normal diastolic function.  Normal inferior vena cava size " and inspiratory collapse.  Estimated right ventricular systolic pressure is 34 mmHg.     IMPRESSION OF CT SCAN OF CHEST OF 8/1/2022:  1.  Mild emphysema.  2.  Atherosclerotic disease     CONCLUSION OF PFTS OF 8/4/2022:  All values in this test correlates with normal limits. Normal pulmonary function test.        Component      Latest Ref Rng 4/26/2024   TSH      0.380 - 5.330 uIU/mL 8.900 (H)       Lab Results   Component Value Date/Time    CHOLSTRLTOT 194 04/26/2024 08:07 AM     (H) 04/26/2024 08:07 AM    HDL 50 04/26/2024 08:07 AM    TRIGLYCERIDE 191 (H) 04/26/2024 08:07 AM        Lab Results   Component Value Date/Time    ASTSGOT 18 04/26/2024 08:07 AM    ALTSGPT 22 04/26/2024 08:07 AM       Lab Results   Component Value Date/Time    SODIUM 141 04/26/2024 08:07 AM    POTASSIUM 4.4 04/26/2024 08:07 AM    CHLORIDE 106 04/26/2024 08:07 AM    CO2 23 04/26/2024 08:07 AM    GLUCOSE 89 04/26/2024 08:07 AM    BUN 26 (H) 04/26/2024 08:07 AM    CREATININE 0.95 04/26/2024 08:07 AM      Lab Results   Component Value Date/Time    WBC 9.3 10/12/2020 12:55 PM    RBC 4.56 10/12/2020 12:55 PM    HEMOGLOBIN 14.2 10/12/2020 12:55 PM    HEMATOCRIT 44.0 10/12/2020 12:55 PM    MCV 96.5 10/12/2020 12:55 PM    MCH 31.1 10/12/2020 12:55 PM    MCHC 32.3 (L) 10/12/2020 12:55 PM    MPV 10.8 10/12/2020 12:55 PM           Assessment & Plan     1. Paroxysmal atrial fibrillation (HCC)  EKG      2. Presence of Watchman left atrial appendage closure device        3. Chronic anticoagulation        4. Mixed hyperlipidemia        5. Centrilobular emphysema (HCC)        6. Obstructive sleep apnea        7. Hypothyroidism, unspecified type            Medical Decision Making: Today's Assessment/Status/Plan:        Paroxysmal atrial fibrillation, in sinus rhythm on Flecainide and Toprol XL. She is status post Watchman too, and she remains on ASA only. Continue same doses.     2. Hyperlipidemia, treated with Mevacor 40mg once daily. Recent lipid  panel was stable.     3. COPD/IVETTE, treated by pulmonology. She was recently prescribed oxygen QHS, but has not yet started this. Last PFTs were normal.     4. Hypothyroidism, treated, with elevated TSH. Suggested follow-up with endocrinology, to better titrate her medications; she is agreeable.     5. Familial stress, with  recently passing away after long gilman with dementia/Alzheimer's disease. She is still very much processing this.     Same medications for now. Suggest follow-up with endocrinology for better thyroid medication titration.    Follow-up with me annually, sooner if clinical condition changes.

## 2024-04-30 ENCOUNTER — PATIENT MESSAGE (OUTPATIENT)
Dept: CARDIOLOGY | Facility: MEDICAL CENTER | Age: 74
End: 2024-04-30
Payer: MEDICARE

## 2024-04-30 DIAGNOSIS — E03.9 HYPOTHYROIDISM, UNSPECIFIED TYPE: ICD-10-CM

## 2024-05-01 LAB — EKG IMPRESSION: NORMAL

## 2024-06-12 ENCOUNTER — HOSPITAL ENCOUNTER (OUTPATIENT)
Dept: LAB | Facility: MEDICAL CENTER | Age: 74
End: 2024-06-12
Payer: MEDICARE

## 2024-06-12 ENCOUNTER — OFFICE VISIT (OUTPATIENT)
Dept: ENDOCRINOLOGY | Facility: MEDICAL CENTER | Age: 74
End: 2024-06-12
Payer: MEDICARE

## 2024-06-12 VITALS
BODY MASS INDEX: 29.61 KG/M2 | WEIGHT: 177.7 LBS | HEART RATE: 74 BPM | OXYGEN SATURATION: 93 % | DIASTOLIC BLOOD PRESSURE: 78 MMHG | HEIGHT: 65 IN | SYSTOLIC BLOOD PRESSURE: 120 MMHG

## 2024-06-12 DIAGNOSIS — E03.9 HYPOTHYROIDISM, UNSPECIFIED TYPE: ICD-10-CM

## 2024-06-12 DIAGNOSIS — Z13.21 ENCOUNTER FOR VITAMIN DEFICIENCY SCREENING: ICD-10-CM

## 2024-06-12 LAB
ALBUMIN SERPL BCP-MCNC: 4.2 G/DL (ref 3.2–4.9)
ALBUMIN/GLOB SERPL: 1.6 G/DL
ALP SERPL-CCNC: 117 U/L (ref 30–99)
ALT SERPL-CCNC: 25 U/L (ref 2–50)
ANION GAP SERPL CALC-SCNC: 11 MMOL/L (ref 7–16)
AST SERPL-CCNC: 15 U/L (ref 12–45)
BILIRUB SERPL-MCNC: 0.2 MG/DL (ref 0.1–1.5)
BUN SERPL-MCNC: 19 MG/DL (ref 8–22)
CALCIUM ALBUM COR SERPL-MCNC: 9.2 MG/DL (ref 8.5–10.5)
CALCIUM SERPL-MCNC: 9.4 MG/DL (ref 8.5–10.5)
CHLORIDE SERPL-SCNC: 105 MMOL/L (ref 96–112)
CO2 SERPL-SCNC: 24 MMOL/L (ref 20–33)
CREAT SERPL-MCNC: 1.02 MG/DL (ref 0.5–1.4)
GFR SERPLBLD CREATININE-BSD FMLA CKD-EPI: 58 ML/MIN/1.73 M 2
GLOBULIN SER CALC-MCNC: 2.7 G/DL (ref 1.9–3.5)
GLUCOSE SERPL-MCNC: 99 MG/DL (ref 65–99)
POTASSIUM SERPL-SCNC: 4.3 MMOL/L (ref 3.6–5.5)
PROT SERPL-MCNC: 6.9 G/DL (ref 6–8.2)
SODIUM SERPL-SCNC: 140 MMOL/L (ref 135–145)
T3FREE SERPL-MCNC: 2.5 PG/ML (ref 2–4.4)
T4 FREE SERPL-MCNC: 1.55 NG/DL (ref 0.93–1.7)
THYROPEROXIDASE AB SERPL-ACNC: 326 IU/ML (ref 0–9)
TSH SERPL DL<=0.005 MIU/L-ACNC: 4.02 UIU/ML (ref 0.38–5.33)

## 2024-06-12 PROCEDURE — 80053 COMPREHEN METABOLIC PANEL: CPT

## 2024-06-12 PROCEDURE — 84439 ASSAY OF FREE THYROXINE: CPT

## 2024-06-12 PROCEDURE — 84443 ASSAY THYROID STIM HORMONE: CPT

## 2024-06-12 PROCEDURE — 84481 FREE ASSAY (FT-3): CPT

## 2024-06-12 PROCEDURE — 3078F DIAST BP <80 MM HG: CPT

## 2024-06-12 PROCEDURE — 3074F SYST BP LT 130 MM HG: CPT

## 2024-06-12 PROCEDURE — 36415 COLL VENOUS BLD VENIPUNCTURE: CPT

## 2024-06-12 PROCEDURE — 99214 OFFICE O/P EST MOD 30 MIN: CPT

## 2024-06-12 PROCEDURE — 99211 OFF/OP EST MAY X REQ PHY/QHP: CPT

## 2024-06-12 PROCEDURE — 86376 MICROSOMAL ANTIBODY EACH: CPT

## 2024-06-12 NOTE — PROGRESS NOTES
Chief Complaint: Consult requested by Zoie Spence P.A.-C. for evaluation of Hypothyroidism    HPI:     Allison Cespedes is a 73 y.o. female with history of Hypothyroidism diagnosed 20 years ago and is here for initial evaluation.      Hypothyroidism  She reports the following symptoms:fatigue, weight gain, feeling slow, and losing hair which has been present for 2 months.       She denies feeling cold and cold intolerance, constipation, swelling, anxiousness, feeling excessive energy, tremulousness, palpitations, sweating, and weight loss.      She denies lumps or enlargement in the neck.    She reports a family history of thyroid disease    She is currently on Levothyroxine 100 mcg daily and liothyronine 5 mcg daily which has been her thyroid hormone dose since many years  She was initially on brand name synthroid and would like to get back on.       She denies any recent dose changes.     She reports Good compliance and takes her thyroid hormone daily before breakfast.      She reports taking any antacids. She states she doesn't wait 4 hrs prior to taking her antacids.       Latest Reference Range & Units 04/26/24 08:07   TSH 0.380 - 5.330 uIU/mL 8.900 (H)     Patient's medications, allergies, and social histories were reviewed and updated as appropriate.      ROS:     CONS:     No fever, no chills, no weight loss, no fatigue   EYES:      No diplopia, no blurry vision, no redness of eyes, no swelling of eyelids   ENT:    No hearing loss, No ear pain, No sore throat, no dysphagia, no neck swelling   CV:     No chest pain, no palpitations, no claudication, no orthopnea, no PND   PULM:    No SOB, no cough, no hemoptysis, no wheezing    GI:   No nausea, no vomiting, no diarrhea, no constipation, no bloody stools   :  Passing urine well, no dysuria, no hematuria   ENDO:   No polyuria, no polydipsia, no heat intolerance, no cold intolerance   NEURO: No headaches, no dizziness, no convulsions, no tremors    MUSC:  No joint swellings, no arthralgias, no myalgias, no weakness   SKIN:   No rash, no ulcers, no dry skin   PSYCH:   No depression, no anxiety, no difficulty sleeping       Past Medical History:  Patient Active Problem List    Diagnosis Date Noted    Mixed hyperlipidemia 08/30/2022    Centrilobular emphysema (HCC) 08/25/2022    Left wrist pain 01/18/2022    Presence of Watchman left atrial appendage closure device 04/04/2019    Atrial fibrillation (HCC) [I48.91] 03/08/2019    Arthritis 11/20/2018    Chronic anticoagulation 11/20/2018    Obstructive sleep apnea 10/16/2018    Hypothyroidism 10/16/2018    SOB (shortness of breath) 09/05/2018       Past Surgical History:  Past Surgical History:   Procedure Laterality Date    PB OPEN RX DISTAL RADIUS FX, INTRA-ARTICULAR* Left 1/21/2022    Procedure: OPEN REDUCTION AND INTERNAL FIXATION DISTAL RADIUS;  Surgeon: Eric Kay M.D.;  Location: [facility];  Service: Orthopedics    PB OPEN TREATMENT PBOX HUMERAL FRACTURE Left 1/21/2022    Procedure: OPEN REDUCTION AND INTERNAL FIXATION PROXIMAL HUMERUS FRACTURE WITH FIBULA  ALLOGRAFT;  Surgeon: Eric Kay M.D.;  Location: [facility];  Service: Orthopedics    FINGER ARTHROPLASTY Left 10/16/2020    Procedure: ARTHROPLASTY, FINGER - THUMB CARPOMETACARPAL EXCISIONAL;  Surgeon: Fidel Kay M.D.;  Location: SURGERY SAME DAY [facility];  Service: Orthopedics    TENDON TRANSFER Left 10/16/2020    Procedure: TRANSFER, TENDON - FOR FLEXOR CARPI RADIALIS TENDON GRAFT;  Surgeon: Fidel Kay M.D.;  Location: SURGERY SAME DAY [facility];  Service: Orthopedics    MASS EXCISION GENERAL Left 7/6/2018    Procedure: MASS EXCISION GENERAL/ SUBCUTANEOUS MASS LEFT SHOULDER;  Surgeon: Swapna Rivas M.D.;  Location: SURGERY SAME DAY [facility];  Service: General    OTHER CARDIAC SURGERY  2018    watchman implanted    CHOLECYSTECTOMY  1994    GYN SURGERY   1975    fibroid tumor    APPENDECTOMY  1960    had ruptured    ARTHROSCOPY, KNEE      MAMMOPLASTY AUGMENTATION      IL BREAST AUGMENTATION WITH IMPLANT      IL REMV 2ND CATARACT,CORN-SCLER SECTN          Allergies:  Tizanidine and Pcn [penicillins]     Current Medications:    Current Outpatient Medications:     levothyroxine (SYNTHROID) 100 MCG Tab, TAKE 1 TABLET EVERY MORNING, Disp: 90 Tablet, Rfl: 3    escitalopram (LEXAPRO) 20 MG tablet, TAKE 1 TABLET EVERY DAY, Disp: 90 Tablet, Rfl: 3    dicyclomine (BENTYL) 10 MG Cap, Take 1 Capsule by mouth 2 times a day., Disp: 180 Capsule, Rfl: 3    liothyronine (CYTOMEL) 5 MCG Tab, TAKE 1 TABLET EVERY DAY, Disp: 90 Tablet, Rfl: 3    valacyclovir (VALTREX) 1 GM Tab, Take 1 Tablet by mouth 2 times a day., Disp: 20 Tablet, Rfl: 3    buPROPion (WELLBUTRIN XL) 150 MG XL tablet, 3 tablets (450mg) QD, Disp: 270 Tablet, Rfl: 3    VENTOLIN  (90 Base) MCG/ACT Aero Soln inhalation aerosol, Inhale 2 Puffs every 6 hours as needed for Shortness of Breath., Disp: 1 Each, Rfl: 3    flecainide (TAMBOCOR) 50 MG tablet, Take 1 Tablet by mouth 2 times a day., Disp: 200 Tablet, Rfl: 3    lovastatin (MEVACOR) 40 MG tablet, Take 1 Tablet by mouth every evening., Disp: 100 Tablet, Rfl: 3    metoprolol SR (TOPROL XL) 25 MG TABLET SR 24 HR, Take 1 Tablet by mouth every day., Disp: 100 Tablet, Rfl: 3    diclofenac DR (VOLTAREN) 75 MG Tablet Delayed Response, Take 75 mg by mouth 2 times a day., Disp: , Rfl:     triamcinolone acetonide (ORALONE) 0.1 % Paste, APPLY TO THE AFFECTED AREA AFTER MEALS AND BEFORE BEDTIME, Disp: , Rfl:     fluticasone (FLONASE) 50 MCG/ACT nasal spray, Administer 1 Spray into affected nostril(S) every day., Disp: , Rfl:     gabapentin (NEURONTIN) 100 MG Cap, TAKE 1 CAPSULE BY MOUTH THREE TIMES DAILY FOR 3 DAYS THEN 2 CAPSULE BY MOUTH THREE TIMES DAILY FOR 3 DAYS THEN 3 CAPSULE BY MOUTH THREE TIMES DAILY (Patient taking differently: Take 100 mg by mouth 2 times a day.  TAKE 1 CAPSULE BY MOUTH THREE TIMES DAILY FOR 3 DAYS THEN 2 CAPSULE BY MOUTH THREE TIMES DAILY FOR 3 DAYS THEN 3 CAPSULE BY MOUTH THREE TIMES DAILY), Disp: 100 capsule, Rfl: 1    vitamin D (CHOLECALCIFEROL) 1000 Unit (25 mcg) Tab, Take 2,000 Units by mouth every day., Disp: , Rfl:     medroxyPROGESTERone (PROVERA) 2.5 MG Tab, Take 2.5 mg by mouth every day., Disp: , Rfl:     LORazepam (ATIVAN) 1 MG Tab, Take 1 mg by mouth at bedtime as needed., Disp: , Rfl: 0    Estradiol 0.025 MG/24HR PATCH BIWEEKLY, , Disp: , Rfl:     aspirin EC 81 MG EC tablet, Take 1 Tab by mouth every day., Disp: 30 Tab, Rfl: 6    HYDROcodone-acetaminophen (NORCO) 5-325 MG Tab per tablet, Take 1 Tab by mouth 2 times a day as needed., Disp: , Rfl:     propranolol (INDERAL) 20 MG Tab, TAKE 1 TABLET BY MOUTH EVERY DAY AS NEEDED FOR PERFORMANCE ANXIETY, Disp: 90 Tablet, Rfl: 0    Social History:  Social History     Socioeconomic History    Marital status:      Spouse name: Not on file    Number of children: Not on file    Years of education: Not on file    Highest education level: Not on file   Occupational History    Not on file   Tobacco Use    Smoking status: Former     Current packs/day: 0.00     Average packs/day: 2.0 packs/day for 30.0 years (60.0 ttl pk-yrs)     Types: Cigarettes     Start date: 2/3/1966     Quit date: 2/3/1996     Years since quittin.3    Smokeless tobacco: Never    Tobacco comments:     continued abstinance   Vaping Use    Vaping status: Never Used   Substance and Sexual Activity    Alcohol use: Not Currently    Drug use: No    Sexual activity: Not on file   Other Topics Concern    Not on file   Social History Narrative    Not on file     Social Determinants of Health     Financial Resource Strain: Not on file   Food Insecurity: Not on file   Transportation Needs: Not on file   Physical Activity: Not on file   Stress: Not on file   Social Connections: Not on file   Intimate Partner Violence: Not on file  "  Housing Stability: Not on file        Family History:   Family History   Problem Relation Age of Onset    Non-contributory Mother     Hypertension Mother     Cancer Mother         lung cancer    Non-contributory Father     Heart Disease Sister         MI, stent    Drug abuse Sister          PHYSICAL EXAM:   Vital signs: /78   Pulse 74   Ht 1.651 m (5' 5\") Comment: pt stated  Wt 80.6 kg (177 lb 11.2 oz)   SpO2 93%   BMI 29.57 kg/m²   GENERAL: Well-developed, well-nourished  in no apparent distress.   EYE: No ocular and eyelid asymmetry, Anicteric sclerae,  PERRL  HENT: Hearing grossly intact, Normocephalic, atraumatic. Pink, moist mucous membranes, No exudate  NECK: Supple. Trachea midline. thyroid is normal in size without nodules or tenderness  CARDIOVASCULAR: Regular rate and rhythm. No murmurs, rubs, or gallops.   LUNGS: Clear to auscultation bilaterally   ABDOMEN: Soft, nontender with positive bowel sounds.   EXTREMITIES: No clubbing, cyanosis, or edema.   NEUROLOGICAL: Cranial nerves II-XII are grossly intact   Symmetric reflexes at the patella no proximal muscle weakness  LYMPH: No cervical, supraclavicular,  adenopathy palpated.   SKIN: No rashes, lesions. Turgor is normal.    Labs:  Lab Results   Component Value Date/Time    WBC 9.3 10/12/2020 12:55 PM    RBC 4.56 10/12/2020 12:55 PM    HEMOGLOBIN 14.2 10/12/2020 12:55 PM    MCV 96.5 10/12/2020 12:55 PM    MCH 31.1 10/12/2020 12:55 PM    MCHC 32.3 (L) 10/12/2020 12:55 PM    RDW 45.5 10/12/2020 12:55 PM    MPV 10.8 10/12/2020 12:55 PM       Lab Results   Component Value Date/Time    SODIUM 141 04/26/2024 08:07 AM    POTASSIUM 4.4 04/26/2024 08:07 AM    CHLORIDE 106 04/26/2024 08:07 AM    CO2 23 04/26/2024 08:07 AM    ANION 12.0 04/26/2024 08:07 AM    GLUCOSE 89 04/26/2024 08:07 AM    BUN 26 (H) 04/26/2024 08:07 AM    CREATININE 0.95 04/26/2024 08:07 AM    CALCIUM 8.5 04/26/2024 08:07 AM    ASTSGOT 18 04/26/2024 08:07 AM    ALTSGPT 22 04/26/2024 " "08:07 AM    TBILIRUBIN 0.2 04/26/2024 08:07 AM    ALBUMIN 4.3 04/26/2024 08:07 AM    TOTPROTEIN 6.6 04/26/2024 08:07 AM    GLOBULIN 2.3 04/26/2024 08:07 AM    AGRATIO 1.9 04/26/2024 08:07 AM       Lab Results   Component Value Date/Time    CHOLSTRLTOT 194 04/26/2024 0807    TRIGLYCERIDE 191 (H) 04/26/2024 0807    HDL 50 04/26/2024 0807     (H) 04/26/2024 0807       Lab Results   Component Value Date/Time    TSHULTRASEN 8.900 (H) 04/26/2024 0807     No results found for: \"FREET4\"  No results found for: \"FREET3\"  No results found for: \"THYSTIMIG\"    No results found for: \"MICROSOMALA\"      Imaging:      ASSESSMENT/PLAN:     1. Hypothyroidism, unspecified type  Unstable  Discussed the etiology of hypothyroidism  Clinically unstable  Bio chemically unstable  Medication:  Levothyroxine 100 mcg daily - continue  Liothyronine 5 mcg daily - continue  Patient will have blood work done this week and notify me via "[employer]"t for review and dose adjustment.   Patient understands to take antacids 4 hrs apart from levothyroxine for better absorption of levothyroxine.   Patient understands to stop taking biotin 5 days prior to blood work.   - TSH; Future  - T3 FREE; Future  - FREE THYROXINE; Future  - THYROID PEROXIDASE  (TPO) AB; Future  I will get US of thyroid to establish baseline  - US-THYROID; Future    2. Encounter for vitamin deficiency screening  I will get vitamin D levels as patient is c/o fatigue.   - VITAMIN D,25 HYDROXY (DEFICIENCY); Future  - Comp Metabolic Panel; Future     Return in about 6 weeks (around 7/24/2024). Patient will have blood work done this week and notify me via "[employer]t for dose adjustment.     Thank you kindly for allowing me to participate in the thyroid care plan for this patient.    Nima Navarro, EMILY   06/12/24    CC:   Zoie Spence P.A.-C.  "

## 2024-06-17 DIAGNOSIS — E03.9 HYPOTHYROIDISM, UNSPECIFIED TYPE: ICD-10-CM

## 2024-06-17 RX ORDER — LEVOTHYROXINE SODIUM 100 MCG
100 TABLET ORAL
Qty: 96 TABLET | Refills: 3 | Status: SHIPPED | OUTPATIENT
Start: 2024-06-17

## 2024-06-17 RX ORDER — LEVOTHYROXINE SODIUM 100 MCG
100 TABLET ORAL
Qty: 96 TABLET | Refills: 3 | Status: SHIPPED | OUTPATIENT
Start: 2024-06-17 | End: 2024-06-17

## 2024-06-17 RX ORDER — LIOTHYRONINE SODIUM 5 MCG
7.5 TABLET ORAL DAILY
Qty: 135 TABLET | Refills: 3 | Status: SHIPPED | OUTPATIENT
Start: 2024-06-17

## 2024-07-01 ENCOUNTER — OFFICE VISIT (OUTPATIENT)
Dept: URGENT CARE | Facility: CLINIC | Age: 74
End: 2024-07-01
Payer: MEDICARE

## 2024-07-01 VITALS
OXYGEN SATURATION: 93 % | HEIGHT: 66 IN | HEART RATE: 72 BPM | RESPIRATION RATE: 18 BRPM | WEIGHT: 176 LBS | BODY MASS INDEX: 28.28 KG/M2 | TEMPERATURE: 97.5 F | SYSTOLIC BLOOD PRESSURE: 112 MMHG | DIASTOLIC BLOOD PRESSURE: 58 MMHG

## 2024-07-01 DIAGNOSIS — J06.9 VIRAL URI WITH COUGH: ICD-10-CM

## 2024-07-01 PROCEDURE — 3074F SYST BP LT 130 MM HG: CPT

## 2024-07-01 PROCEDURE — 99213 OFFICE O/P EST LOW 20 MIN: CPT

## 2024-07-01 PROCEDURE — 3078F DIAST BP <80 MM HG: CPT

## 2024-07-03 RX ORDER — BENZONATATE 100 MG/1
100 CAPSULE ORAL 3 TIMES DAILY PRN
Qty: 30 CAPSULE | Refills: 0 | Status: SHIPPED | OUTPATIENT
Start: 2024-07-03

## 2024-07-03 ASSESSMENT — ENCOUNTER SYMPTOMS
FEVER: 1
COUGH: 1
SORE THROAT: 1

## 2024-07-10 ENCOUNTER — APPOINTMENT (OUTPATIENT)
Dept: RADIOLOGY | Facility: MEDICAL CENTER | Age: 74
End: 2024-07-10
Payer: MEDICARE

## 2024-07-22 ENCOUNTER — OFFICE VISIT (OUTPATIENT)
Dept: ENDOCRINOLOGY | Facility: MEDICAL CENTER | Age: 74
End: 2024-07-22
Payer: MEDICARE

## 2024-07-22 VITALS
DIASTOLIC BLOOD PRESSURE: 58 MMHG | SYSTOLIC BLOOD PRESSURE: 122 MMHG | BODY MASS INDEX: 28.33 KG/M2 | HEART RATE: 74 BPM | OXYGEN SATURATION: 90 % | WEIGHT: 176.3 LBS | HEIGHT: 66 IN

## 2024-07-22 DIAGNOSIS — R42 DIZZINESS: ICD-10-CM

## 2024-07-22 DIAGNOSIS — E06.3 HASHIMOTO'S DISEASE: ICD-10-CM

## 2024-07-22 DIAGNOSIS — E03.9 HYPOTHYROIDISM, UNSPECIFIED TYPE: ICD-10-CM

## 2024-07-22 PROCEDURE — 3078F DIAST BP <80 MM HG: CPT

## 2024-07-22 PROCEDURE — 99211 OFF/OP EST MAY X REQ PHY/QHP: CPT

## 2024-07-22 PROCEDURE — 3074F SYST BP LT 130 MM HG: CPT

## 2024-07-22 PROCEDURE — 99214 OFFICE O/P EST MOD 30 MIN: CPT

## 2024-07-29 ENCOUNTER — TELEPHONE (OUTPATIENT)
Dept: SLEEP MEDICINE | Facility: MEDICAL CENTER | Age: 74
End: 2024-07-29
Payer: MEDICARE

## 2024-07-30 DIAGNOSIS — I48.0 PAROXYSMAL ATRIAL FIBRILLATION (HCC): ICD-10-CM

## 2024-07-30 RX ORDER — FLECAINIDE ACETATE 50 MG/1
50 TABLET ORAL 2 TIMES DAILY
Qty: 200 TABLET | Refills: 3 | Status: SHIPPED | OUTPATIENT
Start: 2024-07-30

## 2024-08-02 ENCOUNTER — HOSPITAL ENCOUNTER (OUTPATIENT)
Dept: RADIOLOGY | Facility: MEDICAL CENTER | Age: 74
End: 2024-08-02
Payer: MEDICARE

## 2024-08-02 DIAGNOSIS — E03.9 HYPOTHYROIDISM, UNSPECIFIED TYPE: ICD-10-CM

## 2024-08-02 PROCEDURE — 76536 US EXAM OF HEAD AND NECK: CPT

## 2024-09-03 ENCOUNTER — HOSPITAL ENCOUNTER (OUTPATIENT)
Dept: RADIOLOGY | Facility: MEDICAL CENTER | Age: 74
End: 2024-09-03
Attending: NURSE PRACTITIONER
Payer: MEDICARE

## 2024-09-03 DIAGNOSIS — Z12.31 VISIT FOR SCREENING MAMMOGRAM: ICD-10-CM

## 2024-09-03 PROCEDURE — 77067 SCR MAMMO BI INCL CAD: CPT

## 2024-09-09 DIAGNOSIS — E03.9 HYPOTHYROIDISM, UNSPECIFIED TYPE: ICD-10-CM

## 2024-09-09 RX ORDER — LIOTHYRONINE SODIUM 5 MCG
7.5 TABLET ORAL
Qty: 390 TABLET | Refills: 3 | Status: SHIPPED | OUTPATIENT
Start: 2024-09-09

## 2024-09-10 DIAGNOSIS — I48.0 PAROXYSMAL ATRIAL FIBRILLATION (HCC): ICD-10-CM

## 2024-09-10 RX ORDER — METOPROLOL SUCCINATE 25 MG/1
25 TABLET, EXTENDED RELEASE ORAL
Qty: 90 TABLET | Refills: 2 | Status: SHIPPED | OUTPATIENT
Start: 2024-09-10

## 2024-09-10 NOTE — TELEPHONE ENCOUNTER
Is the patient due for a refill? Yes    Was the patient seen the past year? Yes    Date of last office visit: 4/29/24    Does the patient have an upcoming appointment?  Yes   If yes, When? 4/29/25    Provider to refill:AB    Does the patient have [facility] Plus and need 100-day supply? (This applies to ALL medications) Patient does not have SCP

## 2024-09-19 DIAGNOSIS — E78.2 MIXED HYPERLIPIDEMIA: ICD-10-CM

## 2024-09-19 RX ORDER — LOVASTATIN 40 MG
40 TABLET ORAL NIGHTLY
Qty: 90 TABLET | Refills: 2 | Status: SHIPPED | OUTPATIENT
Start: 2024-09-19

## 2024-09-19 NOTE — TELEPHONE ENCOUNTER
Is the patient due for a refill? Yes    Was the patient seen the past year? Yes    Date of last office visit: 4/29/24    Does the patient have an upcoming appointment?  Yes   If yes, When? 4/29/24    Provider to refill:AB    Does the patient have longterm Plus and need 100-day supply? (This applies to ALL medications) Patient does not have SCP

## 2024-09-20 RX ORDER — VALACYCLOVIR HYDROCHLORIDE 1 G/1
1000 TABLET, FILM COATED ORAL 2 TIMES DAILY
Qty: 20 TABLET | Refills: 3 | Status: SHIPPED | OUTPATIENT
Start: 2024-09-20

## 2024-09-20 NOTE — TELEPHONE ENCOUNTER
Received request via: Pharmacy    Was the patient seen in the last year in this department? Yes    Does the patient have an active prescription (recently filled or refills available) for medication(s) requested? No    Pharmacy Name: [facility]    Does the patient have skilled nursing Plus and need 100-day supply? (This applies to ALL medications) Patient does not have SCP

## 2024-10-01 ENCOUNTER — PATIENT MESSAGE (OUTPATIENT)
Dept: MEDICAL GROUP | Facility: LAB | Age: 74
End: 2024-10-01
Payer: MEDICARE

## 2024-10-01 DIAGNOSIS — U07.1 COVID-19: ICD-10-CM

## 2024-10-01 DIAGNOSIS — J06.9 VIRAL URI WITH COUGH: ICD-10-CM

## 2024-10-01 RX ORDER — BENZONATATE 100 MG/1
100 CAPSULE ORAL 3 TIMES DAILY PRN
Qty: 30 CAPSULE | Refills: 0 | Status: SHIPPED | OUTPATIENT
Start: 2024-10-01 | End: 2024-10-11

## 2024-10-08 ENCOUNTER — APPOINTMENT (RX ONLY)
Dept: URBAN - METROPOLITAN AREA CLINIC 20 | Facility: CLINIC | Age: 74
Setting detail: DERMATOLOGY
End: 2024-10-08

## 2024-10-08 DIAGNOSIS — L82.1 OTHER SEBORRHEIC KERATOSIS: ICD-10-CM

## 2024-10-08 PROCEDURE — ? ADDITIONAL NOTES

## 2024-10-08 PROCEDURE — 99202 OFFICE O/P NEW SF 15 MIN: CPT

## 2024-10-08 PROCEDURE — ? COUNSELING

## 2024-10-08 ASSESSMENT — LOCATION DETAILED DESCRIPTION DERM: LOCATION DETAILED: RIGHT LATERAL ABDOMEN

## 2024-10-08 ASSESSMENT — LOCATION ZONE DERM: LOCATION ZONE: TRUNK

## 2024-10-08 ASSESSMENT — LOCATION SIMPLE DESCRIPTION DERM: LOCATION SIMPLE: ABDOMEN

## 2024-10-08 NOTE — PROCEDURE: ADDITIONAL NOTES
Additional Notes: Pt was reassured this are benign lesions
Render Risk Assessment In Note?: no
Detail Level: Simple

## 2024-10-28 RX ORDER — VALACYCLOVIR HYDROCHLORIDE 1 G/1
1000 TABLET, FILM COATED ORAL 2 TIMES DAILY
Qty: 80 TABLET | Refills: 1 | Status: SHIPPED | OUTPATIENT
Start: 2024-10-28

## 2024-11-01 NOTE — PROGRESS NOTES
Pulmonary Clinic Note    Date of Visit: 11/6/2024     Chief Complaint:  Chief Complaint   Patient presents with    Follow-Up     Last seen 4/24/24 Dr. Springer     HPI:   Allison Cespedes is a very pleasant 73 y.o. year old female former smoker (30 pack-years, quit in 1996), with a PMHx of emphysema, nocturnal hypoxia A-fib s/p Watchman, arthritis who presented to the Pulmonary Clinic for a regular follow up. Last seen in the office on 4/24/2024 with Dr. Springer.     Patient is followed by pulmonary office for emphysema-without obstruction and nocturnal hypoxia.  PFTs in 2023 were normal with a FEV1 of 1.80 L or 81%, FEV1/FVC 72%, RV 89%, DLCO 84% predicted, without positive bronchodilator response.  CT chest in 2022 shows mild emphysema.  OPO in March 2024 shows saturations less than 88% for 70 minutes with clustering.  Patient is currently on 2 LPM at night.    Interval events:  11/6/2024-  Patient states that she is at her baseline shortness of breath with mMRC of 0-1 and will have a cough that will cause her to gag and will occasionally have episodes of urinary incontinence.  She denies any sputum production or wheezing.  She does have a history of GERD and is on Prilosec 20 mg.  She also states that she has nasal congestion and uses Flonase as needed.  She does have an albuterol inhaler and will use it 4-5 times a week.    Exacerbations this year: None    Current medication regimen: Albuterol    Oxygen use: 2 LPM at night    MMRC Grade: 0-1    Past Medical History:   Diagnosis Date    Anemia     Arthritis     Fingers, hands, toes, feet (osteoarthritis)    Asthma     Inhalers daily    Atrial fibrillation (HCC) 10/2018    New onset in office. March 2019: Status post Watchman procedure. September 2022: Echocardiogram with normal LV size, LVEF 65%. Normal RA, LA and RV. Trace MR, mild AR, trace TR.    Breath shortness     Uses 2L oxygen at night ([employer])     Cataract     Bilateral IOL    Chronic anticoagulation      COPD (chronic obstructive pulmonary disease) (HCC)     Cough     GERD (gastroesophageal reflux disease)     Gynecological disorder     Postmenopausal bleeding right now     Hyperlipidemia     Hypothyroidism     Pneumonia 1989    Psychiatric problem     depression, anxiety    Shortness of breath     Snoring     Tuberculosis 1981    Treated for nine months    Wheezing      Past Surgical History:   Procedure Laterality Date    PB OPEN RX DISTAL RADIUS FX, INTRA-ARTICULAR* Left 1/21/2022    Procedure: OPEN REDUCTION AND INTERNAL FIXATION DISTAL RADIUS;  Surgeon: Eric Kay M.D.;  Location: [facility];  Service: Orthopedics    PB OPEN TREATMENT PBOX HUMERAL FRACTURE Left 1/21/2022    Procedure: OPEN REDUCTION AND INTERNAL FIXATION PROXIMAL HUMERUS FRACTURE WITH FIBULA  ALLOGRAFT;  Surgeon: Eric Kay M.D.;  Location: [facility];  Service: Orthopedics    FINGER ARTHROPLASTY Left 10/16/2020    Procedure: ARTHROPLASTY, FINGER - THUMB CARPOMETACARPAL EXCISIONAL;  Surgeon: Fidel Kay M.D.;  Location: SURGERY SAME DAY [facility];  Service: Orthopedics    TENDON TRANSFER Left 10/16/2020    Procedure: TRANSFER, TENDON - FOR FLEXOR CARPI RADIALIS TENDON GRAFT;  Surgeon: Fidel Kay M.D.;  Location: SURGERY SAME DAY [facility];  Service: Orthopedics    MASS EXCISION GENERAL Left 7/6/2018    Procedure: MASS EXCISION GENERAL/ SUBCUTANEOUS MASS LEFT SHOULDER;  Surgeon: Swapna Rivas M.D.;  Location: SURGERY SAME DAY [facility];  Service: General    OTHER CARDIAC SURGERY  2018    watchman implanted    CHOLECYSTECTOMY  1994    GYN SURGERY  1975    fibroid tumor    APPENDECTOMY  1960    had ruptured    ARTHROSCOPY, KNEE      MAMMOPLASTY AUGMENTATION      VA BREAST AUGMENTATION WITH IMPLANT      VA REMV 2ND CATARACT,CORN-SCLER SECTN       Social History     Socioeconomic History    Marital status:      Spouse name: Not on file     Number of children: Not on file    Years of education: Not on file    Highest education level: Not on file   Occupational History    Not on file   Tobacco Use    Smoking status: Former     Current packs/day: 0.00     Average packs/day: 2.0 packs/day for 30.0 years (60.0 ttl pk-yrs)     Types: Cigarettes     Start date: 2/3/1966     Quit date: 2/3/1996     Years since quittin.7    Smokeless tobacco: Never    Tobacco comments:     continued abstinance   Vaping Use    Vaping status: Never Used   Substance and Sexual Activity    Alcohol use: Not Currently    Drug use: No    Sexual activity: Not on file   Other Topics Concern    Not on file   Social History Narrative    Not on file     Social Drivers of Health     Financial Resource Strain: Not on file   Food Insecurity: Not on file   Transportation Needs: Not on file   Physical Activity: Not on file   Stress: Not on file   Social Connections: Not on file   Intimate Partner Violence: Not on file   Housing Stability: Not on file        Family History   Problem Relation Age of Onset    Non-contributory Mother     Hypertension Mother     Cancer Mother         lung cancer    Non-contributory Father     Heart Disease Sister         MI, stent    Drug abuse Sister      Current Outpatient Medications on File Prior to Visit   Medication Sig Dispense Refill    valacyclovir (VALTREX) 1 GM Tab TAKE 1 TABLET TWICE DAILY 160 Tablet 0    lovastatin (MEVACOR) 40 MG tablet Take 1 Tablet by mouth every evening. 90 Tablet 2    metoprolol SR (TOPROL XL) 25 MG TABLET SR 24 HR Take 1 Tablet by mouth every day. 90 Tablet 2    CYTOMEL 5 MCG Tab Take 1.5 Tablets by mouth 2 times a day. 390 Tablet 3    flecainide (TAMBOCOR) 50 MG tablet Take 1 Tablet by mouth 2 times a day. 200 Tablet 3    SYNTHROID 100 MCG Tab Take 1 Tablet by mouth every morning on an empty stomach. 96 Tablet 3    escitalopram (LEXAPRO) 20 MG tablet TAKE 1 TABLET EVERY DAY 90 Tablet 3    dicyclomine (BENTYL) 10 MG Cap Take  1 Capsule by mouth 2 times a day. 180 Capsule 3    buPROPion (WELLBUTRIN XL) 150 MG XL tablet 3 tablets (450mg)  Tablet 3    VENTOLIN  (90 Base) MCG/ACT Aero Soln inhalation aerosol Inhale 2 Puffs every 6 hours as needed for Shortness of Breath. 1 Each 3    propranolol (INDERAL) 20 MG Tab TAKE 1 TABLET BY MOUTH EVERY DAY AS NEEDED FOR PERFORMANCE ANXIETY 90 Tablet 0    diclofenac DR (VOLTAREN) 75 MG Tablet Delayed Response Take 75 mg by mouth 2 times a day.      fluticasone (FLONASE) 50 MCG/ACT nasal spray Administer 1 Spray into affected nostril(S) every day.      gabapentin (NEURONTIN) 100 MG Cap TAKE 1 CAPSULE BY MOUTH THREE TIMES DAILY FOR 3 DAYS THEN 2 CAPSULE BY MOUTH THREE TIMES DAILY FOR 3 DAYS THEN 3 CAPSULE BY MOUTH THREE TIMES DAILY (Patient taking differently: Take 100 mg by mouth 2 times a day. TAKE 1 CAPSULE BY MOUTH THREE TIMES DAILY FOR 3 DAYS THEN 2 CAPSULE BY MOUTH THREE TIMES DAILY FOR 3 DAYS THEN 3 CAPSULE BY MOUTH THREE TIMES DAILY) 100 capsule 1    vitamin D (CHOLECALCIFEROL) 1000 Unit (25 mcg) Tab Take 2,000 Units by mouth every day.      medroxyPROGESTERone (PROVERA) 2.5 MG Tab Take 2.5 mg by mouth every day.      LORazepam (ATIVAN) 1 MG Tab Take 1 mg by mouth at bedtime as needed.  0    aspirin EC 81 MG EC tablet Take 1 Tab by mouth every day. 30 Tab 6    HYDROcodone-acetaminophen (NORCO) 5-325 MG Tab per tablet Take 1 Tab by mouth 2 times a day as needed.      Nirmatrelvir&Ritonavir 300/100 20 x 150 MG & 10 x 100MG Tablet Therapy Pack Take 300 mg nirmatrelvir (two 150 mg tablets) with 100 mg ritonavir (one 100 mg tablet) by mouth, with all three tablets taken together twice daily for 5 days. 30 Each 0    triamcinolone acetonide (ORALONE) 0.1 % Paste APPLY TO THE AFFECTED AREA AFTER MEALS AND BEFORE BEDTIME      Estradiol 0.025 MG/24HR PATCH BIWEEKLY        No current facility-administered medications on file prior to visit.     Allergies: Tizanidine and Pcn  "[penicillins]    ROS:   Review of Systems   Constitutional:  Negative for chills, diaphoresis, fever and malaise/fatigue.   HENT:  Negative for congestion and sinus pain.    Respiratory:  Positive for cough and shortness of breath (baseline). Negative for hemoptysis, sputum production and wheezing.    Cardiovascular:  Negative for chest pain, palpitations and leg swelling.   Gastrointestinal:  Negative for diarrhea, heartburn, nausea and vomiting.   Musculoskeletal:  Negative for falls and myalgias.   Neurological:  Negative for dizziness, weakness and headaches.     Vitals:  /70 (BP Location: Right arm, Patient Position: Sitting, BP Cuff Size: Adult long)   Pulse 76   Ht 1.676 m (5' 6\")   Wt 74.8 kg (165 lb)   SpO2 92%     Physical Exam  Constitutional:       General: She is not in acute distress.     Appearance: Normal appearance. She is not ill-appearing, toxic-appearing or diaphoretic.   Cardiovascular:      Rate and Rhythm: Normal rate. Rhythm irregular.      Heart sounds: No murmur heard.     No friction rub. No gallop.   Pulmonary:      Effort: No respiratory distress.      Breath sounds: Normal breath sounds. No stridor. No wheezing, rhonchi or rales.   Musculoskeletal:         General: No swelling.      Right lower leg: No edema.      Left lower leg: No edema.   Skin:     General: Skin is warm.   Neurological:      General: No focal deficit present.      Mental Status: She is alert and oriented to person, place, and time.   Psychiatric:         Mood and Affect: Mood normal.         Behavior: Behavior normal.         Thought Content: Thought content normal.         Judgment: Judgment normal.         Laboratory Data:  OPO (Date: 4/8/2024)-   This is an overnight oximetry study performed on April 9, 2024 for duration of 7 hours and 9 minutes on room air.     Basal SpO2 was 89%.  Total time spent below saturation of 88% was 70 minutes.  There is some clustering of desaturation events.  Consider formal " sleep evaluation.    PFTs: (Date: 8/29/2023)-      Impression:  Normal study.     CT Chest: (Date: 8/1/2022)-  Impression:  1.  Mild emphysema.  2.  Atherosclerotic disease      Assessment and Plan:    Problem List Items Addressed This Visit          Pulmonary/Sleep Medicine Problems    Obstructive sleep apnea     OPO in March 2024 shows saturations less than 88% for 70 minutes with clustering.  Patient is currently on 2 LPM at night.  Patient does have a STOP-BANG score of 4.  --PSG will message with results.  If positive, then referral to sleep center, will be needed.         Centrilobular emphysema (HCC)     PFTs in 2023 were normal with a FEV1 of 1.80 L or 81%, FEV1/FVC 72%, RV 89%, DLCO 84% predicted, without positive bronchodilator response.  CT chest in 2022 shows mild emphysema.  She currently uses albuterol as needed, which is 4-5 times a week.  --Continue albuterol as needed  --Advised patient to increase exercise as tolerated  --Advised patient to remain up-to-date on all vaccines            Other    Atrial fibrillation (HCC) [I48.91]     S/p Watchman procedure.  Patient is currently on metoprolol and propranolol.  She is rate controlled today in the office.         Gastroesophageal reflux disease without esophagitis     Patient states that she does have a cough and has a history of GERD.  She is on Prilosec 20 mg.  --Continue current therapy  --Lifestyle modifications such as: keep head of the bed elevated at 30 degrees, avoid cafeinated drinks when possible even during the day, avoid eating anything 2 hours prior to going to bed           Chronic rhinitis     Patient states that she does have a cough and has chronic rhinitis.  She will use Flonase on occasion.  --Advise advised sinus hygiene with saline nasal rinse followed by Flonase          Other Visit Diagnoses       IVETTE (obstructive sleep apnea)        Relevant Orders    Polysomnography, 4 or More          Diagnostic studies have been reviewed with  the patient.    Return in about 6 months (around 5/6/2025), or if symptoms worsen or fail to improve, for Emphysema, with Thomas.     This note was generated using voice recognition software which has a chance of producing errors of grammar and possibly content.  I have made every reasonable attempt to find and correct any obvious errors, but it should be expected that some may not be found prior to finalization of this note.    Time spent in record review prior to patient arrival, reviewing results, and in face-to-face encounter totaled 30 min.  __________  EMILY August  Pulmonary Medicine  [facility]

## 2024-11-05 RX ORDER — VALACYCLOVIR HYDROCHLORIDE 1 G/1
1000 TABLET, FILM COATED ORAL 2 TIMES DAILY
Qty: 160 TABLET | Refills: 0 | Status: SHIPPED | OUTPATIENT
Start: 2024-11-05

## 2024-11-05 NOTE — TELEPHONE ENCOUNTER
Received request via: Pharmacy    Was the patient seen in the last year in this department? Yes  LOV : 1/30/2024   Does the patient have an active prescription (recently filled or refills available) for medication(s) requested? Yes.   Pharmacy Name: [facility]    Does the patient have shelter Plus and need 100-day supply? (This applies to ALL medications) Patient does not have SCP

## 2024-11-06 ENCOUNTER — OFFICE VISIT (OUTPATIENT)
Dept: SLEEP MEDICINE | Facility: MEDICAL CENTER | Age: 74
End: 2024-11-06
Payer: MEDICARE

## 2024-11-06 VITALS
WEIGHT: 165 LBS | HEIGHT: 66 IN | BODY MASS INDEX: 26.52 KG/M2 | DIASTOLIC BLOOD PRESSURE: 70 MMHG | SYSTOLIC BLOOD PRESSURE: 116 MMHG | OXYGEN SATURATION: 92 % | HEART RATE: 76 BPM

## 2024-11-06 DIAGNOSIS — K21.9 GASTROESOPHAGEAL REFLUX DISEASE WITHOUT ESOPHAGITIS: ICD-10-CM

## 2024-11-06 DIAGNOSIS — G47.33 OBSTRUCTIVE SLEEP APNEA: ICD-10-CM

## 2024-11-06 DIAGNOSIS — G47.33 OSA (OBSTRUCTIVE SLEEP APNEA): ICD-10-CM

## 2024-11-06 DIAGNOSIS — J43.2 CENTRILOBULAR EMPHYSEMA (HCC): ICD-10-CM

## 2024-11-06 DIAGNOSIS — I48.0 PAROXYSMAL ATRIAL FIBRILLATION (HCC): ICD-10-CM

## 2024-11-06 DIAGNOSIS — J31.0 CHRONIC RHINITIS: ICD-10-CM

## 2024-11-06 PROCEDURE — 99213 OFFICE O/P EST LOW 20 MIN: CPT

## 2024-11-06 PROCEDURE — 3074F SYST BP LT 130 MM HG: CPT

## 2024-11-06 PROCEDURE — 3078F DIAST BP <80 MM HG: CPT

## 2024-11-06 PROCEDURE — 99214 OFFICE O/P EST MOD 30 MIN: CPT

## 2024-11-06 ASSESSMENT — ENCOUNTER SYMPTOMS
VOMITING: 0
DIARRHEA: 0
FEVER: 0
PALPITATIONS: 0
HEARTBURN: 0
FALLS: 0
WEAKNESS: 0
COUGH: 1
DIAPHORESIS: 0
MYALGIAS: 0
HEADACHES: 0
WHEEZING: 0
SPUTUM PRODUCTION: 0
HEMOPTYSIS: 0
CHILLS: 0
DIZZINESS: 0
NAUSEA: 0
SINUS PAIN: 0
SHORTNESS OF BREATH: 1

## 2024-11-06 NOTE — ASSESSMENT & PLAN NOTE
Patient states that she does have a cough and has chronic rhinitis.  She will use Flonase on occasion.  --Advise advised sinus hygiene with saline nasal rinse followed by Flonase

## 2024-11-06 NOTE — ASSESSMENT & PLAN NOTE
S/p Watchman procedure.  Patient is currently on metoprolol and propranolol.  She is rate controlled today in the office.

## 2024-11-06 NOTE — ASSESSMENT & PLAN NOTE
Patient states that she does have a cough and has a history of GERD.  She is on Prilosec 20 mg.  --Continue current therapy  --Lifestyle modifications such as: keep head of the bed elevated at 30 degrees, avoid cafeinated drinks when possible even during the day, avoid eating anything 2 hours prior to going to bed

## 2024-11-06 NOTE — ASSESSMENT & PLAN NOTE
PFTs in 2023 were normal with a FEV1 of 1.80 L or 81%, FEV1/FVC 72%, RV 89%, DLCO 84% predicted, without positive bronchodilator response.  CT chest in 2022 shows mild emphysema.  She currently uses albuterol as needed, which is 4-5 times a week.  --Continue albuterol as needed  --Advised patient to increase exercise as tolerated  --Advised patient to remain up-to-date on all vaccines

## 2024-11-06 NOTE — ASSESSMENT & PLAN NOTE
OPO in March 2024 shows saturations less than 88% for 70 minutes with clustering.  Patient is currently on 2 LPM at night.  Patient does have a STOP-BANG score of 4.  --PSG will message with results.  If positive, then referral to sleep center, will be needed.

## 2024-11-21 ENCOUNTER — PATIENT MESSAGE (OUTPATIENT)
Dept: SLEEP MEDICINE | Facility: MEDICAL CENTER | Age: 74
End: 2024-11-21
Payer: MEDICARE

## 2024-11-22 RX ORDER — BUPROPION HYDROCHLORIDE 150 MG/1
TABLET ORAL
Qty: 270 TABLET | Refills: 3 | Status: SHIPPED | OUTPATIENT
Start: 2024-11-22

## 2024-11-22 NOTE — TELEPHONE ENCOUNTER
Received request via: Pharmacy    Was the patient seen in the last year in this department? Yes    Does the patient have an active prescription (recently filled or refills available) for medication(s) requested? No    Pharmacy Name:   [facility] - [place], [place] - [address]  [address] [address]  Phone: [phone] Fax: [phone]       Does the patient have nursing home Plus and need 100-day supply? (This applies to ALL medications) Patient does not have SCP

## 2024-12-04 ENCOUNTER — OFFICE VISIT (OUTPATIENT)
Dept: MEDICAL GROUP | Facility: LAB | Age: 74
End: 2024-12-04
Payer: MEDICARE

## 2024-12-04 VITALS
WEIGHT: 164.6 LBS | HEART RATE: 67 BPM | OXYGEN SATURATION: 95 % | RESPIRATION RATE: 12 BRPM | TEMPERATURE: 98.3 F | DIASTOLIC BLOOD PRESSURE: 58 MMHG | SYSTOLIC BLOOD PRESSURE: 90 MMHG | BODY MASS INDEX: 26.45 KG/M2 | HEIGHT: 66 IN

## 2024-12-04 DIAGNOSIS — J98.8 VIRAL RESPIRATORY ILLNESS: ICD-10-CM

## 2024-12-04 DIAGNOSIS — Z12.11 SCREEN FOR COLON CANCER: ICD-10-CM

## 2024-12-04 DIAGNOSIS — J44.1 COPD EXACERBATION (HCC): ICD-10-CM

## 2024-12-04 DIAGNOSIS — B97.89 VIRAL RESPIRATORY ILLNESS: ICD-10-CM

## 2024-12-04 DIAGNOSIS — J43.2 CENTRILOBULAR EMPHYSEMA (HCC): ICD-10-CM

## 2024-12-04 PROCEDURE — 99214 OFFICE O/P EST MOD 30 MIN: CPT | Performed by: FAMILY MEDICINE

## 2024-12-04 PROCEDURE — 3078F DIAST BP <80 MM HG: CPT | Performed by: FAMILY MEDICINE

## 2024-12-04 PROCEDURE — 3074F SYST BP LT 130 MM HG: CPT | Performed by: FAMILY MEDICINE

## 2024-12-04 RX ORDER — AZITHROMYCIN 250 MG/1
TABLET, FILM COATED ORAL
Qty: 6 TABLET | Refills: 0 | Status: SHIPPED | OUTPATIENT
Start: 2024-12-04

## 2024-12-04 RX ORDER — PREDNISONE 20 MG/1
40 TABLET ORAL DAILY
Qty: 10 TABLET | Refills: 0 | Status: SHIPPED | OUTPATIENT
Start: 2024-12-04 | End: 2024-12-09

## 2024-12-04 NOTE — PROGRESS NOTES
Subjective:     CC: Cough    HPI:   Allison is a 74-year-old female with a history of emphysema/COPD who presents with concern for cough.  Reports fairly severe cough that began about 5 days ago, has had increase in green mucus as well.  Did have mild temp elevation at 100 Fahrenheit 3 days ago but this has not returned.  She is noticing some increased work of breathing and shortness of breath as well.  She is on nocturnal oxygen, 2 L but has not had to increase this.  Does hear some chest rattling at night.  She is using albuterol multiple times per day.  She does follow with pulmonology, not currently on inhaler besides albuterol as needed.      Current Outpatient Medications   Medication Sig Dispense Refill    buPROPion (WELLBUTRIN XL) 150 MG XL tablet TAKE 3 TABLETS EVERY  Tablet 3    valacyclovir (VALTREX) 1 GM Tab TAKE 1 TABLET TWICE DAILY 160 Tablet 0    Nirmatrelvir&Ritonavir 300/100 20 x 150 MG & 10 x 100MG Tablet Therapy Pack Take 300 mg nirmatrelvir (two 150 mg tablets) with 100 mg ritonavir (one 100 mg tablet) by mouth, with all three tablets taken together twice daily for 5 days. 30 Each 0    lovastatin (MEVACOR) 40 MG tablet Take 1 Tablet by mouth every evening. 90 Tablet 2    metoprolol SR (TOPROL XL) 25 MG TABLET SR 24 HR Take 1 Tablet by mouth every day. 90 Tablet 2    CYTOMEL 5 MCG Tab Take 1.5 Tablets by mouth 2 times a day. 390 Tablet 3    flecainide (TAMBOCOR) 50 MG tablet Take 1 Tablet by mouth 2 times a day. 200 Tablet 3    SYNTHROID 100 MCG Tab Take 1 Tablet by mouth every morning on an empty stomach. 96 Tablet 3    escitalopram (LEXAPRO) 20 MG tablet TAKE 1 TABLET EVERY DAY 90 Tablet 3    dicyclomine (BENTYL) 10 MG Cap Take 1 Capsule by mouth 2 times a day. 180 Capsule 3    VENTOLIN  (90 Base) MCG/ACT Aero Soln inhalation aerosol Inhale 2 Puffs every 6 hours as needed for Shortness of Breath. 1 Each 3    propranolol (INDERAL) 20 MG Tab TAKE 1 TABLET BY MOUTH EVERY DAY AS NEEDED  "FOR PERFORMANCE ANXIETY 90 Tablet 0    diclofenac DR (VOLTAREN) 75 MG Tablet Delayed Response Take 75 mg by mouth 2 times a day.      triamcinolone acetonide (ORALONE) 0.1 % Paste APPLY TO THE AFFECTED AREA AFTER MEALS AND BEFORE BEDTIME      fluticasone (FLONASE) 50 MCG/ACT nasal spray Administer 1 Spray into affected nostril(S) every day.      gabapentin (NEURONTIN) 100 MG Cap TAKE 1 CAPSULE BY MOUTH THREE TIMES DAILY FOR 3 DAYS THEN 2 CAPSULE BY MOUTH THREE TIMES DAILY FOR 3 DAYS THEN 3 CAPSULE BY MOUTH THREE TIMES DAILY (Patient taking differently: Take 100 mg by mouth 2 times a day. TAKE 1 CAPSULE BY MOUTH THREE TIMES DAILY FOR 3 DAYS THEN 2 CAPSULE BY MOUTH THREE TIMES DAILY FOR 3 DAYS THEN 3 CAPSULE BY MOUTH THREE TIMES DAILY) 100 capsule 1    vitamin D (CHOLECALCIFEROL) 1000 Unit (25 mcg) Tab Take 2,000 Units by mouth every day.      medroxyPROGESTERone (PROVERA) 2.5 MG Tab Take 2.5 mg by mouth every day.      LORazepam (ATIVAN) 1 MG Tab Take 1 mg by mouth at bedtime as needed.  0    Estradiol 0.025 MG/24HR PATCH BIWEEKLY       aspirin EC 81 MG EC tablet Take 1 Tab by mouth every day. 30 Tab 6    HYDROcodone-acetaminophen (NORCO) 5-325 MG Tab per tablet Take 1 Tab by mouth 2 times a day as needed.       No current facility-administered medications for this visit.       Medications, past medical history, allergies, and social history have been reviewed and updated.      Objective:       Exam:  BP 90/58 (BP Location: Left arm, Patient Position: Sitting, BP Cuff Size: Adult)   Pulse 67   Temp 36.8 °C (98.3 °F) (Temporal)   Resp 12   Ht 1.676 m (5' 6\")   Wt 74.7 kg (164 lb 9.6 oz)   SpO2 95%   BMI 26.57 kg/m²  Body mass index is 26.57 kg/m².    Constitutional: Alert. Well appearing. No distress.  Skin: Warm, dry, good turgor, no visible rashes.  ENMT: Moist mucous membranes. Normal dentition.  Oropharynx is clear.  No cervical LAD.  Respiratory: Normal effort.  Diffuse coarse breath sounds bilaterally " with diffuse expiratory wheezing.  Cardiovascular: Regular rate and rhythm. Normal S1/S2. No murmurs, rubs or gallops.  Radial pulses are 2+ and symmetric.  Neuro: Moves all four extremities. No facial droop.  Psych: Answers questions appropriately. Normal affect and mood.      Assessment & Plan:     74 y.o. female with the following -     1. Centrilobular emphysema (HCC)  2. COPD exacerbation (HCC)  3. Viral respiratory illness  Severe cough with increased sputum production and shortness of breath persisting over the last 5 days.  Suspect COPD exacerbation triggered by viral respiratory illness.  Given her increase in sputum production will cover with azithromycin as well as prednisone burst.  Continue albuterol as needed.  Plan to check chest x-ray if not improving within the next few days.  She will follow-up in 1 month for recheck, consider starting ICS-LABA if still using albuterol frequently.  - predniSONE (DELTASONE) 20 MG Tab; Take 2 Tablets by mouth every day for 5 days.  Dispense: 10 Tablet; Refill: 0  - azithromycin (ZITHROMAX) 250 MG Tab; Take 2 tablets (500 mg) PO on day 1 and then take 1 tablet (250 mg) daily on 2-5  Dispense: 6 Tablet; Refill: 0  - DX-CHEST-2 VIEWS; Future    4. Screen for colon cancer  - Cologuard® colon cancer screening      Please note that this note was created using voice recognition software.

## 2024-12-05 ENCOUNTER — PATIENT MESSAGE (OUTPATIENT)
Dept: MEDICAL GROUP | Facility: LAB | Age: 74
End: 2024-12-05
Payer: MEDICARE

## 2024-12-05 DIAGNOSIS — J06.9 VIRAL URI WITH COUGH: ICD-10-CM

## 2024-12-05 RX ORDER — BENZONATATE 100 MG/1
100 CAPSULE ORAL 3 TIMES DAILY PRN
Qty: 30 CAPSULE | Refills: 0 | Status: SHIPPED | OUTPATIENT
Start: 2024-12-05

## 2024-12-05 NOTE — PATIENT COMMUNICATION
Received request via: Patient    Was the patient seen in the last year in this department? Yes    Does the patient have an active prescription (recently filled or refills available) for medication(s) requested? No    Pharmacy Name:   [facility] - [place], [place] - [address]  [address] [address]  Phone: [phone] Fax: [phone]          Does the patient have senior living Plus and need 100-day supply? (This applies to ALL medications) Patient does not have SCP

## 2024-12-08 ENCOUNTER — SLEEP STUDY (OUTPATIENT)
Dept: SLEEP MEDICINE | Facility: MEDICAL CENTER | Age: 74
End: 2024-12-08
Payer: MEDICARE

## 2024-12-08 DIAGNOSIS — G47.33 OSA (OBSTRUCTIVE SLEEP APNEA): ICD-10-CM

## 2024-12-08 DIAGNOSIS — G47.33 OSA TREATED WITH BIPAP: ICD-10-CM

## 2024-12-08 PROCEDURE — 95811 POLYSOM 6/>YRS CPAP 4/> PARM: CPT | Performed by: STUDENT IN AN ORGANIZED HEALTH CARE EDUCATION/TRAINING PROGRAM

## 2024-12-09 NOTE — PROCEDURES
Patient: MELISSA SOARES  ID: [id number] Date: 12/8/2024 Exam No.:   MONTAGE: Standard  STUDY TYPE: Split Night  RECORDING TECHNIQUE:   After the scalp was prepared, gold plated electrodes were applied to the scalp according to the International 10-20 System. EEG (electroencephalogram) was continuously monitored from the O1-M2, O2-M1, C3-M2, C4-M1, F3-M2, and F4-M1. EOGs (electrooculograms) were monitored by electrodes placed at the left and right outer canthi. Chin EMG (electromyogram) was monitored by electrodes placed on the mentalis and sub-mentalis muscles. Nasal and oral airflow were monitored using a triple port thermocouple as well as oronasal pressure transducer. Respiratory effort was measured by inductive plethysmography technology employing abdominal and thoracic belts. Blood oxygen saturation and pulse were monitored by pulse oximetry. Heart rhythm was monitored by surface electrocardiogram. Leg EMG was studied using surface electrodes placed on left and right anterior tibialis. A microphone was used to monitor tracheal sounds and snoring. Body position was monitored and documented by technician observation.   SCORING CRITERIA:   A modification of the AASM manual for scoring of sleep and associated events was used. Obstructive apneas were scored by cessation of airflow for at least 10 seconds with continuing respiratory effort. Central apneas were scored by cessation of airflow for at least 10 seconds with no respiratory effort. Hypopneas were scored by a 30% or more reduction in airflow for at least 10 seconds accompanied by arterial oxygen desaturation of 3% or an arousal. For CMS (Medicare) patients, per AASM rule 1B, hypopneas are scored by 30% with mild reduction in airflow for at least 10 seconds accompanied by arterial saturation decreased at 4%.  DIAGNOSTIC  Study start time was 08:54:41 PM. Diagnostic recording time was 190 minutes with a total sleep time of 120 minutes resulting in a sleep  efficiency of 62.99%%. Sleep latency from the start of the study was 47 minutes and the latency from sleep to REM was 121 minutes. In total,47 arousals were scored for an arousal index of 23.5.  Respiratory:  There were a total of 3 apneas consisting of 2 obstructive apneas, 0 mixed apneas, and 1 central apneas. A total of 33 hypopneas were scored. The apnea index was 1.50 per hour and the hypopnea index was 16.50 per hour resulting in an overall AHI of 18.00. AHI during REM was 34.3 and AHI while supine was 31.23.  Oximetry:  There was a mean oxygen saturation of 92.0%. The minimum oxygen saturation during NREM sleep was 86.0% and in REM was 86.0%. Time spent during sleep with oxygen saturations <88% was 1.7 minutes.   Cardiac:  The highest heart rate seen while awake was 86 BPM while the highest heart rate during sleep was 80 BPM with an average sleeping heart rate of 62 BPM.  Limb Movements:  There were a total of 18 PLMs during sleep, which resulted in a PLM index of 9.0. There were 5 PLMs associated with arousals which resulted in a PLMS arousal index of 2.5.  TREATMENT:  Treatment recording time was 5h 52.0m (352 minutes) with a total sleep time of 5h 13.0m (313 minutes) resulting in a sleep efficiency of 88.9%. Sleep latency from the start of treatment was 10 minutes and REM latency from sleep onset was 1h 39.5m. The patient had 81 arousals in total for an arousal index of 15.5.  Respiratory:   There were 31 apneas in total consisting of 0 obstructive apneas, 31 central apneas, and 0 mixed apneas for an apnea index of 5.94. The patient had 30 hypopneas in total, which resulted in a hypopnea index of 5.75. The overall AHI was 11.69, with a REM AHI of 6.20, and a supine AHI of 19.86.   Oximetry:  The mean SaO2 during treatment was 94.0%. The minimum oxygen saturation in NREM was 87.0 % and in REM was 88.0%. Patient spent 1.6 minutes of TST with SaO2 <88%.  Cardiac:  The highest heart rate during sleep was 80  BPM with an average sleeping heart rate of 60BPM.  Limb Movements:  There were a total of 0 PLMS during titration sleep time that resulted in an index of 0.0. There were 3 PLMS associated with arousals. This resulted in a PLM arousal index of 0.6.  Titration:   CPAP was tried from 5 to 8. BiPAP was tried from 10/6 to 17/12  This was a fully attended sleep study. This test was technically adequate. This patient was titrated on CPAP starting at 5 cm of water pressure and titrated up to 8 cm of water pressure.  Patient was then transitioned to BiPAP and titrated from 10/6-17/12 cmH2O. Patient did best on BiPAP at 17/12 cm of water pressure. Patient spent 31 minutes at that pressure and the AHI was 2.11 which is considered treated obstructive sleep apnea.   Assessment:   DIAGNOSTIC: Moderate obstructive Sleep Apnea Hypopnea - AHI 18 with Nocturnal desaturation - best saturation 86% - saturations <88% below for 1.7 minutes of TST.  TREATMENT: Mild obstructive Sleep Apnea Hypopnea - AHI 11.69 with Nocturnal desaturation - best saturation 87% - saturations <88% below for 1.6 minutes of TST.    Impression:  Supine sleep achieved during diagnostic and treatment portion.  IVETTE worse in supine and REM sleep.  Rebound supine REM seen during treatment portion, prevalent on BiPAP  Improved sleep efficiency during treatment portion  Patient met criteria for split-night protocol.  CPAP and BiPAP were both used.   BiPAP greatly improved oxygenation at higher EPAP levels  No supplemental O2 was added  Recommendation:   Auto BiPAP 17/12, pressure support 4 with heated tubing and proper mask  Patient used S Robin fullface mask

## 2024-12-28 LAB — NONINV COLON CA DNA+OCC BLD SCRN STL QL: NEGATIVE

## 2025-01-14 ENCOUNTER — TELEPHONE (OUTPATIENT)
Dept: CARDIOLOGY | Facility: MEDICAL CENTER | Age: 75
End: 2025-01-14

## 2025-01-14 ENCOUNTER — APPOINTMENT (OUTPATIENT)
Dept: MEDICAL GROUP | Facility: LAB | Age: 75
End: 2025-01-14
Payer: MEDICARE

## 2025-01-14 NOTE — TELEPHONE ENCOUNTER
I sent her a [employer] message. I think she meant primary cardiology contact.  She does see endocrinology and pulmonology for those medications.  Thanks for the update.  AB

## 2025-01-14 NOTE — TELEPHONE ENCOUNTER
To AB: Please advise. I spoke to pt she states that she and [facility] had discussed of setting AB as her PCP.. Pt told that AB is within the cardiology department. I asked pt if she wants [facility] to take over all of her medications she said she wants [facility] to be her PCP. Please advise. Thank you.

## 2025-01-14 NOTE — TELEPHONE ENCOUNTER
AB    Caller: Allison Cespedes     Topic/issue: Patient is calling about setting AB up as her PCP. This was something they previously discussed.    Please advise.    Callback Number: [phone]      Thank you,  Aurora BEJARANO

## 2025-01-20 ENCOUNTER — TELEPHONE (OUTPATIENT)
Dept: ENDOCRINOLOGY | Facility: MEDICAL CENTER | Age: 75
End: 2025-01-20
Payer: MEDICARE

## 2025-01-20 NOTE — TELEPHONE ENCOUNTER
Called pt to remind them of their appointment and that they have labs due prior to appointment. Pt confirms understanding and states she has an appointment for Tuesday to get them done

## 2025-01-21 ENCOUNTER — HOSPITAL ENCOUNTER (OUTPATIENT)
Facility: MEDICAL CENTER | Age: 75
End: 2025-01-21
Payer: MEDICARE

## 2025-01-21 DIAGNOSIS — E03.9 HYPOTHYROIDISM, UNSPECIFIED TYPE: ICD-10-CM

## 2025-01-21 LAB
ALBUMIN SERPL BCP-MCNC: 4.5 G/DL (ref 3.2–4.9)
ALBUMIN/GLOB SERPL: 1.8 G/DL
ALP SERPL-CCNC: 98 U/L (ref 30–99)
ALT SERPL-CCNC: 16 U/L (ref 2–50)
ANION GAP SERPL CALC-SCNC: 12 MMOL/L (ref 7–16)
AST SERPL-CCNC: 17 U/L (ref 12–45)
BILIRUB SERPL-MCNC: 0.3 MG/DL (ref 0.1–1.5)
BUN SERPL-MCNC: 17 MG/DL (ref 8–22)
CALCIUM ALBUM COR SERPL-MCNC: 8.9 MG/DL (ref 8.5–10.5)
CALCIUM SERPL-MCNC: 9.3 MG/DL (ref 8.5–10.5)
CHLORIDE SERPL-SCNC: 104 MMOL/L (ref 96–112)
CO2 SERPL-SCNC: 25 MMOL/L (ref 20–33)
CREAT SERPL-MCNC: 0.94 MG/DL (ref 0.5–1.4)
FASTING STATUS PATIENT QL REPORTED: NORMAL
GFR SERPLBLD CREATININE-BSD FMLA CKD-EPI: 64 ML/MIN/1.73 M 2
GLOBULIN SER CALC-MCNC: 2.5 G/DL (ref 1.9–3.5)
GLUCOSE SERPL-MCNC: 95 MG/DL (ref 65–99)
POTASSIUM SERPL-SCNC: 4.2 MMOL/L (ref 3.6–5.5)
PROT SERPL-MCNC: 7 G/DL (ref 6–8.2)
SODIUM SERPL-SCNC: 141 MMOL/L (ref 135–145)
T3FREE SERPL-MCNC: 3.53 PG/ML (ref 2–4.4)
T4 FREE SERPL-MCNC: 1.3 NG/DL (ref 0.93–1.7)
TSH SERPL-ACNC: 0.27 UIU/ML (ref 0.35–5.5)

## 2025-01-21 PROCEDURE — 80053 COMPREHEN METABOLIC PANEL: CPT

## 2025-01-21 PROCEDURE — 36415 COLL VENOUS BLD VENIPUNCTURE: CPT

## 2025-01-21 PROCEDURE — 84439 ASSAY OF FREE THYROXINE: CPT

## 2025-01-21 PROCEDURE — 84481 FREE ASSAY (FT-3): CPT

## 2025-01-21 PROCEDURE — 84443 ASSAY THYROID STIM HORMONE: CPT

## 2025-01-22 ENCOUNTER — TELEPHONE (OUTPATIENT)
Dept: SLEEP MEDICINE | Facility: MEDICAL CENTER | Age: 75
End: 2025-01-22

## 2025-01-22 ENCOUNTER — OFFICE VISIT (OUTPATIENT)
Dept: ENDOCRINOLOGY | Facility: MEDICAL CENTER | Age: 75
End: 2025-01-22
Payer: MEDICARE

## 2025-01-22 VITALS
HEART RATE: 65 BPM | HEIGHT: 66 IN | DIASTOLIC BLOOD PRESSURE: 62 MMHG | BODY MASS INDEX: 26.84 KG/M2 | WEIGHT: 167 LBS | SYSTOLIC BLOOD PRESSURE: 114 MMHG | OXYGEN SATURATION: 96 %

## 2025-01-22 DIAGNOSIS — R42 DIZZINESS: ICD-10-CM

## 2025-01-22 DIAGNOSIS — E06.3 HASHIMOTO'S DISEASE: ICD-10-CM

## 2025-01-22 DIAGNOSIS — E03.9 HYPOTHYROIDISM, UNSPECIFIED TYPE: ICD-10-CM

## 2025-01-22 PROCEDURE — 3078F DIAST BP <80 MM HG: CPT

## 2025-01-22 PROCEDURE — 99211 OFF/OP EST MAY X REQ PHY/QHP: CPT

## 2025-01-22 PROCEDURE — 3074F SYST BP LT 130 MM HG: CPT

## 2025-01-22 PROCEDURE — 99214 OFFICE O/P EST MOD 30 MIN: CPT

## 2025-01-22 NOTE — PROGRESS NOTES
Chief Complaint: Consult requested by Zoie Spence P.A.-C. for evaluation of Hypothyroidism    HPI:     Allison Cespedes is a 73 y.o. female with history of Hypothyroidism diagnosed 20 years ago and is here for initial evaluation.  She has been going through a virus. She was recently diagnosed with sleep apnea and will be BiPAP. She is in the grieving stages of dealing with her husbands death.     Hypothyroidism  She reports the following symptoms:fatigue- same  feeling slow-same, and losing hair-better      She denies feeling cold and cold intolerance, constipation, swelling, anxiousness, feeling excessive energy, tremulousness, palpitations, sweating, and weight loss.      She reports a family history of thyroid disease    She is currently on synthroid 100 mcg daily and liothyronine 7.5 mcg twice daily which has been her thyroid hormone dose since 5 months        She reports Good compliance and takes her thyroid hormone daily before breakfast.      She reports taking any antacids. She states she doesn't wait 4 hrs prior to taking her antacids.       Latest Reference Range & Units 01/21/25 09:20   TSH 0.350 - 5.500 uIU/mL 0.269 (L)   Free T-4 0.93 - 1.70 ng/dL 1.30   T3,Free 2.00 - 4.40 pg/mL 3.53       2. Hashimoto's   Latest Reference Range & Units 06/12/24 15:21   Microsomal -Tpo- Abs 0.0 - 9.0 IU/mL 326.0 (H)     3. Dizziness  She states its improved since she stated PT.   Patient states she has been feeling dizzy for past three days. She went to urgent care and was told it was a virus.   She was prescribed benzonatate 100 mg TID  Her oxygen was at 90 in clinic today.   She reports dizziness rating at 3 while sitting and 5 while standing.    Patient's medications, allergies, and social histories were reviewed and updated as appropriate.      ROS:     CONS:     No fever, no chills, no weight loss, no fatigue   EYES:      No diplopia, no blurry vision, no redness of eyes, no swelling of eyelids   ENT:     No hearing loss, No ear pain, No sore throat, no dysphagia, no neck swelling   CV:     No chest pain, no palpitations, no claudication, no orthopnea, no PND   PULM:    No SOB, no cough, no hemoptysis, no wheezing    GI:   No nausea, no vomiting, no diarrhea, no constipation, no bloody stools   :  Passing urine well, no dysuria, no hematuria   ENDO:   No polyuria, no polydipsia, no heat intolerance, no cold intolerance   NEURO: No headaches, no dizziness, no convulsions, no tremors   MUSC:  No joint swellings, no arthralgias, no myalgias, no weakness   SKIN:   No rash, no ulcers, no dry skin   PSYCH:   No depression, no anxiety, no difficulty sleeping       Past Medical History:  Patient Active Problem List    Diagnosis Date Noted    Gastroesophageal reflux disease without esophagitis 11/06/2024    Chronic rhinitis 11/06/2024    Mixed hyperlipidemia 08/30/2022    Centrilobular emphysema (HCC) 08/25/2022    Left wrist pain 01/18/2022    Presence of Watchman left atrial appendage closure device 04/04/2019    Atrial fibrillation (HCC) [I48.91] 03/08/2019    Arthritis 11/20/2018    Chronic anticoagulation 11/20/2018    Obstructive sleep apnea 10/16/2018    Hypothyroidism 10/16/2018    SOB (shortness of breath) 09/05/2018       Past Surgical History:  Past Surgical History:   Procedure Laterality Date    PB OPEN RX DISTAL RADIUS FX, INTRA-ARTICULAR* Left 1/21/2022    Procedure: OPEN REDUCTION AND INTERNAL FIXATION DISTAL RADIUS;  Surgeon: Eric Kay M.D.;  Location: [facility];  Service: Orthopedics    PB OPEN TREATMENT PBOX HUMERAL FRACTURE Left 1/21/2022    Procedure: OPEN REDUCTION AND INTERNAL FIXATION PROXIMAL HUMERUS FRACTURE WITH FIBULA  ALLOGRAFT;  Surgeon: Eric Kay M.D.;  Location: [facility];  Service: Orthopedics    FINGER ARTHROPLASTY Left 10/16/2020    Procedure: ARTHROPLASTY, FINGER - THUMB CARPOMETACARPAL EXCISIONAL;   Surgeon: Fidel Kay M.D.;  Location: SURGERY SAME DAY [facility];  Service: Orthopedics    TENDON TRANSFER Left 10/16/2020    Procedure: TRANSFER, TENDON - FOR FLEXOR CARPI RADIALIS TENDON GRAFT;  Surgeon: Fidel Kay M.D.;  Location: SURGERY SAME DAY [facility];  Service: Orthopedics    MASS EXCISION GENERAL Left 7/6/2018    Procedure: MASS EXCISION GENERAL/ SUBCUTANEOUS MASS LEFT SHOULDER;  Surgeon: Swapna Rivas M.D.;  Location: SURGERY SAME DAY [facility];  Service: General    OTHER CARDIAC SURGERY  2018    watchman implanted    CHOLECYSTECTOMY  1994    GYN SURGERY  1975    fibroid tumor    APPENDECTOMY  1960    had ruptured    ARTHROSCOPY, KNEE      MAMMOPLASTY AUGMENTATION      ME BREAST AUGMENTATION WITH IMPLANT      ME REMV 2ND CATARACT,CORN-SCLER SECTN          Allergies:  Tizanidine and Pcn [penicillins]     Current Medications:    Current Outpatient Medications:     benzonatate (TESSALON) 100 MG Cap, Take 1 Capsule by mouth 3 times a day as needed for Cough., Disp: 30 Capsule, Rfl: 0    azithromycin (ZITHROMAX) 250 MG Tab, Take 2 tablets (500 mg) PO on day 1 and then take 1 tablet (250 mg) daily on 2-5, Disp: 6 Tablet, Rfl: 0    buPROPion (WELLBUTRIN XL) 150 MG XL tablet, TAKE 3 TABLETS EVERY DAY, Disp: 270 Tablet, Rfl: 3    valacyclovir (VALTREX) 1 GM Tab, TAKE 1 TABLET TWICE DAILY, Disp: 160 Tablet, Rfl: 0    Nirmatrelvir&Ritonavir 300/100 20 x 150 MG & 10 x 100MG Tablet Therapy Pack, Take 300 mg nirmatrelvir (two 150 mg tablets) with 100 mg ritonavir (one 100 mg tablet) by mouth, with all three tablets taken together twice daily for 5 days., Disp: 30 Each, Rfl: 0    lovastatin (MEVACOR) 40 MG tablet, Take 1 Tablet by mouth every evening., Disp: 90 Tablet, Rfl: 2    metoprolol SR (TOPROL XL) 25 MG TABLET SR 24 HR, Take 1 Tablet by mouth every day., Disp: 90 Tablet, Rfl: 2    CYTOMEL 5 MCG Tab, Take 1.5 Tablets by mouth 2 times a day., Disp: 390 Tablet, Rfl: 3    flecainide (TAMBOCOR)  50 MG tablet, Take 1 Tablet by mouth 2 times a day., Disp: 200 Tablet, Rfl: 3    SYNTHROID 100 MCG Tab, Take 1 Tablet by mouth every morning on an empty stomach., Disp: 96 Tablet, Rfl: 3    escitalopram (LEXAPRO) 20 MG tablet, TAKE 1 TABLET EVERY DAY, Disp: 90 Tablet, Rfl: 3    dicyclomine (BENTYL) 10 MG Cap, Take 1 Capsule by mouth 2 times a day., Disp: 180 Capsule, Rfl: 3    VENTOLIN  (90 Base) MCG/ACT Aero Soln inhalation aerosol, Inhale 2 Puffs every 6 hours as needed for Shortness of Breath., Disp: 1 Each, Rfl: 3    propranolol (INDERAL) 20 MG Tab, TAKE 1 TABLET BY MOUTH EVERY DAY AS NEEDED FOR PERFORMANCE ANXIETY, Disp: 90 Tablet, Rfl: 0    diclofenac DR (VOLTAREN) 75 MG Tablet Delayed Response, Take 75 mg by mouth 2 times a day., Disp: , Rfl:     triamcinolone acetonide (ORALONE) 0.1 % Paste, APPLY TO THE AFFECTED AREA AFTER MEALS AND BEFORE BEDTIME, Disp: , Rfl:     fluticasone (FLONASE) 50 MCG/ACT nasal spray, Administer 1 Spray into affected nostril(S) every day., Disp: , Rfl:     gabapentin (NEURONTIN) 100 MG Cap, TAKE 1 CAPSULE BY MOUTH THREE TIMES DAILY FOR 3 DAYS THEN 2 CAPSULE BY MOUTH THREE TIMES DAILY FOR 3 DAYS THEN 3 CAPSULE BY MOUTH THREE TIMES DAILY (Patient taking differently: Take 100 mg by mouth 2 times a day. TAKE 1 CAPSULE BY MOUTH THREE TIMES DAILY FOR 3 DAYS THEN 2 CAPSULE BY MOUTH THREE TIMES DAILY FOR 3 DAYS THEN 3 CAPSULE BY MOUTH THREE TIMES DAILY), Disp: 100 capsule, Rfl: 1    vitamin D (CHOLECALCIFEROL) 1000 Unit (25 mcg) Tab, Take 2,000 Units by mouth every day., Disp: , Rfl:     medroxyPROGESTERone (PROVERA) 2.5 MG Tab, Take 2.5 mg by mouth every day., Disp: , Rfl:     LORazepam (ATIVAN) 1 MG Tab, Take 1 mg by mouth at bedtime as needed., Disp: , Rfl: 0    Estradiol 0.025 MG/24HR PATCH BIWEEKLY, , Disp: , Rfl:     aspirin EC 81 MG EC tablet, Take 1 Tab by mouth every day., Disp: 30 Tab, Rfl: 6    HYDROcodone-acetaminophen (NORCO) 5-325 MG Tab per tablet, Take 1 Tab by mouth  "2 times a day as needed., Disp: , Rfl:     Social History:  Social History     Socioeconomic History    Marital status:      Spouse name: Not on file    Number of children: Not on file    Years of education: Not on file    Highest education level: Not on file   Occupational History    Not on file   Tobacco Use    Smoking status: Former     Current packs/day: 0.00     Average packs/day: 2.0 packs/day for 30.0 years (60.0 ttl pk-yrs)     Types: Cigarettes     Start date: 2/3/1966     Quit date: 2/3/1996     Years since quittin.9    Smokeless tobacco: Never    Tobacco comments:     continued abstinance   Vaping Use    Vaping status: Never Used   Substance and Sexual Activity    Alcohol use: Not Currently    Drug use: No    Sexual activity: Not on file   Other Topics Concern    Not on file   Social History Narrative    Not on file     Social Drivers of Health     Financial Resource Strain: Not on file   Food Insecurity: Not on file   Transportation Needs: Not on file   Physical Activity: Not on file   Stress: Not on file   Social Connections: Not on file   Intimate Partner Violence: Not on file   Housing Stability: Not on file        Family History:   Family History   Problem Relation Age of Onset    Non-contributory Mother     Hypertension Mother     Cancer Mother         lung cancer    Non-contributory Father     Heart Disease Sister         MI, stent    Drug abuse Sister          PHYSICAL EXAM:   Vital signs: /62 (BP Location: Left arm, Patient Position: Sitting, BP Cuff Size: Adult)   Pulse 65   Ht 1.676 m (5' 6\")   Wt 75.8 kg (167 lb)   SpO2 96%   BMI 26.95 kg/m²   GENERAL: Well-developed, well-nourished  in no apparent distress.   EYE: No ocular and eyelid asymmetry, Anicteric sclerae,  PERRL  HENT: Hearing grossly intact, Normocephalic, atraumatic. Pink, moist mucous membranes, No exudate  NECK: Supple. Trachea midline. thyroid is normal in size without nodules or " "tenderness  CARDIOVASCULAR: Regular rate and rhythm. No murmurs, rubs, or gallops.   LUNGS: Clear to auscultation bilaterally   ABDOMEN: Soft, nontender with positive bowel sounds.   EXTREMITIES: No clubbing, cyanosis, or edema.   NEUROLOGICAL: Cranial nerves II-XII are grossly intact   Symmetric reflexes at the patella no proximal muscle weakness  LYMPH: No cervical, supraclavicular,  adenopathy palpated.   SKIN: No rashes, lesions. Turgor is normal.    Labs:  Lab Results   Component Value Date/Time    WBC 9.3 10/12/2020 12:55 PM    RBC 4.56 10/12/2020 12:55 PM    HEMOGLOBIN 14.2 10/12/2020 12:55 PM    MCV 96.5 10/12/2020 12:55 PM    MCH 31.1 10/12/2020 12:55 PM    MCHC 32.3 (L) 10/12/2020 12:55 PM    RDW 45.5 10/12/2020 12:55 PM    MPV 10.8 10/12/2020 12:55 PM       Lab Results   Component Value Date/Time    SODIUM 141 2025 09:20 AM    POTASSIUM 4.2 2025 09:20 AM    CHLORIDE 104 2025 09:20 AM    CO2 25 2025 09:20 AM    ANION 12.0 2025 09:20 AM    GLUCOSE 95 2025 09:20 AM    BUN 17 2025 09:20 AM    CREATININE 0.94 2025 09:20 AM    CALCIUM 9.3 2025 09:20 AM    ASTSGOT 17 2025 09:20 AM    ALTSGPT 16 2025 09:20 AM    TBILIRUBIN 0.3 2025 09:20 AM    ALBUMIN 4.5 2025 09:20 AM    TOTPROTEIN 7.0 2025 09:20 AM    GLOBULIN 2.5 2025 09:20 AM    AGRATIO 1.8 2025 09:20 AM       Lab Results   Component Value Date/Time    CHOLSTRLTOT 194 2024 0807    TRIGLYCERIDE 191 (H) 2024 0807    HDL 50 2024 0807     (H) 2024 0807       Lab Results   Component Value Date/Time    TSHULTRASEN 8.900 (H) 2024 0807     No results found for: \"FREET4\"  No results found for: \"FREET3\"  No results found for: \"THYSTIMIG\"    No results found for: \"MICROSOMALA\"      Imagin2024 2:13 PM     HISTORY/REASON FOR EXAM:  Difficulty swallowing, abnormal labs     TECHNIQUE/EXAM DESCRIPTION:  Ultrasound of the soft tissues of " the head and neck.     COMPARISON:  None     FINDINGS:  The thyroid gland is heterogeneous.  Vascularity is normal.     The right lobe of the thyroid gland measures 1.28 cm x 3.43 cm x 1.09 cm.  The left lobe of the thyroid gland measures 0.84 cm x 3.62 cm x 1.03 cm.  The isthmus measures 0.13 cm.     Nodules >= 1cm:  None     In the left upper pole there is a 5 mm round nodule, very low suspicion           IMPRESSION:     No significant finding     ACR TI-RADS Recommendations    ASSESSMENT/PLAN:   1. Hypothyroidism, unspecified type  Unstable  TSH slightly suppressed at 0.269  Medication:  Synthroid 100 mcg daily - change to synthroid 100 mcg 6 days 50 mcg one day  Liothyronine 7.5 mcg two times daily - continue  Patient will have blood work done this week and notify me via Rs[employer]nethart for review and dose adjustment.   Patient understands to take antacids 4 hrs apart from levothyroxine for better absorption of levothyroxine.   Patient understands to stop taking biotin 5 days prior to blood work.   US of thyroid rescheduled due to viral illness  - Comp Metabolic Panel; Future  - TSH; Future  - FREE THYROXINE; Future  - T3 FREE; Future    2. Hashimoto's disease  This is the etiology of her hypothyroidism    3. Dizziness  Stable  Followed by pcp       Return in about 6 months (around 7/22/2025). Patient will have blood work done prior to follow up in 6 months.     Thank you kindly for allowing me to participate in the thyroid care plan for this patient.    Nima Navarro, APRN   1/22/25    CC:   Zoie Spence P.A.-C.

## 2025-01-22 NOTE — TELEPHONE ENCOUNTER
Pt stated that she needs our office to contact [facility] and provide them with documentation that shows that she was unable to use CPAP machine. Provider has patient on BiPap and in order to it to be covered by Medicare, they need that information as soon as possible. Please call pt to inform her when it has been taken care of.

## 2025-01-23 NOTE — TELEPHONE ENCOUNTER
Last Seen 11/06/2024 with JULIO DIAZ    Spoke with Kim at [facility] who informed me that the SS needs to state that patient tried and FAILED CPAP THERAPY.     Spoke with April at the sleep lab who informed me that correction has been made    Resending ss with interp to [facility]

## 2025-01-23 NOTE — TELEPHONE ENCOUNTER
I reviewed study and it only says she greatly benefited more from BIPAP - Kandis might need to addend this to specifically state failure of CPAP

## 2025-04-15 ENCOUNTER — APPOINTMENT (OUTPATIENT)
Dept: URBAN - METROPOLITAN AREA CLINIC 20 | Facility: CLINIC | Age: 75
Setting detail: DERMATOLOGY
End: 2025-04-15

## 2025-04-15 DIAGNOSIS — D18.0 HEMANGIOMA: ICD-10-CM

## 2025-04-15 DIAGNOSIS — D22 MELANOCYTIC NEVI: ICD-10-CM

## 2025-04-15 DIAGNOSIS — L57.8 OTHER SKIN CHANGES DUE TO CHRONIC EXPOSURE TO NONIONIZING RADIATION: ICD-10-CM

## 2025-04-15 DIAGNOSIS — D17 BENIGN LIPOMATOUS NEOPLASM: ICD-10-CM

## 2025-04-15 DIAGNOSIS — L82.1 OTHER SEBORRHEIC KERATOSIS: ICD-10-CM

## 2025-04-15 DIAGNOSIS — L81.4 OTHER MELANIN HYPERPIGMENTATION: ICD-10-CM

## 2025-04-15 PROBLEM — D22.5 MELANOCYTIC NEVI OF TRUNK: Status: ACTIVE | Noted: 2025-04-15

## 2025-04-15 PROBLEM — D17.1 BENIGN LIPOMATOUS NEOPLASM OF SKIN AND SUBCUTANEOUS TISSUE OF TRUNK: Status: ACTIVE | Noted: 2025-04-15

## 2025-04-15 PROBLEM — D18.01 HEMANGIOMA OF SKIN AND SUBCUTANEOUS TISSUE: Status: ACTIVE | Noted: 2025-04-15

## 2025-04-15 PROCEDURE — ? ADDITIONAL NOTES

## 2025-04-15 PROCEDURE — ? COUNSELING

## 2025-04-15 PROCEDURE — 99213 OFFICE O/P EST LOW 20 MIN: CPT

## 2025-04-15 ASSESSMENT — LOCATION SIMPLE DESCRIPTION DERM
LOCATION SIMPLE: RIGHT UPPER ARM
LOCATION SIMPLE: LEFT FOREARM
LOCATION SIMPLE: RIGHT UPPER BACK
LOCATION SIMPLE: RIGHT FOREARM
LOCATION SIMPLE: LEFT UPPER ARM
LOCATION SIMPLE: CHEST
LOCATION SIMPLE: LEFT THIGH
LOCATION SIMPLE: RIGHT THIGH
LOCATION SIMPLE: LEFT CHEEK
LOCATION SIMPLE: LEFT UPPER BACK
LOCATION SIMPLE: RIGHT CHEEK

## 2025-04-15 ASSESSMENT — LOCATION DETAILED DESCRIPTION DERM
LOCATION DETAILED: RIGHT DISTAL DORSAL FOREARM
LOCATION DETAILED: LEFT LATERAL UPPER BACK
LOCATION DETAILED: RIGHT SUPERIOR MEDIAL UPPER BACK
LOCATION DETAILED: RIGHT INFERIOR UPPER BACK
LOCATION DETAILED: RIGHT CENTRAL MALAR CHEEK
LOCATION DETAILED: LEFT MEDIAL SUPERIOR CHEST
LOCATION DETAILED: RIGHT ANTERIOR DISTAL THIGH
LOCATION DETAILED: LEFT INFERIOR CENTRAL MALAR CHEEK
LOCATION DETAILED: LEFT ANTERIOR PROXIMAL UPPER ARM
LOCATION DETAILED: RIGHT MID-UPPER BACK
LOCATION DETAILED: LEFT DISTAL DORSAL FOREARM
LOCATION DETAILED: LEFT ANTERIOR DISTAL THIGH
LOCATION DETAILED: RIGHT ANTERIOR PROXIMAL UPPER ARM

## 2025-04-15 ASSESSMENT — LOCATION ZONE DERM
LOCATION ZONE: FACE
LOCATION ZONE: TRUNK
LOCATION ZONE: ARM
LOCATION ZONE: LEG

## 2025-04-19 DIAGNOSIS — E78.2 MIXED HYPERLIPIDEMIA: ICD-10-CM

## 2025-04-19 DIAGNOSIS — I48.0 PAROXYSMAL ATRIAL FIBRILLATION (HCC): ICD-10-CM

## 2025-04-23 RX ORDER — METOPROLOL SUCCINATE 25 MG/1
25 TABLET, EXTENDED RELEASE ORAL
Qty: 90 TABLET | Refills: 3 | Status: SHIPPED | OUTPATIENT
Start: 2025-04-23 | End: 2025-04-29 | Stop reason: SDUPTHER

## 2025-04-23 NOTE — TELEPHONE ENCOUNTER
Chart reviewed re:Metoprolol    Last OV 4/29/24  FV 4/29/25    Labs lipid profile 4/26/24    Per protocol refill renewed.   Lipid profile lab order placed. (Luis Alfredo)    MyChart message to pt .

## 2025-04-24 ENCOUNTER — PATIENT MESSAGE (OUTPATIENT)
Dept: CARDIOLOGY | Facility: MEDICAL CENTER | Age: 75
End: 2025-04-24
Payer: MEDICARE

## 2025-04-29 ENCOUNTER — PATIENT MESSAGE (OUTPATIENT)
Dept: CARDIOLOGY | Facility: MEDICAL CENTER | Age: 75
End: 2025-04-29

## 2025-04-29 ENCOUNTER — OFFICE VISIT (OUTPATIENT)
Dept: CARDIOLOGY | Facility: MEDICAL CENTER | Age: 75
End: 2025-04-29
Attending: NURSE PRACTITIONER
Payer: MEDICARE

## 2025-04-29 VITALS
RESPIRATION RATE: 16 BRPM | OXYGEN SATURATION: 92 % | HEART RATE: 69 BPM | BODY MASS INDEX: 26.03 KG/M2 | WEIGHT: 162 LBS | HEIGHT: 66 IN | SYSTOLIC BLOOD PRESSURE: 110 MMHG | DIASTOLIC BLOOD PRESSURE: 58 MMHG

## 2025-04-29 DIAGNOSIS — M79.602 PAIN IN BOTH UPPER EXTREMITIES: ICD-10-CM

## 2025-04-29 DIAGNOSIS — J43.2 CENTRILOBULAR EMPHYSEMA (HCC): ICD-10-CM

## 2025-04-29 DIAGNOSIS — Z95.818 PRESENCE OF WATCHMAN LEFT ATRIAL APPENDAGE CLOSURE DEVICE: ICD-10-CM

## 2025-04-29 DIAGNOSIS — I48.0 PAROXYSMAL ATRIAL FIBRILLATION (HCC): ICD-10-CM

## 2025-04-29 DIAGNOSIS — R25.1 TREMOR: ICD-10-CM

## 2025-04-29 DIAGNOSIS — R20.0 ARM NUMBNESS: ICD-10-CM

## 2025-04-29 DIAGNOSIS — E78.2 MIXED HYPERLIPIDEMIA: ICD-10-CM

## 2025-04-29 DIAGNOSIS — M79.601 PAIN IN BOTH UPPER EXTREMITIES: ICD-10-CM

## 2025-04-29 DIAGNOSIS — K21.9 GASTROESOPHAGEAL REFLUX DISEASE WITHOUT ESOPHAGITIS: ICD-10-CM

## 2025-04-29 DIAGNOSIS — R29.6 FALLS: ICD-10-CM

## 2025-04-29 DIAGNOSIS — E03.9 HYPOTHYROIDISM, UNSPECIFIED TYPE: ICD-10-CM

## 2025-04-29 DIAGNOSIS — G47.33 OBSTRUCTIVE SLEEP APNEA: ICD-10-CM

## 2025-04-29 DIAGNOSIS — Z79.01 CHRONIC ANTICOAGULATION: ICD-10-CM

## 2025-04-29 PROCEDURE — 99213 OFFICE O/P EST LOW 20 MIN: CPT | Performed by: NURSE PRACTITIONER

## 2025-04-29 RX ORDER — FLECAINIDE ACETATE 50 MG/1
50 TABLET ORAL 2 TIMES DAILY
Qty: 200 TABLET | Refills: 3 | Status: SHIPPED | OUTPATIENT
Start: 2025-04-29

## 2025-04-29 RX ORDER — LOVASTATIN 40 MG/1
40 TABLET ORAL NIGHTLY
Qty: 100 TABLET | Refills: 3 | Status: SHIPPED | OUTPATIENT
Start: 2025-04-29

## 2025-04-29 RX ORDER — METOPROLOL SUCCINATE 25 MG/1
12.5 TABLET, EXTENDED RELEASE ORAL
Qty: 50 TABLET | Refills: 3 | Status: SHIPPED | OUTPATIENT
Start: 2025-04-29

## 2025-04-29 ASSESSMENT — ENCOUNTER SYMPTOMS
ABDOMINAL PAIN: 0
COUGH: 0
SENSORY CHANGE: 1
PALPITATIONS: 0
SHORTNESS OF BREATH: 0
ORTHOPNEA: 0
CHILLS: 0
LOSS OF CONSCIOUSNESS: 0
INSOMNIA: 0
TINGLING: 1
BRUISES/BLEEDS EASILY: 0
FEVER: 0
PND: 0
HEADACHES: 0
DIZZINESS: 0
MYALGIAS: 0
NAUSEA: 0

## 2025-04-29 NOTE — PROGRESS NOTES
Chief Complaint   Patient presents with    Follow-Up    Atrial Fibrillation     Status post Watchman device.    Hyperlipidemia    COPD    Gastrophageal Reflux       Subjective     Allison Cespedes is a 74 y.o. female who presents today for annual follow-up of PAFib, Watchman device, hyperlipidemia, COPD and GERD.    Allison is a 74 year old female with history of PAFib, first diagnosed in October 2018; she is status post Watchman procedure in March 2019, and is now off of anticoagulation, and is on ASA only. She also has some mild hyperlipidemia, COPD/IVETTE on BiPAP, GERD, hypothyroidism and arthritis, last seen by me in April 2024.      She is here today for annual follow-up. It has been a year now that her  passed away after a long gilman with Alzheimer's disease, and she is still grieving him, but she does have a good support system.      From a cardiac standpoint, she has been stable:  No symptomatic palpitations; no chest pain, pressure or discomfort; mild continued dyspnea, but orthopnea or PND; no dizziness or syncope; no LE edema. She does have a Kardia device, and her heart rhythm is always regularly; periodically, it does register as bradycardia.    She has had some bilateral arm pain, usually at rest, with some numbness, along with tremor. She is requesting a referral to neurology.     Her Synthroid was recently adjusted, due to low TSH.    Past Medical History:   Diagnosis Date    Anemia     Arthritis     Fingers, hands, toes, feet (osteoarthritis)    Asthma     Inhalers daily    Atrial fibrillation (HCC) 10/2018    New onset in office. March 2019: Status post Watchman procedure. September 2022: Echocardiogram with normal LV size, LVEF 65%. Normal RA, LA and RV. Trace MR, mild AR, trace TR.    Breath shortness     Uses 2L oxygen at night ([employer])     Cataract     Bilateral IOL    Chronic anticoagulation     COPD (chronic obstructive pulmonary disease) (HCC)     GERD (gastroesophageal reflux  disease)     Gynecological disorder     Postmenopausal bleeding right now     Hyperlipidemia     Hypothyroidism     IVETTE (obstructive sleep apnea)     Uses BiPAP, followed by pulmonology.    Pneumonia 1989    Psychiatric problem     Depression, anxiety    Shortness of breath     Snoring     Tuberculosis 1981    Treated for nine months    Wheezing      Past Surgical History:   Procedure Laterality Date    PB OPEN RX DISTAL RADIUS FX, INTRA-ARTICULAR* Left 01/21/2022    Procedure: OPEN REDUCTION AND INTERNAL FIXATION DISTAL RADIUS;  Surgeon: Eric Kay M.D.;  Location: [facility]  External [facility];  Service: Orthopedics    PB OPEN TREATMENT PBOX HUMERAL FRACTURE Left 01/21/2022    Procedure: OPEN REDUCTION AND INTERNAL FIXATION PROXIMAL HUMERUS FRACTURE WITH FIBULA  ALLOGRAFT;  Surgeon: Eric Kay M.D.;  Location: [facility];  Service: Orthopedics    FINGER ARTHROPLASTY Left 10/16/2020    Procedure: ARTHROPLASTY, FINGER - THUMB CARPOMETACARPAL EXCISIONAL;  Surgeon: Fidel Kay M.D.;  Location: SURGERY SAME DAY [facility];  Service: Orthopedics    TENDON TRANSFER Left 10/16/2020    Procedure: TRANSFER, TENDON - FOR FLEXOR CARPI RADIALIS TENDON GRAFT;  Surgeon: Fidel Kay M.D.;  Location: SURGERY SAME DAY [facility];  Service: Orthopedics    MASS EXCISION GENERAL Left 07/06/2018    Procedure: MASS EXCISION GENERAL/ SUBCUTANEOUS MASS LEFT SHOULDER;  Surgeon: Swapna Rivas M.D.;  Location: SURGERY SAME DAY [facility];  Service: General    OTHER CARDIAC SURGERY  2018    Watchman implanted    CHOLECYSTECTOMY  1994    GYN SURGERY  1975    Fibroid tumor    APPENDECTOMY  1960    Ruptured    ARTHROSCOPY, KNEE      MAMMOPLASTY AUGMENTATION      CT BREAST AUGMENTATION WITH IMPLANT      CT REMV 2ND CATARACT,CORN-SCLER SECTN       Family History   Problem Relation Age of Onset    Non-contributory Mother     Hypertension Mother     Cancer Mother          lung cancer    Non-contributory Father     Heart Disease Sister         MI, stent    Drug abuse Sister      Social History     Socioeconomic History    Marital status:      Spouse name: Not on file    Number of children: Not on file    Years of education: Not on file    Highest education level: Not on file   Occupational History    Not on file   Tobacco Use    Smoking status: Former     Current packs/day: 0.00     Average packs/day: 2.0 packs/day for 30.0 years (60.0 ttl pk-yrs)     Types: Cigarettes     Start date: 2/3/1966     Quit date: 2/3/1996     Years since quittin.2    Smokeless tobacco: Never    Tobacco comments:     continued abstinance   Vaping Use    Vaping status: Never Used   Substance and Sexual Activity    Alcohol use: Not Currently    Drug use: No    Sexual activity: Not on file   Other Topics Concern    Not on file   Social History Narrative    Not on file     Social Drivers of Health     Financial Resource Strain: Not on file   Food Insecurity: Not on file   Transportation Needs: Not on file   Physical Activity: Not on file   Stress: Not on file   Social Connections: Not on file   Intimate Partner Violence: Not on file   Housing Stability: Not on file     Allergies   Allergen Reactions    Tizanidine Unspecified     hallucinations    Pcn [Penicillins]      Rxn as child     Outpatient Encounter Medications as of 2025   Medication Sig Dispense Refill    flecainide (TAMBOCOR) 50 MG tablet Take 1 Tablet by mouth 2 times a day. 200 Tablet 3    lovastatin (MEVACOR) 40 MG tablet Take 1 Tablet by mouth every evening. 100 Tablet 3    metoprolol SR (TOPROL XL) 25 MG TABLET SR 24 HR Take 0.5 Tablets by mouth every day. 50 Tablet 3    benzonatate (TESSALON) 100 MG Cap Take 1 Capsule by mouth 3 times a day as needed for Cough. 30 Capsule 0    buPROPion (WELLBUTRIN XL) 150 MG XL tablet TAKE 3 TABLETS EVERY  Tablet 3    valacyclovir (VALTREX) 1 GM Tab TAKE 1 TABLET TWICE DAILY 160 Tablet 0     CYTOMEL 5 MCG Tab Take 1.5 Tablets by mouth 2 times a day. 390 Tablet 3    SYNTHROID 100 MCG Tab Take 1 Tablet by mouth every morning on an empty stomach. 96 Tablet 3    escitalopram (LEXAPRO) 20 MG tablet TAKE 1 TABLET EVERY DAY 90 Tablet 3    dicyclomine (BENTYL) 10 MG Cap Take 1 Capsule by mouth 2 times a day. 180 Capsule 3    VENTOLIN  (90 Base) MCG/ACT Aero Soln inhalation aerosol Inhale 2 Puffs every 6 hours as needed for Shortness of Breath. 1 Each 3    propranolol (INDERAL) 20 MG Tab TAKE 1 TABLET BY MOUTH EVERY DAY AS NEEDED FOR PERFORMANCE ANXIETY 90 Tablet 0    diclofenac DR (VOLTAREN) 75 MG Tablet Delayed Response Take 75 mg by mouth 2 times a day.      triamcinolone acetonide (ORALONE) 0.1 % Paste APPLY TO THE AFFECTED AREA AFTER MEALS AND BEFORE BEDTIME      fluticasone (FLONASE) 50 MCG/ACT nasal spray Administer 1 Spray into affected nostril(S) every day.      gabapentin (NEURONTIN) 100 MG Cap TAKE 1 CAPSULE BY MOUTH THREE TIMES DAILY FOR 3 DAYS THEN 2 CAPSULE BY MOUTH THREE TIMES DAILY FOR 3 DAYS THEN 3 CAPSULE BY MOUTH THREE TIMES DAILY (Patient taking differently: Take 100 mg by mouth 2 times a day. TAKE 1 CAPSULE BY MOUTH THREE TIMES DAILY FOR 3 DAYS THEN 2 CAPSULE BY MOUTH THREE TIMES DAILY FOR 3 DAYS THEN 3 CAPSULE BY MOUTH THREE TIMES DAILY) 100 capsule 1    vitamin D (CHOLECALCIFEROL) 1000 Unit (25 mcg) Tab Take 2,000 Units by mouth every day.      medroxyPROGESTERone (PROVERA) 2.5 MG Tab Take 2.5 mg by mouth every day.      LORazepam (ATIVAN) 1 MG Tab Take 1 mg by mouth at bedtime as needed.  0    Estradiol 0.025 MG/24HR PATCH BIWEEKLY       aspirin EC 81 MG EC tablet Take 1 Tab by mouth every day. 30 Tab 6    HYDROcodone-acetaminophen (NORCO) 5-325 MG Tab per tablet Take 1 Tab by mouth 2 times a day as needed.      [DISCONTINUED] metoprolol SR (TOPROL XL) 25 MG TABLET SR 24 HR TAKE 1 TABLET EVERY DAY 90 Tablet 3    [DISCONTINUED] azithromycin (ZITHROMAX) 250 MG Tab Take 2 tablets  "(500 mg) PO on day 1 and then take 1 tablet (250 mg) daily on 2-5 6 Tablet 0    [DISCONTINUED] Nirmatrelvir&Ritonavir 300/100 20 x 150 MG & 10 x 100MG Tablet Therapy Pack Take 300 mg nirmatrelvir (two 150 mg tablets) with 100 mg ritonavir (one 100 mg tablet) by mouth, with all three tablets taken together twice daily for 5 days. 30 Each 0    [DISCONTINUED] lovastatin (MEVACOR) 40 MG tablet Take 1 Tablet by mouth every evening. 90 Tablet 2    [DISCONTINUED] flecainide (TAMBOCOR) 50 MG tablet Take 1 Tablet by mouth 2 times a day. 200 Tablet 3     No facility-administered encounter medications on file as of 4/29/2025.     Review of Systems   Constitutional:  Negative for chills and fever.   HENT:  Negative for congestion.    Respiratory:  Negative for cough and shortness of breath.    Cardiovascular:  Negative for chest pain, palpitations, orthopnea, leg swelling and PND.   Gastrointestinal:  Negative for abdominal pain and nausea.   Musculoskeletal:  Positive for joint pain. Negative for myalgias.   Skin:  Negative for rash.   Neurological:  Positive for tingling and sensory change. Negative for dizziness, loss of consciousness and headaches.        Bilateral arm pain/numbness at rest.   Endo/Heme/Allergies:  Does not bruise/bleed easily.   Psychiatric/Behavioral:  The patient does not have insomnia.               Objective     /58 (BP Location: Left arm, Patient Position: Sitting, BP Cuff Size: Adult)   Pulse 69   Resp 16   Ht 1.676 m (5' 6\")   Wt 73.5 kg (162 lb)   SpO2 92%   BMI 26.15 kg/m²     Physical Exam  Constitutional:       Appearance: She is well-developed.   HENT:      Head: Normocephalic.   Neck:      Vascular: No JVD.   Cardiovascular:      Rate and Rhythm: Normal rate and regular rhythm.      Heart sounds: Normal heart sounds.   Pulmonary:      Effort: Pulmonary effort is normal. No respiratory distress.      Breath sounds: Normal breath sounds. No wheezing or rales.   Abdominal:      " General: Bowel sounds are normal. There is no distension.      Palpations: Abdomen is soft.      Tenderness: There is no abdominal tenderness.   Musculoskeletal:         General: Normal range of motion.      Cervical back: Normal range of motion and neck supple.   Skin:     General: Skin is warm and dry.      Findings: No rash.   Neurological:      Mental Status: She is alert and oriented to person, place, and time.       ECHOCARDIOGRAPHY:      CONCLUSIONS OF TTE OF 9/22/2022:  Compared to the prior study on 03/07/2019, no change.  Normal left ventricular systolic function.   No evidence of valvular abnormality based on Doppler evaluation.   Normal aortic root for body surface area. The ascending aorta diameter is 2.7 cm.     CONCLUSIONS OF ECHOCARDIOGRAM OF 10/17/2018:  Normal left ventricular systolic function.  Left ventricular ejection fraction is visually estimated to be 65%.  Normal diastolic function.  Normal inferior vena cava size and inspiratory collapse.  Estimated right ventricular systolic pressure is 34 mmHg.     CT SCAN(S):    IMPRESSION OF CT SCAN OF CHEST OF 8/1/2022:  1.  Mild emphysema.  2.  Atherosclerotic disease    PULMONARY FUNCTION TESTING:    Interpretation of PFTs of 8/29/2023:  Normal study.      CONCLUSION OF PFTS OF 8/4/2022:  All values in this test correlates with normal limits. Normal pulmonary function test.      PROCEDURE(S):    Procedure(s) Performed:   1) Watchman CASA closure    LABS:    Lab Results   Component Value Date/Time    CHOLSTRLTOT 194 04/26/2024 08:07 AM     (H) 04/26/2024 08:07 AM    HDL 50 04/26/2024 08:07 AM    TRIGLYCERIDE 191 (H) 04/26/2024 08:07 AM        Lab Results   Component Value Date/Time    ASTSGOT 17 01/21/2025 09:20 AM    ALTSGPT 16 01/21/2025 09:20 AM       Lab Results   Component Value Date/Time    SODIUM 141 01/21/2025 09:20 AM    POTASSIUM 4.2 01/21/2025 09:20 AM    CHLORIDE 104 01/21/2025 09:20 AM    CO2 25 01/21/2025 09:20 AM    GLUCOSE 95  01/21/2025 09:20 AM    BUN 17 01/21/2025 09:20 AM    CREATININE 0.94 01/21/2025 09:20 AM      Lab Results   Component Value Date/Time    WBC 9.3 10/12/2020 12:55 PM    RBC 4.56 10/12/2020 12:55 PM    HEMOGLOBIN 14.2 10/12/2020 12:55 PM    HEMATOCRIT 44.0 10/12/2020 12:55 PM    MCV 96.5 10/12/2020 12:55 PM    MCH 31.1 10/12/2020 12:55 PM    MCHC 32.3 (L) 10/12/2020 12:55 PM    MPV 10.8 10/12/2020 12:55 PM         Assessment & Plan     1. Paroxysmal atrial fibrillation (HCC)  flecainide (TAMBOCOR) 50 MG tablet    metoprolol SR (TOPROL XL) 25 MG TABLET SR 24 HR      2. Presence of Watchman left atrial appendage closure device        3. Chronic anticoagulation  CBC WITHOUT DIFFERENTIAL      4. Mixed hyperlipidemia  Comp Metabolic Panel    Lipid Profile    lovastatin (MEVACOR) 40 MG tablet      5. Centrilobular emphysema (HCC)        6. Obstructive sleep apnea        7. Gastroesophageal reflux disease without esophagitis        8. Hypothyroidism, unspecified type        9. Tremor  Referral to Neurology      10. Pain in both upper extremities  Referral to Neurology      11. Falls  Referral to Neurology          Medical Decision Making: Today's Assessment/Status/Plan:        PAFib, diagnosed in 2018, status post Watchman procedure in March 2019, in sinus rhythm on Flecainide 50mg twice daily; can decrease Toprol XL from 25mg to 12.5mg once daily.   Chronic anticoagulation with ASA 81mg once daily, as she has Watchman device.  Hyperlipidemia, treated with Mevacor 40mg once daily. To repeat annual lipid panel.  COPD, due to smoking history, quit many years ago, followed by pulmonology. Uses inhalers periodically.  IVETTE, on BiPAP, followed by pulmonology.  GERD, treated/stable.  Hypothyroidism, treated and followed by endocrinology.  Bilateral arm pain at rest, with numbness and tremor. Probably needs MRI; she is given referral to neurology.    Same medications for now, except she can lower Toprol XL dose from 25mg to 12.5mg  once daily.  Labs as above.  Referral to neurology for evaluation of bilateral arm pain/numbness.    Follow-up annually, sooner if clinical condition changes.

## 2025-05-08 NOTE — PROGRESS NOTES
[facility] Follow-up Visit    Date of Visit: 5/9/2025     CC:  Follow-up for IVETTE management      HPI:  Allison Cespedes is a very pleasant 74 y.o. year old female former smoker (30 pack-years, quit in 1996), with a PMHx of emphysema, nocturnal hypoxia A-fib s/p Watchman, arthritis who presented to the [facility] for a regular follow up. Last seen in the office on 11/6/2024 with myself .     Patient presents for first compliance.  Patient has been seen in the pulmonary office for emphysema and nocturnal hypoxia.  PSG in December 2024 was positive and showed failure CPAP, requiring BiPAP.  Patient reports that she does not feel as much relief as she is hoping for, but she does find her sleep a little bit more restful.  She will have daytime drowsiness, and a dry mouth which she takes Tylenol for.  She denies any significant morning headaches, drowsiness with driving, issues fine sleep, snoring, gasping or apneas, palpitations, aerophagia, or skin irritation.  She will have a positional mask leak.  She will sleep 5.5 hours and will have 1 awakening due to her dog but will not have any issues when her sleep.  She does states that she had a cold this last month for a week and a half and was able unable to use her machine.    DME provider: [facility]  Device: [employer]Med AirCurve 10  Settings: AutoBiPAP (IPAP max 17, EPAP 12, PS 4 CWP)  Oxygen: None  When: December 2024  Mask: FFM  Chin strap: No     Cleaning regimen: Has not cleaned her supplies    Compliance:  Compliance data reviewed showing 60% usage > 4hours in last 30 days. Average AHI 2.9 events/hour. 95% leaks 14.6 L/min. Patient continues to use and benefit from machine.      Sleep History:  PSG 12/8/2024-        Patient Active Problem List    Diagnosis Date Noted    IVETTE treated with BiPAP 10/16/2018    Centrilobular emphysema (HCC) 08/25/2022    BMI 26.0-26.9,adult 05/09/2025    Gastroesophageal reflux disease without esophagitis 11/06/2024     Chronic rhinitis 11/06/2024    Atrial fibrillation (HCC) [I48.91] 03/08/2019    Mixed hyperlipidemia 08/30/2022    Left wrist pain 01/18/2022    Presence of Watchman left atrial appendage closure device 04/04/2019    Arthritis 11/20/2018    Chronic anticoagulation 11/20/2018    Hypothyroidism 10/16/2018    SOB (shortness of breath) 09/05/2018     Past Medical History:   Diagnosis Date    Anemia     Arthritis     Fingers, hands, toes, feet (osteoarthritis)    Asthma     Inhalers daily    Atrial fibrillation (HCC) 10/2018    New onset in office. March 2019: Status post Watchman procedure. September 2022: Echocardiogram with normal LV size, LVEF 65%. Normal RA, LA and RV. Trace MR, mild AR, trace TR.    Breath shortness     Uses 2L oxygen at night ([employer])     Cataract     Bilateral IOL    Chronic anticoagulation     COPD (chronic obstructive pulmonary disease) (HCC)     GERD (gastroesophageal reflux disease)     Gynecological disorder     Postmenopausal bleeding right now     Hyperlipidemia     Hypothyroidism     IVETTE (obstructive sleep apnea)     Uses BiPAP, followed by pulmonology.    Pneumonia 1989    Psychiatric problem     Depression, anxiety    Shortness of breath     Snoring     Tuberculosis 1981    Treated for nine months    Wheezing       Past Surgical History:   Procedure Laterality Date    PB OPEN RX DISTAL RADIUS FX, INTRA-ARTICULAR* Left 01/21/2022    Procedure: OPEN REDUCTION AND INTERNAL FIXATION DISTAL RADIUS;  Surgeon: Eric Kay M.D.;  Location: [facility];  Service: Orthopedics    PB OPEN TREATMENT PBOX HUMERAL FRACTURE Left 01/21/2022    Procedure: OPEN REDUCTION AND INTERNAL FIXATION PROXIMAL HUMERUS FRACTURE WITH FIBULA  ALLOGRAFT;  Surgeon: Eric Kay M.D.;  Location: [facility];  Service: Orthopedics    FINGER ARTHROPLASTY Left 10/16/2020    Procedure: ARTHROPLASTY, FINGER - THUMB CARPOMETACARPAL EXCISIONAL;  Surgeon:  Fidel Kay M.D.;  Location: SURGERY SAME DAY [facility];  Service: Orthopedics    TENDON TRANSFER Left 10/16/2020    Procedure: TRANSFER, TENDON - FOR FLEXOR CARPI RADIALIS TENDON GRAFT;  Surgeon: Fidel Kay M.D.;  Location: SURGERY SAME DAY [facility];  Service: Orthopedics    MASS EXCISION GENERAL Left 2018    Procedure: MASS EXCISION GENERAL/ SUBCUTANEOUS MASS LEFT SHOULDER;  Surgeon: Swapna Rivas M.D.;  Location: SURGERY SAME DAY [facility];  Service: General    OTHER CARDIAC SURGERY      Watchman implanted    CHOLECYSTECTOMY  1994    GYN SURGERY  1975    Fibroid tumor    APPENDECTOMY  1960    Ruptured    ARTHROSCOPY, KNEE      MAMMOPLASTY AUGMENTATION      CA BREAST AUGMENTATION WITH IMPLANT      CA REMV 2ND CATARACT,CORN-SCLER SECTN       Family History   Problem Relation Age of Onset    Non-contributory Mother     Hypertension Mother     Cancer Mother         lung cancer    Non-contributory Father     Heart Disease Sister         MI, stent    Drug abuse Sister      Social History     Socioeconomic History    Marital status:      Spouse name: Not on file    Number of children: Not on file    Years of education: Not on file    Highest education level: Not on file   Occupational History    Not on file   Tobacco Use    Smoking status: Former     Current packs/day: 0.00     Average packs/day: 2.0 packs/day for 30.0 years (60.0 ttl pk-yrs)     Types: Cigarettes     Start date: 2/3/1966     Quit date: 2/3/1996     Years since quittin.2    Smokeless tobacco: Never    Tobacco comments:     continued abstinance   Vaping Use    Vaping status: Never Used   Substance and Sexual Activity    Alcohol use: Not Currently    Drug use: No    Sexual activity: Not on file   Other Topics Concern    Not on file   Social History Narrative    Not on file     Social Drivers of Health     Financial Resource Strain: Not on file   Food Insecurity: Not on file   Transportation Needs: Not on file    Physical Activity: Not on file   Stress: Not on file   Social Connections: Not on file   Intimate Partner Violence: Not on file   Housing Stability: Not on file     Current Outpatient Medications   Medication Sig Dispense Refill    progesterone (PROMETRIUM) 100 MG Cap       flecainide (TAMBOCOR) 50 MG tablet Take 1 Tablet by mouth 2 times a day. 200 Tablet 3    lovastatin (MEVACOR) 40 MG tablet Take 1 Tablet by mouth every evening. 100 Tablet 3    metoprolol SR (TOPROL XL) 25 MG TABLET SR 24 HR Take 0.5 Tablets by mouth every day. 50 Tablet 3    benzonatate (TESSALON) 100 MG Cap Take 1 Capsule by mouth 3 times a day as needed for Cough. 30 Capsule 0    buPROPion (WELLBUTRIN XL) 150 MG XL tablet TAKE 3 TABLETS EVERY  Tablet 3    valacyclovir (VALTREX) 1 GM Tab TAKE 1 TABLET TWICE DAILY 160 Tablet 0    CYTOMEL 5 MCG Tab Take 1.5 Tablets by mouth 2 times a day. 390 Tablet 3    SYNTHROID 100 MCG Tab Take 1 Tablet by mouth every morning on an empty stomach. 96 Tablet 3    escitalopram (LEXAPRO) 20 MG tablet TAKE 1 TABLET EVERY DAY 90 Tablet 3    dicyclomine (BENTYL) 10 MG Cap Take 1 Capsule by mouth 2 times a day. 180 Capsule 3    VENTOLIN  (90 Base) MCG/ACT Aero Soln inhalation aerosol Inhale 2 Puffs every 6 hours as needed for Shortness of Breath. 1 Each 3    propranolol (INDERAL) 20 MG Tab TAKE 1 TABLET BY MOUTH EVERY DAY AS NEEDED FOR PERFORMANCE ANXIETY 90 Tablet 0    diclofenac DR (VOLTAREN) 75 MG Tablet Delayed Response Take 75 mg by mouth 2 times a day.      triamcinolone acetonide (ORALONE) 0.1 % Paste APPLY TO THE AFFECTED AREA AFTER MEALS AND BEFORE BEDTIME      fluticasone (FLONASE) 50 MCG/ACT nasal spray Administer 1 Spray into affected nostril(S) every day.      gabapentin (NEURONTIN) 100 MG Cap TAKE 1 CAPSULE BY MOUTH THREE TIMES DAILY FOR 3 DAYS THEN 2 CAPSULE BY MOUTH THREE TIMES DAILY FOR 3 DAYS THEN 3 CAPSULE BY MOUTH THREE TIMES DAILY (Patient taking differently: Take 100 mg by mouth 2  "times a day. TAKE 1 CAPSULE BY MOUTH THREE TIMES DAILY FOR 3 DAYS THEN 2 CAPSULE BY MOUTH THREE TIMES DAILY FOR 3 DAYS THEN 3 CAPSULE BY MOUTH THREE TIMES DAILY) 100 capsule 1    vitamin D (CHOLECALCIFEROL) 1000 Unit (25 mcg) Tab Take 2,000 Units by mouth every day.      medroxyPROGESTERone (PROVERA) 2.5 MG Tab Take 2.5 mg by mouth every day.      LORazepam (ATIVAN) 1 MG Tab Take 1 mg by mouth at bedtime as needed.  0    Estradiol 0.025 MG/24HR PATCH BIWEEKLY       aspirin EC 81 MG EC tablet Take 1 Tab by mouth every day. 30 Tab 6    HYDROcodone-acetaminophen (NORCO) 5-325 MG Tab per tablet Take 1 Tab by mouth 2 times a day as needed.       No current facility-administered medications for this visit.      ALLERGIES: Tizanidine and Pcn [penicillins]    ROS:  Constitutional: Denies fever, chills, sweats,  weight loss, fatigue  Cardiovascular: Denies chest pain, tightness, palpitations, swelling in legs/feet  Respiratory: Denies shortness of breath, cough, sputum, wheezing, painful breathing   Sleep: per HPI  Gastrointestinal: Denies  difficulty swallowing, nausea, abdominal pain, diarrhea, constipation, heartburn.  Musculoskeletal: Denies painful joints, sore muscles,       PHYSICAL EXAM:  /62 (BP Location: Right arm, Patient Position: Sitting, BP Cuff Size: Adult)   Pulse 64   Ht 1.676 m (5' 6\")   Wt 73.9 kg (163 lb)   SpO2 96%   BMI 26.31 kg/m²   Appearance: Well-nourished, well-developed, no acute distress  Eyes:  No scleral icterus , EOMI  ENMT: No redness of the oropharynx  Lung auscultation:  No wheezes rhonchi rubs or rales  Cardiac: No murmurs, rubs, or gallops; regular rhythm, normal rate; no edema  Musculoskeletal:  Grossly normal; gait and station normal; digits and nails normal  Skin:  No rashes, petechiae, cyanosis  Neurologic: without focal signs; oriented to person, time, place, and purpose; judgement intact  Psychiatric:  No depression, anxiety, agitation  Mallampati score: Class " II.    Assessment and Plan:    The medical record was reviewed.    Diagnostic and titration nocturnal polysomnogram's, home sleep apnea tests, continuous nocturnal oximetry results, multiple sleep latency tests, and compliance reports reviewed.    Problem List Items Addressed This Visit          Pulmonary/Sleep Medicine Problems    IVETTE treated with BiPAP    Sleep Apnea:    The pathophysiology of sleep anea and the increased risk of cardiovascular morbidity from untreated sleep apnea is discussed in detail with the patient.  Urged to avoid supine sleep, weight gain and alcoholic beverages since all of these can worsen sleep apnea. Cautioned against drowsy driving. If feeling sleepy while driving, pull over for a break/nap, rather than persist on the road, in the interest of own safety and that of others on the road.  The risks of untreated sleep apnea were discussed with the patient at length. Patients with sleep apnea are at increased risk of cardiovascular disease including coronary artery disease, systemic arterial hypertension, pulmonary arterial hypertension, cardiac arrythmias, and stroke.  Positive airway pressure will favorably impact many of the adverse conditions and effects provoked by sleep apnea.    Plan:    Compliance download was reviewed and discussed with the patient.  Patient's AHI is well-controlled, but unfortunately she does not meet insurance requirements for compliance, which is due to a recent cold.  We will see her back in 6 weeks for compliance.    - Compliance was reinforced  - Clean supplies a least once a week with dish soap and water and air dry  - Recommended the patient against the use of Ozone , such as SoClean  - Recommended the patient change out supplies as recommended for best mask fit and usage of the machine  - Equipment replacement schedule:  Mask cushion every month  Nasal pillows 2 times per month  Mask every 6 months  Head gear every 6 months  Tubing every 3  months  Ultra-fine filters 2 times per month  Foam filter every 6 months  Humidifier chamber every 6 months  Chin strap every 6 months    Has been advised to continue the current BiPAP, clean equipment frequently, and get new mask and supplies as allowed by insurance and DME. Recommend an earlier appointment, if significant treatment barriers develop.    Advised patient to reach out via [facility] if any questions or concerns should arise.              Relevant Orders    Referral to Pulmonary and Sleep Medicine       Other    BMI 26.0-26.9,adult     Have advised the patient to follow up with the appropriate healthcare practitioners for all other medical problems and issues.    Return in about 6 weeks (around 6/20/2025), or if symptoms worsen or fail to improve, for 1st compliance, with Thomas.      Please note portions of this record was created using voice recognition software. I have made every reasonable attempt to correct obvious errors, but I expect that there are errors of grammar and possibly content I did not discover before finalizing the note.    Time spent in record review prior to patient arrival, reviewing results, and in face-to-face encounter totaled 29 min.  __________  EMILY August  Pulmonary & Sleep Medicine  [facility]

## 2025-05-09 ENCOUNTER — OFFICE VISIT (OUTPATIENT)
Dept: SLEEP MEDICINE | Facility: MEDICAL CENTER | Age: 75
End: 2025-05-09
Payer: MEDICARE

## 2025-05-09 VITALS
OXYGEN SATURATION: 96 % | DIASTOLIC BLOOD PRESSURE: 62 MMHG | BODY MASS INDEX: 26.2 KG/M2 | WEIGHT: 163 LBS | SYSTOLIC BLOOD PRESSURE: 100 MMHG | HEART RATE: 64 BPM | HEIGHT: 66 IN

## 2025-05-09 DIAGNOSIS — G47.33 OSA TREATED WITH BIPAP: ICD-10-CM

## 2025-05-09 PROCEDURE — 3074F SYST BP LT 130 MM HG: CPT

## 2025-05-09 PROCEDURE — 3078F DIAST BP <80 MM HG: CPT

## 2025-05-09 PROCEDURE — 99213 OFFICE O/P EST LOW 20 MIN: CPT

## 2025-05-09 RX ORDER — PROGESTERONE 100 MG/1
CAPSULE ORAL
COMMUNITY
Start: 2025-03-19

## 2025-05-09 NOTE — ASSESSMENT & PLAN NOTE
Sleep Apnea:    The pathophysiology of sleep anea and the increased risk of cardiovascular morbidity from untreated sleep apnea is discussed in detail with the patient.  Urged to avoid supine sleep, weight gain and alcoholic beverages since all of these can worsen sleep apnea. Cautioned against drowsy driving. If feeling sleepy while driving, pull over for a break/nap, rather than persist on the road, in the interest of own safety and that of others on the road.  The risks of untreated sleep apnea were discussed with the patient at length. Patients with sleep apnea are at increased risk of cardiovascular disease including coronary artery disease, systemic arterial hypertension, pulmonary arterial hypertension, cardiac arrythmias, and stroke.  Positive airway pressure will favorably impact many of the adverse conditions and effects provoked by sleep apnea.    Plan:    Compliance download was reviewed and discussed with the patient.  Patient's AHI is well-controlled, but unfortunately she does not meet insurance requirements for compliance, which is due to a recent cold.  We will see her back in 6 weeks for compliance.    - Compliance was reinforced  - Clean supplies a least once a week with dish soap and water and air dry  - Recommended the patient against the use of Ozone , such as SoClean  - Recommended the patient change out supplies as recommended for best mask fit and usage of the machine  - Equipment replacement schedule:  Mask cushion every month  Nasal pillows 2 times per month  Mask every 6 months  Head gear every 6 months  Tubing every 3 months  Ultra-fine filters 2 times per month  Foam filter every 6 months  Humidifier chamber every 6 months  Chin strap every 6 months    Has been advised to continue the current BiPAP, clean equipment frequently, and get new mask and supplies as allowed by insurance and DME. Recommend an earlier appointment, if significant treatment barriers develop.    Advised  patient to reach out via [facility] if any questions or concerns should arise.

## 2025-05-09 NOTE — PATIENT INSTRUCTIONS
Equipment replacement schedule:  Mask cushion every month  Nasal pillows 2 times per month  Mask every 6 months  Head gear every 6 months  Tubing every 3 months  Ultra-fine filters 2 times per month  Foam filter every 6 months  Humidifier chamber every 6 months  Chin strap every 6 months      Practice sleep hygiene by keeping a good sleep environment:    1.  Removing distractions such as television, computers and other engaging activities.  2.  Keep the sleep room dark, cool and quiet  3.  Commit to a consistent bedtime and wake up time  4.  Avoid staying in bed while awake for greater than 20-30 minutes. If unable to fall asleep during this time then recommend going to a different location in the house to engage in a relaxation activity prior to returning to bed.   5. Recommend finishing meals 3 hours before bedtime especially if you are prone to indigestion or heartburn.  6.  Avoid smoking for at least 3 hours before bedtime.  7.  Recommend engaging in regular physical activity daily (its ok to exercise prior to bedtime).  8.  Avoid stressful situations before bedtime  9.  Avoid napping during the daytime or limiting the nap to less than 20 minutes.  10. Go to bed only when tired.  11. Limited activities in bed to sleep and sex  12. Recommend engaging in relaxing activities prior to bed such as reading, writing, listening to calming music, meditation, stretching or taking a bath.

## 2025-05-12 ENCOUNTER — HOSPITAL ENCOUNTER (OUTPATIENT)
Facility: MEDICAL CENTER | Age: 75
End: 2025-05-12
Attending: NURSE PRACTITIONER
Payer: MEDICARE

## 2025-05-12 ENCOUNTER — HOSPITAL ENCOUNTER (OUTPATIENT)
Facility: MEDICAL CENTER | Age: 75
End: 2025-05-12
Payer: MEDICARE

## 2025-05-12 ENCOUNTER — RESULTS FOLLOW-UP (OUTPATIENT)
Dept: CARDIOLOGY | Facility: MEDICAL CENTER | Age: 75
End: 2025-05-12
Payer: MEDICARE

## 2025-05-12 DIAGNOSIS — E03.9 HYPOTHYROIDISM, UNSPECIFIED TYPE: ICD-10-CM

## 2025-05-12 DIAGNOSIS — E78.2 MIXED HYPERLIPIDEMIA: ICD-10-CM

## 2025-05-12 DIAGNOSIS — Z79.01 CHRONIC ANTICOAGULATION: ICD-10-CM

## 2025-05-12 LAB
ALBUMIN SERPL BCP-MCNC: 4.2 G/DL (ref 3.2–4.9)
ALBUMIN/GLOB SERPL: 1.6 G/DL
ALP SERPL-CCNC: 105 U/L (ref 30–99)
ALT SERPL-CCNC: 19 U/L (ref 2–50)
ANION GAP SERPL CALC-SCNC: 11 MMOL/L (ref 7–16)
AST SERPL-CCNC: 23 U/L (ref 12–45)
BILIRUB SERPL-MCNC: 0.3 MG/DL (ref 0.1–1.5)
BUN SERPL-MCNC: 17 MG/DL (ref 8–22)
CALCIUM ALBUM COR SERPL-MCNC: 9.4 MG/DL (ref 8.5–10.5)
CALCIUM SERPL-MCNC: 9.6 MG/DL (ref 8.5–10.5)
CHLORIDE SERPL-SCNC: 108 MMOL/L (ref 96–112)
CHOLEST SERPL-MCNC: 157 MG/DL (ref 100–199)
CO2 SERPL-SCNC: 24 MMOL/L (ref 20–33)
CREAT SERPL-MCNC: 0.84 MG/DL (ref 0.5–1.4)
ERYTHROCYTE [DISTWIDTH] IN BLOOD BY AUTOMATED COUNT: 48.6 FL (ref 35.9–50)
FASTING STATUS PATIENT QL REPORTED: NORMAL
GFR SERPLBLD CREATININE-BSD FMLA CKD-EPI: 73 ML/MIN/1.73 M 2
GLOBULIN SER CALC-MCNC: 2.6 G/DL (ref 1.9–3.5)
GLUCOSE SERPL-MCNC: 99 MG/DL (ref 65–99)
HCT VFR BLD AUTO: 44.2 % (ref 37–47)
HDLC SERPL-MCNC: 46 MG/DL
HGB BLD-MCNC: 13.9 G/DL (ref 12–16)
LDLC SERPL CALC-MCNC: 88 MG/DL
MCH RBC QN AUTO: 31.2 PG (ref 27–33)
MCHC RBC AUTO-ENTMCNC: 31.4 G/DL (ref 32.2–35.5)
MCV RBC AUTO: 99.1 FL (ref 81.4–97.8)
PLATELET # BLD AUTO: 273 K/UL (ref 164–446)
PMV BLD AUTO: 11.4 FL (ref 9–12.9)
POTASSIUM SERPL-SCNC: 4.4 MMOL/L (ref 3.6–5.5)
PROT SERPL-MCNC: 6.8 G/DL (ref 6–8.2)
RBC # BLD AUTO: 4.46 M/UL (ref 4.2–5.4)
SODIUM SERPL-SCNC: 143 MMOL/L (ref 135–145)
T3FREE SERPL-MCNC: 4.36 PG/ML (ref 2–4.4)
T4 FREE SERPL-MCNC: 1.63 NG/DL (ref 0.93–1.7)
TRIGL SERPL-MCNC: 115 MG/DL (ref 0–149)
TSH SERPL-ACNC: 0.06 UIU/ML (ref 0.38–5.33)
WBC # BLD AUTO: 8.5 K/UL (ref 4.8–10.8)

## 2025-05-12 PROCEDURE — 84439 ASSAY OF FREE THYROXINE: CPT

## 2025-05-12 PROCEDURE — 80053 COMPREHEN METABOLIC PANEL: CPT

## 2025-05-12 PROCEDURE — 36415 COLL VENOUS BLD VENIPUNCTURE: CPT

## 2025-05-12 PROCEDURE — 80061 LIPID PANEL: CPT

## 2025-05-12 PROCEDURE — 84481 FREE ASSAY (FT-3): CPT

## 2025-05-12 PROCEDURE — 85027 COMPLETE CBC AUTOMATED: CPT

## 2025-05-12 PROCEDURE — 84443 ASSAY THYROID STIM HORMONE: CPT

## 2025-05-13 DIAGNOSIS — E03.9 HYPOTHYROIDISM, UNSPECIFIED TYPE: ICD-10-CM

## 2025-05-13 RX ORDER — LEVOTHYROXINE SODIUM 75 MCG
75 TABLET ORAL
Qty: 90 TABLET | Refills: 3 | Status: SHIPPED | OUTPATIENT
Start: 2025-05-13

## 2025-05-15 NOTE — Clinical Note
REFERRAL APPROVAL NOTICE         Sent on May 15, 2025                   Allison [facility] [place]  [address]   [address]  [place] [address]                   Dear Ms. Cespedes,    After a careful review of the medical information and benefit coverage, [facility] has processed your referral. See below for additional details.    If applicable, you must be actively enrolled with your insurance for coverage of the authorized service. If you have any questions regarding your coverage, please contact your insurance directly.    REFERRAL INFORMATION   Referral #:  [id number]  Referred-To Department    Referred-By Provider:  Pulmonary and Sleep Medicine    EDGARD Jha   Pulmonary/sleep [facility]      [address]  [address]  [place] [address]  [phone] [address]  [place] [id number] [phone]    Referral Start Date:  05/09/2025  Referral End Date:   05/09/2026           SCHEDULING  If you do not already have an appointment, please call [phone] to make an appointment.   MORE INFORMATION  As a reminder, [facility] ownership has changed, meaning this location is now owned and operated by [facility]. As such, we want to clarify that our patients should expect to receive two separate bills for the services received at [facility] - one representing the [facility] facility fees as the owner of the establishment, and the other to represent the physician's services and subsequent fees. You can speak with your insurance carrier for a pricing estimate by calling the customer service number on the back of your card and ask about charges for a hospital outpatient visit.  If you do not already have a [facility] account, sign up at: [email or web address]  You can access your medical information, make appointments, see lab results,  billing information, and more.  If you have questions regarding this referral, please contact  the [facility] department at:             [phone]. Monday - Friday 7:30AM - 5:00PM.      Sincerely,  [facility]

## 2025-05-28 ENCOUNTER — OFFICE VISIT (OUTPATIENT)
Dept: SLEEP MEDICINE | Facility: MEDICAL CENTER | Age: 75
End: 2025-05-28
Payer: MEDICARE

## 2025-05-28 VITALS
RESPIRATION RATE: 16 BRPM | BODY MASS INDEX: 25.71 KG/M2 | SYSTOLIC BLOOD PRESSURE: 106 MMHG | HEIGHT: 66 IN | WEIGHT: 160 LBS | HEART RATE: 85 BPM | OXYGEN SATURATION: 95 % | DIASTOLIC BLOOD PRESSURE: 66 MMHG

## 2025-05-28 DIAGNOSIS — G47.33 OSA (OBSTRUCTIVE SLEEP APNEA): ICD-10-CM

## 2025-05-28 DIAGNOSIS — G47.33 OSA TREATED WITH BIPAP: Primary | ICD-10-CM

## 2025-05-28 PROCEDURE — 3078F DIAST BP <80 MM HG: CPT | Performed by: STUDENT IN AN ORGANIZED HEALTH CARE EDUCATION/TRAINING PROGRAM

## 2025-05-28 PROCEDURE — 99214 OFFICE O/P EST MOD 30 MIN: CPT | Performed by: STUDENT IN AN ORGANIZED HEALTH CARE EDUCATION/TRAINING PROGRAM

## 2025-05-28 PROCEDURE — 99214 OFFICE O/P EST MOD 30 MIN: CPT

## 2025-05-28 PROCEDURE — 3074F SYST BP LT 130 MM HG: CPT | Performed by: STUDENT IN AN ORGANIZED HEALTH CARE EDUCATION/TRAINING PROGRAM

## 2025-05-28 ASSESSMENT — FIBROSIS 4 INDEX: FIB4 SCORE: 1.43

## 2025-05-28 ASSESSMENT — PATIENT HEALTH QUESTIONNAIRE - PHQ9: CLINICAL INTERPRETATION OF PHQ2 SCORE: 0

## 2025-05-28 NOTE — PROGRESS NOTES
[facility] Consult Note     Date: 5/28/2025 / Time: 2:57 PM      Thank you for requesting a sleep medicine consultation on Allison Cespedes at the sleep center. Presents today with the chief complaints of managing obstructive sleep apnea. She is referred by EDGARD Jha  [address] [place] [address],  [place] [id number] for evaluation and treatment of obstructive sleep apnea on BiPAP.     HISTORY OF PRESENT ILLNESS:     Allison Cespedes is a 74 y.o. female with history of atrial fibrillation status post watchman, GERD, central lobar emphysema, and moderate obstructive sleep apnea on BiPAP.  Presents to Sleep Clinic for evaluation of sleep.    She is following with pulmonology.  She completed a PSG split-night study in December 2024 that showed moderate obstructive sleep apnea is present that responded well to BiPAP.  Her oxygen saturations normalized with BiPAP therapy.    She has been on a BiPAP machine since beginning of this year.  She finds with using the BiPAP she does wake up feeling more rested and has more energy in the morning.  She finds overall her continuity of sleep is also improved.  Prior to using PAP she would wake up 2-3 times a night to use the restroom.    She does have difficulty with her sleep schedule.  She will often want to stay up late however she is unable to sleep and as her dog will wake her up at 530 to be fed.  She will often have difficulty getting back to sleep after this.    DME provider: CPAP and more   Device: Aircurve 10  Mask: fullface   Aerophagia: maybe over the past weeks   Snoring: No   Dry mouth: Yes  Leak: No   Skin irritation: No   Chin strap: No     As per supplemental questionnaire to be scanned or imported into chart:    [place] Sleepiness Score: 8    Sleep Schedule  Bedtime: Weekday & Weekend 11pm-2am  Wake time: Weekday & Weekend 530am to feed dog  Sleep-onset latency: once ready to sleep, mins   Awakenings from sleep: Not with BiPAP  "  Difficulty falling back asleep: Not generally   Bedroom partner: No  Naps: sometimes falls asleep     DAYTIME SYMPTOMS:   Excessive daytime sleepiness: No   Daytime fatigue: Yes   Difficulty concentrating: No   Memory problems: slight   Irritability: can be at times   Work/school performance issues: No   Sleepiness with driving: before BiPAP   Caffeine/stimulant use: Yes  Alcohol use:No     SLEEP RELATED SYMPTOMS  Snoring: No   Witnessed apnea or gasping/choking: No   Dry mouth or mouth breathing: Yes  Night Sweating: sometimes   Teeth grinding/biting: Yes  Morning headaches: No   Refreshed Upon Awakening: Yes     SLEEP RELATED BEHAVIORS:  Parasomnias (walking, talking, eating, violence): No   Leg kicking: No   Restless legs - \"urge to move\": No   Nightmares: No  Recurrent: No   Dream enactment: No      NARCOLEPSY:  Cataplexy: No   Sleep paralysis: No   Sleep attacks: No   Hypnagogic/hypnopompic hallucinations: No     MEDICAL HISTORY  Past Medical History[1]     SURGICAL HISTORY  Past Surgical History[2]     FAMILY HISTORY  Family History   Problem Relation Age of Onset    Non-contributory Mother     Hypertension Mother     Cancer Mother         lung cancer    Non-contributory Father     Heart Disease Sister         MI, stent    Drug abuse Sister        SOCIAL HISTORY  Social History[3]     Occupation: Retired     CURRENT MEDICATIONS  Current Medications[4]    REVIEW OF SYSTEMS  Constitutional: Denies fevers, Denies weight changes  Ears/Nose/Throat/Mouth: Denies nasal congestion or sore throat   Cardiovascular: Denies chest pain  Respiratory: Denies shortness of breath, Denies cough  Gastrointestinal/Hepatic: Denies nausea, vomiting  Sleep: see HPI    Physical Examination:  Vitals/ General Appearance:   Weight/BMI: Body mass index is 25.82 kg/m².  /66 (BP Location: Left arm, Patient Position: Sitting, BP Cuff Size: Adult)   Pulse 85   Resp 16   Ht 1.676 m (5' 6\")   Wt 72.6 kg (160 lb)   SpO2 95% " "  Vitals:    05/28/25 1455   BP: 106/66   BP Location: Left arm   Patient Position: Sitting   BP Cuff Size: Adult   Pulse: 85   Resp: 16   SpO2: 95%   Weight: 72.6 kg (160 lb)   Height: 1.676 m (5' 6\")       Pt. is alert and oriented to time, place and person. Cooperative and in no apparent distress.     Constitutional: Alert, no distress, well-groomed.  Skin: No rashes in visible areas.  Eye: Round. Conjunctiva clear, lids normal. No icterus.   ENT EXAM  Nasal alae/valves collapsible: No   Nasal septum deviation: No  Nasal turbinate hypertrophy: No   Hard palate narrow: No   Hard palate high: No   Soft palate/uvula (Mallampati score): 4  Tongue Scalloping: No   Retrognathia: No   Micrognathia: No   Cardiovascular:no murmus/gallops/rubs, normal S1 and S2 heart sounds, regular rate and rhythm  Pulmonary:Clear to auscultation, No wheezes, No crackles.  Neurologic:Awake, alert and oriented x 3, Normal age appropriate gait, No involuntary motions.  Extremities: No clubbing, cyanosis, or edema       ASSESSMENT AND PLAN   Allison Cespedes is a 74 y.o. female with history of atrial fibrillation status post watchman, GERD, central lobar emphysema, and moderate obstructive sleep apnea on BiPAP.  Presents to Sleep Clinic for evaluation of sleep.    Obstructive sleep apnea  Allison Cespedes  has  Obstructive Sleep Apnea (IVETTE).   Pt has risk factors for IVETTE include age and crowded oropharynx.     The pathophysiology of IVETTE and the increased risk of cardiovascular morbidity from untreated IVETTE is discussed in detail with the patient. She  also has hx of A Fib, GERD, which can be worsened by IVETTE.        The medical record was reviewed.    Diagnostic and titration nocturnal polysomnogram's, home sleep apnea tests, continuous nocturnal oximetry results, multiple sleep latency tests, and compliance reports reviewed.  Compliance data reviewed showing 77% usage > 4hours in last 30  days. Average AHI 2.5 events/hour. Pt " continues to use and benefit from machine.     Current Settings Auto BiPAP EPAP 12 IPAP 17 PS 4     Reviewed previous sleep study.  Advised that based on previous sleep study she does not require supplemental oxygen with her PAP machine at night.    Discussed importance of maintaining same bedtime and wake time all week long.    PLAN:   -Order placed for mask and supplies   -Advised to reach out via MyChart with questions     Has been advised to continue the current BiPAP , clean equipment frequently, and get new mask and supplies as allowed by insurance and DME. Recommend an earlier appointment, if significant treatment barriers develop.    Patients with IVETTE are at increased risk of cardiovascular disease including coronary artery disease, systemic arterial hypertension, pulmonary arterial hypertension, cardiac arrythmias, and stroke.     Return in about 1 year (around 5/28/2026).        Please note portions of this record was created using voice recognition software. I have made every reasonable attempt to correct obvious errors, but I expect that there are errors of grammar and possibly content I did not discover before finalizing the note.           [1]   Past Medical History:  Diagnosis Date    Anemia     Arthritis     Fingers, hands, toes, feet (osteoarthritis)    Asthma     Inhalers daily    Atrial fibrillation (HCC) 10/2018    New onset in office. March 2019: Status post Watchman procedure. September 2022: Echocardiogram with normal LV size, LVEF 65%. Normal RA, LA and RV. Trace MR, mild AR, trace TR.    Breath shortness     Uses 2L oxygen at night ([employer])     Cataract     Bilateral IOL    Chronic anticoagulation     COPD (chronic obstructive pulmonary disease) (HCC)     GERD (gastroesophageal reflux disease)     Gynecological disorder     Postmenopausal bleeding right now     Hyperlipidemia     Hypothyroidism     IVETTE (obstructive sleep apnea)     Uses BiPAP, followed by pulmonology.    Pneumonia 1989     Psychiatric problem     Depression, anxiety    Shortness of breath     Snoring     Tuberculosis 1981    Treated for nine months    Wheezing    [2]   Past Surgical History:  Procedure Laterality Date    PB OPEN RX DISTAL RADIUS FX, INTRA-ARTICULAR* Left 2022    Procedure: OPEN REDUCTION AND INTERNAL FIXATION DISTAL RADIUS;  Surgeon: Eric Kay M.D.;  Location: [facility];  Service: Orthopedics    PB OPEN TREATMENT PBOX HUMERAL FRACTURE Left 2022    Procedure: OPEN REDUCTION AND INTERNAL FIXATION PROXIMAL HUMERUS FRACTURE WITH FIBULA  ALLOGRAFT;  Surgeon: Eric Kay M.D.;  Location: [facility];  Service: Orthopedics    FINGER ARTHROPLASTY Left 10/16/2020    Procedure: ARTHROPLASTY, FINGER - THUMB CARPOMETACARPAL EXCISIONAL;  Surgeon: Fidel Kay M.D.;  Location: SURGERY SAME DAY [facility];  Service: Orthopedics    TENDON TRANSFER Left 10/16/2020    Procedure: TRANSFER, TENDON - FOR FLEXOR CARPI RADIALIS TENDON GRAFT;  Surgeon: Fidel Kay M.D.;  Location: SURGERY SAME DAY [facility];  Service: Orthopedics    MASS EXCISION GENERAL Left 2018    Procedure: MASS EXCISION GENERAL/ SUBCUTANEOUS MASS LEFT SHOULDER;  Surgeon: Swapna Rivas M.D.;  Location: SURGERY SAME DAY [facility];  Service: General    OTHER CARDIAC SURGERY  2018    Watchman implanted    CHOLECYSTECTOMY  1994    GYN SURGERY  1975    Fibroid tumor    APPENDECTOMY  1960    Ruptured    ARTHROSCOPY, KNEE      MAMMOPLASTY AUGMENTATION      [place] BREAST AUGMENTATION WITH IMPLANT      [place] REMV 2ND CATARACT,CORN-SCLER SECTN     [3]   Social History  Socioeconomic History    Marital status:    Tobacco Use    Smoking status: Former     Current packs/day: 0.00     Average packs/day: 2.0 packs/day for 30.0 years (60.0 ttl pk-yrs)     Types: Cigarettes     Start date: 2/3/1966     Quit date: 2/3/1996     Years since quittin.3    Smokeless tobacco:  Never    Tobacco comments:     continued abstinance   Vaping Use    Vaping status: Never Used   Substance and Sexual Activity    Alcohol use: Not Currently    Drug use: No   [4]   Current Outpatient Medications   Medication Sig Dispense Refill    SYNTHROID 75 MCG Tab Take 1 Tablet by mouth every morning on an empty stomach. 90 Tablet 3    progesterone (PROMETRIUM) 100 MG Cap       flecainide (TAMBOCOR) 50 MG tablet Take 1 Tablet by mouth 2 times a day. 200 Tablet 3    lovastatin (MEVACOR) 40 MG tablet Take 1 Tablet by mouth every evening. 100 Tablet 3    metoprolol SR (TOPROL XL) 25 MG TABLET SR 24 HR Take 0.5 Tablets by mouth every day. 50 Tablet 3    benzonatate (TESSALON) 100 MG Cap Take 1 Capsule by mouth 3 times a day as needed for Cough. 30 Capsule 0    buPROPion (WELLBUTRIN XL) 150 MG XL tablet TAKE 3 TABLETS EVERY  Tablet 3    valacyclovir (VALTREX) 1 GM Tab TAKE 1 TABLET TWICE DAILY 160 Tablet 0    CYTOMEL 5 MCG Tab Take 1.5 Tablets by mouth 2 times a day. 390 Tablet 3    escitalopram (LEXAPRO) 20 MG tablet TAKE 1 TABLET EVERY DAY 90 Tablet 3    dicyclomine (BENTYL) 10 MG Cap Take 1 Capsule by mouth 2 times a day. 180 Capsule 3    VENTOLIN  (90 Base) MCG/ACT Aero Soln inhalation aerosol Inhale 2 Puffs every 6 hours as needed for Shortness of Breath. 1 Each 3    propranolol (INDERAL) 20 MG Tab TAKE 1 TABLET BY MOUTH EVERY DAY AS NEEDED FOR PERFORMANCE ANXIETY 90 Tablet 0    diclofenac DR (VOLTAREN) 75 MG Tablet Delayed Response Take 75 mg by mouth 2 times a day.      triamcinolone acetonide (ORALONE) 0.1 % Paste APPLY TO THE AFFECTED AREA AFTER MEALS AND BEFORE BEDTIME      fluticasone (FLONASE) 50 MCG/ACT nasal spray Administer 1 Spray into affected nostril(S) every day.      gabapentin (NEURONTIN) 100 MG Cap TAKE 1 CAPSULE BY MOUTH THREE TIMES DAILY FOR 3 DAYS THEN 2 CAPSULE BY MOUTH THREE TIMES DAILY FOR 3 DAYS THEN 3 CAPSULE BY MOUTH THREE TIMES DAILY (Patient taking differently: Take 100  mg by mouth 2 times a day. TAKE 1 CAPSULE BY MOUTH THREE TIMES DAILY FOR 3 DAYS THEN 2 CAPSULE BY MOUTH THREE TIMES DAILY FOR 3 DAYS THEN 3 CAPSULE BY MOUTH THREE TIMES DAILY) 100 capsule 1    vitamin D (CHOLECALCIFEROL) 1000 Unit (25 mcg) Tab Take 2,000 Units by mouth every day.      medroxyPROGESTERone (PROVERA) 2.5 MG Tab Take 2.5 mg by mouth every day.      LORazepam (ATIVAN) 1 MG Tab Take 1 mg by mouth at bedtime as needed.  0    Estradiol 0.025 MG/24HR PATCH BIWEEKLY       aspirin EC 81 MG EC tablet Take 1 Tab by mouth every day. 30 Tab 6    HYDROcodone-acetaminophen (NORCO) 5-325 MG Tab per tablet Take 1 Tab by mouth 2 times a day as needed.       No current facility-administered medications for this visit.

## 2025-06-25 ASSESSMENT — ENCOUNTER SYMPTOMS
HEARTBURN: 0
FEVER: 0
CHILLS: 0
SINUS PAIN: 0
VOMITING: 0
HEMOPTYSIS: 0
DIAPHORESIS: 0
DIZZINESS: 0
WEAKNESS: 0
PALPITATIONS: 0
HEADACHES: 0
SPUTUM PRODUCTION: 0
MYALGIAS: 0
DIARRHEA: 0
SHORTNESS OF BREATH: 1
NAUSEA: 0
COUGH: 1
FALLS: 0
WHEEZING: 0

## 2025-06-25 NOTE — PROGRESS NOTES
Pulmonary Clinic Note    Date of Visit: 6/26/2025     Chief Complaint:  No chief complaint on file.    HPI:   Allison Cespedes is a very pleasant 73 y.o. year old female former smoker (30 pack-years, quit in 1996), with a PMHx of emphysema, nocturnal hypoxia A-fib s/p Watchman, arthritis who presented to the Pulmonary Clinic for a regular follow up. Last seen in the office on 11/6/2024 with myself.     Patient is followed by pulmonary office for emphysema-without obstruction and nocturnal hypoxia.  PFTs in 2023 were normal with a FEV1 of 1.80 L or 81%, FEV1/FVC 72%, RV 89%, DLCO 84% predicted, without positive bronchodilator response.  CT chest in 2022 shows mild emphysema.  OPO in March 2024 shows saturations less than 88% for 70 minutes with clustering.  Patient is currently on 2 LPM at night.    Interval events:  11/6/2024-  Patient states that she is at her baseline shortness of breath with mMRC of 0-1 and will have a cough that will cause her to gag and will occasionally have episodes of urinary incontinence.  She denies any sputum production or wheezing.  She does have a history of GERD and is on Prilosec 20 mg.  She also states that she has nasal congestion and uses Flonase as needed.  She does have an albuterol inhaler and will use it 4-5 times a week.    6/25/2025-  ***    Exacerbations this year: None    Current medication regimen: Albuterol    Oxygen use: 2 LPM at night    MMRC Grade:   0- Breathless only during strenuous exercise  1- Short of breath when hurrying or going up a small hill  2- Walks slower than friends due to breathlessness, has to stop at own pace  3- Stops to catch breath on level ground after 100m  4- Breathless with ambulating around house or ADLs        Past Medical History:   Diagnosis Date    Anemia     Arthritis     Fingers, hands, toes, feet (osteoarthritis)    Asthma     Inhalers daily    Atrial fibrillation (HCC) 10/2018    New onset in office. March 2019: Status post  Watchman procedure. September 2022: Echocardiogram with normal LV size, LVEF 65%. Normal RA, LA and RV. Trace MR, mild AR, trace TR.    Breath shortness     Uses 2L oxygen at night ([employer])     Cataract     Bilateral IOL    Chronic anticoagulation     COPD (chronic obstructive pulmonary disease) ([facility])     GERD (gastroesophageal reflux disease)     Gynecological disorder     Postmenopausal bleeding right now     Hyperlipidemia     Hypothyroidism     IVETTE (obstructive sleep apnea)     Uses BiPAP, followed by pulmonology.    Pneumonia 1989    Psychiatric problem     Depression, anxiety    Shortness of breath     Snoring     Tuberculosis 1981    Treated for nine months    Wheezing      Past Surgical History:   Procedure Laterality Date    PB OPEN RX DISTAL RADIUS FX, INTRA-ARTICULAR* Left 01/21/2022    Procedure: OPEN REDUCTION AND INTERNAL FIXATION DISTAL RADIUS;  Surgeon: Eric Kay M.D.;  Location: [facility];  Service: Orthopedics    PB OPEN TREATMENT PBOX HUMERAL FRACTURE Left 01/21/2022    Procedure: OPEN REDUCTION AND INTERNAL FIXATION PROXIMAL HUMERUS FRACTURE WITH FIBULA  ALLOGRAFT;  Surgeon: Eirc Kay M.D.;  Location: [facility];  Service: Orthopedics    FINGER ARTHROPLASTY Left 10/16/2020    Procedure: ARTHROPLASTY, FINGER - THUMB CARPOMETACARPAL EXCISIONAL;  Surgeon: Fidel Kay M.D.;  Location: SURGERY SAME DAY [facility];  Service: Orthopedics    TENDON TRANSFER Left 10/16/2020    Procedure: TRANSFER, TENDON - FOR FLEXOR CARPI RADIALIS TENDON GRAFT;  Surgeon: Fidel Kay M.D.;  Location: SURGERY SAME DAY [facility];  Service: Orthopedics    MASS EXCISION GENERAL Left 07/06/2018    Procedure: MASS EXCISION GENERAL/ SUBCUTANEOUS MASS LEFT SHOULDER;  Surgeon: Swapna Rivas M.D.;  Location: SURGERY SAME DAY [facility];  Service: General    OTHER CARDIAC SURGERY  2018    Watchman implanted    CHOLECYSTECTOMY  1994     GYN SURGERY  1975    Fibroid tumor    APPENDECTOMY  1960    Ruptured    ARTHROSCOPY, KNEE      MAMMOPLASTY AUGMENTATION      [place] BREAST AUGMENTATION WITH IMPLANT      [place] REMV 2ND CATARACT,CORN-SCLER SECTN       Social History     Socioeconomic History    Marital status:      Spouse name: Not on file    Number of children: Not on file    Years of education: Not on file    Highest education level: Not on file   Occupational History    Not on file   Tobacco Use    Smoking status: Former     Current packs/day: 0.00     Average packs/day: 2.0 packs/day for 30.0 years (60.0 ttl pk-yrs)     Types: Cigarettes     Start date: 2/3/1966     Quit date: 2/3/1996     Years since quittin.4    Smokeless tobacco: Never    Tobacco comments:     continued abstinance   Vaping Use    Vaping status: Never Used   Substance and Sexual Activity    Alcohol use: Not Currently    Drug use: No    Sexual activity: Not on file   Other Topics Concern    Not on file   Social History Narrative    Not on file     Social Drivers of Health     Financial Resource Strain: Not on file   Food Insecurity: Not on file   Transportation Needs: Not on file   Physical Activity: Not on file   Stress: Not on file   Social Connections: Not on file   Intimate Partner Violence: Not on file   Housing Stability: Not on file        Family History   Problem Relation Age of Onset    Non-contributory Mother     Hypertension Mother     Cancer Mother         lung cancer    Non-contributory Father     Heart Disease Sister         MI, stent    Drug abuse Sister      Current Outpatient Medications on File Prior to Visit   Medication Sig Dispense Refill    SYNTHROID 75 MCG Tab Take 1 Tablet by mouth every morning on an empty stomach. 90 Tablet 3    progesterone (PROMETRIUM) 100 MG Cap       flecainide (TAMBOCOR) 50 MG tablet Take 1 Tablet by mouth 2 times a day. 200 Tablet 3    lovastatin (MEVACOR) 40 MG tablet Take 1 Tablet by mouth every evening. 100 Tablet 3     metoprolol SR (TOPROL XL) 25 MG TABLET SR 24 HR Take 0.5 Tablets by mouth every day. 50 Tablet 3    benzonatate (TESSALON) 100 MG Cap Take 1 Capsule by mouth 3 times a day as needed for Cough. 30 Capsule 0    buPROPion (WELLBUTRIN XL) 150 MG XL tablet TAKE 3 TABLETS EVERY  Tablet 3    valacyclovir (VALTREX) 1 GM Tab TAKE 1 TABLET TWICE DAILY 160 Tablet 0    CYTOMEL 5 MCG Tab Take 1.5 Tablets by mouth 2 times a day. 390 Tablet 3    escitalopram (LEXAPRO) 20 MG tablet TAKE 1 TABLET EVERY DAY 90 Tablet 3    dicyclomine (BENTYL) 10 MG Cap Take 1 Capsule by mouth 2 times a day. 180 Capsule 3    VENTOLIN  (90 Base) MCG/ACT Aero Soln inhalation aerosol Inhale 2 Puffs every 6 hours as needed for Shortness of Breath. 1 Each 3    propranolol (INDERAL) 20 MG Tab TAKE 1 TABLET BY MOUTH EVERY DAY AS NEEDED FOR PERFORMANCE ANXIETY 90 Tablet 0    diclofenac DR (VOLTAREN) 75 MG Tablet Delayed Response Take 75 mg by mouth 2 times a day.      triamcinolone acetonide (ORALONE) 0.1 % Paste APPLY TO THE AFFECTED AREA AFTER MEALS AND BEFORE BEDTIME      fluticasone (FLONASE) 50 MCG/ACT nasal spray Administer 1 Spray into affected nostril(S) every day.      gabapentin (NEURONTIN) 100 MG Cap TAKE 1 CAPSULE BY MOUTH THREE TIMES DAILY FOR 3 DAYS THEN 2 CAPSULE BY MOUTH THREE TIMES DAILY FOR 3 DAYS THEN 3 CAPSULE BY MOUTH THREE TIMES DAILY (Patient taking differently: Take 100 mg by mouth 2 times a day. TAKE 1 CAPSULE BY MOUTH THREE TIMES DAILY FOR 3 DAYS THEN 2 CAPSULE BY MOUTH THREE TIMES DAILY FOR 3 DAYS THEN 3 CAPSULE BY MOUTH THREE TIMES DAILY) 100 capsule 1    vitamin D (CHOLECALCIFEROL) 1000 Unit (25 mcg) Tab Take 2,000 Units by mouth every day.      medroxyPROGESTERone (PROVERA) 2.5 MG Tab Take 2.5 mg by mouth every day.      LORazepam (ATIVAN) 1 MG Tab Take 1 mg by mouth at bedtime as needed.  0    Estradiol 0.025 MG/24HR PATCH BIWEEKLY       aspirin EC 81 MG EC tablet Take 1 Tab by mouth every day. 30 Tab 6     HYDROcodone-acetaminophen (NORCO) 5-325 MG Tab per tablet Take 1 Tab by mouth 2 times a day as needed.       No current facility-administered medications on file prior to visit.     Allergies: Tizanidine and Pcn [penicillins]    ROS:   Review of Systems   Constitutional:  Negative for chills, diaphoresis, fever and malaise/fatigue.   HENT:  Negative for congestion and sinus pain.    Respiratory:  Positive for cough and shortness of breath (baseline). Negative for hemoptysis, sputum production and wheezing.    Cardiovascular:  Negative for chest pain, palpitations and leg swelling.   Gastrointestinal:  Negative for diarrhea, heartburn, nausea and vomiting.   Musculoskeletal:  Negative for falls and myalgias.   Neurological:  Negative for dizziness, weakness and headaches.     Vitals:  There were no vitals taken for this visit.    Physical Exam  Constitutional:       General: She is not in acute distress.     Appearance: Normal appearance. She is not ill-appearing, toxic-appearing or diaphoretic.   Cardiovascular:      Rate and Rhythm: Normal rate. Rhythm irregular.      Heart sounds: No murmur heard.     No friction rub. No gallop.   Pulmonary:      Effort: No respiratory distress.      Breath sounds: Normal breath sounds. No stridor. No wheezing, rhonchi or rales.   Musculoskeletal:         General: No swelling.      Right lower leg: No edema.      Left lower leg: No edema.   Skin:     General: Skin is warm.   Neurological:      General: No focal deficit present.      Mental Status: She is alert and oriented to person, place, and time.   Psychiatric:         Mood and Affect: Mood normal.         Behavior: Behavior normal.         Thought Content: Thought content normal.         Judgment: Judgment normal.         Laboratory Data:  OPO (Date: 4/8/2024)-   This is an overnight oximetry study performed on April 9, 2024 for duration of 7 hours and 9 minutes on room air.     Basal SpO2 was 89%.  Total time spent below  saturation of 88% was 70 minutes.  There is some clustering of desaturation events.  Consider formal sleep evaluation.    PFTs: (Date: 8/29/2023)-      Impression:  Normal study.     CT Chest: (Date: 8/1/2022)-  Impression:  1.  Mild emphysema.  2.  Atherosclerotic disease      Assessment and Plan:    Problem List Items Addressed This Visit    None      Diagnostic studies have been reviewed with the patient.    No follow-ups on file.     This note was generated using voice recognition software which has a chance of producing errors of grammar and possibly content.  I have made every reasonable attempt to find and correct any obvious errors, but it should be expected that some may not be found prior to finalization of this note.    Time spent in record review prior to patient arrival, reviewing results, and in face-to-face encounter totaled 30 min.  __________  EMILY August  Pulmonary Medicine  [facility]

## 2025-06-26 ENCOUNTER — TELEMEDICINE (OUTPATIENT)
Dept: SLEEP MEDICINE | Facility: MEDICAL CENTER | Age: 75
End: 2025-06-26
Payer: MEDICARE

## 2025-06-26 VITALS — HEIGHT: 66 IN | WEIGHT: 161 LBS | BODY MASS INDEX: 25.88 KG/M2

## 2025-06-26 DIAGNOSIS — J31.0 CHRONIC RHINITIS: ICD-10-CM

## 2025-06-26 DIAGNOSIS — G47.33 OSA TREATED WITH BIPAP: Primary | ICD-10-CM

## 2025-06-26 ASSESSMENT — FIBROSIS 4 INDEX: FIB4 SCORE: 1.43

## 2025-06-26 NOTE — ASSESSMENT & PLAN NOTE
Symptoms are affecting CPAP use.  --Advised sinus hygiene with saline nasal rinses followed by [facility]

## 2025-06-26 NOTE — ASSESSMENT & PLAN NOTE
Sleep Apnea:    The pathophysiology of sleep anea and the increased risk of cardiovascular morbidity from untreated sleep apnea is discussed in detail with the patient.  Urged to avoid supine sleep, weight gain and alcoholic beverages since all of these can worsen sleep apnea. Cautioned against drowsy driving. If feeling sleepy while driving, pull over for a break/nap, rather than persist on the road, in the interest of own safety and that of others on the road.  The risks of untreated sleep apnea were discussed with the patient at length. Patients with sleep apnea are at increased risk of cardiovascular disease including coronary artery disease, systemic arterial hypertension, pulmonary arterial hypertension, cardiac arrythmias, and stroke.  Positive airway pressure will favorably impact many of the adverse conditions and effects provoked by sleep apnea.    Plan:    Compliance download was reviewed and discussed with the patient.  Patient is compliant AHI is well-controlled.  Will see the patient back in 1 year for annual compliance.      - Order placed for mask and supplies to [facility]  - Compliance was reinforced  - Clean supplies a least once a week with dish soap and water and air dry  - Recommended the patient against the use of Ozone , such as SoClean  - Recommended the patient change out supplies as recommended for best mask fit and usage of the machine  - Equipment replacement schedule:  Mask cushion every month  Nasal pillows 2 times per month  Mask every 6 months  Head gear every 6 months  Tubing every 3 months  Ultra-fine filters 2 times per month  Foam filter every 6 months  Humidifier chamber every 6 months  Chin strap every 6 months    Has been advised to continue the current BiPAP, clean equipment frequently, and get new mask and supplies as allowed by insurance and DME. Recommend an earlier appointment, if significant treatment barriers develop.    Advised patient to reach out via [employer]  if any questions or concerns should arise.

## 2025-06-26 NOTE — PROGRESS NOTES
[facility] Follow-up Telemedicine Visit   This visit was conducted via Zoom using secure and encrypted videoconferencing technology. The patient was in a private location in the [facility]. The patient's identity was confirmed and verbal consent was obtained for this virtual visit. Given the importance of social distancing and other strategies recommended to reduce the risk of COVID-19 transmission, I am providing medical care to this patient via audio/video visit in place of an in person visit at the request of the patient. Verbal consent to telehealth, risks, benefits, and consequences were discussed. Patient retains the right to withdraw at any time. All existing confidentiality protections apply. The patient has access to all transmitted medical information. No dissemination of any patient images or information to other entities without further written consent.    Place of Service: [facility]    Subjective:     Date of Visit: 6/26/2025     CC:   Follow-up for IVETTE management      HPI:  Allison Cespedes is a very pleasant 74 y.o. year old female former smoker (30 pack-years, quit in 1996), with a PMHx of emphysema, nocturnal hypoxia A-fib s/p Watchman, arthritis who [facility]. Last seen in the office on 5/28/2025 with Dr. Marquis.     Patient presents for compliance.  Patient states that her usage on BiPAP machine is going good and that she got cold and was not using it and subsequently lost part of her mask.  She then went [facility] and they gave her the wrong part.  They then gave her the correct part and suggested that she try new mask.  She does have a new mask, which she states she likes better but is having significant allergies and nasal congestion which made usage difficult.  She states that she still finds her sleep restful and experiences a dry mouth that is alleviated with XyliMelts.  She will have an occasional mask leak.  She denies any significant  morning headaches, daytime drowsiness, drowsiness while driving, issues falling asleep, snoring, gasping, apneas, palpitations.  Patient will sleep on average 5.5 hours and will occasionally nap for 1.5 hours but will not have any awakenings.    DME provider: [facility]  Device: ResMed air curve 10  Settings auto BiPAP Max IPAP 17 CWP, Min EPAP 12 CWP, PS 4 CWP  Oxygen:  None  Mask: FFM  Chin strap: No     Cleaning regimen: 2-3 times a week with soap and water    Compliance:  Compliance data reviewed showing 70% usage > 4hours in last 30 days. Average AHI 1.8 events/hour. 95% leaks 28.9 L/min.     Sleep History:  PSG 12/8/2024-          ROS:  Constitutional: Denies fever, chills, sweats,  weight loss, fatigue  Cardiovascular: Denies chest pain, tightness, palpitations, swelling in legs/feet  Respiratory: Denies shortness of breath, cough, sputum, wheezing, painful breathing   Sleep: per HPI  Gastrointestinal: Denies  difficulty swallowing, nausea, abdominal pain, diarrhea, constipation, heartburn.  Musculoskeletal: Denies painful joints, sore muscles,     Patient Active Problem List    Diagnosis Date Noted    IVETTE treated with BiPAP 10/16/2018    Chronic rhinitis 11/06/2024    Centrilobular emphysema (HCC) 08/25/2022    BMI 25.0-25.9,adult 05/09/2025    Gastroesophageal reflux disease without esophagitis 11/06/2024    Atrial fibrillation (HCC) [I48.91] 03/08/2019    Mixed hyperlipidemia 08/30/2022    Left wrist pain 01/18/2022    Presence of Watchman left atrial appendage closure device 04/04/2019    Arthritis 11/20/2018    Chronic anticoagulation 11/20/2018    Hypothyroidism 10/16/2018    SOB (shortness of breath) 09/05/2018       Past Medical History[1]     Past Surgical History[2]    Family History   Problem Relation Age of Onset    Non-contributory Mother     Hypertension Mother     Cancer Mother         lung cancer    Non-contributory Father     Heart Disease Sister         MI, stent    Drug abuse Sister   "      Social History     Socioeconomic History    Marital status:      Spouse name: Not on file    Number of children: Not on file    Years of education: Not on file    Highest education level: Not on file   Occupational History    Not on file   Tobacco Use    Smoking status: Former     Current packs/day: 0.00     Average packs/day: 2.0 packs/day for 30.0 years (60.0 ttl pk-yrs)     Types: Cigarettes     Start date: 2/3/1966     Quit date: 2/3/1996     Years since quittin.4    Smokeless tobacco: Never    Tobacco comments:     continued abstinance   Vaping Use    Vaping status: Never Used   Substance and Sexual Activity    Alcohol use: Not Currently    Drug use: No    Sexual activity: Not on file   Other Topics Concern    Not on file   Social History Narrative    Not on file     Social Drivers of Health     Financial Resource Strain: Not on file   Food Insecurity: Not on file   Transportation Needs: Not on file   Physical Activity: Not on file   Stress: Not on file   Social Connections: Not on file   Intimate Partner Violence: Not on file   Housing Stability: Not on file       Current Medications[3]     ALLERGIES: Tizanidine and Pcn [penicillins]     Objective:   Vitals obtained by patient:  Ht 1.676 m (5' 6\")   Wt 73 kg (161 lb)   BMI 25.99 kg/m²     PHYSICAL EXAM: Limited due to telemedicine visit     Constitutional: Alert, no distress, well-groomed.  Skin: No rashes in visible areas.  Eye: Round. Conjunctiva clear, lids normal. No icterus.   ENMT: Lips pink without lesions, good dentition, moist mucous membranes. Phonation normal.  Neck: No masses, no thyromegaly. Moves freely without pain.  CV: Pulse as reported by patient  Respiratory: Unlabored respiratory effort, no cough or audible wheeze  Psych: Alert and oriented x3, normal affect and mood.       Assessment and Plan:     The medical record was reviewed.    Diagnostic and titration nocturnal polysomnogram's, home sleep apnea tests, continuous " nocturnal oximetry results, multiple sleep latency tests, and compliance reports reviewed.    Problem List Items Addressed This Visit          Pulmonary/Sleep Medicine Problems    IVETTE treated with BiPAP - Primary    Sleep Apnea:    The pathophysiology of sleep anea and the increased risk of cardiovascular morbidity from untreated sleep apnea is discussed in detail with the patient.  Urged to avoid supine sleep, weight gain and alcoholic beverages since all of these can worsen sleep apnea. Cautioned against drowsy driving. If feeling sleepy while driving, pull over for a break/nap, rather than persist on the road, in the interest of own safety and that of others on the road.  The risks of untreated sleep apnea were discussed with the patient at length. Patients with sleep apnea are at increased risk of cardiovascular disease including coronary artery disease, systemic arterial hypertension, pulmonary arterial hypertension, cardiac arrythmias, and stroke.  Positive airway pressure will favorably impact many of the adverse conditions and effects provoked by sleep apnea.    Plan:    Compliance download was reviewed and discussed with the patient.  Patient is compliant AHI is well-controlled.  Will see the patient back in 1 year for annual compliance.      - Order placed for mask and supplies to [facility]  - Compliance was reinforced  - Clean supplies a least once a week with dish soap and water and air dry  - Recommended the patient against the use of Ozone , such as SoClean  - Recommended the patient change out supplies as recommended for best mask fit and usage of the machine  - Equipment replacement schedule:  Mask cushion every month  Nasal pillows 2 times per month  Mask every 6 months  Head gear every 6 months  Tubing every 3 months  Ultra-fine filters 2 times per month  Foam filter every 6 months  Humidifier chamber every 6 months  Chin strap every 6 months    Has been advised to continue the current  BiPAP, clean equipment frequently, and get new mask and supplies as allowed by insurance and DME. Recommend an earlier appointment, if significant treatment barriers develop.    Advised patient to reach out via [employer] if any questions or concerns should arise.              Relevant Orders    Discontinue DME    DME Mask and Supplies       Other    Chronic rhinitis    Symptoms are affecting CPAP use.  --Advised sinus hygiene with saline nasal rinses followed by Radha         BMI 25.0-25.9,adult       Return in about 1 year (around 6/26/2026), or if symptoms worsen or fail to improve, for compliance, with Thomas.    Face to Face Video Visit:   Please note portions of this record was created using voice recognition software. I have made every reasonable attempt to correct obvious errors, but I expect that there are errors of grammar and possibly content I did not discover before finalizing the note.    Time spent in record review prior to patient arrival, reviewing results, and in face-to-face encounter totaled 20 min.  __________  EMILY August  Pulmonary & Sleep Medicine  [facility]               [1]   Past Medical History:  Diagnosis Date    Anemia     Arthritis     Fingers, hands, toes, feet (osteoarthritis)    Asthma     Inhalers daily    Atrial fibrillation (HCC) 10/2018    New onset in office. March 2019: Status post Watchman procedure. September 2022: Echocardiogram with normal LV size, LVEF 65%. Normal RA, LA and RV. Trace MR, mild AR, trace TR.    Breath shortness     Uses 2L oxygen at night ([employer])     Cataract     Bilateral IOL    Chronic anticoagulation     COPD (chronic obstructive pulmonary disease) (HCC)     GERD (gastroesophageal reflux disease)     Gynecological disorder     Postmenopausal bleeding right now     Hyperlipidemia     Hypothyroidism     IVETTE (obstructive sleep apnea)     Uses BiPAP, followed by pulmonology.    Pneumonia 1989    Psychiatric problem     Depression, anxiety     Shortness of breath     Snoring     Tuberculosis 1981    Treated for nine months    Wheezing    [2]   Past Surgical History:  Procedure Laterality Date    PB OPEN RX DISTAL RADIUS FX, INTRA-ARTICULAR* Left 01/21/2022    Procedure: OPEN REDUCTION AND INTERNAL FIXATION DISTAL RADIUS;  Surgeon: Eric Kay M.D.;  Location: [facility];  Service: Orthopedics    PB OPEN TREATMENT PBOX HUMERAL FRACTURE Left 01/21/2022    Procedure: OPEN REDUCTION AND INTERNAL FIXATION PROXIMAL HUMERUS FRACTURE WITH FIBULA  ALLOGRAFT;  Surgeon: Eric Kay M.D.;  Location: [facility];  Service: Orthopedics    FINGER ARTHROPLASTY Left 10/16/2020    Procedure: ARTHROPLASTY, FINGER - THUMB CARPOMETACARPAL EXCISIONAL;  Surgeon: Fidel Kay M.D.;  Location: SURGERY SAME DAY [facility];  Service: Orthopedics    TENDON TRANSFER Left 10/16/2020    Procedure: TRANSFER, TENDON - FOR FLEXOR CARPI RADIALIS TENDON GRAFT;  Surgeon: Fidel Kay M.D.;  Location: SURGERY SAME DAY [facility];  Service: Orthopedics    MASS EXCISION GENERAL Left 07/06/2018    Procedure: MASS EXCISION GENERAL/ SUBCUTANEOUS MASS LEFT SHOULDER;  Surgeon: Swapna Rivas M.D.;  Location: SURGERY SAME DAY [facility];  Service: General    OTHER CARDIAC SURGERY  2018    Watchman implanted    CHOLECYSTECTOMY  1994    GYN SURGERY  1975    Fibroid tumor    APPENDECTOMY  1960    Ruptured    ARTHROSCOPY, KNEE      MAMMOPLASTY AUGMENTATION      MN BREAST AUGMENTATION WITH IMPLANT      MN REMV 2ND CATARACT,CORN-SCLER SECTN     [3]   Current Outpatient Medications   Medication Sig Dispense Refill    SYNTHROID 75 MCG Tab Take 1 Tablet by mouth every morning on an empty stomach. 90 Tablet 3    progesterone (PROMETRIUM) 100 MG Cap       flecainide (TAMBOCOR) 50 MG tablet Take 1 Tablet by mouth 2 times a day. 200 Tablet 3    lovastatin (MEVACOR) 40 MG tablet Take 1 Tablet by mouth every evening. 100 Tablet  3    metoprolol SR (TOPROL XL) 25 MG TABLET SR 24 HR Take 0.5 Tablets by mouth every day. 50 Tablet 3    benzonatate (TESSALON) 100 MG Cap Take 1 Capsule by mouth 3 times a day as needed for Cough. 30 Capsule 0    buPROPion (WELLBUTRIN XL) 150 MG XL tablet TAKE 3 TABLETS EVERY  Tablet 3    valacyclovir (VALTREX) 1 GM Tab TAKE 1 TABLET TWICE DAILY 160 Tablet 0    CYTOMEL 5 MCG Tab Take 1.5 Tablets by mouth 2 times a day. 390 Tablet 3    escitalopram (LEXAPRO) 20 MG tablet TAKE 1 TABLET EVERY DAY 90 Tablet 3    dicyclomine (BENTYL) 10 MG Cap Take 1 Capsule by mouth 2 times a day. 180 Capsule 3    VENTOLIN  (90 Base) MCG/ACT Aero Soln inhalation aerosol Inhale 2 Puffs every 6 hours as needed for Shortness of Breath. 1 Each 3    propranolol (INDERAL) 20 MG Tab TAKE 1 TABLET BY MOUTH EVERY DAY AS NEEDED FOR PERFORMANCE ANXIETY 90 Tablet 0    diclofenac DR (VOLTAREN) 75 MG Tablet Delayed Response Take 75 mg by mouth 2 times a day.      triamcinolone acetonide (ORALONE) 0.1 % Paste APPLY TO THE AFFECTED AREA AFTER MEALS AND BEFORE BEDTIME      fluticasone (FLONASE) 50 MCG/ACT nasal spray Administer 1 Spray into affected nostril(S) every day.      gabapentin (NEURONTIN) 100 MG Cap TAKE 1 CAPSULE BY MOUTH THREE TIMES DAILY FOR 3 DAYS THEN 2 CAPSULE BY MOUTH THREE TIMES DAILY FOR 3 DAYS THEN 3 CAPSULE BY MOUTH THREE TIMES DAILY (Patient taking differently: Take 100 mg by mouth 2 times a day. TAKE 1 CAPSULE BY MOUTH THREE TIMES DAILY FOR 3 DAYS THEN 2 CAPSULE BY MOUTH THREE TIMES DAILY FOR 3 DAYS THEN 3 CAPSULE BY MOUTH THREE TIMES DAILY) 100 capsule 1    vitamin D (CHOLECALCIFEROL) 1000 Unit (25 mcg) Tab Take 2,000 Units by mouth every day.      medroxyPROGESTERone (PROVERA) 2.5 MG Tab Take 2.5 mg by mouth every day.      LORazepam (ATIVAN) 1 MG Tab Take 1 mg by mouth at bedtime as needed.  0    Estradiol 0.025 MG/24HR PATCH BIWEEKLY       aspirin EC 81 MG EC tablet Take 1 Tab by mouth every day. 30 Tab 6     HYDROcodone-acetaminophen (NORCO) 5-325 MG Tab per tablet Take 1 Tab by mouth 2 times a day as needed.       No current facility-administered medications for this visit.

## 2025-06-30 ENCOUNTER — HOSPITAL ENCOUNTER (OUTPATIENT)
Dept: RADIOLOGY | Facility: MEDICAL CENTER | Age: 75
End: 2025-06-30
Attending: NURSE PRACTITIONER
Payer: MEDICARE

## 2025-06-30 ENCOUNTER — RESULTS FOLLOW-UP (OUTPATIENT)
Dept: CARDIOLOGY | Facility: MEDICAL CENTER | Age: 75
End: 2025-06-30
Payer: MEDICARE

## 2025-06-30 DIAGNOSIS — M79.601 PAIN IN BOTH UPPER EXTREMITIES: ICD-10-CM

## 2025-06-30 DIAGNOSIS — M79.602 PAIN IN BOTH UPPER EXTREMITIES: ICD-10-CM

## 2025-06-30 DIAGNOSIS — R20.0 ARM NUMBNESS: ICD-10-CM

## 2025-06-30 DIAGNOSIS — R25.1 TREMOR: ICD-10-CM

## 2025-06-30 PROCEDURE — 72146 MRI CHEST SPINE W/O DYE: CPT

## 2025-06-30 PROCEDURE — 72141 MRI NECK SPINE W/O DYE: CPT

## 2025-07-17 ENCOUNTER — TELEPHONE (OUTPATIENT)
Dept: ENDOCRINOLOGY | Facility: MEDICAL CENTER | Age: 75
End: 2025-07-17
Payer: MEDICARE

## 2025-07-22 ENCOUNTER — PATIENT MESSAGE (OUTPATIENT)
Dept: ENDOCRINOLOGY | Facility: MEDICAL CENTER | Age: 75
End: 2025-07-22
Payer: MEDICARE

## 2025-07-23 ENCOUNTER — APPOINTMENT (OUTPATIENT)
Dept: ENDOCRINOLOGY | Facility: MEDICAL CENTER | Age: 75
End: 2025-07-23
Payer: MEDICARE

## 2025-07-23 NOTE — PROGRESS NOTES
Chief Complaint: Consult requested by Zoie Spence P.A.-C. for evaluation of Hypothyroidism    HPI:     Allison Cespedes is a 73 y.o. female with history of Hypothyroidism diagnosed 20 years ago and is here for initial evaluation.  She has been going through a virus. She was recently diagnosed with sleep apnea and will be BiPAP. She is in the grieving stages of dealing with her husbands death.     Hypothyroidism  She reports the following symptoms:fatigue- same  feeling slow-same, and losing hair-better      She denies feeling cold and cold intolerance, constipation, swelling, anxiousness, feeling excessive energy, tremulousness, palpitations, sweating, and weight loss.      She reports a family history of thyroid disease    She is currently on synthroid 100 mcg 6 days 50 mcg one day daily and liothyronine 7.5 mcg twice daily which has been her thyroid hormone dose since 2 months        She reports Good compliance and takes her thyroid hormone daily before breakfast.      She reports taking any antacids. She states she doesn't wait 4 hrs prior to taking her antacids.       Latest Reference Range & Units 05/12/25 07:06   TSH 0.380 - 5.330 uIU/mL 0.060 (L)   Free T-4 0.93 - 1.70 ng/dL 1.63   T3,Free 2.00 - 4.40 pg/mL 4.36      Latest Reference Range & Units 01/21/25 09:20   TSH 0.350 - 5.500 uIU/mL 0.269 (L)   Free T-4 0.93 - 1.70 ng/dL 1.30   T3,Free 2.00 - 4.40 pg/mL 3.53       2. Hashimoto's   Latest Reference Range & Units 06/12/24 15:21   Microsomal -Tpo- Abs 0.0 - 9.0 IU/mL 326.0 (H)     Patient's medications, allergies, and social histories were reviewed and updated as appropriate.      ROS:     CONS:     No fever, no chills, no weight loss, no fatigue   EYES:      No diplopia, no blurry vision, no redness of eyes, no swelling of eyelids   ENT:    No hearing loss, No ear pain, No sore throat, no dysphagia, no neck swelling   CV:     No chest pain, no palpitations, no claudication, no orthopnea, no  PND   PULM:    No SOB, no cough, no hemoptysis, no wheezing    GI:   No nausea, no vomiting, no diarrhea, no constipation, no bloody stools   :  Passing urine well, no dysuria, no hematuria   ENDO:   No polyuria, no polydipsia, no heat intolerance, no cold intolerance   NEURO: No headaches, no dizziness, no convulsions, no tremors   MUSC:  No joint swellings, no arthralgias, no myalgias, no weakness   SKIN:   No rash, no ulcers, no dry skin   PSYCH:   No depression, no anxiety, no difficulty sleeping       Past Medical History:  Patient Active Problem List    Diagnosis Date Noted    BMI 25.0-25.9,adult 05/09/2025    Gastroesophageal reflux disease without esophagitis 11/06/2024    Chronic rhinitis 11/06/2024    Mixed hyperlipidemia 08/30/2022    Centrilobular emphysema (HCC) 08/25/2022    Left wrist pain 01/18/2022    Presence of Watchman left atrial appendage closure device 04/04/2019    Atrial fibrillation (HCC) [I48.91] 03/08/2019    Arthritis 11/20/2018    Chronic anticoagulation 11/20/2018    IVETTE treated with BiPAP 10/16/2018    Hypothyroidism 10/16/2018    SOB (shortness of breath) 09/05/2018       Past Surgical History:  Past Surgical History:   Procedure Laterality Date    PB OPEN RX DISTAL RADIUS FX, INTRA-ARTICULAR* Left 01/21/2022    Procedure: OPEN REDUCTION AND INTERNAL FIXATION DISTAL RADIUS;  Surgeon: Eric Kay M.D.;  Location: [facility];  Service: Orthopedics    PB OPEN TREATMENT PBOX HUMERAL FRACTURE Left 01/21/2022    Procedure: OPEN REDUCTION AND INTERNAL FIXATION PROXIMAL HUMERUS FRACTURE WITH FIBULA  ALLOGRAFT;  Surgeon: Eric Kay M.D.;  Location: [facility];  Service: Orthopedics    FINGER ARTHROPLASTY Left 10/16/2020    Procedure: ARTHROPLASTY, FINGER - THUMB CARPOMETACARPAL EXCISIONAL;  Surgeon: Fidel Kay M.D.;  Location: SURGERY SAME DAY [facility];  Service: Orthopedics    TENDON TRANSFER Left  10/16/2020    Procedure: TRANSFER, TENDON - FOR FLEXOR CARPI RADIALIS TENDON GRAFT;  Surgeon: Fidel Kay M.D.;  Location: SURGERY SAME DAY [facility];  Service: Orthopedics    MASS EXCISION GENERAL Left 07/06/2018    Procedure: MASS EXCISION GENERAL/ SUBCUTANEOUS MASS LEFT SHOULDER;  Surgeon: Swapna Rivas M.D.;  Location: SURGERY SAME DAY [facility];  Service: General    OTHER CARDIAC SURGERY  2018    Watchman implanted    CHOLECYSTECTOMY  1994    GYN SURGERY  1975    Fibroid tumor    APPENDECTOMY  1960    Ruptured    ARTHROSCOPY, KNEE      MAMMOPLASTY AUGMENTATION      VT BREAST AUGMENTATION WITH IMPLANT      VT REMV 2ND CATARACT,CORN-SCLER SECTN          Allergies:  Tizanidine and Pcn [penicillins]     Current Medications:    Current Outpatient Medications:     SYNTHROID 75 MCG Tab, Take 1 Tablet by mouth every morning on an empty stomach., Disp: 90 Tablet, Rfl: 3    progesterone (PROMETRIUM) 100 MG Cap, , Disp: , Rfl:     flecainide (TAMBOCOR) 50 MG tablet, Take 1 Tablet by mouth 2 times a day., Disp: 200 Tablet, Rfl: 3    lovastatin (MEVACOR) 40 MG tablet, Take 1 Tablet by mouth every evening., Disp: 100 Tablet, Rfl: 3    metoprolol SR (TOPROL XL) 25 MG TABLET SR 24 HR, Take 0.5 Tablets by mouth every day., Disp: 50 Tablet, Rfl: 3    benzonatate (TESSALON) 100 MG Cap, Take 1 Capsule by mouth 3 times a day as needed for Cough., Disp: 30 Capsule, Rfl: 0    buPROPion (WELLBUTRIN XL) 150 MG XL tablet, TAKE 3 TABLETS EVERY DAY, Disp: 270 Tablet, Rfl: 3    valacyclovir (VALTREX) 1 GM Tab, TAKE 1 TABLET TWICE DAILY, Disp: 160 Tablet, Rfl: 0    CYTOMEL 5 MCG Tab, Take 1.5 Tablets by mouth 2 times a day., Disp: 390 Tablet, Rfl: 3    escitalopram (LEXAPRO) 20 MG tablet, TAKE 1 TABLET EVERY DAY, Disp: 90 Tablet, Rfl: 3    dicyclomine (BENTYL) 10 MG Cap, Take 1 Capsule by mouth 2 times a day., Disp: 180 Capsule, Rfl: 3    VENTOLIN  (90 Base) MCG/ACT Aero Soln inhalation aerosol, Inhale 2 Puffs every 6  hours as needed for Shortness of Breath., Disp: 1 Each, Rfl: 3    propranolol (INDERAL) 20 MG Tab, TAKE 1 TABLET BY MOUTH EVERY DAY AS NEEDED FOR PERFORMANCE ANXIETY, Disp: 90 Tablet, Rfl: 0    diclofenac DR (VOLTAREN) 75 MG Tablet Delayed Response, Take 75 mg by mouth 2 times a day., Disp: , Rfl:     triamcinolone acetonide (ORALONE) 0.1 % Paste, APPLY TO THE AFFECTED AREA AFTER MEALS AND BEFORE BEDTIME, Disp: , Rfl:     fluticasone (FLONASE) 50 MCG/ACT nasal spray, Administer 1 Spray into affected nostril(S) every day., Disp: , Rfl:     gabapentin (NEURONTIN) 100 MG Cap, TAKE 1 CAPSULE BY MOUTH THREE TIMES DAILY FOR 3 DAYS THEN 2 CAPSULE BY MOUTH THREE TIMES DAILY FOR 3 DAYS THEN 3 CAPSULE BY MOUTH THREE TIMES DAILY (Patient taking differently: Take 100 mg by mouth 2 times a day. TAKE 1 CAPSULE BY MOUTH THREE TIMES DAILY FOR 3 DAYS THEN 2 CAPSULE BY MOUTH THREE TIMES DAILY FOR 3 DAYS THEN 3 CAPSULE BY MOUTH THREE TIMES DAILY), Disp: 100 capsule, Rfl: 1    vitamin D (CHOLECALCIFEROL) 1000 Unit (25 mcg) Tab, Take 2,000 Units by mouth every day., Disp: , Rfl:     medroxyPROGESTERone (PROVERA) 2.5 MG Tab, Take 2.5 mg by mouth every day., Disp: , Rfl:     LORazepam (ATIVAN) 1 MG Tab, Take 1 mg by mouth at bedtime as needed., Disp: , Rfl: 0    Estradiol 0.025 MG/24HR PATCH BIWEEKLY, , Disp: , Rfl:     aspirin EC 81 MG EC tablet, Take 1 Tab by mouth every day., Disp: 30 Tab, Rfl: 6    HYDROcodone-acetaminophen (NORCO) 5-325 MG Tab per tablet, Take 1 Tab by mouth 2 times a day as needed., Disp: , Rfl:     Social History:  Social History     Socioeconomic History    Marital status:      Spouse name: Not on file    Number of children: Not on file    Years of education: Not on file    Highest education level: Not on file   Occupational History    Not on file   Tobacco Use    Smoking status: Former     Current packs/day: 0.00     Average packs/day: 2.0 packs/day for 30.0 years (60.0 ttl pk-yrs)     Types: Cigarettes      Start date: 2/3/1966     Quit date: 2/3/1996     Years since quittin.4    Smokeless tobacco: Never    Tobacco comments:     continued abstinance   Vaping Use    Vaping status: Never Used   Substance and Sexual Activity    Alcohol use: Not Currently    Drug use: No    Sexual activity: Not on file   Other Topics Concern    Not on file   Social History Narrative    Not on file     Social Drivers of Health     Financial Resource Strain: Not on file   Food Insecurity: Not on file   Transportation Needs: Not on file   Physical Activity: Not on file   Stress: Not on file   Social Connections: Not on file   Intimate Partner Violence: Not on file   Housing Stability: Not on file        Family History:   Family History   Problem Relation Age of Onset    Non-contributory Mother     Hypertension Mother     Cancer Mother         lung cancer    Non-contributory Father     Heart Disease Sister         MI, stent    Drug abuse Sister          PHYSICAL EXAM:   Vital signs: There were no vitals taken for this visit.  GENERAL: Well-developed, well-nourished  in no apparent distress.   EYE: No ocular and eyelid asymmetry, Anicteric sclerae,  PERRL  HENT: Hearing grossly intact, Normocephalic, atraumatic. Pink, moist mucous membranes, No exudate  NECK: Supple. Trachea midline. thyroid is normal in size without nodules or tenderness  CARDIOVASCULAR: Regular rate and rhythm. No murmurs, rubs, or gallops.   LUNGS: Clear to auscultation bilaterally   ABDOMEN: Soft, nontender with positive bowel sounds.   EXTREMITIES: No clubbing, cyanosis, or edema.   NEUROLOGICAL: Cranial nerves II-XII are grossly intact   Symmetric reflexes at the patella no proximal muscle weakness  LYMPH: No cervical, supraclavicular,  adenopathy palpated.   SKIN: No rashes, lesions. Turgor is normal.    Labs:  Lab Results   Component Value Date/Time    WBC 8.5 2025 07:06 AM    RBC 4.46 2025 07:06 AM    HEMOGLOBIN 13.9 2025 07:06 AM    MCV 99.1  "(H) 2025 07:06 AM    MCH 31.2 2025 07:06 AM    MCHC 31.4 (L) 2025 07:06 AM    RDW 48.6 2025 07:06 AM    MPV 11.4 2025 07:06 AM       Lab Results   Component Value Date/Time    SODIUM 143 2025 07:06 AM    POTASSIUM 4.4 2025 07:06 AM    CHLORIDE 108 2025 07:06 AM    CO2 24 2025 07:06 AM    ANION 11.0 2025 07:06 AM    GLUCOSE 99 2025 07:06 AM    BUN 17 2025 07:06 AM    CREATININE 0.84 2025 07:06 AM    CALCIUM 9.6 2025 07:06 AM    ASTSGOT 23 2025 07:06 AM    ALTSGPT 19 2025 07:06 AM    TBILIRUBIN 0.3 2025 07:06 AM    ALBUMIN 4.2 2025 07:06 AM    TOTPROTEIN 6.8 2025 07:06 AM    GLOBULIN 2.6 2025 07:06 AM    AGRATIO 1.6 2025 07:06 AM       Lab Results   Component Value Date/Time    CHOLSTRLTOT 194 2024 0807    TRIGLYCERIDE 191 (H) 2024 0807    HDL 50 2024 0807     (H) 2024 0807       Lab Results   Component Value Date/Time    TSHULTRASEN 8.900 (H) 2024 0807     No results found for: \"FREET4\"  No results found for: \"FREET3\"  No results found for: \"THYSTIMIG\"    No results found for: \"MICROSOMALA\"      Imagin2024 2:13 PM     HISTORY/REASON FOR EXAM:  Difficulty swallowing, abnormal labs     TECHNIQUE/EXAM DESCRIPTION:  Ultrasound of the soft tissues of the head and neck.     COMPARISON:  None     FINDINGS:  The thyroid gland is heterogeneous.  Vascularity is normal.     The right lobe of the thyroid gland measures 1.28 cm x 3.43 cm x 1.09 cm.  The left lobe of the thyroid gland measures 0.84 cm x 3.62 cm x 1.03 cm.  The isthmus measures 0.13 cm.     Nodules >= 1cm:  None     In the left upper pole there is a 5 mm round nodule, very low suspicion           IMPRESSION:     No significant finding     ACR TI-RADS Recommendations    ASSESSMENT/PLAN:   1. Hypothyroidism, unspecified type  Unstable  TSH slightly suppressed at 0.269  Medication:  Synthroid 100 mcg " daily - change to synthroid 100 mcg 6 days 50 mcg one day  Liothyronine 7.5 mcg two times daily - continue  Patient will have blood work done this week and notify me via [employer] for review and dose adjustment.   Patient understands to take antacids 4 hrs apart from levothyroxine for better absorption of levothyroxine.   Patient understands to stop taking biotin 5 days prior to blood work.   US of thyroid rescheduled due to viral illness  - Comp Metabolic Panel; Future  - TSH; Future  - FREE THYROXINE; Future  - T3 FREE; Future    2. Hashimoto's disease  This is the etiology of her hypothyroidism    3. Dizziness  Stable  Followed by pcp       No follow-ups on file. Patient will have blood work done prior to follow up in 6 months.     Thank you kindly for allowing me to participate in the thyroid care plan for this patient.    Nima Navarro, APRN   1/22/25    CC:   Zoie Spence P.A.-C.

## (undated) DEVICE — ELECTRODE 850 FOAM ADHESIVE - HYDROGEL RADIOTRNSPRNT (50/PK)

## (undated) DEVICE — MANIFOLD NEPTUNE 1 PORT (20/PK)

## (undated) DEVICE — SPLINT PLASTER 3 IN X 15 IN - (50/BX 12BX/CA)

## (undated) DEVICE — SUTURE GENERAL

## (undated) DEVICE — CANISTER SUCTION RIGID RED 1500CC (40EA/CA)

## (undated) DEVICE — ELECTRODE DUAL RETURN W/ CORD - (50/PK)

## (undated) DEVICE — TUBING CLEARLINK DUO-VENT - C-FLO (48EA/CA)

## (undated) DEVICE — LACTATED RINGERS INJ 1000 ML - (14EA/CA 60CA/PF)

## (undated) DEVICE — SLEEVE, VASO, THIGH, MED

## (undated) DEVICE — KIT  I.V. START (100EA/CA)

## (undated) DEVICE — GLOVE BIOGEL SZ 6.5 SURGICAL PF LTX (50PR/BX 4BX/CA)

## (undated) DEVICE — SET EXTENSION WITH 2 PORTS (48EA/CA) ***PART #2C8610 IS A SUBSTITUTE*****

## (undated) DEVICE — BOVIE BLADE COATED &INSULATED - 25/PK

## (undated) DEVICE — SUTURE 4-0 MONOCRYL PLUS PS-2 - 27 INCH (36/BX)

## (undated) DEVICE — DRESSING TRANSPARENT FILM TEGADERM 2.375 X 2.75"  (100EA/BX)"

## (undated) DEVICE — PADDING CAST 4 IN STERILE - 4 X 4 YDS (24/CA)

## (undated) DEVICE — SUTURE 4-0 ETHILON FS-2 18 (36PK/BX)"

## (undated) DEVICE — TUBE E-T HI-LO CUFF 6.5MM (10EA/BX)

## (undated) DEVICE — SODIUM CHL IRRIGATION 0.9% 1000ML (12EA/CA)

## (undated) DEVICE — WATER IRRIGATION STERILE 1000ML (12EA/CA)

## (undated) DEVICE — SENSOR SPO2 NEO LNCS ADHESIVE (20/BX) SEE USER NOTES

## (undated) DEVICE — KIT ANESTHESIA W/CIRCUIT & 3/LT BAG W/FILTER (20EA/CA)

## (undated) DEVICE — CANISTER SUCTION 3000ML MECHANICAL FILTER AUTO SHUTOFF MEDI-VAC NONSTERILE LF DISP  (40EA/CA)

## (undated) DEVICE — SET LEADWIRE 5 LEAD BEDSIDE DISPOSABLE ECG (1SET OF 5/EA)

## (undated) DEVICE — DRAPE LAPAROTOMY T SHEET - (12EA/CA)

## (undated) DEVICE — CATHETER IV 20 GA X 1-1/4 ---SURG.& SDS ONLY--- (50EA/BX)

## (undated) DEVICE — HEAD HOLDER JUNIOR/ADULT

## (undated) DEVICE — SUCTION INSTRUMENT YANKAUER BULBOUS TIP W/O VENT (50EA/CA)

## (undated) DEVICE — PROTECTOR ULNA NERVE - (36PR/CA)

## (undated) DEVICE — TUBE CONNECTING SUCTION - CLEAR PLASTIC STERILE 72 IN (50EA/CA)

## (undated) DEVICE — SPONGE GAUZESTER. 2X2 4-PL - (2/PK 50PK/BX 30BX/CS)

## (undated) DEVICE — STOCKINET BIAS 4 IN STERILE - (20/CA)

## (undated) DEVICE — PACK MINOR BASIN - (2EA/CA)

## (undated) DEVICE — MASK ANESTHESIA ADULT  - (100/CA)

## (undated) DEVICE — TOURNIQUET CUFF 18 X 3 ONE PORT DISP - STERILE (10/BX)

## (undated) DEVICE — PACK LOWER EXTREMITY - (2/CA)

## (undated) DEVICE — GLOVE BIOGEL INDICATOR SZ 6.5 SURGICAL PF LTX - (50PR/BX 4BX/CA)

## (undated) DEVICE — SUTURE 3-0 VICRYL PLUS SH - 8X 18 INCH (12/BX)

## (undated) DEVICE — CLOSURE SKIN STRIP 1/2 X 4 IN - (STERI STRIP) (50/BX 4BX/CA)

## (undated) DEVICE — GOWN WARMING STANDARD FLEX - (30/CA)

## (undated) DEVICE — GLOVE BIOGEL PI ORTHO SZ 7.5 PF LF (40PR/BX)

## (undated) DEVICE — NEPTUNE 4 PORT MANIFOLD - (20/PK)

## (undated) DEVICE — CHLORAPREP 26 ML APPLICATOR - ORANGE TINT(25/CA)